# Patient Record
Sex: FEMALE | Race: WHITE | Employment: OTHER | URBAN - METROPOLITAN AREA
[De-identification: names, ages, dates, MRNs, and addresses within clinical notes are randomized per-mention and may not be internally consistent; named-entity substitution may affect disease eponyms.]

---

## 2017-05-09 ENCOUNTER — PATIENT OUTREACH (OUTPATIENT)
Dept: CASE MANAGEMENT | Age: 72
End: 2017-05-09

## 2017-05-09 NOTE — PROGRESS NOTES
Initial phone assessment completed with patient, separate call to daughter in law  Direct referral from PCP in conjunction with LPN for AWV    Patient lives alone - support through son and daughter in law  Patient with COPD - oxygen dependent   Continues to smoke - in the face of many please from family   Patient has been refusing GAMALIEL because she thinks she will not be able to smoke  Patient takes long list of medications - has trouble affording meds and also living   Refer now to SW to discuss medication assistance and possible GAMALIEL  Provided patient and daughter in law names for help in the home,  Patient says she could use help with light housekeeping, meal prep, driving to appointments, companionship    Plan - refer to SW   Follow up with SW

## 2017-05-15 ENCOUNTER — PATIENT OUTREACH (OUTPATIENT)
Dept: CASE MANAGEMENT | Age: 72
End: 2017-05-15

## 2017-05-15 NOTE — PROGRESS NOTES
(Late Entry) BYRON CM received referral from RN CM on 5/9 - attempted contact with patient's Rain VALDEZ by telephone, no answer and message left requesting call back on voice mail - will attempt second contact by 5/17 if no response has been received.

## 2017-05-16 ENCOUNTER — PATIENT OUTREACH (OUTPATIENT)
Dept: CASE MANAGEMENT | Age: 72
End: 2017-05-16

## 2017-05-17 ENCOUNTER — PATIENT OUTREACH (OUTPATIENT)
Dept: CASE MANAGEMENT | Age: 72
End: 2017-05-17

## 2017-05-17 NOTE — PROGRESS NOTES
Visit made today by this SW CM with RN CM to patient's home - Zhangdeyanira Maverickmiguel angel, also in attendance - discussed with patient importance of having a plan B in place if she was no longer able to live alone in her own apartment - patient states emphatically, at this point in time, she \"wants to remain in own home as long as possible\" - discussed assisted living as an option and patient's questions were answered and misconceptions about ALFs corrected - patient is open to talking about assisted living options and possibly touring some facilities so she will be more informed about this living option for the future - relayed that referral had been made to [address] [facility] with [facility] [employer] to come and speak with patient/DIL and son (if available) about ALFs, how fees are structured and various options availalble - SW will also provide DIL with transportation resource information available through [employer] - patient currently has a Medicare Advantage plan through [employer] - (provides all A & B coverage in addition to RX and dental coverage)  - SW to also provide DIL with contact link for [place] [facility] to speak to them and determine if a plan with more comprehensive RX coverage for what medications she is now on exists - additionally, SW will also look into drug  programs for assistance with/coverage of Entocort EC that patient cannot currently afford (will also compare them with coupon programs that are also available) - SW will continue to follow case and assist as needed.

## 2017-05-17 NOTE — PROGRESS NOTES
Home visit with patient and daughter-in-law (DIL), Aleksandr 240 also present - see her Progress Note  Patient lives in one level duplex - 1 step to enter the home  Patient lives independently, family lives nearby. Driving - could use intermittent assistance with transportation -  will provide information on this    Patient alert, well-engaged in conversation, mild [place]  As discussed with PCP prior to visit - [facility] to [facility] in [facility]    Discussion -  1-Patient unable to afford Entocort EC. She found that this worked for her when she was taking it. 2-Advanced directives:  Provided education on the importance of this  3-Nutrition - patient reports that she does not eat well  4-Falls:  Patient has had two falls that she made us aware of; has been able to call family when they occurred  5-Assisted Living - patient resistant to the idea; discussed this as a proactive \"Plan B,\"  Something best planned for when it isn't need. \"Plan for the worst, hope for the best.\"    Plan -   Provided team contact information, explained our role  Provided information re: Entocort EC discount coupons  Provided the following information to COURTNEY via email:  Sanjay Vitale is diagnosed with lymphocytic colitis. This link is from [address] [facility] [address] will tell you more than you want to know. [email or web address]    It is notable that tobacco irritates the intestinal lining causing diarrhea, cramps, bloating. Nicotine, as well can also cause nausea and stomach cramps. The other aspect of this is that smoking decreases the efficiency of food digestion, causing altered bowel movements.   If this information motivates Nirmala to stop smoking (doubtful from what you tell me), we will want to use a method of withdrawal (such as a patch) because direct withdrawal of nicotine can cause both constipation and diarrhea.    http://www.Thounds.com/. org/using-diet-changes. html  This link mentions that there is still research to be done on this type of colitis and how diet may affect it. It does, however, makes the suggestion for a gluten free diet. There are many gluten free options now in grocery stores. Whole foods are always preferable over processed foods. Easy to digest foods:  Applesauce, bananas (especially if having diarrhea), melons, rice (also will help with diarrhea). High fiber foods:  Well-cooked vegetables, beans        Probably raw greens will aggravate this condition for Nirmala  As suggested, Nirmala may want to avoid the ice cream related to the lactose  Drink plenty of water, avoid caffeine and sodas (because of the sugar)    In researching this problem, it seems like Nirmala should lean towards a bland diet. This is not as bad as it sounds. See this link:  Deidra.HireVue/the-bland-diet.html    (complete with a chart you can print out)  A lot of this is trial and error  patient will determine what works and what [facility].

## 2017-05-27 ENCOUNTER — PATIENT OUTREACH (OUTPATIENT)
Dept: CASE MANAGEMENT | Age: 72
End: 2017-05-27

## 2017-05-27 NOTE — PROGRESS NOTES
BYRON MARSHALL communicated, as requested by RN CM, with patient's daughter [address] [facility] re: names of three private pay agencies to provide assistance in patient's home, contact information for [employer] program through [employer] on aging, contact information to apply for caregiver's respite vouchers available through [employer] as well as access information for [employer] (Drug 's programs online) for assistance in obtaining Entocort EC/Budesonide - patient cannot afford RX at present time due to high co-pay - BYRON MARSHALL also received call back from [employer] on 5/26 - he stated that he had made contact with Norm Thompson (patient's daughter) and provided GAMALIEL overview and discussed possible options in ALFs availalble to patient, however,due to patient's very limited monthly income and no additional resources, is not a candidate for an GAMALIEL at this time - BYRON will contact daughter on 5/31 to discuss possible consideration of applying for Medicaid and CLTC services - current case status communicated to RN CM.

## 2017-05-29 ENCOUNTER — PATIENT OUTREACH (OUTPATIENT)
Dept: CASE MANAGEMENT | Age: 72
End: 2017-05-29

## 2017-05-29 NOTE — PROGRESS NOTES
Email message sent to patient's DIL reminding her to please let me know when patient gets an appointment with the [facility]

## 2017-06-05 ENCOUNTER — PATIENT OUTREACH (OUTPATIENT)
Dept: CASE MANAGEMENT | Age: 72
End: 2017-06-05

## 2017-06-05 NOTE — PROGRESS NOTES
Patient's DIL reports some improvement with patient  Still waiting on referral for [facility]  (i.e. Call from the [address])

## 2017-06-16 ENCOUNTER — PATIENT OUTREACH (OUTPATIENT)
Dept: CASE MANAGEMENT | Age: 72
End: 2017-06-16

## 2017-06-16 NOTE — PROGRESS NOTES
Follow up call with patient's DIL  Requested call back - trying to determine if they have heard from the [facility]

## 2017-06-19 ENCOUNTER — PATIENT OUTREACH (OUTPATIENT)
Dept: CASE MANAGEMENT | Age: 72
End: 2017-06-19

## 2017-06-19 NOTE — PROGRESS NOTES
Spoke briefly with patient  She is not aware of any call from the [facility] and doesn't feel that she needs the resources they provide. Patient is also adamant about postponing any efforts towards beginning to look at ALFs at present time. Will let patient's DIL know of conversation. Follow up call scheduled for 2 weeks.

## 2017-06-21 ENCOUNTER — PATIENT OUTREACH (OUTPATIENT)
Dept: CASE MANAGEMENT | Age: 72
End: 2017-06-21

## 2017-06-21 NOTE — PROGRESS NOTES
BYRON MARSHALL made telephone contact with patient's DIL, Denton Orestes, today - we discussed patient's currnt status (she states that patient's overall affect is \"happier\" and patient now has a better understanding of her Colitis, how she was feeling when she wasn't eating properly or paying attention to the flares that she would have - we also discussed long term possibilities (Patient's income appears to be too low but SW will confirm any possibilities for consideration with [employer] ([employer]) as DIL feels these still need to be considered as a \"Plan B\" option for the future - also interested in possibly applying for Medicaid/CLTC services in the future - BYRON will have to verify that patient is not over income and will contact DIL in one week for f/u - have apprised RN CM of call content.

## 2017-07-06 ENCOUNTER — PATIENT OUTREACH (OUTPATIENT)
Dept: CASE MANAGEMENT | Age: 72
End: 2017-07-06

## 2017-07-06 NOTE — PROGRESS NOTES
BYRON CM placed call to patient's DIL, Alisha De León (T# [phone]) to discuss patient's status and  intermediate-long term patient care needs in home and how they will be met - no answer - left message requesting c/b to this BYRON - RN ROLANDO on case informed of status.

## 2017-11-05 ENCOUNTER — PATIENT OUTREACH (OUTPATIENT)
Dept: CASE MANAGEMENT | Age: 72
End: 2017-11-05

## 2017-11-06 NOTE — PROGRESS NOTES
BYRON MARSHALL received email from COURTNEY, Patria Millan, requesting contact be made with patient re: changing insurance coverage - BYRON contacted COURTNEY and spoke with her concerning what changes she needed made - from information provided by COURTNEY sounded like sky was interested in changing her Part D plan - this SW provided contact information to COURTNEY for [employer]) through the [employer] and told her to contact now during the open enrollment period which ends on December 7th - information was also emailed to COURTNEY as well - COURTNEY also mentioned that patient has been asked by each of her two sisters to move in with them - she is presently resistant to to this idea - COURTNEY cocerned that patient will find her self in a position with few options available (she has limited monthly income and cannot afford GAMALIEL) - she speaks with son fairly regularly during the week but, per COURTNEY, their relationship is strained - this SW has offered to meet with patient and possibly other family members (DIL, Son) to pursue discussion of long term planning options -COURTNEY thanked this SW and will advise if needed.

## 2017-11-13 ENCOUNTER — PATIENT OUTREACH (OUTPATIENT)
Dept: CASE MANAGEMENT | Age: 72
End: 2017-11-13

## 2017-11-13 NOTE — PROGRESS NOTES
Clinical goals met as far as patient willing to engage  Patient with good family support  Family engaged with LISW

## 2018-01-31 PROBLEM — F32.5 DEPRESSION, MAJOR, IN REMISSION (HCC): Status: ACTIVE | Noted: 2018-01-31

## 2018-08-09 ENCOUNTER — HOSPITAL ENCOUNTER (OUTPATIENT)
Dept: MAMMOGRAPHY | Age: 73
Discharge: HOME | End: 2018-08-09
Payer: MEDICARE

## 2018-08-09 ENCOUNTER — HOME HEALTH ADMISSION (OUTPATIENT)
Dept: HOME HEALTH SERVICES | Facility: HOME HEALTH | Age: 73
End: 2018-08-09
Payer: MEDICARE

## 2018-08-09 DIAGNOSIS — Z12.31 ENCOUNTER FOR SCREENING MAMMOGRAM FOR MALIGNANT NEOPLASM OF BREAST: ICD-10-CM

## 2018-08-09 PROCEDURE — 77067 SCR MAMMO BI INCL CAD: CPT

## 2018-08-10 ENCOUNTER — PATIENT OUTREACH (OUTPATIENT)
Dept: CASE MANAGEMENT | Age: 73
End: 2018-08-10

## 2018-08-10 ENCOUNTER — HOME CARE VISIT (OUTPATIENT)
Dept: SCHEDULING | Facility: HOME HEALTH | Age: 73
End: 2018-08-10
Payer: MEDICARE

## 2018-08-10 PROCEDURE — G0151 HHCP-SERV OF PT,EA 15 MIN: HCPCS

## 2018-08-10 PROCEDURE — 400013 HH SOC

## 2018-08-10 NOTE — PROGRESS NOTES
Please let patient know her MMG showed : IMPRESSION: Increased focal asymmetry in the medial left breast.  Compression views are recommended. Darry Goodpasture will get in touch with her to schedule it.     Thanks,  Dr. Richard Gomez

## 2018-08-10 NOTE — PROGRESS NOTES
BYRON MARSHALL called to talk with pt about community resources that can help her better with transportation and around her home. Pt was having a very hard time hearing BYRON MARSHALL on the phone. BYRON MARSHALL asked pt if she would prefer a home visit over telephone interview and pt said she would much prefer a home visit. Pt asked if BYRON MARSHALL could come out Tuesday next week. BYRON MARSHALL let to know that would work well. PLAN:  BYRON MARSHALL will make a home visit Tues. Aug. 14th to help assess pt's needs and provide resources. This note will not be viewable in [address]

## 2018-08-12 VITALS
DIASTOLIC BLOOD PRESSURE: 80 MMHG | OXYGEN SATURATION: 94 % | RESPIRATION RATE: 17 BRPM | HEART RATE: 79 BPM | SYSTOLIC BLOOD PRESSURE: 124 MMHG

## 2018-08-14 ENCOUNTER — PATIENT OUTREACH (OUTPATIENT)
Dept: CASE MANAGEMENT | Age: 73
End: 2018-08-14

## 2018-08-14 ENCOUNTER — HOME CARE VISIT (OUTPATIENT)
Dept: SCHEDULING | Facility: HOME HEALTH | Age: 73
End: 2018-08-14
Payer: MEDICARE

## 2018-08-14 VITALS
RESPIRATION RATE: 20 BRPM | OXYGEN SATURATION: 97 % | HEART RATE: 76 BPM | TEMPERATURE: 99.5 F | SYSTOLIC BLOOD PRESSURE: 128 MMHG | DIASTOLIC BLOOD PRESSURE: 68 MMHG

## 2018-08-14 PROCEDURE — G0157 HHC PT ASSISTANT EA 15: HCPCS

## 2018-08-15 ENCOUNTER — HOME CARE VISIT (OUTPATIENT)
Dept: SCHEDULING | Facility: HOME HEALTH | Age: 73
End: 2018-08-15
Payer: MEDICARE

## 2018-08-15 PROCEDURE — G0155 HHCP-SVS OF CSW,EA 15 MIN: HCPCS

## 2018-08-16 ENCOUNTER — HOME CARE VISIT (OUTPATIENT)
Dept: SCHEDULING | Facility: HOME HEALTH | Age: 73
End: 2018-08-16
Payer: MEDICARE

## 2018-08-16 ENCOUNTER — PATIENT OUTREACH (OUTPATIENT)
Dept: CASE MANAGEMENT | Age: 73
End: 2018-08-16

## 2018-08-16 VITALS
OXYGEN SATURATION: 96 % | SYSTOLIC BLOOD PRESSURE: 138 MMHG | RESPIRATION RATE: 20 BRPM | TEMPERATURE: 97.8 F | DIASTOLIC BLOOD PRESSURE: 72 MMHG | HEART RATE: 76 BPM

## 2018-08-16 PROCEDURE — G0157 HHC PT ASSISTANT EA 15: HCPCS

## 2018-08-20 ENCOUNTER — PATIENT OUTREACH (OUTPATIENT)
Dept: CASE MANAGEMENT | Age: 73
End: 2018-08-20

## 2018-08-21 ENCOUNTER — HOME CARE VISIT (OUTPATIENT)
Dept: SCHEDULING | Facility: HOME HEALTH | Age: 73
End: 2018-08-21
Payer: MEDICARE

## 2018-08-21 ENCOUNTER — PATIENT OUTREACH (OUTPATIENT)
Dept: CASE MANAGEMENT | Age: 73
End: 2018-08-21

## 2018-08-21 VITALS
TEMPERATURE: 98.1 F | RESPIRATION RATE: 18 BRPM | SYSTOLIC BLOOD PRESSURE: 148 MMHG | HEART RATE: 72 BPM | OXYGEN SATURATION: 97 % | DIASTOLIC BLOOD PRESSURE: 66 MMHG

## 2018-08-21 PROCEDURE — G0157 HHC PT ASSISTANT EA 15: HCPCS

## 2018-08-21 NOTE — Clinical Note
Just an FYI,  I wasn't able to connect with this patient to do a home visit, but she's really connected with Nava  13.  and feels comfortable with her. I'm going to step out and just let the SW through home health assist the patient at this time. Please let me know if any new needs arise in the future.   [facility]

## 2018-08-21 NOTE — PROGRESS NOTES
Pt called BYRON MARSHALL back. Pt stated that she likes the SW who's coming out through [facility] right now and doesn't want to have SW CM coming out too. BYRON MARSHALL let pt know that was fine and that she could just work with the SW through [facility]. PLAN:  BYRON MARSHALL will remove herself from pt's care at this time. Pt is connected with SW through [facility]. This note will not be viewable in [address]

## 2018-08-22 ENCOUNTER — PATIENT OUTREACH (OUTPATIENT)
Dept: CASE MANAGEMENT | Age: 73
End: 2018-08-22

## 2018-08-22 ENCOUNTER — HOME CARE VISIT (OUTPATIENT)
Dept: SCHEDULING | Facility: HOME HEALTH | Age: 73
End: 2018-08-22
Payer: MEDICARE

## 2018-08-22 PROCEDURE — G0155 HHCP-SVS OF CSW,EA 15 MIN: HCPCS

## 2018-08-22 NOTE — PROGRESS NOTES
Medication Adherence Outreach    Date/Time of Call:  8/22/2018  9:40 am    Name of medication and dosage:   * Atorvastatin 80 mg 1 every day    Does the patient know the purpose and dosage of medication? * Yes    Are you getting a #30 day or #90 day supply of your medication? * 90 day supply    Are you still taking this medication? If not, why? * Yes    Transportation issues or any problems paying for the medication or other reason? * No    What pharmacy are you using to fill your medication and do you know the last time that you got your medication filled? * [facility]   Patient is currently transferring medication to [facility].   * Last refill 8/8/2018   Are you having any side effects from taking your medication? * No          Any other questions or concerns expressed by the patient. * No    Comments:     * Discussed medication adherence with Mrs. Bueno and she states she has no current barriers to prevent her from obtaining or taking her medication.        Call Completed By:  [address]

## 2018-08-23 ENCOUNTER — HOME CARE VISIT (OUTPATIENT)
Dept: HOME HEALTH SERVICES | Facility: HOME HEALTH | Age: 73
End: 2018-08-23
Payer: MEDICARE

## 2018-08-23 ENCOUNTER — HOME CARE VISIT (OUTPATIENT)
Dept: SCHEDULING | Facility: HOME HEALTH | Age: 73
End: 2018-08-23
Payer: MEDICARE

## 2018-08-23 PROCEDURE — G0157 HHC PT ASSISTANT EA 15: HCPCS

## 2018-08-24 VITALS
TEMPERATURE: 97.4 F | RESPIRATION RATE: 18 BRPM | SYSTOLIC BLOOD PRESSURE: 138 MMHG | OXYGEN SATURATION: 95 % | HEART RATE: 72 BPM | DIASTOLIC BLOOD PRESSURE: 72 MMHG

## 2018-08-28 ENCOUNTER — HOME CARE VISIT (OUTPATIENT)
Dept: SCHEDULING | Facility: HOME HEALTH | Age: 73
End: 2018-08-28
Payer: MEDICARE

## 2018-08-28 PROCEDURE — G0157 HHC PT ASSISTANT EA 15: HCPCS

## 2018-08-29 VITALS
TEMPERATURE: 97.8 F | OXYGEN SATURATION: 97 % | RESPIRATION RATE: 16 BRPM | SYSTOLIC BLOOD PRESSURE: 138 MMHG | DIASTOLIC BLOOD PRESSURE: 68 MMHG | HEART RATE: 78 BPM

## 2018-08-30 ENCOUNTER — HOME CARE VISIT (OUTPATIENT)
Dept: SCHEDULING | Facility: HOME HEALTH | Age: 73
End: 2018-08-30
Payer: MEDICARE

## 2018-08-30 VITALS
OXYGEN SATURATION: 97 % | DIASTOLIC BLOOD PRESSURE: 86 MMHG | TEMPERATURE: 98.1 F | HEART RATE: 66 BPM | SYSTOLIC BLOOD PRESSURE: 128 MMHG | RESPIRATION RATE: 18 BRPM

## 2018-08-30 PROCEDURE — G0157 HHC PT ASSISTANT EA 15: HCPCS

## 2018-09-03 ENCOUNTER — HOME CARE VISIT (OUTPATIENT)
Dept: SCHEDULING | Facility: HOME HEALTH | Age: 73
End: 2018-09-03
Payer: MEDICARE

## 2018-09-03 VITALS
SYSTOLIC BLOOD PRESSURE: 123 MMHG | DIASTOLIC BLOOD PRESSURE: 65 MMHG | RESPIRATION RATE: 17 BRPM | OXYGEN SATURATION: 98 % | HEART RATE: 78 BPM | TEMPERATURE: 98.1 F

## 2018-09-03 PROCEDURE — G0151 HHCP-SERV OF PT,EA 15 MIN: HCPCS

## 2018-09-13 ENCOUNTER — HOSPITAL ENCOUNTER (OUTPATIENT)
Dept: MAMMOGRAPHY | Age: 73
Discharge: HOME | End: 2018-09-13
Attending: FAMILY MEDICINE
Payer: MEDICARE

## 2018-09-13 DIAGNOSIS — R92.8 ABNORMAL SCREENING MAMMOGRAM: ICD-10-CM

## 2018-09-13 DIAGNOSIS — M89.9 BONE DISORDER: ICD-10-CM

## 2018-09-13 PROCEDURE — 77065 DX MAMMO INCL CAD UNI: CPT

## 2018-09-13 PROCEDURE — 77080 DXA BONE DENSITY AXIAL: CPT

## 2018-09-14 NOTE — PROGRESS NOTES
Please inform patient her Mammogram is abnormal .    Radiologist is recommending additional imaging evaluation. [facility] will get in touch with her to schedule it.     Thanks,  Dr. Sapp

## 2018-09-17 NOTE — PROGRESS NOTES
Please let patient know her Dexa showed : Impression:  Using the World Health Organization criteria, the bone mineral density is  borderline osteoporotic, slightly improved since prior. Continue Fosamax & Vit D as directed.     Thanks,  Dr. Dipak Mendoza

## 2019-04-23 ENCOUNTER — HOSPITAL ENCOUNTER (OUTPATIENT)
Dept: CT IMAGING | Age: 74
Discharge: HOME | End: 2019-04-23
Attending: INTERNAL MEDICINE
Payer: MEDICARE

## 2019-04-23 DIAGNOSIS — R63.4 WEIGHT LOSS: ICD-10-CM

## 2019-04-23 DIAGNOSIS — R10.9 ABDOMINAL PAIN: ICD-10-CM

## 2019-04-23 PROCEDURE — 74177 CT ABD & PELVIS W/CONTRAST: CPT

## 2019-04-23 PROCEDURE — 74011000258 HC RX REV CODE- 258: Performed by: INTERNAL MEDICINE

## 2019-04-23 PROCEDURE — 74011636320 HC RX REV CODE- 636/320: Performed by: INTERNAL MEDICINE

## 2019-04-23 RX ORDER — SODIUM CHLORIDE 0.9 % (FLUSH) 0.9 %
10 SYRINGE (ML) INJECTION
Status: COMPLETED | OUTPATIENT
Start: 2019-04-23 | End: 2019-04-23

## 2019-04-23 RX ADMIN — DIATRIZOATE MEGLUMINE AND DIATRIZOATE SODIUM 15 ML: 660; 100 LIQUID ORAL; RECTAL at 10:52

## 2019-04-23 RX ADMIN — SODIUM CHLORIDE 100 ML: 900 INJECTION, SOLUTION INTRAVENOUS at 10:52

## 2019-04-23 RX ADMIN — IOPAMIDOL 100 ML: 755 INJECTION, SOLUTION INTRAVENOUS at 10:52

## 2019-04-23 RX ADMIN — Medication 10 ML: at 10:52

## 2019-06-12 ENCOUNTER — HOSPITAL ENCOUNTER (OUTPATIENT)
Dept: LAB | Age: 74
Discharge: HOME | End: 2019-06-12
Payer: MEDICARE

## 2019-06-12 DIAGNOSIS — D72.820 LYMPHOCYTOSIS: ICD-10-CM

## 2019-06-12 DIAGNOSIS — R16.1 SPLENOMEGALY: ICD-10-CM

## 2019-06-12 LAB
ALBUMIN SERPL-MCNC: 3.7 G/DL (ref 3.2–4.6)
ALBUMIN/GLOB SERPL: 1.4 {RATIO} (ref 1.2–3.5)
ALP SERPL-CCNC: 71 U/L (ref 50–136)
ALT SERPL-CCNC: 20 U/L (ref 12–65)
ANION GAP SERPL CALC-SCNC: 9 MMOL/L (ref 7–16)
AST SERPL-CCNC: 16 U/L (ref 15–37)
BASOPHILS # BLD: 0.1 K/UL (ref 0–0.2)
BASOPHILS NFR BLD: 1 % (ref 0–2)
BILIRUB SERPL-MCNC: 0.4 MG/DL (ref 0.2–1.1)
BUN SERPL-MCNC: 12 MG/DL (ref 8–23)
CALCIUM SERPL-MCNC: 9 MG/DL (ref 8.3–10.4)
CHLORIDE SERPL-SCNC: 107 MMOL/L (ref 98–107)
CO2 SERPL-SCNC: 23 MMOL/L (ref 21–32)
CREAT SERPL-MCNC: 1.09 MG/DL (ref 0.6–1)
CRP SERPL-MCNC: 0.9 MG/DL (ref 0–0.9)
DIFFERENTIAL METHOD BLD: ABNORMAL
EOSINOPHIL # BLD: 0 K/UL (ref 0–0.8)
EOSINOPHIL NFR BLD: 1 % (ref 0.5–7.8)
ERYTHROCYTE [DISTWIDTH] IN BLOOD BY AUTOMATED COUNT: 13.4 % (ref 11.9–14.6)
ERYTHROCYTE [SEDIMENTATION RATE] IN BLOOD: 16 MM/HR (ref 0–30)
FOLATE SERPL-MCNC: 9.9 NG/ML (ref 3.1–17.5)
GLOBULIN SER CALC-MCNC: 2.7 G/DL (ref 2.3–3.5)
GLUCOSE SERPL-MCNC: 95 MG/DL (ref 65–100)
HAPTOGLOB SERPL-MCNC: 198 MG/DL (ref 30–200)
HCT VFR BLD AUTO: 38.7 % (ref 35.8–46.3)
HGB BLD-MCNC: 12.8 G/DL (ref 11.7–15.4)
IMM GRANULOCYTES # BLD AUTO: 0 K/UL (ref 0–0.5)
IMM GRANULOCYTES NFR BLD AUTO: 1 % (ref 0–5)
LDH SERPL L TO P-CCNC: 150 U/L (ref 110–210)
LYMPHOCYTES # BLD: 1 K/UL (ref 0.5–4.6)
LYMPHOCYTES NFR BLD: 12 % (ref 13–44)
MCH RBC QN AUTO: 31.1 PG (ref 26.1–32.9)
MCHC RBC AUTO-ENTMCNC: 33.1 G/DL (ref 31.4–35)
MCV RBC AUTO: 94.2 FL (ref 79.6–97.8)
MONOCYTES # BLD: 0.4 K/UL (ref 0.1–1.3)
MONOCYTES NFR BLD: 5 % (ref 4–12)
NEUTS SEG # BLD: 6.7 K/UL (ref 1.7–8.2)
NEUTS SEG NFR BLD: 82 % (ref 43–78)
NRBC # BLD: 0 K/UL (ref 0–0.2)
PLATELET # BLD AUTO: 218 K/UL (ref 150–450)
PMV BLD AUTO: 10.9 FL (ref 9.4–12.3)
POTASSIUM SERPL-SCNC: 4.1 MMOL/L (ref 3.5–5.1)
PROT SERPL-MCNC: 6.4 G/DL (ref 6.3–8.2)
RBC # BLD AUTO: 4.11 M/UL (ref 4.05–5.25)
RHEUMATOID FACT SER QL LA: NEGATIVE
SODIUM SERPL-SCNC: 139 MMOL/L (ref 136–145)
VIT B12 SERPL-MCNC: 739 PG/ML (ref 193–986)
WBC # BLD AUTO: 8.2 K/UL (ref 4.3–11.1)

## 2019-06-12 PROCEDURE — 83615 LACTATE (LD) (LDH) ENZYME: CPT

## 2019-06-12 PROCEDURE — 85025 COMPLETE CBC W/AUTO DIFF WBC: CPT

## 2019-06-12 PROCEDURE — 82607 VITAMIN B-12: CPT

## 2019-06-12 PROCEDURE — 86038 ANTINUCLEAR ANTIBODIES: CPT

## 2019-06-12 PROCEDURE — 86803 HEPATITIS C AB TEST: CPT

## 2019-06-12 PROCEDURE — 86140 C-REACTIVE PROTEIN: CPT

## 2019-06-12 PROCEDURE — 86430 RHEUMATOID FACTOR TEST QUAL: CPT

## 2019-06-12 PROCEDURE — 85652 RBC SED RATE AUTOMATED: CPT

## 2019-06-12 PROCEDURE — 87389 HIV-1 AG W/HIV-1&-2 AB AG IA: CPT

## 2019-06-12 PROCEDURE — 88184 FLOWCYTOMETRY/ TC 1 MARKER: CPT

## 2019-06-12 PROCEDURE — 88185 FLOWCYTOMETRY/TC ADD-ON: CPT

## 2019-06-12 PROCEDURE — 82746 ASSAY OF FOLIC ACID SERUM: CPT

## 2019-06-12 PROCEDURE — 83010 ASSAY OF HAPTOGLOBIN QUANT: CPT

## 2019-06-12 PROCEDURE — 80053 COMPREHEN METABOLIC PANEL: CPT

## 2019-06-12 PROCEDURE — 36415 COLL VENOUS BLD VENIPUNCTURE: CPT

## 2019-06-13 LAB
HCV AB S/CO SERPL IA: <0.1 S/CO RATIO (ref 0–0.9)
HIV 1+2 AB+HIV1 P24 AG SERPL QL IA: NON REACTIVE

## 2019-06-14 PROBLEM — D72.820 LYMPHOCYTOSIS: Status: ACTIVE | Noted: 2019-06-14

## 2019-06-14 PROBLEM — R16.1 SPLENOMEGALY: Status: ACTIVE | Noted: 2019-06-14

## 2019-06-14 PROBLEM — D72.829 LEUKOCYTOSIS: Status: ACTIVE | Noted: 2019-06-14

## 2019-06-14 LAB
ANA SER QL: NEGATIVE
FLOW CYTOMETRY, FBTC1: NORMAL
PATH REV BLD -IMP: NORMAL
SPECIMEN SOURCE: NORMAL
TEST ORDERED:: NORMAL

## 2019-08-19 PROBLEM — K52.9 CHRONIC DIARRHEA: Status: ACTIVE | Noted: 2019-08-19

## 2019-10-30 ENCOUNTER — HOSPITAL ENCOUNTER (OUTPATIENT)
Dept: LAB | Age: 74
Discharge: HOME | End: 2019-10-30
Payer: MEDICARE

## 2019-10-30 DIAGNOSIS — R16.1 SPLENOMEGALY: ICD-10-CM

## 2019-10-30 DIAGNOSIS — R79.89 CREATININE ELEVATION: ICD-10-CM

## 2019-10-30 LAB
ALBUMIN SERPL-MCNC: 3.6 G/DL (ref 3.2–4.6)
ALBUMIN/GLOB SERPL: 1.2 {RATIO} (ref 1.2–3.5)
ALP SERPL-CCNC: 66 U/L (ref 50–136)
ALT SERPL-CCNC: 25 U/L (ref 12–65)
ANION GAP SERPL CALC-SCNC: 6 MMOL/L (ref 7–16)
APPEARANCE UR: CLEAR
AST SERPL-CCNC: 15 U/L (ref 15–37)
BASOPHILS # BLD: 0 K/UL (ref 0–0.2)
BASOPHILS NFR BLD: 1 % (ref 0–2)
BILIRUB SERPL-MCNC: 0.3 MG/DL (ref 0.2–1.1)
BILIRUB UR QL: NEGATIVE
BUN SERPL-MCNC: 19 MG/DL (ref 8–23)
CALCIUM SERPL-MCNC: 8.1 MG/DL (ref 8.3–10.4)
CHLORIDE SERPL-SCNC: 112 MMOL/L (ref 98–107)
CO2 SERPL-SCNC: 23 MMOL/L (ref 21–32)
COLOR UR: YELLOW
CREAT SERPL-MCNC: 1.43 MG/DL (ref 0.6–1)
DIFFERENTIAL METHOD BLD: ABNORMAL
EOSINOPHIL # BLD: 0 K/UL (ref 0–0.8)
EOSINOPHIL NFR BLD: 1 % (ref 0.5–7.8)
ERYTHROCYTE [DISTWIDTH] IN BLOOD BY AUTOMATED COUNT: 13.9 % (ref 11.9–14.6)
GLOBULIN SER CALC-MCNC: 2.9 G/DL (ref 2.3–3.5)
GLUCOSE SERPL-MCNC: 99 MG/DL (ref 65–100)
GLUCOSE UR STRIP.AUTO-MCNC: NEGATIVE MG/DL
HCT VFR BLD AUTO: 38.9 % (ref 35.8–46.3)
HGB BLD-MCNC: 12.3 G/DL (ref 11.7–15.4)
HGB UR QL STRIP: NEGATIVE
IMM GRANULOCYTES # BLD AUTO: 0.1 K/UL (ref 0–0.5)
IMM GRANULOCYTES NFR BLD AUTO: 1 % (ref 0–5)
KETONES UR QL STRIP.AUTO: NEGATIVE MG/DL
LDH SERPL L TO P-CCNC: 151 U/L (ref 110–210)
LEUKOCYTE ESTERASE UR QL STRIP.AUTO: NEGATIVE
LYMPHOCYTES # BLD: 0.9 K/UL (ref 0.5–4.6)
LYMPHOCYTES NFR BLD: 10 % (ref 13–44)
MCH RBC QN AUTO: 30.4 PG (ref 26.1–32.9)
MCHC RBC AUTO-ENTMCNC: 31.6 G/DL (ref 31.4–35)
MCV RBC AUTO: 96.3 FL (ref 79.6–97.8)
MONOCYTES # BLD: 0.5 K/UL (ref 0.1–1.3)
MONOCYTES NFR BLD: 6 % (ref 4–12)
NEUTS SEG # BLD: 7.2 K/UL (ref 1.7–8.2)
NEUTS SEG NFR BLD: 81 % (ref 43–78)
NITRITE UR QL STRIP.AUTO: NEGATIVE
NRBC # BLD: 0 K/UL (ref 0–0.2)
PH UR STRIP: 7 [PH] (ref 5–9)
PLATELET # BLD AUTO: 182 K/UL (ref 150–450)
PMV BLD AUTO: 11.2 FL (ref 9.4–12.3)
POTASSIUM SERPL-SCNC: 4.5 MMOL/L (ref 3.5–5.1)
PROT SERPL-MCNC: 6.5 G/DL (ref 6.3–8.2)
PROT UR STRIP-MCNC: NEGATIVE MG/DL
RBC # BLD AUTO: 4.04 M/UL (ref 4.05–5.25)
SODIUM SERPL-SCNC: 141 MMOL/L (ref 136–145)
SP GR UR REFRACTOMETRY: 1.01 (ref 1–1.02)
UROBILINOGEN UR QL STRIP.AUTO: 0.2 EU/DL (ref 0.2–1)
WBC # BLD AUTO: 8.7 K/UL (ref 4.3–11.1)

## 2019-10-30 PROCEDURE — 81003 URINALYSIS AUTO W/O SCOPE: CPT

## 2019-10-30 PROCEDURE — 85025 COMPLETE CBC W/AUTO DIFF WBC: CPT

## 2019-10-30 PROCEDURE — 80053 COMPREHEN METABOLIC PANEL: CPT

## 2019-10-30 PROCEDURE — 83615 LACTATE (LD) (LDH) ENZYME: CPT

## 2019-10-30 PROCEDURE — 36415 COLL VENOUS BLD VENIPUNCTURE: CPT

## 2019-11-10 PROBLEM — R79.89 CREATININE ELEVATION: Status: ACTIVE | Noted: 2019-11-10

## 2019-11-10 PROBLEM — L98.9 SKIN LESION: Status: ACTIVE | Noted: 2019-11-10

## 2020-01-10 ENCOUNTER — HOSPITAL ENCOUNTER (OUTPATIENT)
Dept: MAMMOGRAPHY | Age: 75
Discharge: HOME | End: 2020-01-10
Attending: FAMILY MEDICINE
Payer: MEDICARE

## 2020-01-10 DIAGNOSIS — Z12.31 VISIT FOR SCREENING MAMMOGRAM: ICD-10-CM

## 2020-01-10 PROCEDURE — 77063 BREAST TOMOSYNTHESIS BI: CPT

## 2020-01-13 NOTE — PROGRESS NOTES
Dear Mrs. Bueno     Your recent mammogram showed :No mammographic evidence of malignancy.      Recommend annual mammogram in one year.  A reminder letter will be scheduled.     Thanks,  Dr. Dipak Mendoza

## 2020-02-10 ENCOUNTER — HOSPITAL ENCOUNTER (OUTPATIENT)
Dept: LAB | Age: 75
Discharge: HOME | End: 2020-02-10
Payer: MEDICARE

## 2020-02-10 DIAGNOSIS — R16.1 SPLENOMEGALY: ICD-10-CM

## 2020-02-10 LAB
ALBUMIN SERPL-MCNC: 3.2 G/DL (ref 3.2–4.6)
ALBUMIN/GLOB SERPL: 1.1 {RATIO} (ref 1.2–3.5)
ALP SERPL-CCNC: 77 U/L (ref 50–136)
ALT SERPL-CCNC: 16 U/L (ref 12–65)
ANION GAP SERPL CALC-SCNC: 4 MMOL/L (ref 7–16)
AST SERPL-CCNC: 12 U/L (ref 15–37)
BASOPHILS # BLD: 0.1 K/UL (ref 0–0.2)
BASOPHILS NFR BLD: 1 % (ref 0–2)
BILIRUB SERPL-MCNC: 0.3 MG/DL (ref 0.2–1.1)
BUN SERPL-MCNC: 14 MG/DL (ref 8–23)
CALCIUM SERPL-MCNC: 8.4 MG/DL (ref 8.3–10.4)
CHLORIDE SERPL-SCNC: 112 MMOL/L (ref 98–107)
CO2 SERPL-SCNC: 25 MMOL/L (ref 21–32)
CREAT SERPL-MCNC: 1.12 MG/DL (ref 0.6–1)
DIFFERENTIAL METHOD BLD: ABNORMAL
EOSINOPHIL # BLD: 0.1 K/UL (ref 0–0.8)
EOSINOPHIL NFR BLD: 1 % (ref 0.5–7.8)
ERYTHROCYTE [DISTWIDTH] IN BLOOD BY AUTOMATED COUNT: 13.8 % (ref 11.9–14.6)
GLOBULIN SER CALC-MCNC: 2.9 G/DL (ref 2.3–3.5)
GLUCOSE SERPL-MCNC: 88 MG/DL (ref 65–100)
HCT VFR BLD AUTO: 37.6 % (ref 35.8–46.3)
HGB BLD-MCNC: 12 G/DL (ref 11.7–15.4)
IMM GRANULOCYTES # BLD AUTO: 0.1 K/UL (ref 0–0.5)
IMM GRANULOCYTES NFR BLD AUTO: 1 % (ref 0–5)
LDH SERPL L TO P-CCNC: 168 U/L (ref 110–210)
LYMPHOCYTES # BLD: 1.8 K/UL (ref 0.5–4.6)
LYMPHOCYTES NFR BLD: 20 % (ref 13–44)
MCH RBC QN AUTO: 30.4 PG (ref 26.1–32.9)
MCHC RBC AUTO-ENTMCNC: 31.9 G/DL (ref 31.4–35)
MCV RBC AUTO: 95.2 FL (ref 79.6–97.8)
MONOCYTES # BLD: 0.6 K/UL (ref 0.1–1.3)
MONOCYTES NFR BLD: 7 % (ref 4–12)
NEUTS SEG # BLD: 6.3 K/UL (ref 1.7–8.2)
NEUTS SEG NFR BLD: 71 % (ref 43–78)
NRBC # BLD: 0 K/UL (ref 0–0.2)
PLATELET # BLD AUTO: 212 K/UL (ref 150–450)
PMV BLD AUTO: 11 FL (ref 9.4–12.3)
POTASSIUM SERPL-SCNC: 4.6 MMOL/L (ref 3.5–5.1)
PROT SERPL-MCNC: 6.1 G/DL (ref 6.3–8.2)
RBC # BLD AUTO: 3.95 M/UL (ref 4.05–5.25)
SODIUM SERPL-SCNC: 141 MMOL/L (ref 136–145)
WBC # BLD AUTO: 8.9 K/UL (ref 4.3–11.1)

## 2020-02-10 PROCEDURE — 83615 LACTATE (LD) (LDH) ENZYME: CPT

## 2020-02-10 PROCEDURE — 80053 COMPREHEN METABOLIC PANEL: CPT

## 2020-02-10 PROCEDURE — 36415 COLL VENOUS BLD VENIPUNCTURE: CPT

## 2020-02-10 PROCEDURE — 85025 COMPLETE CBC W/AUTO DIFF WBC: CPT

## 2020-02-13 ENCOUNTER — HOSPITAL ENCOUNTER (EMERGENCY)
Age: 75
Discharge: HOME | End: 2020-02-13
Attending: EMERGENCY MEDICINE
Payer: MEDICARE

## 2020-02-13 ENCOUNTER — HOSPITAL ENCOUNTER (OUTPATIENT)
Dept: GENERAL RADIOLOGY | Age: 75
Discharge: HOME | End: 2020-02-13
Attending: EMERGENCY MEDICINE
Payer: MEDICARE

## 2020-02-13 ENCOUNTER — HOSPITAL ENCOUNTER (OUTPATIENT)
Dept: CT IMAGING | Age: 75
Discharge: HOME | End: 2020-02-13
Attending: FAMILY MEDICINE
Payer: MEDICARE

## 2020-02-13 VITALS
RESPIRATION RATE: 18 BRPM | BODY MASS INDEX: 22.51 KG/M2 | TEMPERATURE: 98.4 F | SYSTOLIC BLOOD PRESSURE: 120 MMHG | WEIGHT: 152 LBS | DIASTOLIC BLOOD PRESSURE: 70 MMHG | OXYGEN SATURATION: 94 % | HEIGHT: 69 IN | HEART RATE: 70 BPM

## 2020-02-13 DIAGNOSIS — G44.221 CHRONIC TENSION-TYPE HEADACHE, INTRACTABLE: ICD-10-CM

## 2020-02-13 DIAGNOSIS — S20.219A STERNAL CONTUSION, INITIAL ENCOUNTER: Primary | ICD-10-CM

## 2020-02-13 DIAGNOSIS — S61.511A TEAR OF SKIN OF RIGHT WRIST, INITIAL ENCOUNTER: ICD-10-CM

## 2020-02-13 PROCEDURE — 74011250636 HC RX REV CODE- 250/636: Performed by: EMERGENCY MEDICINE

## 2020-02-13 PROCEDURE — 70450 CT HEAD/BRAIN W/O DYE: CPT

## 2020-02-13 PROCEDURE — 96372 THER/PROPH/DIAG INJ SC/IM: CPT

## 2020-02-13 PROCEDURE — 71046 X-RAY EXAM CHEST 2 VIEWS: CPT

## 2020-02-13 PROCEDURE — 74011250637 HC RX REV CODE- 250/637: Performed by: EMERGENCY MEDICINE

## 2020-02-13 PROCEDURE — 99283 EMERGENCY DEPT VISIT LOW MDM: CPT

## 2020-02-13 PROCEDURE — 90715 TDAP VACCINE 7 YRS/> IM: CPT | Performed by: EMERGENCY MEDICINE

## 2020-02-13 PROCEDURE — 90471 IMMUNIZATION ADMIN: CPT

## 2020-02-13 RX ORDER — KETOROLAC TROMETHAMINE 30 MG/ML
30 INJECTION, SOLUTION INTRAMUSCULAR; INTRAVENOUS
Status: COMPLETED | OUTPATIENT
Start: 2020-02-13 | End: 2020-02-13

## 2020-02-13 RX ORDER — ONDANSETRON 8 MG/1
8 TABLET, ORALLY DISINTEGRATING ORAL
Status: COMPLETED | OUTPATIENT
Start: 2020-02-13 | End: 2020-02-13

## 2020-02-13 RX ADMIN — KETOROLAC TROMETHAMINE 30 MG: 30 INJECTION, SOLUTION INTRAMUSCULAR at 15:31

## 2020-02-13 RX ADMIN — TETANUS TOXOID, REDUCED DIPHTHERIA TOXOID AND ACELLULAR PERTUSSIS VACCINE, ADSORBED 0.5 ML: 5; 2.5; 8; 8; 2.5 SUSPENSION INTRAMUSCULAR at 15:31

## 2020-02-13 RX ADMIN — ONDANSETRON 8 MG: 8 TABLET, ORALLY DISINTEGRATING ORAL at 15:31

## 2020-02-13 NOTE — ED PROVIDER NOTES
Patient fell while standing up off the toilet into a nightstand just in front of her. Hit chest first over her sternum. Also suffered a skin tear to her right wrist on the forearm. Has worse pain with breathing. Also with palpation. Did Not hit her head or lose consciousness. No neck pain. The history is provided by the patient. No  was used. Fall   The accident occurred 3 to 5 hours ago. The fall occurred while standing. She fell from a height of ground level. Impact surface: nightstand. The volume of blood lost was minimal. The point of impact was the right wrist (chest). The pain is present in the right wrist (chest). The pain is moderate. She was ambulatory at the scene. Associated symptoms include nausea and laceration (skin tear riight wrist). Pertinent negatives include no visual change, no fever, no numbness, no abdominal pain, no bowel incontinence, no vomiting, no hematuria, no headaches, no extremity weakness, no loss of consciousness and no tingling. The risk factors include being elderly. The symptoms are aggravated by pressure on injury. She has tried nothing for the symptoms. It is unknown when the patient last had a tetanus shot.         Past Medical History:   Diagnosis Date    Anxiety 3/6/2013    Arthritis     osteoarthritis    B12 deficiency 3/6/2013    Chronic back pain 3/6/2013    Chronic kidney disease (CKD) stage G3b/A1, moderately decreased glomerular filtration rate (GFR) between 30-44 mL/min/1.73 square meter and albuminuria creatinine ratio less than 30 mg/g ([facility]) 10/14/2016    Constipation 11/4/2015    COPD 2006    [facility]/ patient on 2.5 liters O2 nightly at home    Decreased GFR 5/26/2016    Depression 10/25/2012    Depression 10/25/2012    Depression, major, in remission ([facility] [address] 1/31/2018    diverticulitis     Encounter for long-term (current) use of other medications 3/6/2013    Environmental allergies 3/6/2013    Fecal incontinence 12/7/2016    Fibromyalgia 9/15/2015    GERD (gastroesophageal reflux disease) \"years\"    Headache(784.0) 2/12/2013    HTN (hypertension) 10/25/2012    HTN (hypertension) 10/25/2012    Hyperlipidemia 10/25/2012    Lymphocytic colitis 65/8/9348    Metabolic syndrome 6/2/7783    Microalbuminuria 11/4/2015    Osteoporosis 10/25/2012    polypectomy     PUD (peptic ulcer disease) \"years ago\"    Had peptic ulcers    thyroid mass     partial removal, benign    Unspecified adverse effect of anesthesia     PATIENT STATES SHE DOESN'T GET GENERAL ANESTHESIA DUE TO COPD    Unspecified sleep apnea     wears O2 at night.   no c-jeffrey    Vitamin D deficiency 3/6/2013       Past Surgical History:   Procedure Laterality Date    BREAST SURGERY PROCEDURE UNLISTED  1980s    cyst removed from right breast/benign    COLOSTOMY  1990s    placed, then reversed    HX APPENDECTOMY  1970s    HX BREAST BIOPSY Right     excision of cyst    HX CATARACT REMOVAL Right 2016    right eye    HX CHOLECYSTECTOMY  1970s    HX GYN  1970s    TIA BSO    HX HERNIA REPAIR  unknown    hiatal hernia repair    HX ORTHOPAEDIC  [id number]    right knee replaced and then left the next year    HX PARTIAL THYROIDECTOMY  1990s    [facility]         Family History:   Problem Relation Age of Onset    Kidney Disease Mother     Heart Disease Father     Heart Attack Brother        Social History     Socioeconomic History    Marital status:      Spouse name: Not on file    Number of children: Not on file    Years of education: Not on file    Highest education level: Not on file   Occupational History    Not on file   Social Needs    Financial resource strain: Not on file    Food insecurity:     Worry: Not on file     Inability: Not on file    Transportation needs:     Medical: Not on file     Non-medical: Not on file   Tobacco Use    Smoking status: Current Every Day Smoker     Packs/day: 0.50     Years: 45.00     Pack years: 22.50     Types: Cigarettes    Smokeless tobacco: Never Used   Substance and Sexual Activity    Alcohol use: Yes     Comment: states no drinking beer in 2 months    Drug use: Yes     Types: Marijuana     Comment: smoked it last night    Sexual activity: Never   Lifestyle    Physical activity:     Days per week: Not on file     Minutes per session: Not on file    Stress: Not on file   Relationships    Social connections:     Talks on phone: Not on file     Gets together: Not on file     Attends Denominational service: Not on file     Active member of club or organization: Not on file     Attends meetings of clubs or organizations: Not on file     Relationship status: Not on file    Intimate partner violence:     Fear of current or ex partner: Not on file     Emotionally abused: Not on file     Physically abused: Not on file     Forced sexual activity: Not on file   Other Topics Concern    Not on file   Social History Narrative    Not on file         ALLERGIES: Amlodipine; Lisinopril; and Niaspan [niacin]    Review of Systems   Constitutional: Negative for chills and fever. HENT: Negative for rhinorrhea and sore throat. Eyes: Negative for pain and redness. Respiratory: Negative for chest tightness, shortness of breath and wheezing. Cardiovascular: Positive for chest pain. Negative for leg swelling. Gastrointestinal: Positive for nausea. Negative for abdominal pain, bowel incontinence, diarrhea and vomiting. Genitourinary: Negative for dysuria and hematuria. Musculoskeletal: Negative for back pain, extremity weakness, gait problem, neck pain and neck stiffness. Skin: Positive for wound. Negative for color change and rash. Neurological: Negative for tingling, loss of consciousness, weakness, numbness and headaches.        Vitals:    02/13/20 1400   BP: 124/69   Pulse: 73   Resp: 20   Temp: 98.8 °F (37.1 °C)   SpO2: 92%   Weight: 68.9 kg (152 lb)   Height: 5' 9\" (1.753 m) Physical Exam  Constitutional:       Appearance: She is well-developed. HENT:      Head: Normocephalic and atraumatic. Neck:      Musculoskeletal: Normal range of motion and neck supple. Cardiovascular:      Rate and Rhythm: Normal rate and regular rhythm. Pulmonary:      Effort: Pulmonary effort is normal.      Breath sounds: Normal breath sounds. Chest:      Chest wall: Tenderness (over sternum) present. Abdominal:      General: Bowel sounds are normal.      Palpations: Abdomen is soft. Tenderness: There is no abdominal tenderness. Musculoskeletal: Normal range of motion. General: Signs of injury present. Comments: Skin tear to right wrist.   FROM. Radial pulse intact. Skin:     General: Skin is warm and dry. Findings: Laceration (skin tear riight wrist) present. Neurological:      General: No focal deficit present. Mental Status: She is alert. Mental status is at baseline. MDM  Number of Diagnoses or Management Options  Diagnosis management comments: Patient with healing sternum fracture from prior. No acute on x-ray today. Has tenderness over the mid sternum. Likely sternal contusion. Has Ashley at home. Will discharge. Amount and/or Complexity of Data Reviewed  Tests in the radiology section of CPT®: ordered and reviewed  Tests in the medicine section of CPT®: ordered and reviewed    Patient Progress  Patient progress: stable         Wound Repair  Date/Time: 2/13/2020 3:40 PM  Performed by: attendingPre-procedure re-eval: Immediately prior to the procedure, the patient was reevaluated and found suitable for the planned procedure and any planned medications. Location details: right wrist  Wound length:2.6 - 7.5 cm  Skin closure: Steri-Strips  Patient tolerance: Patient tolerated the procedure well with no immediate complications  My total time at bedside, performing this procedure was 1-15 minutes.                  XR CHEST PA LAT (Final result)   Result time 02/13/20 14:39:52   Final result by Leslie Horn MD (02/13/20 14:39:52)                Impression:    Impression:   No significant interval change.             Narrative:    PA and lateral chest radiographs    History:  fall with sternal pain, 74 years Female    Comparison: Chest radiograph October 16, 2013    Findings:  Normal cardiomediastinal silhouette.  Persistent hyperinflation  suggestive of COPD.  Moderate diffuse interstitial prominence appears probably  similar to prior, given differences in technique.  Minimal dependent  subsegmental atelectasis bilateral lung bases.  No evidence of pneumothorax,  pleural effusion, or air space opacity.  There appears to be a chronic healed  fracture deformity of the mid sternum also seen on prior.  Visualized soft  tissue and osseous structures otherwise unremarkable.

## 2020-02-13 NOTE — ED TRIAGE NOTES
PT fell this am and struck her chest on a night stand. She is c/o sternal pain and skin tear to right wrist. She denies loss of consciousness.

## 2020-02-13 NOTE — DISCHARGE INSTRUCTIONS
Patient Education        Chest Contusion: Care Instructions  Your Care Instructions  A chest contusion, or bruise, is caused by a fall or direct blow to the chest. Car crashes, falls, getting punched, and injury from bicycle handlebars are common causes of chest contusions. A very forceful blow to the chest can injure the heart or blood vessels in the chest, the lungs, the airway, the liver, or the spleen. Pain may be caused by an injury to muscles, cartilage, or ribs. Deep breathing, coughing, or sneezing can increase your pain. Lying on the injured area also can cause pain. Follow-up care is a key part of your treatment and safety. Be sure to make and go to all appointments, and call your doctor if you are having problems. It's also a good idea to know your test results and keep a list of the medicines you take. How can you care for yourself at home? · Rest and protect the injured or sore area. Stop, change, or take a break from any activity that may be causing your pain. · Put ice or a cold pack on the area for 10 to 20 minutes at a time. Put a thin cloth between the ice and your skin. · After 2 or 3 days, if your swelling is gone, apply a heating pad set on low or a warm cloth to your chest. Some doctors suggest that you go back and forth between hot and cold. Put a thin cloth between the heating pad and your skin. · Do not wrap or tape your ribs for support. This may cause you to take smaller breaths, which could increase your risk of pneumonia and lung collapse. · Ask your doctor if you can take an over-the-counter pain medicine, such as acetaminophen (Tylenol), ibuprofen (Advil, Motrin), or naproxen (Aleve). Be safe with medicines. Read and follow all instructions on the label. · Take your medicines exactly as prescribed. Call your doctor if you think you are having a problem with your medicine.   · Gentle stretching and massage may help you feel better after a few days of rest. Stretch slowly to the point just before discomfort begins, then hold the stretch for at least 15 to 30 seconds. Do this 3 or 4 times per day. · As your pain gets better, slowly return to your normal activities. Be patient, because chest bruises can take weeks or months to heal. Any increased pain may be a sign that you need to rest a while longer. When should you call for help? Call 911 anytime you think you may need emergency care. For example, call if:    · You have severe trouble breathing.     · You cough up blood.    Call your doctor now or seek immediate medical care if:    · You have belly pain.     · You are dizzy or lightheaded, or you feel like you may faint.     · You develop new symptoms with the chest pain.     · Your chest pain gets worse.     · You have a fever.     · You have some shortness of breath.     · You have a cough that brings up mucus from the lungs.    Watch closely for changes in your health, and be sure to contact your doctor if:    · Your chest pain is not improving after 1 week. Where can you learn more? Go to http://augusta-liat.info/. Enter I174 in the search box to learn more about \"Chest Contusion: Care Instructions. \"  Current as of: June 26, 2019  Content Version: 12.2  © 2596-8696 Healthwise, Incorporated. Care instructions adapted under license by "[employer] (which disclaims liability or warranty for this information). If you have questions about a medical condition or this instruction, always ask your healthcare professional. [place] [address] any warranty or liability for your use of this information. Patient Education     Skin Tears: Care Instructions  Your Care Instructions  As we get older, our skin gets drier and more fragile. Sometimes this can cause the outer layers of skin to split and tear open. Skin tears are treated in different ways.  In some cases, doctors use pieces of tape called Steri-Strips to pull the skin together and help it heal. Other times, it's best to leave the tear open and cover it with a special wound-care bandage. Skin tears are usually not serious. They usually heal in a few weeks. But how long you take to heal depends on your body and the type of tear you have. Sometimes the torn piece of skin is used to protect the wound while it heals. But that piece of skin does not heal. It may fall off on its own. Or the doctor may remove it. As your tear heals, it's important to keep it clean to help prevent infection. The doctor has checked you carefully, but problems can develop later. If you notice any problems or new symptoms, get medical treatment right away. Follow-up care is a key part of your treatment and safety. Be sure to make and go to all appointments, and call your doctor if you are having problems. It's also a good idea to know your test results and keep a list of the medicines you take. How can you care for yourself at home? · If you have pain, ask your doctor if you can take an over-the-counter pain medicine, such as acetaminophen (Tylenol), ibuprofen (Advil, Motrin), or naproxen (Aleve). Be safe with medicines. Read and follow all instructions on the label. · If you have a bandage, follow your doctor's instructions for changing it. · If you have Steri-Strips, leave them on until they fall off. · Follow your doctor's instructions about bathing. · Gently wash the skin tear with plain water 2 times a day. Do not rub the area. · Let the area air dry. Or you can pat it carefully with a soft towel. When should you call for help? Call your doctor now or seek immediate medical care if:  · You have signs of infection, such as:  ¨ Increased pain, swelling, warmth, or redness around the tear. ¨ Red streaks leading from the tear. ¨ Pus draining from the tear. ¨ A fever. · The tear starts to bleed a lot.  Small amounts of blood are normal.  Watch closely for changes in your health, and be sure to contact your doctor if:  · You do not get better as expected. Where can you learn more? Go to [employer]  Enter N511 in the search box to learn more about \"Skin Tears: Care Instructions. \"   © 9667-8784 Healthwise, Incorporated. Care instructions adapted under license by [facility] (which disclaims liability or warranty for this information). This care instruction is for use with your licensed healthcare professional. If you have questions about a medical condition or this instruction, always ask your healthcare professional. [address] any warranty or liability for your use of this information.   Content Version: 90.9.493145; Current as of: May 22, 2015

## 2020-02-13 NOTE — ED NOTES
I have reviewed discharge instructions with the patient and caregiver. The patient and caregiver verbalized understanding. Patient left ED via Discharge Method: wheelchair to Home with caregiver. Opportunity for questions and clarification provided. Patient given 0 scripts. To continue your aftercare when you leave the hospital, you may receive an automated call from our care team to check in on how you are doing. This is a free service and part of our promise to provide the best care and service to meet your aftercare needs.  If you have questions, or wish to unsubscribe from this service please call [phone]. Thank you for Choosing our [facility] Emergency Department.

## 2020-02-16 ENCOUNTER — APPOINTMENT (OUTPATIENT)
Dept: CT IMAGING | Age: 75
End: 2020-02-16
Payer: MEDICARE

## 2020-02-16 ENCOUNTER — HOSPITAL ENCOUNTER (EMERGENCY)
Age: 75
Discharge: HOME | End: 2020-02-16
Payer: MEDICARE

## 2020-02-16 VITALS
DIASTOLIC BLOOD PRESSURE: 66 MMHG | HEART RATE: 85 BPM | RESPIRATION RATE: 17 BRPM | OXYGEN SATURATION: 96 % | TEMPERATURE: 98.2 F | SYSTOLIC BLOOD PRESSURE: 148 MMHG

## 2020-02-16 DIAGNOSIS — S22.22XD CLOSED FRACTURE OF BODY OF STERNUM WITH ROUTINE HEALING, SUBSEQUENT ENCOUNTER: Primary | ICD-10-CM

## 2020-02-16 DIAGNOSIS — K21.9 GASTROESOPHAGEAL REFLUX DISEASE WITHOUT ESOPHAGITIS: Chronic | ICD-10-CM

## 2020-02-16 LAB
ALBUMIN SERPL-MCNC: 3.4 G/DL (ref 3.2–4.6)
ALBUMIN/GLOB SERPL: 1.1 {RATIO} (ref 1.2–3.5)
ALP SERPL-CCNC: 79 U/L (ref 50–136)
ALT SERPL-CCNC: 25 U/L (ref 12–65)
ANION GAP SERPL CALC-SCNC: 4 MMOL/L (ref 7–16)
ARTERIAL PATENCY WRIST A: YES
AST SERPL-CCNC: 44 U/L (ref 15–37)
BASE DEFICIT BLD-SCNC: 5 MMOL/L
BASOPHILS # BLD: 0.1 K/UL (ref 0–0.2)
BASOPHILS NFR BLD: 1 % (ref 0–2)
BDY SITE: ABNORMAL
BILIRUB SERPL-MCNC: 0.5 MG/DL (ref 0.2–1.1)
BUN SERPL-MCNC: 18 MG/DL (ref 8–23)
CALCIUM SERPL-MCNC: 8.7 MG/DL (ref 8.3–10.4)
CHLORIDE SERPL-SCNC: 111 MMOL/L (ref 98–107)
CK SERPL-CCNC: 931 U/L (ref 21–215)
CO2 BLD-SCNC: 22 MMOL/L
CO2 SERPL-SCNC: 26 MMOL/L (ref 21–32)
COLLECT TIME,HTIME: 559
CREAT SERPL-MCNC: 1.08 MG/DL (ref 0.6–1)
DIFFERENTIAL METHOD BLD: ABNORMAL
EOSINOPHIL # BLD: 0 K/UL (ref 0–0.8)
EOSINOPHIL NFR BLD: 0 % (ref 0.5–7.8)
ERYTHROCYTE [DISTWIDTH] IN BLOOD BY AUTOMATED COUNT: 14.1 % (ref 11.9–14.6)
FLOW RATE ISTAT,IFRATE: 2 L/MIN
GAS FLOW.O2 O2 DELIVERY SYS: ABNORMAL L/MIN
GLOBULIN SER CALC-MCNC: 3.1 G/DL (ref 2.3–3.5)
GLUCOSE SERPL-MCNC: 103 MG/DL (ref 65–100)
HCO3 BLD-SCNC: 21.1 MMOL/L (ref 22–26)
HCT VFR BLD AUTO: 40.2 % (ref 35.8–46.3)
HGB BLD-MCNC: 12.6 G/DL (ref 11.7–15.4)
IMM GRANULOCYTES # BLD AUTO: 0.1 K/UL (ref 0–0.5)
IMM GRANULOCYTES NFR BLD AUTO: 1 % (ref 0–5)
LYMPHOCYTES # BLD: 0.7 K/UL (ref 0.5–4.6)
LYMPHOCYTES NFR BLD: 6 % (ref 13–44)
MCH RBC QN AUTO: 30.9 PG (ref 26.1–32.9)
MCHC RBC AUTO-ENTMCNC: 31.3 G/DL (ref 31.4–35)
MCV RBC AUTO: 98.5 FL (ref 79.6–97.8)
MONOCYTES # BLD: 0.8 K/UL (ref 0.1–1.3)
MONOCYTES NFR BLD: 7 % (ref 4–12)
NEUTS SEG # BLD: 10.6 K/UL (ref 1.7–8.2)
NEUTS SEG NFR BLD: 86 % (ref 43–78)
NRBC # BLD: 0 K/UL (ref 0–0.2)
PCO2 BLD: 43.7 MMHG (ref 35–45)
PH BLD: 7.29 [PH] (ref 7.35–7.45)
PLATELET # BLD AUTO: 203 K/UL (ref 150–450)
PMV BLD AUTO: 11.7 FL (ref 9.4–12.3)
PO2 BLD: 65 MMHG (ref 75–100)
POTASSIUM SERPL-SCNC: 4.2 MMOL/L (ref 3.5–5.1)
PROT SERPL-MCNC: 6.5 G/DL (ref 6.3–8.2)
RBC # BLD AUTO: 4.08 M/UL (ref 4.05–5.2)
SAO2 % BLD: 90 % (ref 95–98)
SERVICE CMNT-IMP: ABNORMAL
SERVICE CMNT-IMP: ABNORMAL
SODIUM SERPL-SCNC: 141 MMOL/L (ref 136–145)
SPECIMEN TYPE: ABNORMAL
TROPONIN I SERPL-MCNC: 0.04 NG/ML (ref 0.02–0.05)
WBC # BLD AUTO: 12.3 K/UL (ref 4.3–11.1)

## 2020-02-16 PROCEDURE — 72125 CT NECK SPINE W/O DYE: CPT

## 2020-02-16 PROCEDURE — 81003 URINALYSIS AUTO W/O SCOPE: CPT

## 2020-02-16 PROCEDURE — 82550 ASSAY OF CK (CPK): CPT

## 2020-02-16 PROCEDURE — 94640 AIRWAY INHALATION TREATMENT: CPT

## 2020-02-16 PROCEDURE — 82803 BLOOD GASES ANY COMBINATION: CPT

## 2020-02-16 PROCEDURE — 71260 CT THORAX DX C+: CPT

## 2020-02-16 PROCEDURE — 74011000250 HC RX REV CODE- 250

## 2020-02-16 PROCEDURE — 96374 THER/PROPH/DIAG INJ IV PUSH: CPT

## 2020-02-16 PROCEDURE — 74011636320 HC RX REV CODE- 636/320

## 2020-02-16 PROCEDURE — 80053 COMPREHEN METABOLIC PANEL: CPT

## 2020-02-16 PROCEDURE — 84484 ASSAY OF TROPONIN QUANT: CPT

## 2020-02-16 PROCEDURE — 85025 COMPLETE CBC W/AUTO DIFF WBC: CPT

## 2020-02-16 PROCEDURE — 74011000258 HC RX REV CODE- 258

## 2020-02-16 PROCEDURE — 99285 EMERGENCY DEPT VISIT HI MDM: CPT

## 2020-02-16 PROCEDURE — 70450 CT HEAD/BRAIN W/O DYE: CPT

## 2020-02-16 PROCEDURE — 74011250636 HC RX REV CODE- 250/636

## 2020-02-16 PROCEDURE — 96375 TX/PRO/DX INJ NEW DRUG ADDON: CPT

## 2020-02-16 PROCEDURE — 36600 WITHDRAWAL OF ARTERIAL BLOOD: CPT

## 2020-02-16 RX ORDER — MORPHINE SULFATE 4 MG/ML
4 INJECTION INTRAVENOUS
Status: COMPLETED | OUTPATIENT
Start: 2020-02-16 | End: 2020-02-16

## 2020-02-16 RX ORDER — IPRATROPIUM BROMIDE AND ALBUTEROL SULFATE 2.5; .5 MG/3ML; MG/3ML
SOLUTION RESPIRATORY (INHALATION)
Status: COMPLETED
Start: 2020-02-16 | End: 2020-02-16

## 2020-02-16 RX ORDER — SODIUM CHLORIDE 0.9 % (FLUSH) 0.9 %
10 SYRINGE (ML) INJECTION
Status: COMPLETED | OUTPATIENT
Start: 2020-02-16 | End: 2020-02-16

## 2020-02-16 RX ORDER — TRAMADOL HYDROCHLORIDE 50 MG/1
50 TABLET ORAL
Qty: 14 TAB | Refills: 0 | Status: SHIPPED | OUTPATIENT
Start: 2020-02-16 | End: 2020-02-19

## 2020-02-16 RX ORDER — IPRATROPIUM BROMIDE AND ALBUTEROL SULFATE 2.5; .5 MG/3ML; MG/3ML
3 SOLUTION RESPIRATORY (INHALATION)
Status: COMPLETED | OUTPATIENT
Start: 2020-02-16 | End: 2020-02-16

## 2020-02-16 RX ORDER — ONDANSETRON 2 MG/ML
4 INJECTION INTRAMUSCULAR; INTRAVENOUS
Status: COMPLETED | OUTPATIENT
Start: 2020-02-16 | End: 2020-02-16

## 2020-02-16 RX ORDER — ALBUTEROL SULFATE 0.83 MG/ML
2.5 SOLUTION RESPIRATORY (INHALATION)
Qty: 24 NEBULE | Refills: 0 | Status: SHIPPED | OUTPATIENT
Start: 2020-02-16

## 2020-02-16 RX ADMIN — IPRATROPIUM BROMIDE AND ALBUTEROL SULFATE 3 ML: 2.5; .5 SOLUTION RESPIRATORY (INHALATION) at 06:05

## 2020-02-16 RX ADMIN — Medication 10 ML: at 05:07

## 2020-02-16 RX ADMIN — ONDANSETRON 4 MG: 2 INJECTION INTRAMUSCULAR; INTRAVENOUS at 06:33

## 2020-02-16 RX ADMIN — MORPHINE SULFATE 4 MG: 4 INJECTION INTRAVENOUS at 06:35

## 2020-02-16 RX ADMIN — SODIUM CHLORIDE 100 ML: 900 INJECTION, SOLUTION INTRAVENOUS at 05:07

## 2020-02-16 RX ADMIN — IPRATROPIUM BROMIDE AND ALBUTEROL SULFATE 3 ML: .5; 3 SOLUTION RESPIRATORY (INHALATION) at 06:05

## 2020-02-16 RX ADMIN — IOPAMIDOL 100 ML: 755 INJECTION, SOLUTION INTRAVENOUS at 05:07

## 2020-02-16 NOTE — DISCHARGE INSTRUCTIONS
Patient Education        Sternum Fracture: Care Instructions  Your Care Instructions    The sternum is a long, flat bone in the center of the chest. It is connected to the ribs with cartilage. Together with the ribs, it helps to protect important organs in the chest, such as the heart and lungs, from damage. The sternum is also called the breastbone. Most broken sternums are caused by car accidents. In most cases, a broken sternum will heal on its own. It can take 3 months or longer for the pain to go away. The doctor has checked you carefully, but problems can develop later. If you notice any problems or new symptoms, get medical treatment right away. You heal best when you take good care of yourself. Eat a variety of healthy foods, and don't smoke. Follow-up care is a key part of your treatment and safety. Be sure to make and go to all appointments, and call your doctor if you are having problems. It's also a good idea to know your test results and keep a list of the medicines you take. How can you care for yourself at home? · Be safe with medicines. Take pain medicines exactly as directed. ? If the doctor gave you a prescription medicine for pain, take it as prescribed. ? If you are not taking a prescription pain medicine, ask your doctor if you can take an over-the-counter medicine. · Put ice or a cold pack on your chest for 10 to 20 minutes at a time. Try to do this every 1 to 2 hours for the next 3 days (when you are awake) or until the swelling goes down. Put a thin cloth between the ice and your skin. · Even if it hurts, cough or take the deepest breath you can at least once every hour. This will get air deeply into your lungs and reduce your chance of getting pneumonia or a partial collapse of a lung. Hold a pillow against your chest to make this less painful. · Get plenty of rest.  · Avoid activities that may put pressure on your chest or cause a blow to the chest until the pain is gone.   When should you call for help? Call 911 anytime you think you may need emergency care. For example, call if:    · You passed out (lost consciousness).     · You have severe trouble breathing.     · You have a fast or irregular heartbeat.    Call your doctor now or seek immediate medical care if:    · You have new or worse trouble breathing.     · Your pain gets worse.     · You are dizzy or lightheaded, or you feel like you may faint.     · You have a fever.     · You have a new cough or your cough gets worse.    Watch closely for changes in your health, and be sure to contact your doctor if:    · Your pain does not get better as expected. Where can you learn more? Go to http://augusta-liat.info/. Enter C430 in the search box to learn more about \"Sternum Fracture: Care Instructions. \"  Current as of: June 26, 2019  Content Version: 12.2  © 8455-0105 [employer]. Care instructions adapted under license by [email or web address] (which disclaims liability or warranty for this information). If you have questions about a medical condition or this instruction, always ask your healthcare professional. [address] any warranty or liability for your use of this information.

## 2020-02-16 NOTE — ED TRIAGE NOTES
Patient arrives to ED from home complaining of a fall. Patient complains of pain to her chest from the impact of the fall. Patient was recently at [facility] for a fall and they diagnosed her with a bruised sternum. Patient O2 sat is 83% on room air. Patient denies feeling SOB. Patient states she was using her walker and tripped and when she fell she hit her sternum on her walker similar to what she did last time. Patient complains of pain to her neck, pain, and sternum. Denies dizziness.  Denies LOC

## 2020-02-16 NOTE — ED NOTES
I have reviewed discharge instructions with the patient. The patient verbalized understanding. Patient left ED via Discharge Method: stretcher to Home with [facility]. Opportunity for questions and clarification provided. Patient given 1 scripts. To continue your aftercare when you leave the hospital, you may receive an automated call from our care team to check in on how you are doing. This is a free service and part of our promise to provide the best care and service to meet your aftercare needs.  If you have questions, or wish to unsubscribe from this service please call [phone]. Thank you for Choosing our [facility] Emergency Department.

## 2020-02-16 NOTE — ED PROVIDER NOTES
66-year-old female who is fallen several times. She fell several days ago landing on her chest had chest and back pain was seen in outlying facility had some x-rays done which were reportedly negative she continues to have pain tonight she fell again landing on her chest she has neck back and chest pain. She denies striking her head. Patient has bruises on various parts of her body of varying ages of healing. Fall   The accident occurred 1 to 2 hours ago. The fall occurred while standing. She fell from a height of ground level. She landed on hard floor. The point of impact was the neck. The pain is present in the neck. The pain is at a severity of 5/10. The pain is moderate. Pertinent negatives include no loss of consciousness. The symptoms are aggravated by activity.         Past Medical History:   Diagnosis Date    Anxiety 3/6/2013    Arthritis     osteoarthritis    B12 deficiency 3/6/2013    Chronic back pain 3/6/2013    Chronic kidney disease (CKD) stage G3b/A1, moderately decreased glomerular filtration rate (GFR) between 30-44 mL/min/1.73 square meter and albuminuria creatinine ratio less than 30 mg/g ([facility]) 10/14/2016    Constipation 11/4/2015    COPD 2006    [facility]/ patient on 2.5 liters O2 nightly at home    Decreased GFR 5/26/2016    Depression 10/25/2012    Depression 10/25/2012    Depression, major, in remission ([facility] [address] 1/31/2018    diverticulitis     Encounter for long-term (current) use of other medications 3/6/2013    Environmental allergies 3/6/2013    Fecal incontinence 12/7/2016    Fibromyalgia 9/15/2015    GERD (gastroesophageal reflux disease) \"years\"    Headache(784.0) 2/12/2013    HTN (hypertension) 10/25/2012    HTN (hypertension) 10/25/2012    Hyperlipidemia 10/25/2012    Lymphocytic colitis 82/4/1811    Metabolic syndrome 4/1/3334    Microalbuminuria 11/4/2015    Osteoporosis 10/25/2012    polypectomy     PUD (peptic ulcer disease) \"years ago\" Had peptic ulcers    thyroid mass     partial removal, benign    Unspecified adverse effect of anesthesia     PATIENT STATES SHE DOESN'T GET GENERAL ANESTHESIA DUE TO COPD    Unspecified sleep apnea     wears O2 at night.   no c-jeffrey    Vitamin D deficiency 3/6/2013       Past Surgical History:   Procedure Laterality Date    BREAST SURGERY PROCEDURE UNLISTED  1980s    cyst removed from right breast/benign    COLOSTOMY  1990s    placed, then reversed    HX APPENDECTOMY  1970s    HX BREAST BIOPSY Right     excision of cyst    HX CATARACT REMOVAL Right 2016    right eye    HX CHOLECYSTECTOMY  1970s    HX GYN  1970s    TIA BSO    HX HERNIA REPAIR  unknown    hiatal hernia repair    HX ORTHOPAEDIC  [id number]    right knee replaced and then left the next year    HX PARTIAL THYROIDECTOMY  1990s    Angelina Carmona         Family History:   Problem Relation Age of Onset    Kidney Disease Mother     Heart Disease Father     Heart Attack Brother        Social History     Socioeconomic History    Marital status:      Spouse name: Not on file    Number of children: Not on file    Years of education: Not on file    Highest education level: Not on file   Occupational History    Not on file   Social Needs    Financial resource strain: Not on file    Food insecurity:     Worry: Not on file     Inability: Not on file    Transportation needs:     Medical: Not on file     Non-medical: Not on file   Tobacco Use    Smoking status: Current Every Day Smoker     Packs/day: 0.50     Years: 45.00     Pack years: 22.50     Types: Cigarettes    Smokeless tobacco: Never Used   Substance and Sexual Activity    Alcohol use: Yes     Comment: states no drinking beer in 2 months    Drug use: Yes     Types: Marijuana     Comment: smoked it last night    Sexual activity: Never   Lifestyle    Physical activity:     Days per week: Not on file     Minutes per session: Not on file    Stress: Not on file   Relationships    Social connections:     Talks on phone: Not on file     Gets together: Not on file     Attends Sabianism service: Not on file     Active member of club or organization: Not on file     Attends meetings of clubs or organizations: Not on file     Relationship status: Not on file    Intimate partner violence:     Fear of current or ex partner: Not on file     Emotionally abused: Not on file     Physically abused: Not on file     Forced sexual activity: Not on file   Other Topics Concern    Not on file   Social History Narrative    Not on file         ALLERGIES: Amlodipine; Lisinopril; and Niaspan [niacin]    Review of Systems   Constitutional: Negative. Negative for activity change. HENT: Negative. Eyes: Negative. Respiratory: Negative. Cardiovascular: Negative. Gastrointestinal: Negative. Genitourinary: Negative. Musculoskeletal: Negative. Skin: Negative. Neurological: Negative. Negative for loss of consciousness. Psychiatric/Behavioral: Negative. All other systems reviewed and are negative. Vitals:    02/16/20 0355   BP: 143/64   Pulse: 81   Resp: 16   Temp: 97.4 °F (36.3 °C)   SpO2: 92%            Physical Exam  Vitals signs and nursing note reviewed. Constitutional:       General: She is not in acute distress. Appearance: She is well-developed. She is not diaphoretic. HENT:      Head: Normocephalic and atraumatic. Right Ear: External ear normal.      Left Ear: External ear normal.      Nose: Nose normal.      Mouth/Throat:      Pharynx: No oropharyngeal exudate. Eyes:      General: No scleral icterus. Right eye: No discharge. Left eye: No discharge. Conjunctiva/sclera: Conjunctivae normal.      Pupils: Pupils are equal, round, and reactive to light. Neck:      Musculoskeletal: Normal range of motion. Neck rigidity, spinous process tenderness and muscular tenderness present. Vascular: No JVD. Trachea: No tracheal deviation. Cardiovascular:      Rate and Rhythm: Normal rate and regular rhythm. Pulmonary:      Effort: Pulmonary effort is normal. No respiratory distress. Breath sounds: Normal breath sounds. No stridor. No wheezing. Chest:      Chest wall: Tenderness present. Abdominal:      General: Bowel sounds are normal. There is no distension. Palpations: Abdomen is soft. There is no mass. Tenderness: There is no abdominal tenderness. Musculoskeletal: Normal range of motion. General: No tenderness. Skin:     General: Skin is warm and dry. Coloration: Skin is not pale. Findings: No erythema or rash. Neurological:      Mental Status: She is alert and oriented to person, place, and time. Cranial Nerves: No cranial nerve deficit. Psychiatric:         Behavior: Behavior normal.         Thought Content: Thought content normal.          MDM  Number of Diagnoses or Management Options  Diagnosis management comments: Patient is worse fall happened several days ago she is 3 to 4 days post fall she has a fractured sternum minimal displacement no large vessel injury mild atelectasis. Patient is able to maintain saturation levels greater than 90% on 2 L which he uses at home. Put her on some inhaled albuterol and close follow-up no signs of infection at this point.        Amount and/or Complexity of Data Reviewed  Clinical lab tests: ordered and reviewed  Tests in the radiology section of CPT®: ordered and reviewed  Tests in the medicine section of CPT®: ordered and reviewed    Risk of Complications, Morbidity, and/or Mortality  Presenting problems: high  Diagnostic procedures: high  Management options: high    Patient Progress  Patient progress: improved         Procedures

## 2020-02-17 NOTE — PROGRESS NOTES
Dear Ms. Bueno    Your recent CT head showed: Impression:  Normal unenhanced CT of the brain for age. No acute intracranial abnormality.     Thanks,  Dr. Christianne Vargas

## 2020-02-20 ENCOUNTER — HOME HEALTH ADMISSION (OUTPATIENT)
Dept: HOME HEALTH SERVICES | Facility: HOME HEALTH | Age: 75
End: 2020-02-20

## 2020-06-04 ENCOUNTER — HOSPITAL ENCOUNTER (OUTPATIENT)
Dept: GENERAL RADIOLOGY | Age: 75
Discharge: HOME | End: 2020-06-04
Attending: FAMILY MEDICINE
Payer: MEDICARE

## 2020-06-04 DIAGNOSIS — S22.22XD CLOSED FRACTURE OF BODY OF STERNUM WITH ROUTINE HEALING, SUBSEQUENT ENCOUNTER: ICD-10-CM

## 2020-06-04 PROCEDURE — 71120 X-RAY EXAM BREASTBONE 2/>VWS: CPT

## 2020-06-05 NOTE — PROGRESS NOTES
Dear Ms. Bueno,    Your recent XR of sternum showed:  IMPRESSION: Interval healing of the sternal fracture. Low bone density.     Thanks,  Dr. Floresita Diaz

## 2020-12-18 ENCOUNTER — TRANSCRIBE ORDER (OUTPATIENT)
Dept: SCHEDULING | Age: 75
End: 2020-12-18

## 2020-12-18 DIAGNOSIS — Z12.31 SCREENING MAMMOGRAM FOR HIGH-RISK PATIENT: Primary | ICD-10-CM

## 2021-01-15 ENCOUNTER — HOSPITAL ENCOUNTER (OUTPATIENT)
Dept: MAMMOGRAPHY | Age: 76
Discharge: HOME | End: 2021-01-15
Attending: FAMILY MEDICINE
Payer: MEDICARE

## 2021-01-15 ENCOUNTER — HOSPITAL ENCOUNTER (EMERGENCY)
Age: 76
Discharge: HOME | End: 2021-01-15
Attending: EMERGENCY MEDICINE
Payer: MEDICARE

## 2021-01-15 ENCOUNTER — HOSPITAL ENCOUNTER (EMERGENCY)
Dept: CT IMAGING | Age: 76
Discharge: HOME | End: 2021-01-15
Attending: PHYSICIAN ASSISTANT
Payer: MEDICARE

## 2021-01-15 VITALS
BODY MASS INDEX: 17.88 KG/M2 | HEIGHT: 68 IN | TEMPERATURE: 98.2 F | SYSTOLIC BLOOD PRESSURE: 117 MMHG | DIASTOLIC BLOOD PRESSURE: 67 MMHG | OXYGEN SATURATION: 96 % | WEIGHT: 118 LBS | RESPIRATION RATE: 16 BRPM | HEART RATE: 65 BPM

## 2021-01-15 DIAGNOSIS — Z12.31 SCREENING MAMMOGRAM FOR HIGH-RISK PATIENT: ICD-10-CM

## 2021-01-15 DIAGNOSIS — S00.03XA HEMATOMA OF SCALP, INITIAL ENCOUNTER: ICD-10-CM

## 2021-01-15 DIAGNOSIS — S09.90XA MINOR HEAD INJURY, INITIAL ENCOUNTER: Primary | ICD-10-CM

## 2021-01-15 PROCEDURE — 77067 SCR MAMMO BI INCL CAD: CPT

## 2021-01-15 PROCEDURE — 72125 CT NECK SPINE W/O DYE: CPT

## 2021-01-15 PROCEDURE — 70450 CT HEAD/BRAIN W/O DYE: CPT

## 2021-01-15 PROCEDURE — 99283 EMERGENCY DEPT VISIT LOW MDM: CPT

## 2021-01-15 PROCEDURE — 74011250637 HC RX REV CODE- 250/637: Performed by: PHYSICIAN ASSISTANT

## 2021-01-15 RX ORDER — ACETAMINOPHEN 325 MG/1
650 TABLET ORAL
Status: COMPLETED | OUTPATIENT
Start: 2021-01-15 | End: 2021-01-15

## 2021-01-15 RX ADMIN — ACETAMINOPHEN 650 MG: 325 TABLET, FILM COATED ORAL at 15:05

## 2021-01-15 NOTE — ED PROVIDER NOTES
Patient to ER status post fall in her parking deck. She was using her walker and somehow walker got caught on the pavement she fell backwards and hit her head, she denies any chest pain shortness of breath no dizziness or blurred vision she does have tenderness to the back of her head. She was able to move from lying to the wheelchair without difficulty standing no pain to the lower back, no nausea or vomiting    The history is provided by the patient. Fall  The accident occurred less than 1 hour ago. The fall occurred while walking. She fell from a height of ground level. She landed on concrete. There was no blood loss. The point of impact was the head. The pain is present in the head. The pain is at a severity of 7/10. She was ambulatory at the scene. There was no entrapment after the fall. There was no drug use involved in the accident. There was no alcohol use involved in the accident. Pertinent negatives include no visual change, no loss of consciousness and no laceration. The risk factors include being elderly. The symptoms are aggravated by pressure on injury. She has tried nothing for the symptoms. The treatment provided no relief. It is unknown when the patient last had a tetanus shot.         Past Medical History:   Diagnosis Date    Anxiety 3/6/2013    Arthritis     osteoarthritis    B12 deficiency 3/6/2013    Chronic back pain 3/6/2013    Chronic kidney disease (CKD) stage G3b/A1, moderately decreased glomerular filtration rate (GFR) between 30-44 mL/min/1.73 square meter and albuminuria creatinine ratio less than 30 mg/g 10/14/2016    Constipation 11/4/2015    COPD 2006    [facility]/ patient on 2.5 liters O2 nightly at home    Decreased GFR 5/26/2016    Depression 10/25/2012    Depression 10/25/2012    Depression, major, in remission ([address] 1/31/2018    diverticulitis     Encounter for long-term (current) use of other medications 3/6/2013    Environmental allergies 3/6/2013    Fecal incontinence 12/7/2016    Fibromyalgia 9/15/2015    GERD (gastroesophageal reflux disease) \"years\"    Headache(784.0) 2/12/2013    HTN (hypertension) 10/25/2012    HTN (hypertension) 10/25/2012    Hyperlipidemia 10/25/2012    Lymphocytic colitis 80/3/0804    Metabolic syndrome 9/9/6887    Microalbuminuria 11/4/2015    Osteoporosis 10/25/2012    polypectomy     PUD (peptic ulcer disease) \"years ago\"    Had peptic ulcers    thyroid mass     partial removal, benign    Unspecified adverse effect of anesthesia     PATIENT STATES SHE DOESN'T GET GENERAL ANESTHESIA DUE TO COPD    Unspecified sleep apnea     wears O2 at night.   no c-jeffrey    Vitamin D deficiency 3/6/2013       Past Surgical History:   Procedure Laterality Date    HX APPENDECTOMY  1970s    HX BREAST BIOPSY Right     excision of cyst    HX CATARACT REMOVAL Right 2016    right eye    HX CHOLECYSTECTOMY  1970s    HX GYN  1970s    TIA BSO    HX HERNIA REPAIR  unknown    hiatal hernia repair    HX ORTHOPAEDIC  [id number]    right knee replaced and then left the next year    HX PARTIAL THYROIDECTOMY  [address] UNLISTED  1980s    cyst removed from right breast/benign    AL COLOSTOMY  1990s    placed, then reversed         Family History:   Problem Relation Age of Onset    Kidney Disease Mother     Heart Disease Father     Heart Attack Brother        Social History     Socioeconomic History    Marital status:      Spouse name: Not on file    Number of children: Not on file    Years of education: Not on file    Highest education level: Not on file   Occupational History    Not on file   Social Needs    Financial resource strain: Not on file    Food insecurity     Worry: Not on file     Inability: Not on file    Transportation needs     Medical: Not on file     Non-medical: Not on file   Tobacco Use    Smoking status: Current Every Day Smoker     Packs/day: 0.50     Years: 45.00 Pack years: 22.50     Types: Cigarettes    Smokeless tobacco: Never Used   Substance and Sexual Activity    Alcohol use: Yes     Comment: states no drinking beer in 2 months    Drug use: Yes     Types: Marijuana     Comment: smoked it last night    Sexual activity: Never   Lifestyle    Physical activity     Days per week: Not on file     Minutes per session: Not on file    Stress: Not on file   Relationships    Social connections     Talks on phone: Not on file     Gets together: Not on file     Attends Sikh service: Not on file     Active member of club or organization: Not on file     Attends meetings of clubs or organizations: Not on file     Relationship status: Not on file    Intimate partner violence     Fear of current or ex partner: Not on file     Emotionally abused: Not on file     Physically abused: Not on file     Forced sexual activity: Not on file   Other Topics Concern    Not on file   Social History Narrative    Not on file         ALLERGIES: Amlodipine, Lisinopril, and Niaspan [niacin]    Review of Systems   Neurological: Negative for loss of consciousness. All other systems reviewed and are negative. Vitals:    01/15/21 1335   BP: 117/67   Pulse: 65   Resp: 16   Temp: 98.2 °F (36.8 °C)   SpO2: 96%   Weight: 53.5 kg (118 lb)   Height: 5' 8\" (1.727 m)            Physical Exam  Vitals signs and nursing note reviewed. Constitutional:       General: She is not in acute distress. Appearance: Normal appearance. She is well-developed. She is not diaphoretic. HENT:      Head: Normocephalic. Comments: Occipital scalp area with small hematoma no abrasion noted pain does radiate down into the cervical spine area  Eyes:      Pupils: Pupils are equal, round, and reactive to light. Neck:      Musculoskeletal: Normal range of motion and neck supple. Muscular tenderness present. Comments: Diffuse soreness along the C-spine area no point tenderness.   Patient has full flexion and extension of the C-spine no numbness or tingling into the arms  Cardiovascular:      Rate and Rhythm: Normal rate and regular rhythm. Pulmonary:      Effort: Pulmonary effort is normal.      Breath sounds: Normal breath sounds. Chest:      Chest wall: No tenderness. Abdominal:      General: Bowel sounds are normal.      Palpations: Abdomen is soft. Tenderness: There is no abdominal tenderness. Musculoskeletal: Normal range of motion. General: Swelling present. Comments: Patient with chronic edema to bilateral lower extremities but no pain to rotation of bilateral hips   Skin:     General: Skin is warm. Findings: No laceration. Neurological:      General: No focal deficit present. Mental Status: She is alert and oriented to person, place, and time.    Psychiatric:         Mood and Affect: Mood normal.         Behavior: Behavior normal.          MDM  Number of Diagnoses or Management Options  Diagnosis management comments: Head and cervical CT showed no acute process, will discharge home with scalp hematoma and minor head trauma patient to return to ER if vomiting severe headache not relieved with Tylenol or strokelike symptoms       Amount and/or Complexity of Data Reviewed  Tests in the radiology section of CPT®: ordered and reviewed    Risk of Complications, Morbidity, and/or Mortality  Presenting problems: moderate  Diagnostic procedures: moderate  Management options: low    Patient Progress  Patient progress: improved         Procedures

## 2021-01-15 NOTE — DISCHARGE INSTRUCTIONS
Ice to scalp hematoma twice a day, Tylenol for any pain.   Return to the ER if vomiting severe headache or strokelike symptoms, see primary md for recheck

## 2021-02-06 ENCOUNTER — APPOINTMENT (OUTPATIENT)
Dept: GENERAL RADIOLOGY | Age: 76
DRG: 308 | End: 2021-02-06
Attending: EMERGENCY MEDICINE
Payer: MEDICARE

## 2021-02-06 ENCOUNTER — HOSPITAL ENCOUNTER (INPATIENT)
Age: 76
LOS: 9 days | Discharge: NURSING, REHAB OR LONG-TERM CARE | DRG: 308 | End: 2021-02-15
Attending: EMERGENCY MEDICINE | Admitting: HOSPITALIST
Payer: MEDICARE

## 2021-02-06 ENCOUNTER — APPOINTMENT (OUTPATIENT)
Dept: CT IMAGING | Age: 76
DRG: 308 | End: 2021-02-06
Attending: EMERGENCY MEDICINE
Payer: MEDICARE

## 2021-02-06 DIAGNOSIS — S32.030A CLOSED COMPRESSION FRACTURE OF L3 LUMBAR VERTEBRA, INITIAL ENCOUNTER (HCC): ICD-10-CM

## 2021-02-06 DIAGNOSIS — J44.9 CHRONIC OBSTRUCTIVE PULMONARY DISEASE, UNSPECIFIED COPD TYPE (HCC): Chronic | ICD-10-CM

## 2021-02-06 DIAGNOSIS — N18.32 CHRONIC KIDNEY DISEASE (CKD) STAGE G3B/A1, MODERATELY DECREASED GLOMERULAR FILTRATION RATE (GFR) BETWEEN 30-44 ML/MIN/1.73 SQUARE METER AND ALBUMINURIA CREATININE RATIO LESS THAN 30 MG/G (HCC): ICD-10-CM

## 2021-02-06 DIAGNOSIS — F17.210 SMOKING 1/2 PACK A DAY OR LESS: ICD-10-CM

## 2021-02-06 DIAGNOSIS — M54.50 CHRONIC BILATERAL LOW BACK PAIN WITHOUT SCIATICA: ICD-10-CM

## 2021-02-06 DIAGNOSIS — E87.6 HYPOKALEMIA: ICD-10-CM

## 2021-02-06 DIAGNOSIS — I48.91 NEW ONSET ATRIAL FIBRILLATION (HCC): Primary | ICD-10-CM

## 2021-02-06 DIAGNOSIS — I35.1 SEVERE AORTIC REGURGITATION: ICD-10-CM
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
## 2021-02-06 DIAGNOSIS — I35.1 NONRHEUMATIC AORTIC VALVE INSUFFICIENCY: ICD-10-CM

## 2021-02-06 DIAGNOSIS — S32.000S CLOSED COMPRESSION FRACTURE OF LUMBAR VERTEBRA, SEQUELA: ICD-10-CM

## 2021-02-06 DIAGNOSIS — G89.29 CHRONIC BILATERAL LOW BACK PAIN WITHOUT SCIATICA: ICD-10-CM

## 2021-02-06 PROBLEM — R29.6 FALLS FREQUENTLY: Status: ACTIVE | Noted: 2021-02-06

## 2021-02-06 LAB
25(OH)D3 SERPL-MCNC: 57.4 NG/ML (ref 30–100)
ALBUMIN SERPL-MCNC: 3.3 G/DL (ref 3.2–4.6)
ALBUMIN/GLOB SERPL: 1.2 {RATIO} (ref 1.2–3.5)
ALP SERPL-CCNC: 93 U/L (ref 50–130)
ALT SERPL-CCNC: 26 U/L (ref 12–65)
ANION GAP SERPL CALC-SCNC: 7 MMOL/L (ref 7–16)
APPEARANCE UR: CLEAR
APTT PPP: 30 SEC (ref 24.1–35.1)
AST SERPL-CCNC: 27 U/L (ref 15–37)
ATRIAL RATE: 119 BPM
BASOPHILS # BLD: 0 K/UL (ref 0–0.2)
BASOPHILS # BLD: 0.1 K/UL (ref 0–0.2)
BASOPHILS NFR BLD: 1 % (ref 0–2)
BASOPHILS NFR BLD: 1 % (ref 0–2)
BILIRUB SERPL-MCNC: 0.4 MG/DL (ref 0.2–1.1)
BILIRUB UR QL: NEGATIVE
BNP SERPL-MCNC: 6999 PG/ML
BUN SERPL-MCNC: 23 MG/DL (ref 8–23)
CALCIUM SERPL-MCNC: 8.5 MG/DL (ref 8.3–10.4)
CALCULATED R AXIS, ECG10: -111 DEGREES
CALCULATED T AXIS, ECG11: 74 DEGREES
CHLORIDE SERPL-SCNC: 110 MMOL/L (ref 98–107)
CO2 SERPL-SCNC: 27 MMOL/L (ref 21–32)
COLOR UR: YELLOW
CREAT SERPL-MCNC: 0.95 MG/DL (ref 0.6–1)
DIAGNOSIS, 93000: NORMAL
DIFFERENTIAL METHOD BLD: ABNORMAL
DIFFERENTIAL METHOD BLD: ABNORMAL
EOSINOPHIL # BLD: 0 K/UL (ref 0–0.8)
EOSINOPHIL # BLD: 0.1 K/UL (ref 0–0.8)
EOSINOPHIL NFR BLD: 0 % (ref 0.5–7.8)
EOSINOPHIL NFR BLD: 1 % (ref 0.5–7.8)
ERYTHROCYTE [DISTWIDTH] IN BLOOD BY AUTOMATED COUNT: 14.5 % (ref 11.9–14.6)
ERYTHROCYTE [DISTWIDTH] IN BLOOD BY AUTOMATED COUNT: 14.6 % (ref 11.9–14.6)
GLOBULIN SER CALC-MCNC: 2.8 G/DL (ref 2.3–3.5)
GLUCOSE SERPL-MCNC: 158 MG/DL (ref 65–100)
GLUCOSE UR STRIP.AUTO-MCNC: NEGATIVE MG/DL
HCT VFR BLD AUTO: 31.7 % (ref 35.8–46.3)
HCT VFR BLD AUTO: 36.9 % (ref 35.8–46.3)
HGB BLD-MCNC: 10.4 G/DL (ref 11.7–15.4)
HGB BLD-MCNC: 12.6 G/DL (ref 11.7–15.4)
HGB UR QL STRIP: NEGATIVE
IMM GRANULOCYTES # BLD AUTO: 0 K/UL (ref 0–0.5)
IMM GRANULOCYTES # BLD AUTO: 0 K/UL (ref 0–0.5)
IMM GRANULOCYTES NFR BLD AUTO: 0 % (ref 0–5)
IMM GRANULOCYTES NFR BLD AUTO: 1 % (ref 0–5)
INR PPP: 1.1
KETONES UR QL STRIP.AUTO: NEGATIVE MG/DL
LACTATE SERPL-SCNC: 1.4 MMOL/L (ref 0.4–2)
LEUKOCYTE ESTERASE UR QL STRIP.AUTO: NEGATIVE
LYMPHOCYTES # BLD: 1.3 K/UL (ref 0.5–4.6)
LYMPHOCYTES # BLD: 1.7 K/UL (ref 0.5–4.6)
LYMPHOCYTES NFR BLD: 15 % (ref 13–44)
LYMPHOCYTES NFR BLD: 25 % (ref 13–44)
MAGNESIUM SERPL-MCNC: 1.7 MG/DL (ref 1.8–2.4)
MCH RBC QN AUTO: 32.6 PG (ref 26.1–32.9)
MCH RBC QN AUTO: 33 PG (ref 26.1–32.9)
MCHC RBC AUTO-ENTMCNC: 32.8 G/DL (ref 31.4–35)
MCHC RBC AUTO-ENTMCNC: 34.1 G/DL (ref 31.4–35)
MCV RBC AUTO: 96.6 FL (ref 79.6–97.8)
MCV RBC AUTO: 99.4 FL (ref 79.6–97.8)
MONOCYTES # BLD: 0.6 K/UL (ref 0.1–1.3)
MONOCYTES # BLD: 0.7 K/UL (ref 0.1–1.3)
MONOCYTES NFR BLD: 10 % (ref 4–12)
MONOCYTES NFR BLD: 7 % (ref 4–12)
NEUTS SEG # BLD: 4.3 K/UL (ref 1.7–8.2)
NEUTS SEG # BLD: 6.6 K/UL (ref 1.7–8.2)
NEUTS SEG NFR BLD: 63 % (ref 43–78)
NEUTS SEG NFR BLD: 77 % (ref 43–78)
NITRITE UR QL STRIP.AUTO: NEGATIVE
NRBC # BLD: 0 K/UL (ref 0–0.2)
NRBC # BLD: 0 K/UL (ref 0–0.2)
PH UR STRIP: 5.5 [PH] (ref 5–9)
PLATELET # BLD AUTO: 170 K/UL (ref 150–450)
PLATELET # BLD AUTO: 213 K/UL (ref 150–450)
PMV BLD AUTO: 11.2 FL (ref 9.4–12.3)
PMV BLD AUTO: 11.8 FL (ref 9.4–12.3)
POTASSIUM SERPL-SCNC: 2.9 MMOL/L (ref 3.5–5.1)
PROT SERPL-MCNC: 6.1 G/DL (ref 6.3–8.2)
PROT UR STRIP-MCNC: NEGATIVE MG/DL
PROTHROMBIN TIME: 14.6 SEC (ref 12.5–14.7)
Q-T INTERVAL, ECG07: 376 MS
QRS DURATION, ECG06: 108 MS
QTC CALCULATION (BEZET), ECG08: 546 MS
RBC # BLD AUTO: 3.19 M/UL (ref 4.05–5.2)
RBC # BLD AUTO: 3.82 M/UL (ref 4.05–5.2)
SODIUM SERPL-SCNC: 144 MMOL/L (ref 136–145)
SP GR UR REFRACTOMETRY: 1.01 (ref 1–1.02)
TROPONIN-HIGH SENSITIVITY: 15.1 PG/ML (ref 0–14)
TROPONIN-HIGH SENSITIVITY: 17 PG/ML (ref 0–14)
TSH SERPL DL<=0.005 MIU/L-ACNC: 4.18 UIU/ML
UROBILINOGEN UR QL STRIP.AUTO: 0.2 EU/DL (ref 0.2–1)
VENTRICULAR RATE, ECG03: 127 BPM
VIT B12 SERPL-MCNC: 472 PG/ML (ref 193–986)
WBC # BLD AUTO: 6.8 K/UL (ref 4.3–11.1)
WBC # BLD AUTO: 8.6 K/UL (ref 4.3–11.1)

## 2021-02-06 PROCEDURE — 82306 VITAMIN D 25 HYDROXY: CPT

## 2021-02-06 PROCEDURE — 96361 HYDRATE IV INFUSION ADD-ON: CPT

## 2021-02-06 PROCEDURE — 77010033678 HC OXYGEN DAILY

## 2021-02-06 PROCEDURE — 65610000001 HC ROOM ICU GENERAL

## 2021-02-06 PROCEDURE — 85730 THROMBOPLASTIN TIME PARTIAL: CPT

## 2021-02-06 PROCEDURE — 83735 ASSAY OF MAGNESIUM: CPT

## 2021-02-06 PROCEDURE — 84484 ASSAY OF TROPONIN QUANT: CPT

## 2021-02-06 PROCEDURE — 85025 COMPLETE CBC W/AUTO DIFF WBC: CPT

## 2021-02-06 PROCEDURE — 72100 X-RAY EXAM L-S SPINE 2/3 VWS: CPT

## 2021-02-06 PROCEDURE — 96374 THER/PROPH/DIAG INJ IV PUSH: CPT

## 2021-02-06 PROCEDURE — 73502 X-RAY EXAM HIP UNI 2-3 VIEWS: CPT

## 2021-02-06 PROCEDURE — 85610 PROTHROMBIN TIME: CPT

## 2021-02-06 PROCEDURE — 99283 EMERGENCY DEPT VISIT LOW MDM: CPT

## 2021-02-06 PROCEDURE — 83880 ASSAY OF NATRIURETIC PEPTIDE: CPT

## 2021-02-06 PROCEDURE — 83605 ASSAY OF LACTIC ACID: CPT

## 2021-02-06 PROCEDURE — 93005 ELECTROCARDIOGRAM TRACING: CPT | Performed by: EMERGENCY MEDICINE

## 2021-02-06 PROCEDURE — 84443 ASSAY THYROID STIM HORMONE: CPT

## 2021-02-06 PROCEDURE — 75810000293 HC SIMP/SUPERF WND  RPR

## 2021-02-06 PROCEDURE — 71045 X-RAY EXAM CHEST 1 VIEW: CPT

## 2021-02-06 PROCEDURE — 82607 VITAMIN B-12: CPT

## 2021-02-06 PROCEDURE — 74011250636 HC RX REV CODE- 250/636: Performed by: HOSPITALIST

## 2021-02-06 PROCEDURE — 74011250637 HC RX REV CODE- 250/637: Performed by: HOSPITALIST

## 2021-02-06 PROCEDURE — 80053 COMPREHEN METABOLIC PANEL: CPT

## 2021-02-06 PROCEDURE — 74011000250 HC RX REV CODE- 250: Performed by: EMERGENCY MEDICINE

## 2021-02-06 PROCEDURE — 74011250636 HC RX REV CODE- 250/636: Performed by: EMERGENCY MEDICINE

## 2021-02-06 PROCEDURE — 96375 TX/PRO/DX INJ NEW DRUG ADDON: CPT

## 2021-02-06 PROCEDURE — 2709999900 HC NON-CHARGEABLE SUPPLY

## 2021-02-06 PROCEDURE — 70450 CT HEAD/BRAIN W/O DYE: CPT

## 2021-02-06 PROCEDURE — 81003 URINALYSIS AUTO W/O SCOPE: CPT

## 2021-02-06 PROCEDURE — 94760 N-INVAS EAR/PLS OXIMETRY 1: CPT

## 2021-02-06 RX ORDER — NALOXONE HYDROCHLORIDE 0.4 MG/ML
0.4 INJECTION, SOLUTION INTRAMUSCULAR; INTRAVENOUS; SUBCUTANEOUS AS NEEDED
Status: DISCONTINUED | OUTPATIENT
Start: 2021-02-06 | End: 2021-02-09 | Stop reason: HOSPADM

## 2021-02-06 RX ORDER — ONDANSETRON 2 MG/ML
4 INJECTION INTRAMUSCULAR; INTRAVENOUS
Status: COMPLETED | OUTPATIENT
Start: 2021-02-06 | End: 2021-02-06

## 2021-02-06 RX ORDER — DILTIAZEM HYDROCHLORIDE 30 MG/1
30 TABLET, FILM COATED ORAL
Status: DISCONTINUED | OUTPATIENT
Start: 2021-02-06 | End: 2021-02-09 | Stop reason: HOSPADM

## 2021-02-06 RX ORDER — MORPHINE SULFATE 2 MG/ML
1 INJECTION, SOLUTION INTRAMUSCULAR; INTRAVENOUS
Status: DISCONTINUED | OUTPATIENT
Start: 2021-02-06 | End: 2021-02-07

## 2021-02-06 RX ORDER — POTASSIUM CHLORIDE 20 MEQ/1
40 TABLET, EXTENDED RELEASE ORAL
Status: COMPLETED | OUTPATIENT
Start: 2021-02-06 | End: 2021-02-06

## 2021-02-06 RX ORDER — MORPHINE SULFATE 4 MG/ML
4 INJECTION INTRAVENOUS
Status: COMPLETED | OUTPATIENT
Start: 2021-02-06 | End: 2021-02-06

## 2021-02-06 RX ORDER — AMOXICILLIN 250 MG
1 CAPSULE ORAL
Status: DISCONTINUED | OUTPATIENT
Start: 2021-02-06 | End: 2021-02-09 | Stop reason: HOSPADM

## 2021-02-06 RX ORDER — HEPARIN SODIUM 5000 [USP'U]/ML
60 INJECTION, SOLUTION INTRAVENOUS; SUBCUTANEOUS ONCE
Status: DISCONTINUED | OUTPATIENT
Start: 2021-02-06 | End: 2021-02-06

## 2021-02-06 RX ORDER — HYDROCODONE BITARTRATE AND ACETAMINOPHEN 5; 325 MG/1; MG/1
1 TABLET ORAL
Status: DISCONTINUED | OUTPATIENT
Start: 2021-02-06 | End: 2021-02-09 | Stop reason: HOSPADM

## 2021-02-06 RX ORDER — MAGNESIUM SULFATE HEPTAHYDRATE 40 MG/ML
2 INJECTION, SOLUTION INTRAVENOUS DAILY PRN
Status: DISCONTINUED | OUTPATIENT
Start: 2021-02-06 | End: 2021-02-09 | Stop reason: HOSPADM

## 2021-02-06 RX ORDER — ENOXAPARIN SODIUM 100 MG/ML
30 INJECTION SUBCUTANEOUS EVERY 24 HOURS
Status: DISCONTINUED | OUTPATIENT
Start: 2021-02-06 | End: 2021-02-07 | Stop reason: ALTCHOICE

## 2021-02-06 RX ORDER — DILTIAZEM HYDROCHLORIDE 5 MG/ML
20 INJECTION INTRAVENOUS
Status: COMPLETED | OUTPATIENT
Start: 2021-02-06 | End: 2021-02-06

## 2021-02-06 RX ORDER — SODIUM CHLORIDE 0.9 % (FLUSH) 0.9 %
5-40 SYRINGE (ML) INJECTION AS NEEDED
Status: DISCONTINUED | OUTPATIENT
Start: 2021-02-06 | End: 2021-02-09 | Stop reason: HOSPADM

## 2021-02-06 RX ORDER — POTASSIUM CHLORIDE 14.9 MG/ML
20 INJECTION INTRAVENOUS
Status: COMPLETED | OUTPATIENT
Start: 2021-02-06 | End: 2021-02-06

## 2021-02-06 RX ORDER — ACETAMINOPHEN 325 MG/1
650 TABLET ORAL
Status: DISCONTINUED | OUTPATIENT
Start: 2021-02-06 | End: 2021-02-09 | Stop reason: HOSPADM

## 2021-02-06 RX ORDER — SODIUM CHLORIDE 0.9 % (FLUSH) 0.9 %
5-40 SYRINGE (ML) INJECTION EVERY 8 HOURS
Status: DISCONTINUED | OUTPATIENT
Start: 2021-02-06 | End: 2021-02-09 | Stop reason: HOSPADM

## 2021-02-06 RX ORDER — HEPARIN SODIUM 5000 [USP'U]/100ML
12-25 INJECTION, SOLUTION INTRAVENOUS
Status: DISCONTINUED | OUTPATIENT
Start: 2021-02-06 | End: 2021-02-06

## 2021-02-06 RX ADMIN — POTASSIUM CHLORIDE 40 MEQ: 1500 TABLET, EXTENDED RELEASE ORAL at 17:30

## 2021-02-06 RX ADMIN — ENOXAPARIN SODIUM 30 MG: 30 INJECTION SUBCUTANEOUS at 18:29

## 2021-02-06 RX ADMIN — Medication 10 ML: at 18:29

## 2021-02-06 RX ADMIN — MORPHINE SULFATE 4 MG: 4 INJECTION INTRAVENOUS at 13:44

## 2021-02-06 RX ADMIN — MORPHINE SULFATE 1 MG: 2 INJECTION, SOLUTION INTRAMUSCULAR; INTRAVENOUS at 22:08

## 2021-02-06 RX ADMIN — SODIUM CHLORIDE 1000 ML: 900 INJECTION, SOLUTION INTRAVENOUS at 13:48

## 2021-02-06 RX ADMIN — Medication 10 ML: at 22:09

## 2021-02-06 RX ADMIN — MORPHINE SULFATE 1 MG: 2 INJECTION, SOLUTION INTRAMUSCULAR; INTRAVENOUS at 17:22

## 2021-02-06 RX ADMIN — POTASSIUM CHLORIDE 20 MEQ: 14.9 INJECTION, SOLUTION INTRAVENOUS at 15:34

## 2021-02-06 RX ADMIN — DILTIAZEM HYDROCHLORIDE 30 MG: 30 TABLET, FILM COATED ORAL at 17:30

## 2021-02-06 RX ADMIN — ONDANSETRON 4 MG: 2 INJECTION INTRAMUSCULAR; INTRAVENOUS at 13:44

## 2021-02-06 RX ADMIN — DILTIAZEM HYDROCHLORIDE 20 MG: 5 INJECTION INTRAVENOUS at 14:30

## 2021-02-06 RX ADMIN — HYDROCODONE BITARTRATE AND ACETAMINOPHEN 1 TABLET: 5; 325 TABLET ORAL at 20:09

## 2021-02-06 NOTE — PROGRESS NOTES
Patient arrived from ER with monitoring for A fib She is catechetic with sagging skin and multiple bruising and skin tears. Lower leg dressings are intact and please see note prior as it is this nurse who did those dressings. Her feet have poorly cared for nails and they are thickened and twisted. There is a stage one sore on the sacral promontory with an Allevyn on that site. Her body is covered with scars and pigment changes. She has bruising on the right shoulder, an older bruise. There is a glued laceration over the left brow and is bright blue bruised and there is a painful lump on the left scalp. IV on the right arm is patent. Right arm is covered with skin tears   Xeroform guaze applied to opened areas with Kerlix wrap  She has scrapped knuckles are scraped. Teeth are in poor repair. Left arm has ecchymosis. Bathed per ICU policy with warmed CHG wipes.   [facility]ernadez care done

## 2021-02-06 NOTE — ED NOTES
TRANSFER - OUT REPORT:    Verbal report given to Lupe Alford RN on Jose Raul Gupta  being transferred to [facility] [address] [facility] for routine progression of care       Report consisted of patients Situation, Background, Assessment and   Recommendations(SBAR). Information from the following report(s) SBAR was reviewed with the receiving nurse. Lines:   Peripheral IV 02/06/21 Left Forearm (Active)        Opportunity for questions and clarification was provided.       Patient transported with:   Registered Nurse

## 2021-02-06 NOTE — ED NOTES
Bandages on both lower legs falling off legs. Removed the rest of the bandages to reveal skin tears from below both knees all the way down the shins. Also ecchymosis on all 4 extremities. Xeroform guaze on the lower legs wrapped with Kerlix.

## 2021-02-06 NOTE — H&P
Hospitalist Note     Admit Date:  2021  1:20 PM   Name:  Leroy Sequeira   Age:  66 y.o.  :  1945   MRN:  [id number]   PCP:  Meg Herman MD  Treatment Team: Attending Provider: Krista Echevarria MD; Primary Nurse: Amaya Lucas RN    HPI/Subjective:     Chief complaint fall    Patient is a 33-year-old female with past medical history significant for depression, GERD, hypertension, peptic ulcer disease, COPD on 4 L oxygen at nighttime at baseline, was brought to the ER by EMS due to falling at home. Patient is alert awake oriented x3 at the time of my evaluation. Patient denies any loss of consciousness. She states that she fell down about 3 days ago and started having worsening lower back pain. Back Pain- lower back, 10/10 in severity, aggravated with movement, no radiation, no relieving factors. She also fell today and hit her left forehead. She states she feels very weak and tired. She denies any loss of consciousness. She denies any chest pain or palpitations. No nausea no vomiting no diarrhea no constipation. No tingling numbness. She states she feels weak in both her legs. 10 systems reviewed and negative except as noted in HPI. ER course  Afebrile, tachycardic with heart rate of 129/min irregularly irregular,, blood pressure 129/66, oxygen saturation 95% room air. EKG personally reviewed by me showed A. fib with RVR with heart rate of 130/min. Patient was given a dose of IV Cardizem and heart rate improved. CT head negative for acute intracranial abnormality. CBC fairly unremarkable. CMP remarkable for potassium of 2.9. Lactic acid 1.4. High-sensitivity troponin 17.  proBNP [id number]. Chest x-ray shows no acute cardiopulmonary abnormality. X-ray of the low back shows age-indeterminate moderate compression fracture L3. X-ray of the right hip shows no fracture. Osteoarthritic changes.     Patient is admitted for further evaluation management of new onset atrial fibrillation with rapid ventricular rate, frequent falls. Past Medical History:   Diagnosis Date    Anxiety 3/6/2013    Arthritis     osteoarthritis    B12 deficiency 3/6/2013    Chronic back pain 3/6/2013    Chronic kidney disease (CKD) stage G3b/A1, moderately decreased glomerular filtration rate (GFR) between 30-44 mL/min/1.73 square meter and albuminuria creatinine ratio less than 30 mg/g 10/14/2016    Constipation 11/4/2015    COPD 2006    [facility]/ patient on 2.5 liters O2 nightly at home    Decreased GFR 5/26/2016    Depression 10/25/2012    Depression 10/25/2012    Depression, major, in remission ([facility] [address] 1/31/2018    diverticulitis     Encounter for long-term (current) use of other medications 3/6/2013    Environmental allergies 3/6/2013    Fecal incontinence 12/7/2016    Fibromyalgia 9/15/2015    GERD (gastroesophageal reflux disease) \"years\"    Headache(784.0) 2/12/2013    HTN (hypertension) 10/25/2012    HTN (hypertension) 10/25/2012    Hyperlipidemia 10/25/2012    Lymphocytic colitis 90/7/2178    Metabolic syndrome 8/7/3092    Microalbuminuria 11/4/2015    Osteoporosis 10/25/2012    polypectomy     PUD (peptic ulcer disease) \"years ago\"    Had peptic ulcers    thyroid mass     partial removal, benign    Unspecified adverse effect of anesthesia     PATIENT STATES SHE DOESN'T GET GENERAL ANESTHESIA DUE TO COPD    Unspecified sleep apnea     wears O2 at night.   no c-jeffrey    Vitamin D deficiency 3/6/2013      Past Surgical History:   Procedure Laterality Date    HX APPENDECTOMY  1970s    HX BREAST BIOPSY Right     excision of cyst    HX CATARACT REMOVAL Right 2016    right eye    HX CHOLECYSTECTOMY  1970s    HX GYN  1970s    TIA BSO    HX HERNIA REPAIR  unknown    hiatal hernia repair    HX ORTHOPAEDIC  [id number]    right knee replaced and then left the next year    HX PARTIAL THYROIDECTOMY  Sheeba cyst removed from right breast/benign    GA COLOSTOMY  1990s    placed, then reversed      Allergies   Allergen Reactions    Amlodipine Hives    Lisinopril Diarrhea    Niaspan [Niacin] Rash     Patient was not taking it with  mg 30 min before. She wants to give it another try. Pt takes this med and it does not cause a rash anymore        Social History     Tobacco Use    Smoking status: Current Every Day Smoker     Packs/day: 0.50     Years: 45.00     Pack years: 22.50     Types: Cigarettes    Smokeless tobacco: Never Used   Substance Use Topics    Alcohol use: Yes     Comment: states no drinking beer in 2 months      Family History   Problem Relation Age of Onset    Kidney Disease Mother     Heart Disease Father     Heart Attack Brother       Immunization History   Administered Date(s) Administered    Influenza High Dose Vaccine PF 11/04/2015, 11/03/2017, 10/10/2018    Influenza Vaccine 10/17/2013, 10/13/2014    Influenza Vaccine (Quad) PF (>6 Mo Flulaval, Fluarix, and >3 Yrs Afluria, Fluzone 27735) 10/12/2016    Influenza Vaccine (Tri) Adjuvanted (>65 Yrs FLUAD TRI 42433) 10/10/2019    Pneumococcal Conjugate (PCV-13) 10/12/2016    Pneumococcal Polysaccharide (PPSV-23) 10/13/2014    Tdap 04/25/2017, 02/13/2020     PTA Medications:  Prior to Admission Medications   Prescriptions Last Dose Informant Patient Reported? Taking? LORazepam (ATIVAN) 2 mg tablet   No No   Sig: Take 1 Tab by mouth three (3) times daily. 1 TAB PO QHS OR TID PRN  Indications: anxious   OTHER   Yes No   Oxygen   Yes No   albuterol (PROVENTIL HFA, VENTOLIN HFA, PROAIR HFA) 90 mcg/actuation inhaler 2/5/2021  No Yes   Sig: Take 2 Puffs by inhalation every four (4) hours as needed for Wheezing or Shortness of Breath. albuterol (PROVENTIL VENTOLIN) 2.5 mg /3 mL (0.083 %) nebu 2/5/2021  No No   Sig: 3 mL by Nebulization route every four (4) hours as needed for Wheezing or Shortness of Breath.    alendronate (FOSAMAX) 70 mg tablet   No No   Sig: Take 1 Tab by mouth every seven (7) days. artificial tears, dextran-hypromellose-glycerin, (TEARS NATURALE FORTE) 0.1-0.3-0.2 % ophthalmic solution   No Yes   Sig: Administer 1 Drop to both eyes every six (6) hours. Indications: dry eye   atorvastatin (LIPITOR) 80 mg tablet   No No   Sig: Take 1 Tab by mouth daily. Indications: primary prevention of heart attack   budesonide (ENTOCORT EC) 3 mg capsule   Yes No   Sig: Take 6 mg by mouth every morning. budesonide-formoteroL (SYMBICORT) 160-4.5 mcg/actuation HFAA   No No   Sig: Take 2 Puffs by inhalation two (2) times a day. busPIRone (BUSPAR) 15 mg tablet   No Yes   Sig: Take 1 Tab by mouth three (3) times daily. Indications: repeated episodes of anxiety   colestipol (COLESTID) 1 gram tablet   Yes No   Sig: Take 1 g by mouth two (2) times a day. dicyclomine (BENTYL) 10 mg capsule   No No   Sig: Take 1 Cap by mouth three (3) times daily as needed for Abdominal Cramps. Indications: irritable colon   ergocalciferol (ERGOCALCIFEROL) 1,250 mcg (50,000 unit) capsule   No No   Sig: Take 1 Cap by mouth every seven (7) days. furosemide (LASIX) 20 mg tablet   No No   Sig: Take 1 Tab by mouth daily as needed (edema). linaCLOtide (Linzess) 72 mcg cap capsule   No No   Sig: Take 1 Cap by mouth Daily (before breakfast). Indications: irritable bowel syndrome with constipation   metoclopramide HCl (REGLAN) 10 mg tablet   No No   Sig: Take 1 Tab by mouth Before breakfast, lunch, dinner and at bedtime. Indications: stomach muscle paralysis and decreased function from diabetes   ondansetron (ZOFRAN ODT) 4 mg disintegrating tablet   No No   Sig: Take 1 Tab by mouth every eight (8) hours as needed for Nausea. ondansetron hcl (ZOFRAN) 4 mg tablet   Yes No   Sig: Take 4 mg by mouth every eight (8) hours as needed for Nausea or Vomiting. pantoprazole (PROTONIX) 40 mg tablet   No No   Sig: Take 1 Tab by mouth two (2) times a day.  Indications: gastroesophageal reflux disease   predniSONE (DELTASONE) 10 mg tablet   Yes No   Sig: TK 1 T PO D   predniSONE (DELTASONE) 10 mg tablet   No No   Sig: Take 10 mg by mouth two (2) times a day. primidone (MYSOLINE) 50 mg tablet   No No   Sig: Take 1 Tab by mouth three (3) times daily. Indications: essential tremor   sertraline (ZOLOFT) 100 mg tablet   No No   Sig: Take 2 tablets daily ( Dose increased from previously 100 mg daily )  Indications: anxiousness associated with depression   sucralfate (CARAFATE) 1 gram tablet   Yes No   Sig: Take 1 g by mouth four (4) times daily. tiotropium (SPIRIVA) 18 mcg inhalation capsule   No No   Sig: Take 1 Cap by inhalation daily. Indications: bronchospasm prevention with COPD   topiramate (TOPAMAX) 50 mg tablet   No No   Sig: Take 1 tablet with breakfast, 1 tablet with lunch, and 2 with dinner. Indications: migraine prevention   traZODone (DESYREL) 50 mg tablet   No No   Sig: Take 1 Tab by mouth nightly. Indications: insomnia associated with depression      Facility-Administered Medications: None       Objective:     Patient Vitals for the past 24 hrs:   Temp Pulse Resp BP SpO2   02/06/21 1719 97.8 °F (36.6 °C) 99  128/69 94 %   02/06/21 1430  (!) 113  132/70    02/06/21 1326 98.2 °F (36.8 °C) (!) 129 18 129/66 95 %     Oxygen Therapy  O2 Sat (%): 94 % (02/06/21 1719)  O2 Device: Room air (02/06/21 1719)    No intake or output data in the 24 hours ending 02/06/21 1735    *Note that automatically entered I/Os may not be accurate; dependent on patient compliance with collection and accurate  by assistants. Physical Exam:    General:    Elderly female, chronically ill-appearing,  Eyes:   Normal sclerae. Extraocular movements intact. HENT:  Normocephalic, atraumatic. Moist mucous membranes  CV:   Tachycardic, irregularly irregular, no murmurs rubs or gallops  Lungs:  CTAB. No wheezing, rhonchi, or rales. Abdomen: Soft, nontender, nondistended.   Active bowel sounds, no organomegaly  Extremities: Warm and dry. No cyanosis or edema.,  Bilateral lower extremity bruises, wraps in place,  Neurologic: CN II-XII grossly intact. Sensation intact. Skin:     No rashes or jaundice. Normal coloration  Psych:  Normal mood and affect. I reviewed the labs, imaging, EKGs, telemetry, and other studies done this admission. Data Review:   Recent Results (from the past 24 hour(s))   EKG, 12 LEAD, INITIAL    Collection Time: 02/06/21  1:24 PM   Result Value Ref Range    Ventricular Rate 127 BPM    Atrial Rate 119 BPM    QRS Duration 108 ms    Q-T Interval 376 ms    QTC Calculation (Bezet) 546 ms    Calculated R Axis -111 degrees    Calculated T Axis 74 degrees    Diagnosis       Atrial fibrillation with rapid ventricular response  Pulmonary disease pattern  Right bundle branch block  Abnormal ECG  When compared with ECG of 07-DEC-2009 12:08,  Atrial fibrillation has replaced Sinus rhythm  Vent. rate has increased BY  63 BPM  Right bundle branch block is now Present     TROPONIN-HIGH SENSITIVITY    Collection Time: 02/06/21  1:43 PM   Result Value Ref Range    Troponin-High Sensitivity 17.0 (H) 0 - 14 pg/mL   CBC WITH AUTOMATED DIFF    Collection Time: 02/06/21  1:43 PM   Result Value Ref Range    WBC 8.6 4.3 - 11.1 K/uL    RBC 3.82 (L) 4.05 - 5.2 M/uL    HGB 12.6 11.7 - 15.4 g/dL    HCT 36.9 35.8 - 46.3 %    MCV 96.6 79.6 - 97.8 FL    MCH 33.0 (H) 26.1 - 32.9 PG    MCHC 34.1 31.4 - 35.0 g/dL    RDW 14.5 11.9 - 14.6 %    PLATELET 149 833 - 064 K/uL    MPV 11.8 9.4 - 12.3 FL    ABSOLUTE NRBC 0.00 0.0 - 0.2 K/uL    DF AUTOMATED      NEUTROPHILS 77 43 - 78 %    LYMPHOCYTES 15 13 - 44 %    MONOCYTES 7 4.0 - 12.0 %    EOSINOPHILS 0 (L) 0.5 - 7.8 %    BASOPHILS 1 0.0 - 2.0 %    IMMATURE GRANULOCYTES 1 0.0 - 5.0 %    ABS. NEUTROPHILS 6.6 1.7 - 8.2 K/UL    ABS. LYMPHOCYTES 1.3 0.5 - 4.6 K/UL    ABS. MONOCYTES 0.6 0.1 - 1.3 K/UL    ABS. EOSINOPHILS 0.0 0.0 - 0.8 K/UL    ABS.  BASOPHILS 0.0 0.0 - 0.2 K/UL    ABS. IMM. GRANS. 0.0 0.0 - 0.5 K/UL   METABOLIC PANEL, COMPREHENSIVE    Collection Time: 02/06/21  1:43 PM   Result Value Ref Range    Sodium 144 136 - 145 mmol/L    Potassium 2.9 (L) 3.5 - 5.1 mmol/L    Chloride 110 (H) 98 - 107 mmol/L    CO2 27 21 - 32 mmol/L    Anion gap 7 7 - 16 mmol/L    Glucose 158 (H) 65 - 100 mg/dL    BUN 23 8 - 23 MG/DL    Creatinine 0.95 0.6 - 1.0 MG/DL    GFR est AA >60 >60 ml/min/1.73m2    GFR est non-AA >60 >60 ml/min/1.73m2    Calcium 8.5 8.3 - 10.4 MG/DL    Bilirubin, total 0.4 0.2 - 1.1 MG/DL    ALT (SGPT) 26 12 - 65 U/L    AST (SGOT) 27 15 - 37 U/L    Alk.  phosphatase 93 50 - 130 U/L    Protein, total 6.1 (L) 6.3 - 8.2 g/dL    Albumin 3.3 3.2 - 4.6 g/dL    Globulin 2.8 2.3 - 3.5 g/dL    A-G Ratio 1.2 1.2 - 3.5     PROTHROMBIN TIME + INR    Collection Time: 02/06/21  1:43 PM   Result Value Ref Range    Prothrombin time 14.6 12.5 - 14.7 sec    INR 1.1     LACTIC ACID    Collection Time: 02/06/21  1:43 PM   Result Value Ref Range    Lactic acid 1.4 0.4 - 2.0 MMOL/L   NT-PRO BNP    Collection Time: 02/06/21  1:43 PM   Result Value Ref Range    NT pro-BNP 6,999 (H) <450 PG/ML   PTT    Collection Time: 02/06/21  1:43 PM   Result Value Ref Range    aPTT 30.0 24.1 - 35.1 SEC   URINALYSIS W/ RFLX MICROSCOPIC    Collection Time: 02/06/21  3:10 PM   Result Value Ref Range    Color YELLOW      Appearance CLEAR      Specific gravity 1.008 1.001 - 1.023      pH (UA) 5.5 5.0 - 9.0      Protein Negative NEG mg/dL    Glucose Negative mg/dL    Ketone Negative NEG mg/dL    Bilirubin Negative NEG      Blood Negative NEG      Urobilinogen 0.2 0.2 - 1.0 EU/dL    Nitrites Negative NEG      Leukocyte Esterase Negative NEG     TROPONIN-HIGH SENSITIVITY    Collection Time: 02/06/21  4:13 PM   Result Value Ref Range    Troponin-High Sensitivity 15.1 (H) 0 - 14 pg/mL   TSH 3RD GENERATION    Collection Time: 02/06/21  4:13 PM   Result Value Ref Range    TSH 4.180 uIU/mL   MAGNESIUM Collection Time: 02/06/21  4:13 PM   Result Value Ref Range    Magnesium 1.7 (L) 1.8 - 2.4 mg/dL       All Micro Results     None          Current Facility-Administered Medications   Medication Dose Route Frequency    sodium chloride (NS) flush 5-40 mL  5-40 mL IntraVENous Q8H    sodium chloride (NS) flush 5-40 mL  5-40 mL IntraVENous PRN    dilTIAZem IR (CARDIZEM) tablet 30 mg  30 mg Oral TIDAC    acetaminophen (TYLENOL) tablet 650 mg  650 mg Oral Q4H PRN    HYDROcodone-acetaminophen (NORCO) 5-325 mg per tablet 1 Tab  1 Tab Oral Q4H PRN    morphine injection 1 mg  1 mg IntraVENous Q4H PRN    naloxone (NARCAN) injection 0.4 mg  0.4 mg IntraVENous PRN    senna-docusate (PERICOLACE) 8.6-50 mg per tablet 1 Tab  1 Tab Oral BID PRN    heparin (porcine) injection 3,200 Units  60 Units/kg IntraVENous ONCE    heparin 25,000 units in dextrose 500 mL infusion  12-25 Units/kg/hr IntraVENous TITRATE    magnesium sulfate 2 g/50 ml IVPB (premix or compounded)  2 g IntraVENous DAILY PRN       Other Studies:  Xr Spine Lumb 2 Or 3 V    Result Date: 2/6/2021  PELVIS AND RIGHT HIP, 3 views. HISTORY: Hip pain following fall. TECHNIQUE: AP view of the pelvis and coned down AP and frogleg lateral of the hip. FINDINGS: -Bony pelvic ring: Appears intact. -SI joints: Appear symmetric. -Pubic rami: Appear intact. -Femoral head:  Has a round smooth contour. -Joint space: Symmetrically. Acetabular osteophytes. -Femoral neck and proximal femoral shaft:  Appear intact. -Lower lumbar spine: Mild DJD. Negative for acute hip fracture. Osteoarthritis. LUMBAR SPINE, 3 views. HISTORY: Low back pain following fall. COMPARISON: None. CT scan July 2011 TECHNIQUE: AP, lateral and cone-down lumbosacral junction views. FINDINGS: Spinal alignment: Anatomic. Disk spaces: Maintained. Pedicles:  Appear intact. Vertebral bodies: Decreased height of L3 SI joints:  Appear symmetric. Facet joints:  Also unremarkable. Other:  Osteophytes.  Aortic calcifications. IMPRESSION: Age-indeterminate moderate compression fracture L3. Posterior intact. CHEST X-RAY, one view. HISTORY:  Chest pain  falls. TECHNIQUE:  AP supine view. COMPARISON: None. FINDINGS: Lungs: Hyperinflated and clear. Small calcified granulomas. No pneumothorax. Costophrenic angles: are sharp. Heart size: is normal. Pulmonary vasculature: is unremarkable. Aorta: Arch calcifications. . Included portion of the upper abdomen: is unremarkable. Bones: No gross bony lesions. Other: None. IMPRESSION:  Negative for acute change. Xr Hip Rt W Or Wo Pelv 2-3 Vws    Result Date: 2/6/2021  PELVIS AND RIGHT HIP, 3 views. HISTORY: Hip pain following fall. TECHNIQUE: AP view of the pelvis and coned down AP and frogleg lateral of the hip. FINDINGS: -Bony pelvic ring: Appears intact. -SI joints: Appear symmetric. -Pubic rami: Appear intact. -Femoral head:  Has a round smooth contour. -Joint space: Symmetrically. Acetabular osteophytes. -Femoral neck and proximal femoral shaft:  Appear intact. -Lower lumbar spine: Mild DJD. Negative for acute hip fracture. Osteoarthritis. LUMBAR SPINE, 3 views. HISTORY: Low back pain following fall. COMPARISON: None. CT scan July 2011 TECHNIQUE: AP, lateral and cone-down lumbosacral junction views. FINDINGS: Spinal alignment: Anatomic. Disk spaces: Maintained. Pedicles:  Appear intact. Vertebral bodies: Decreased height of L3 SI joints:  Appear symmetric. Facet joints:  Also unremarkable. Other:  Osteophytes. Aortic calcifications. IMPRESSION: Age-indeterminate moderate compression fracture L3. Posterior intact. CHEST X-RAY, one view. HISTORY:  Chest pain  falls. TECHNIQUE:  AP supine view. COMPARISON: None. FINDINGS: Lungs: Hyperinflated and clear. Small calcified granulomas. No pneumothorax. Costophrenic angles: are sharp. Heart size: is normal. Pulmonary vasculature: is unremarkable. Aorta: Arch calcifications. . Included portion of the upper abdomen: is unremarkable. Bones: No gross bony lesions. Other: None. IMPRESSION:  Negative for acute change. Ct Head Wo Cont    Result Date: 2/6/2021  CT HEAD WITHOUT CONTRAST. INDICATION: Head injury sustained in fall. COMPARISON: January 2021 and February 2020  TECHNIQUE:   5 mm axial scans from the skull base to the vertex. Our CT scanners use one or more of the following:  Automated exposure control, adjustment of the mA and or kV according to patient size, iterative reconstruction. FINDINGS:  No acute intraparenchymal hemorrhage or abnormal extra-axial fluid collection. The ventricles are normal size. No midline shift or mass effect. Symmetric volume loss. Included portion of the paranasal sinuses and orbits grossly unremarkable. Negative for acute intracranial abnormality. Chronic changes. Xr Chest Port    Result Date: 2/6/2021  PELVIS AND RIGHT HIP, 3 views. HISTORY: Hip pain following fall. TECHNIQUE: AP view of the pelvis and coned down AP and frogleg lateral of the hip. FINDINGS: -Bony pelvic ring: Appears intact. -SI joints: Appear symmetric. -Pubic rami: Appear intact. -Femoral head:  Has a round smooth contour. -Joint space: Symmetrically. Acetabular osteophytes. -Femoral neck and proximal femoral shaft:  Appear intact. -Lower lumbar spine: Mild DJD. Negative for acute hip fracture. Osteoarthritis. LUMBAR SPINE, 3 views. HISTORY: Low back pain following fall. COMPARISON: None. CT scan July 2011 TECHNIQUE: AP, lateral and cone-down lumbosacral junction views. FINDINGS: Spinal alignment: Anatomic. Disk spaces: Maintained. Pedicles:  Appear intact. Vertebral bodies: Decreased height of L3 SI joints:  Appear symmetric. Facet joints:  Also unremarkable. Other:  Osteophytes. Aortic calcifications. IMPRESSION: Age-indeterminate moderate compression fracture L3. Posterior intact. CHEST X-RAY, one view. HISTORY:  Chest pain  falls. TECHNIQUE:  AP supine view. COMPARISON: None.  FINDINGS: Lungs: Hyperinflated and clear. Small calcified granulomas. No pneumothorax. Costophrenic angles: are sharp. Heart size: is normal. Pulmonary vasculature: is unremarkable. Aorta: Arch calcifications. . Included portion of the upper abdomen: is unremarkable. Bones: No gross bony lesions. Other: None. IMPRESSION:  Negative for acute change. Assessment and Plan:     Hospital Problems as of 2/6/2021 Date Reviewed: 8/4/2020          Codes Class Noted - Resolved POA    * (Principal) New onset atrial fibrillation ([facility] [address] ICD-10-CM: I48.91  ICD-9-CM: 427.31  2/6/2021 - Present Unknown        Falls frequently ICD-10-CM: R29.6  ICD-9-CM: V15.88  2/6/2021 - Present Unknown        Hypokalemia ICD-10-CM: E87.6  ICD-9-CM: 276.8  2/6/2021 - Present Unknown        Peripheral neuropathy ICD-10-CM: G62.9  ICD-9-CM: 356.9  2/12/2013 - Present Yes        COPD (chronic obstructive pulmonary disease) (HCC) (Chronic) ICD-10-CM: J44.9  ICD-9-CM: 496  12/30/2009 - Present Yes        GERD (gastroesophageal reflux disease) (Chronic) ICD-10-CM: K21.9  ICD-9-CM: 530.81  12/30/2009 - Present Yes              Plan: This is a 70-year-old female with    New onset atrial fibrillation with rapid ventricular rate:  EKG shows new onset atrial fibrillation with RVR. Status post IV Cardizem and heart rate improved. Patient is started on p.o. Cardizem. Cardiology consulted. Appreciate recommendations. Echocardiogram ordered. [id number] score is 3, putting her at high risk for stroke. However unfortunately due to frequent falls and head injury today with laceration, and multiple bruises will hold off on starting oral anticoagulation. Will check TSH, magnesium level. Frequent falls -likely polypharmacy  Patient is on multiple medications which can increase risk of falls including Ativan, Neurontin. Will decrease the dose of Ativan and will need to be tapered off of it. Will not stop abruptly due to risk of seizures. Fall precautions.   Will check 25-hydroxy vitamin D level, TSH, B12    Hypokalemia  Potassium 2.9. She received IV potassium in the ER. Plan to give 1 dose of oral potassium. Recheck potassium in a.m. Anxiety/depression  Continue home medications buspirone    Chronic hypoxemic respiratory failure secondary to COPD at baseline 4 L oxygen at night  Continue home oxygen, inhalers. No signs of acute exacerbation. GERD  PPI    Discharge planning: Telemetry, inpatient. May need placement. PPD.     DVT ppx: Lovenox    Code status: DNR    DPOA patient's son Mr. Pedro Dunbar phone number [phone]    Estimated LOS:  Greater than 2 midnights    Risk:  high    Signed:  Estella Petersen MD

## 2021-02-06 NOTE — PROGRESS NOTES
Problem: Falls - Risk of  Goal: *Absence of Falls  Description: Document Jose Martin Lindo Fall Risk and appropriate interventions in the flowsheet. Outcome: Progressing Towards Goal  Note: Fall Risk Interventions:            Medication Interventions: Patient to call before getting OOB, Bed/chair exit alarm    Elimination Interventions: Bed/chair exit alarm    History of Falls Interventions: Bed/chair exit alarm, Room close to nurse's station, Investigate reason for fall         Problem: Patient Education: Go to Patient Education Activity  Goal: Patient/Family Education  Outcome: Progressing Towards Goal     Problem: Pressure Injury - Risk of  Goal: *Prevention of pressure injury  Description: Document Gen Scale and appropriate interventions in the flowsheet. Outcome: Progressing Towards Goal  Note: Pressure Injury Interventions:  Sensory Interventions: Assess changes in LOC, Pad between skin to skin, Minimize linen layers, Keep linens dry and wrinkle-free    Moisture Interventions: Check for incontinence Q2 hours and as needed, Limit adult briefs, Minimize layers    Activity Interventions: PT/OT evaluation    Mobility Interventions: Float heels, Turn and reposition approx.  every two hours(pillow and wedges)    Nutrition Interventions: Offer support with meals,snacks and hydration    Friction and Shear Interventions: Minimize layers                Problem: Patient Education: Go to Patient Education Activity  Goal: Patient/Family Education  Outcome: Progressing Towards Goal

## 2021-02-06 NOTE — ED PROVIDER NOTES
Patient has had multiple falls recently with multiple bruises and bumps on her legs and arms. Had a fall 3 days ago worsening her lower back pain. Has not felt better. Also fell today hitting her left forehead with a laceration above the left eyebrow. Patient states her legs are weak. Came in for evaluation. Lives at home with a caretaker. Caretaker is not there all the time. Patient ambulates with a walker. The history is provided by the patient and the EMS personnel. No  was used. Fall  The accident occurred more than 2 days ago. The fall occurred while walking. She fell from a height of ground level. She landed on hard floor. There was no blood loss. The point of impact was the head (buttocks). Pain location: lower back. The pain is moderate. She was ambulatory at the scene. Associated symptoms include extremity weakness (BLE) and laceration. Pertinent negatives include no visual change, no fever, no numbness, no abdominal pain, no nausea, no vomiting, no hematuria, no headaches, no loss of consciousness and no tingling. The risk factors include being elderly. The symptoms are aggravated by pressure on injury. She has tried nothing for the symptoms. It is unknown when the patient last had a tetanus shot.         Past Medical History:   Diagnosis Date    Anxiety 3/6/2013    Arthritis     osteoarthritis    B12 deficiency 3/6/2013    Chronic back pain 3/6/2013    Chronic kidney disease (CKD) stage G3b/A1, moderately decreased glomerular filtration rate (GFR) between 30-44 mL/min/1.73 square meter and albuminuria creatinine ratio less than 30 mg/g 10/14/2016    Constipation 11/4/2015    COPD 2006    [facility]/ patient on 2.5 liters O2 nightly at home    Decreased GFR 5/26/2016    Depression 10/25/2012    Depression 10/25/2012    Depression, major, in remission ([facility] [address] 1/31/2018    diverticulitis     Encounter for long-term (current) use of other medications 3/6/2013    Environmental allergies 3/6/2013    Fecal incontinence 12/7/2016    Fibromyalgia 9/15/2015    GERD (gastroesophageal reflux disease) \"years\"    Headache(784.0) 2/12/2013    HTN (hypertension) 10/25/2012    HTN (hypertension) 10/25/2012    Hyperlipidemia 10/25/2012    Lymphocytic colitis 11/5/9004    Metabolic syndrome 8/5/1659    Microalbuminuria 11/4/2015    Osteoporosis 10/25/2012    polypectomy     PUD (peptic ulcer disease) \"years ago\"    Had peptic ulcers    thyroid mass     partial removal, benign    Unspecified adverse effect of anesthesia     PATIENT STATES SHE DOESN'T GET GENERAL ANESTHESIA DUE TO COPD    Unspecified sleep apnea     wears O2 at night.   no c-jeffrey    Vitamin D deficiency 3/6/2013       Past Surgical History:   Procedure Laterality Date    HX APPENDECTOMY  1970s    HX BREAST BIOPSY Right     excision of cyst    HX CATARACT REMOVAL Right 2016    right eye    HX CHOLECYSTECTOMY  1970s    HX GYN  1970s    TIA BSO    HX HERNIA REPAIR  unknown    hiatal hernia repair    HX ORTHOPAEDIC  [id number]    right knee replaced and then left the next year    HX PARTIAL THYROIDECTOMY  [address] UNLISTED  1980s    cyst removed from right breast/benign    SC COLOSTOMY  1990s    placed, then reversed         Family History:   Problem Relation Age of Onset    Kidney Disease Mother     Heart Disease Father     Heart Attack Brother        Social History     Socioeconomic History    Marital status:      Spouse name: Not on file    Number of children: Not on file    Years of education: Not on file    Highest education level: Not on file   Occupational History    Not on file   Social Needs    Financial resource strain: Not on file    Food insecurity     Worry: Not on file     Inability: Not on file    Transportation needs     Medical: Not on file     Non-medical: Not on file   Tobacco Use    Smoking status: Current Every Day Smoker     Packs/day: 0.50     Years: 45.00     Pack years: 22.50     Types: Cigarettes    Smokeless tobacco: Never Used   Substance and Sexual Activity    Alcohol use: Yes     Comment: states no drinking beer in 2 months    Drug use: Yes     Types: Marijuana     Comment: smoked it last night    Sexual activity: Never   Lifestyle    Physical activity     Days per week: Not on file     Minutes per session: Not on file    Stress: Not on file   Relationships    Social connections     Talks on phone: Not on file     Gets together: Not on file     Attends Worship service: Not on file     Active member of club or organization: Not on file     Attends meetings of clubs or organizations: Not on file     Relationship status: Not on file    Intimate partner violence     Fear of current or ex partner: Not on file     Emotionally abused: Not on file     Physically abused: Not on file     Forced sexual activity: Not on file   Other Topics Concern    Not on file   Social History Narrative    Not on file         ALLERGIES: Amlodipine, Lisinopril, and Niaspan [niacin]    Review of Systems   Constitutional: Negative for chills and fever. HENT: Negative for rhinorrhea and sore throat. Eyes: Negative for pain, redness and visual disturbance. Respiratory: Negative for chest tightness, shortness of breath and wheezing. Cardiovascular: Negative for chest pain and leg swelling. Gastrointestinal: Negative for abdominal pain, diarrhea, nausea and vomiting. Genitourinary: Negative for dysuria and hematuria. Musculoskeletal: Positive for back pain, extremity weakness (BLE) and gait problem. Negative for neck pain and neck stiffness. Skin: Positive for color change and wound. Negative for rash. Neurological: Positive for weakness. Negative for tingling, loss of consciousness, numbness and headaches.        Vitals:    02/06/21 1326   BP: 129/66   Pulse: (!) 129   Resp: 18   Temp: 98.2 °F (36.8 °C)   SpO2: 95% Weight: 53.5 kg (118 lb)   Height: 5' 8\" (1.727 m)            Physical Exam  Constitutional:       Appearance: Normal appearance. She is well-developed. HENT:      Head: Normocephalic. Eyes:      Extraocular Movements: Extraocular movements intact. Pupils: Pupils are equal, round, and reactive to light. Neck:      Musculoskeletal: Normal range of motion and neck supple. No muscular tenderness. Cardiovascular:      Rate and Rhythm: Normal rate and regular rhythm. Pulmonary:      Effort: Pulmonary effort is normal.      Breath sounds: Normal breath sounds. Abdominal:      General: Bowel sounds are normal.      Palpations: Abdomen is soft. Tenderness: There is no abdominal tenderness. Musculoskeletal: Normal range of motion. General: Swelling (both feet) present. Skin:     General: Skin is warm and dry. Findings: Bruising (BLE and BUE) and laceration present. Neurological:      General: No focal deficit present. Mental Status: She is alert and oriented to person, place, and time. MDM  Number of Diagnoses or Management Options  Diagnosis management comments: New onset atrial fibrillation with leg weakness and falls. Also with hyperkalemia. Has L3 compression fracture age indeterminate. Worse back pain since fall 3 days ago. Will admit for further treatment. Amount and/or Complexity of Data Reviewed  Clinical lab tests: ordered and reviewed  Tests in the radiology section of CPT®: ordered and reviewed  Tests in the medicine section of CPT®: ordered and reviewed    Patient Progress  Patient progress: stable         Wound Closure by Adhesive    Date/Time: 2/6/2021 2:02 PM  Performed by: Dong Mohr MD  Authorized by: Dong Mohr MD     Consent:     Consent obtained:  Verbal    Consent given by:  Patient    Risks discussed:  Pain  Anesthesia (see MAR for exact dosages):      Anesthesia method:  None  Laceration details:     Location:  Face Face location:  L eyebrow    Length (cm):  2  Repair type:     Repair type:  Simple  Exploration:     Hemostasis achieved with:  Direct pressure    Wound exploration: wound explored through full range of motion and entire depth of wound probed and visualized    Treatment:     Area cleansed with:  Saline    Amount of cleaning:  Standard  Skin repair:     Repair method:  Tissue adhesive  Approximation:     Approximation:  Close  Post-procedure details:     Dressing:  Open (no dressing)    Patient tolerance of procedure: Tolerated well, no immediate complications            EKG: nonspecific ST and T waves changes, atrial fibrillation, rate 127. Results Include:    Recent Results (from the past 24 hour(s))   EKG, 12 LEAD, INITIAL    Collection Time: 02/06/21  1:24 PM   Result Value Ref Range    Ventricular Rate 127 BPM    Atrial Rate 119 BPM    QRS Duration 108 ms    Q-T Interval 376 ms    QTC Calculation (Bezet) 546 ms    Calculated R Axis -111 degrees    Calculated T Axis 74 degrees    Diagnosis       Atrial fibrillation with rapid ventricular response  Pulmonary disease pattern  Right bundle branch block  Abnormal ECG  When compared with ECG of 07-DEC-2009 12:08,  Atrial fibrillation has replaced Sinus rhythm  Vent.  rate has increased BY  63 BPM  Right bundle branch block is now Present     TROPONIN-HIGH SENSITIVITY    Collection Time: 02/06/21  1:43 PM   Result Value Ref Range    Troponin-High Sensitivity 17.0 (H) 0 - 14 pg/mL   CBC WITH AUTOMATED DIFF    Collection Time: 02/06/21  1:43 PM   Result Value Ref Range    WBC 8.6 4.3 - 11.1 K/uL    RBC 3.82 (L) 4.05 - 5.2 M/uL    HGB 12.6 11.7 - 15.4 g/dL    HCT 36.9 35.8 - 46.3 %    MCV 96.6 79.6 - 97.8 FL    MCH 33.0 (H) 26.1 - 32.9 PG    MCHC 34.1 31.4 - 35.0 g/dL    RDW 14.5 11.9 - 14.6 %    PLATELET 057 265 - 382 K/uL    MPV 11.8 9.4 - 12.3 FL    ABSOLUTE NRBC 0.00 0.0 - 0.2 K/uL    DF AUTOMATED      NEUTROPHILS 77 43 - 78 %    LYMPHOCYTES 15 13 - 44 % MONOCYTES 7 4.0 - 12.0 %    EOSINOPHILS 0 (L) 0.5 - 7.8 %    BASOPHILS 1 0.0 - 2.0 %    IMMATURE GRANULOCYTES 1 0.0 - 5.0 %    ABS. NEUTROPHILS 6.6 1.7 - 8.2 K/UL    ABS. LYMPHOCYTES 1.3 0.5 - 4.6 K/UL    ABS. MONOCYTES 0.6 0.1 - 1.3 K/UL    ABS. EOSINOPHILS 0.0 0.0 - 0.8 K/UL    ABS. BASOPHILS 0.0 0.0 - 0.2 K/UL    ABS. IMM. GRANS. 0.0 0.0 - 0.5 K/UL   METABOLIC PANEL, COMPREHENSIVE    Collection Time: 02/06/21  1:43 PM   Result Value Ref Range    Sodium 144 136 - 145 mmol/L    Potassium 2.9 (L) 3.5 - 5.1 mmol/L    Chloride 110 (H) 98 - 107 mmol/L    CO2 27 21 - 32 mmol/L    Anion gap 7 7 - 16 mmol/L    Glucose 158 (H) 65 - 100 mg/dL    BUN 23 8 - 23 MG/DL    Creatinine 0.95 0.6 - 1.0 MG/DL    GFR est AA >60 >60 ml/min/1.73m2    GFR est non-AA >60 >60 ml/min/1.73m2    Calcium 8.5 8.3 - 10.4 MG/DL    Bilirubin, total 0.4 0.2 - 1.1 MG/DL    ALT (SGPT) 26 12 - 65 U/L    AST (SGOT) 27 15 - 37 U/L    Alk.  phosphatase 93 50 - 130 U/L    Protein, total 6.1 (L) 6.3 - 8.2 g/dL    Albumin 3.3 3.2 - 4.6 g/dL    Globulin 2.8 2.3 - 3.5 g/dL    A-G Ratio 1.2 1.2 - 3.5     PROTHROMBIN TIME + INR    Collection Time: 02/06/21  1:43 PM   Result Value Ref Range    Prothrombin time 14.6 12.5 - 14.7 sec    INR 1.1     LACTIC ACID    Collection Time: 02/06/21  1:43 PM   Result Value Ref Range    Lactic acid 1.4 0.4 - 2.0 MMOL/L   NT-PRO BNP    Collection Time: 02/06/21  1:43 PM   Result Value Ref Range    NT pro-BNP 6,999 (H) <450 PG/ML   URINALYSIS W/ RFLX MICROSCOPIC    Collection Time: 02/06/21  3:10 PM   Result Value Ref Range    Color YELLOW      Appearance CLEAR      Specific gravity 1.008 1.001 - 1.023      pH (UA) 5.5 5.0 - 9.0      Protein Negative NEG mg/dL    Glucose Negative mg/dL    Ketone Negative NEG mg/dL    Bilirubin Negative NEG      Blood Negative NEG      Urobilinogen 0.2 0.2 - 1.0 EU/dL    Nitrites Negative NEG      Leukocyte Esterase Negative NEG           CT HEAD WO CONT (Final result)  Result time 02/06/21 14:45:14  Final result by Ash Carranza MD (02/06/21 14:45:14)                Impression:    Negative for acute intracranial abnormality.  Chronic changes. Narrative:    CT HEAD WITHOUT CONTRAST. INDICATION: Head injury sustained in fall. COMPARISON: January 2021 and February 2020       TECHNIQUE:   5 mm axial scans from the skull base to the vertex.  Our CT   scanners use one or more of the following:  Automated exposure control,   adjustment of the mA and or kV according to patient size, iterative   reconstruction. FINDINGS:     No acute intraparenchymal hemorrhage or abnormal extra-axial fluid collection.     The ventricles are normal size.     No midline shift or mass effect. Symmetric volume loss. Included portion of the paranasal sinuses and orbits grossly unremarkable.                     XR CHEST PORT (Final result)  Result time 02/06/21 14:59:06  Final result by Ash Carranza MD (02/06/21 14:59:06)                Impression:    Negative for acute hip fracture. Osteoarthritis. LUMBAR SPINE, 3 views. HISTORY: Low back pain following fall. COMPARISON: None. CT scan July 2011     TECHNIQUE: AP, lateral and cone-down lumbosacral junction views. FINDINGS:   Spinal alignment: Anatomic.     Disk spaces: Maintained. Pedicles:  Appear intact. Vertebral bodies: Decreased height of L3   SI joints:  Appear symmetric. Facet joints:  Also unremarkable. Other:  Osteophytes. Aortic calcifications. IMPRESSION: Age-indeterminate moderate compression fracture L3. Posterior   intact. CHEST X-RAY, one view. HISTORY:  Chest pain  falls. TECHNIQUE:  AP supine view. COMPARISON: None. FINDINGS:   Lungs: Hyperinflated and clear. Small calcified granulomas. No pneumothorax. Costophrenic angles: are sharp. Heart size: is normal.   Pulmonary vasculature: is unremarkable. Aorta: Arch calcifications. .   Included portion of the upper abdomen: is unremarkable. Bones: No gross bony lesions. Other: None. IMPRESSION:  Negative for acute change.                    Narrative:    PELVIS AND RIGHT HIP, 3 views. HISTORY: Hip pain following fall. TECHNIQUE: AP view of the pelvis and coned down AP and frogleg lateral of the   hip. FINDINGS:   -Bony pelvic ring: Appears intact. -SI joints: Appear symmetric.   -Pubic rami: Appear intact. -Femoral head:  Has a round smooth contour.   -Joint space: Symmetrically. Acetabular osteophytes. -Femoral neck and proximal femoral shaft:  Appear intact.   -Lower lumbar spine: Mild DJD.                     XR SPINE LUMB 2 OR 3 V (Final result)  Result time 02/06/21 14:59:06  Final result by Paulo Cooney MD (02/06/21 14:59:06)                Impression:    Negative for acute hip fracture. Osteoarthritis. LUMBAR SPINE, 3 views. HISTORY: Low back pain following fall. COMPARISON: None. CT scan July 2011     TECHNIQUE: AP, lateral and cone-down lumbosacral junction views. FINDINGS:   Spinal alignment: Anatomic.     Disk spaces: Maintained. Pedicles:  Appear intact. Vertebral bodies: Decreased height of L3   SI joints:  Appear symmetric. Facet joints:  Also unremarkable. Other:  Osteophytes. Aortic calcifications. IMPRESSION: Age-indeterminate moderate compression fracture L3. Posterior   intact. CHEST X-RAY, one view. HISTORY:  Chest pain  falls. TECHNIQUE:  AP supine view. COMPARISON: None. FINDINGS:   Lungs: Hyperinflated and clear. Small calcified granulomas. No pneumothorax. Costophrenic angles: are sharp. Heart size: is normal.   Pulmonary vasculature: is unremarkable. Aorta: Arch calcifications. .   Included portion of the upper abdomen: is unremarkable. Bones: No gross bony lesions. Other: None. IMPRESSION:  Negative for acute change.                    Narrative:    PELVIS AND RIGHT HIP, 3 views.      HISTORY: Hip pain following fall. TECHNIQUE: AP view of the pelvis and coned down AP and frogleg lateral of the   hip. FINDINGS:   -Bony pelvic ring: Appears intact. -SI joints: Appear symmetric.   -Pubic rami: Appear intact. -Femoral head:  Has a round smooth contour.   -Joint space: Symmetrically. Acetabular osteophytes. -Femoral neck and proximal femoral shaft:  Appear intact.   -Lower lumbar spine: Mild DJD.                     XR HIP RT W OR WO PELV 2-3 VWS (Final result)  Result time 02/06/21 14:59:06  Final result by Silvia Reeves MD (02/06/21 14:59:06)                Impression:    Negative for acute hip fracture. Osteoarthritis. LUMBAR SPINE, 3 views. HISTORY: Low back pain following fall. COMPARISON: None. CT scan July 2011     TECHNIQUE: AP, lateral and cone-down lumbosacral junction views. FINDINGS:   Spinal alignment: Anatomic.     Disk spaces: Maintained. Pedicles:  Appear intact. Vertebral bodies: Decreased height of L3   SI joints:  Appear symmetric. Facet joints:  Also unremarkable. Other:  Osteophytes. Aortic calcifications. IMPRESSION: Age-indeterminate moderate compression fracture L3. Posterior   intact. CHEST X-RAY, one view. HISTORY:  Chest pain  falls. TECHNIQUE:  AP supine view. COMPARISON: None. FINDINGS:   Lungs: Hyperinflated and clear. Small calcified granulomas. No pneumothorax. Costophrenic angles: are sharp. Heart size: is normal.   Pulmonary vasculature: is unremarkable. Aorta: Arch calcifications. .   Included portion of the upper abdomen: is unremarkable. Bones: No gross bony lesions. Other: None. IMPRESSION:  Negative for acute change.                    Narrative:    PELVIS AND RIGHT HIP, 3 views. HISTORY: Hip pain following fall. TECHNIQUE: AP view of the pelvis and coned down AP and frogleg lateral of the   hip. FINDINGS:   -Bony pelvic ring: Appears intact. -SI joints: Appear symmetric. -Pubic rami: Appear intact. -Femoral head:  Has a round smooth contour.   -Joint space: Symmetrically. Acetabular osteophytes.    -Femoral neck and proximal femoral shaft:  Appear intact.   -Lower lumbar spine: Mild DJD.

## 2021-02-07 LAB
ALBUMIN SERPL-MCNC: 2.7 G/DL (ref 3.2–4.6)
ALBUMIN/GLOB SERPL: 1.1 {RATIO} (ref 1.2–3.5)
ALP SERPL-CCNC: 108 U/L (ref 50–136)
ALT SERPL-CCNC: 27 U/L (ref 12–65)
ANION GAP SERPL CALC-SCNC: 3 MMOL/L (ref 7–16)
AST SERPL-CCNC: 29 U/L (ref 15–37)
BILIRUB SERPL-MCNC: 0.4 MG/DL (ref 0.2–1.1)
BUN SERPL-MCNC: 16 MG/DL (ref 8–23)
CALCIUM SERPL-MCNC: 7.6 MG/DL (ref 8.3–10.4)
CHLORIDE SERPL-SCNC: 113 MMOL/L (ref 98–107)
CO2 SERPL-SCNC: 27 MMOL/L (ref 21–32)
CREAT SERPL-MCNC: 0.7 MG/DL (ref 0.6–1)
GLOBULIN SER CALC-MCNC: 2.5 G/DL (ref 2.3–3.5)
GLUCOSE SERPL-MCNC: 87 MG/DL (ref 65–100)
POTASSIUM SERPL-SCNC: 3.4 MMOL/L (ref 3.5–5.1)
PROT SERPL-MCNC: 5.2 G/DL (ref 6.3–8.2)
SODIUM SERPL-SCNC: 143 MMOL/L (ref 136–145)

## 2021-02-07 PROCEDURE — 86580 TB INTRADERMAL TEST: CPT | Performed by: HOSPITALIST

## 2021-02-07 PROCEDURE — 94760 N-INVAS EAR/PLS OXIMETRY 1: CPT

## 2021-02-07 PROCEDURE — 74011250636 HC RX REV CODE- 250/636: Performed by: FAMILY MEDICINE

## 2021-02-07 PROCEDURE — 99223 1ST HOSP IP/OBS HIGH 75: CPT | Performed by: INTERNAL MEDICINE

## 2021-02-07 PROCEDURE — 74011000250 HC RX REV CODE- 250: Performed by: HOSPITALIST

## 2021-02-07 PROCEDURE — 74011000302 HC RX REV CODE- 302: Performed by: HOSPITALIST

## 2021-02-07 PROCEDURE — 74011250637 HC RX REV CODE- 250/637: Performed by: HOSPITALIST

## 2021-02-07 PROCEDURE — 94640 AIRWAY INHALATION TREATMENT: CPT

## 2021-02-07 PROCEDURE — 77030012341 HC CHMB SPCR OPTC MDI VYRM -A

## 2021-02-07 PROCEDURE — 94664 DEMO&/EVAL PT USE INHALER: CPT

## 2021-02-07 PROCEDURE — 36415 COLL VENOUS BLD VENIPUNCTURE: CPT

## 2021-02-07 PROCEDURE — 65610000001 HC ROOM ICU GENERAL

## 2021-02-07 PROCEDURE — 74011250636 HC RX REV CODE- 250/636: Performed by: HOSPITALIST

## 2021-02-07 PROCEDURE — 80053 COMPREHEN METABOLIC PANEL: CPT

## 2021-02-07 PROCEDURE — 77010033678 HC OXYGEN DAILY

## 2021-02-07 RX ORDER — IPRATROPIUM BROMIDE AND ALBUTEROL SULFATE 2.5; .5 MG/3ML; MG/3ML
3 SOLUTION RESPIRATORY (INHALATION)
Status: DISCONTINUED | OUTPATIENT
Start: 2021-02-07 | End: 2021-02-09 | Stop reason: HOSPADM

## 2021-02-07 RX ORDER — LORAZEPAM 1 MG/1
1 TABLET ORAL
Status: DISCONTINUED | OUTPATIENT
Start: 2021-02-07 | End: 2021-02-09 | Stop reason: HOSPADM

## 2021-02-07 RX ORDER — BUDESONIDE AND FORMOTEROL FUMARATE DIHYDRATE 160; 4.5 UG/1; UG/1
2 AEROSOL RESPIRATORY (INHALATION)
Status: DISCONTINUED | OUTPATIENT
Start: 2021-02-07 | End: 2021-02-09 | Stop reason: HOSPADM

## 2021-02-07 RX ORDER — ONDANSETRON 2 MG/ML
4 INJECTION INTRAMUSCULAR; INTRAVENOUS ONCE
Status: COMPLETED | OUTPATIENT
Start: 2021-02-07 | End: 2021-02-07

## 2021-02-07 RX ORDER — LIDOCAINE 4 G/100G
1 PATCH TOPICAL EVERY 24 HOURS
Status: DISCONTINUED | OUTPATIENT
Start: 2021-02-07 | End: 2021-02-09 | Stop reason: HOSPADM

## 2021-02-07 RX ORDER — IBUPROFEN 200 MG
1 TABLET ORAL EVERY 24 HOURS
Status: DISCONTINUED | OUTPATIENT
Start: 2021-02-07 | End: 2021-02-09 | Stop reason: HOSPADM

## 2021-02-07 RX ORDER — ACETYLCYSTEINE 200 MG/ML
400 SOLUTION ORAL; RESPIRATORY (INHALATION)
Status: DISCONTINUED | OUTPATIENT
Start: 2021-02-07 | End: 2021-02-08

## 2021-02-07 RX ORDER — HYDROMORPHONE HYDROCHLORIDE 2 MG/1
2 TABLET ORAL
Status: DISCONTINUED | OUTPATIENT
Start: 2021-02-07 | End: 2021-02-09 | Stop reason: HOSPADM

## 2021-02-07 RX ORDER — ALBUTEROL SULFATE 2.5 MG/.5ML
2.5 SOLUTION RESPIRATORY (INHALATION)
Status: COMPLETED | OUTPATIENT
Start: 2021-02-07 | End: 2021-02-07

## 2021-02-07 RX ORDER — ACETYLCYSTEINE 200 MG/ML
4 SOLUTION ORAL; RESPIRATORY (INHALATION)
Status: DISCONTINUED | OUTPATIENT
Start: 2021-02-07 | End: 2021-02-07

## 2021-02-07 RX ORDER — MORPHINE SULFATE 4 MG/ML
4 INJECTION INTRAVENOUS
Status: DISCONTINUED | OUTPATIENT
Start: 2021-02-07 | End: 2021-02-07

## 2021-02-07 RX ORDER — HYDROCODONE BITARTRATE AND ACETAMINOPHEN 7.5; 325 MG/1; MG/1
1 TABLET ORAL EVERY 6 HOURS
Status: DISCONTINUED | OUTPATIENT
Start: 2021-02-07 | End: 2021-02-09 | Stop reason: HOSPADM

## 2021-02-07 RX ORDER — PREGABALIN 25 MG/1
25 CAPSULE ORAL 2 TIMES DAILY
Status: DISCONTINUED | OUTPATIENT
Start: 2021-02-07 | End: 2021-02-09 | Stop reason: HOSPADM

## 2021-02-07 RX ADMIN — HYDROCODONE BITARTRATE AND ACETAMINOPHEN 1 TABLET: 7.5; 325 TABLET ORAL at 11:30

## 2021-02-07 RX ADMIN — HYDROCODONE BITARTRATE AND ACETAMINOPHEN 1 TABLET: 7.5; 325 TABLET ORAL at 17:47

## 2021-02-07 RX ADMIN — PREGABALIN 25 MG: 25 CAPSULE ORAL at 09:32

## 2021-02-07 RX ADMIN — DILTIAZEM HYDROCHLORIDE 30 MG: 30 TABLET, FILM COATED ORAL at 16:55

## 2021-02-07 RX ADMIN — DILTIAZEM HYDROCHLORIDE 30 MG: 30 TABLET, FILM COATED ORAL at 11:30

## 2021-02-07 RX ADMIN — DILTIAZEM HYDROCHLORIDE 30 MG: 30 TABLET, FILM COATED ORAL at 07:12

## 2021-02-07 RX ADMIN — BUDESONIDE AND FORMOTEROL FUMARATE DIHYDRATE 2 PUFF: 160; 4.5 AEROSOL RESPIRATORY (INHALATION) at 20:10

## 2021-02-07 RX ADMIN — MORPHINE SULFATE 4 MG: 4 INJECTION INTRAVENOUS at 01:30

## 2021-02-07 RX ADMIN — HYDROMORPHONE HYDROCHLORIDE 2 MG: 2 TABLET ORAL at 15:09

## 2021-02-07 RX ADMIN — MORPHINE SULFATE 4 MG: 4 INJECTION INTRAVENOUS at 05:48

## 2021-02-07 RX ADMIN — LORAZEPAM 1 MG: 1 TABLET ORAL at 11:30

## 2021-02-07 RX ADMIN — LORAZEPAM 1 MG: 1 TABLET ORAL at 20:32

## 2021-02-07 RX ADMIN — HYDROMORPHONE HYDROCHLORIDE 2 MG: 2 TABLET ORAL at 20:32

## 2021-02-07 RX ADMIN — AZITHROMYCIN MONOHYDRATE 250 MG: 500 INJECTION, POWDER, LYOPHILIZED, FOR SOLUTION INTRAVENOUS at 17:45

## 2021-02-07 RX ADMIN — HYDROCODONE BITARTRATE AND ACETAMINOPHEN 1 TABLET: 7.5; 325 TABLET ORAL at 23:27

## 2021-02-07 RX ADMIN — ONDANSETRON 4 MG: 2 INJECTION INTRAMUSCULAR; INTRAVENOUS at 13:15

## 2021-02-07 RX ADMIN — Medication 10 ML: at 20:35

## 2021-02-07 RX ADMIN — Medication 10 ML: at 05:49

## 2021-02-07 RX ADMIN — Medication 10 ML: at 13:16

## 2021-02-07 RX ADMIN — ALBUTEROL SULFATE 2.5 MG: 2.5 SOLUTION RESPIRATORY (INHALATION) at 16:41

## 2021-02-07 RX ADMIN — TUBERCULIN PURIFIED PROTEIN DERIVATIVE 5 UNITS: 5 INJECTION, SOLUTION INTRADERMAL at 09:43

## 2021-02-07 RX ADMIN — HYDROMORPHONE HYDROCHLORIDE 2 MG: 2 TABLET ORAL at 09:32

## 2021-02-07 RX ADMIN — PREGABALIN 25 MG: 25 CAPSULE ORAL at 20:32

## 2021-02-07 RX ADMIN — ACETYLCYSTEINE 400 MG: 200 SOLUTION ORAL; RESPIRATORY (INHALATION) at 20:10

## 2021-02-07 RX ADMIN — ENOXAPARIN SODIUM 30 MG: 30 INJECTION SUBCUTANEOUS at 17:49

## 2021-02-07 RX ADMIN — HYDROCODONE BITARTRATE AND ACETAMINOPHEN 1 TABLET: 5; 325 TABLET ORAL at 07:12

## 2021-02-07 RX ADMIN — IPRATROPIUM BROMIDE AND ALBUTEROL SULFATE 3 ML: .5; 3 SOLUTION RESPIRATORY (INHALATION) at 20:10

## 2021-02-07 NOTE — PROGRESS NOTES
Problem: Falls - Risk of  Goal: *Absence of Falls  Description: Document Clydene Bone Fall Risk and appropriate interventions in the flowsheet. Outcome: Progressing Towards Goal  Note: Fall Risk Interventions:            Medication Interventions: Bed/chair exit alarm, Evaluate medications/consider consulting pharmacy, Patient to call before getting OOB    Elimination Interventions: Bed/chair exit alarm, Toileting schedule/hourly rounds    History of Falls Interventions: Bed/chair exit alarm, Evaluate medications/consider consulting pharmacy         Problem: Patient Education: Go to Patient Education Activity  Goal: Patient/Family Education  Outcome: Progressing Towards Goal     Problem: Pressure Injury - Risk of  Goal: *Prevention of pressure injury  Description: Document Gen Scale and appropriate interventions in the flowsheet. Outcome: Progressing Towards Goal  Note: Pressure Injury Interventions:  Sensory Interventions: Assess changes in LOC, Assess need for specialty bed, Chair cushion, Check visual cues for pain, Float heels, Turn and reposition approx. every two hours (pillows and wedges if needed), Use 30-degree side-lying position    Moisture Interventions: Absorbent underpads, Apply protective barrier, creams and emollients, Assess need for specialty bed, Check for incontinence Q2 hours and as needed, Internal/External urinary devices    Activity Interventions: Assess need for specialty bed, Pressure redistribution bed/mattress(bed type)    Mobility Interventions: Assess need for specialty bed, Float heels, HOB 30 degrees or less, Pressure redistribution bed/mattress (bed type), Turn and reposition approx.  every two hours(pillow and wedges)    Nutrition Interventions: Offer support with meals,snacks and hydration, Document food/fluid/supplement intake    Friction and Shear Interventions: Apply protective barrier, creams and emollients, Feet elevated on foot rest, Foam dressings/transparent film/skin sealants, HOB 30 degrees or less, Lift sheet, Lift team/patient mobility team                Problem: Patient Education: Go to Patient Education Activity  Goal: Patient/Family Education  Outcome: Progressing Towards Goal

## 2021-02-07 NOTE — PROGRESS NOTES
SW reviewed patient's chart and conducted a baseline assessment. Discharge plan at this time is as follows:     Care Management Interventions  PCP Verified by CM: Yes  Mode of Transport at Discharge: BLS  Transition of Care Consult (CM Consult): Discharge Planning, SNF  Physical Therapy Consult: Yes  Occupational Therapy Consult: Yes  Current Support Network: Own Home, Has Personal Caregivers  Confirm Follow Up Transport: Other (see comment)  The Plan for Transition of Care is Related to the Following Treatment Goals : STR potentially LTC  The Patient and/or Patient Representative was Provided with a Choice of Provider and Agrees with the Discharge Plan?: Yes  Name of the Patient Representative Who was Provided with a Choice of Provider and Agrees with the Discharge Plan: Grace Liu  Freedom of Choice List was Provided with Basic Dialogue that Supports the Patient's Individualized Plan of Care/Goals, Treatment Preferences and Shares the Quality Data Associated with the Providers?: Yes  Discharge Location  Discharge Placement: Skilled nursing facility      *Please note that discharge plans can change throughout an inpatient admission.  Ensure that you are referring to the most recent social work/nurse case management note for current discharge plan*     MASSIEL Sommers    [facility]    * [email or web address]

## 2021-02-07 NOTE — PROGRESS NOTES
Verbal consult to BYRON regarding concerns that the patient has multiple bruises at various stages of healing all over her body and is under weight. SW met with patient who states that she lives alone and does not have family support. Patient has Medicaid secondary and a [facility] worker seven days a week for four hours a day. She uses a walker to ambulate but has a wheelchair. She admits to multiple falls and requires ADL assistance. Patient seen by Dr. Emigdio Pineda and is current. SW talked to the patient about STR. Patient reluctant to consider. BYRON guided the patient through the decision making process and she agreed that STR would be the best option for discharge as she admits she is not doing well at home independently. Patient denies abuse or neglect. Patient to be seen by PT/OT. SW requested a PPD be placed. Patient would benefit from a STR referral at a facility that has the capability of accepting a LTC patient should she be unable to live independently. Patient prefers to return home, but would benefit from the option to transition into LTC if she's ultimately agreeable.      MASSIEL Simpson    [facility]    * [email or web address]

## 2021-02-07 NOTE — PROGRESS NOTES
[facility] Progress Note      Assessment / Plan:    Severe back pain due to recent fallwith opiate tolerance and dependency, chronic pain, high de-escalate pain medications to oral Dilaudid, avoid IV medications whenever possible due to tolerance    New onset atrial fibrillation with rapid ventricular responsestatus post IV diltiazem's, heart rate improved, cardiology consulted, chest Vascor 3, with high bleeding risk due to frequent and recent falls, monitor without anticoagulation    Polypharmacyhistory of frequent high-dose opiates and Ativan useslowly wean patient's if tolerated,    Hypokalemia,-replace, continue to monitor while inpatient    COPD exacerbation with increased sputum lungs, chronic respiratory failure secondary to advanced COPD on 4 L at baseline -we will order nebulizer treatments, Mucomyst, Symbicort,  encourage pulmonary toilet, low-dose azithromycin    Severe protein malnutritionlikely secondary to above, BMI 16, add Ensure 3 times daily, consider dietary consultation       Chief Complaint : Fall      Subjective:  Patient complains of severe back pain, increase mucus  Cardiovascular, respiratory, GI review of system negative except mentioned above  Objective:  Visit Vitals  /69   Pulse 93   Temp 98.4 °F (36.9 °C)   Resp 15   Ht 5' 8\" (1.727 m)   Wt 49 kg (108 lb)   SpO2 99%   BMI 16.42 kg/m²                 Physical Exam:  General: No acute distress, speaking in full sentences, no use of accessory muscles, diffuse muscle loss  HEENT: Pupils equal and reactive to light and accommodation, oropharynx is clear   Neck: Supple, no lymphadenopathy, no JVD   Lungs:   mild decreased breath sound   cardiovascular:IR Regular rate and rhythm with normal S1 and S2   Abdomen: Soft, nontender, nondistended, normoactive bowel sounds   Extremities: No cyanosis  or edema   Neuro: Nonfocal, A&O x3   Psych: Normal affect     Intake and Output:  Date 02/06/21 0700 - 02/07/21 6112 02/07/21 0700 - 02/08/21 0659   Shift 1725-3340 6905-4460 24 Hour Total 9035-5456 2736-4144 24 Hour Total   INTAKE   Shift Total(mL/kg)         OUTPUT   Urine(mL/kg/hr) 100(0.2) 750(1.3) 850(0.7)        Urine Output (mL) (Urinary Catheter 02/06/21 2- way) 100 750 850      Shift Total(mL/kg) 100(2) 750(15.3) 850(17.4)      NET -100 -750 -850      Weight (kg) 49 49 49 49 49 49       LAB:  Admission on 02/06/2021   Component Date Value    Ventricular Rate 02/06/2021 127     Atrial Rate 02/06/2021 119     QRS Duration 02/06/2021 108     Q-T Interval 02/06/2021 376     QTC Calculation (Bezet) 02/06/2021 546     Calculated R Axis 02/06/2021 -111     Calculated T Axis 02/06/2021 74     Diagnosis 02/06/2021                      Value:Atrial fibrillation with rapid ventricular response  Pulmonary disease pattern  Right bundle branch block  Abnormal ECG  When compared with ECG of 07-DEC-2009 12:08,  Atrial fibrillation has replaced Sinus rhythm  Vent. rate has increased BY  63 BPM  Right bundle branch block is now Present  Confirmed by Marylen Juba MD (), BASIM KERN ([id number]) on 2/6/2021 5:51:13 PM      Troponin-High Sensitivity 02/06/2021 17.0*    Troponin-High Sensitivity 02/06/2021 15.1*    WBC 02/06/2021 8.6     RBC 02/06/2021 3.82*    HGB 02/06/2021 12.6     HCT 02/06/2021 36.9     MCV 02/06/2021 96.6     MCH 02/06/2021 33.0*    MCHC 02/06/2021 34.1     RDW 02/06/2021 14.5     PLATELET 00/38/0304 419     MPV 02/06/2021 11.8     ABSOLUTE NRBC 02/06/2021 0.00     DF 02/06/2021 AUTOMATED     NEUTROPHILS 02/06/2021 77     LYMPHOCYTES 02/06/2021 15     MONOCYTES 02/06/2021 7     EOSINOPHILS 02/06/2021 0*    BASOPHILS 02/06/2021 1     IMMATURE GRANULOCYTES 02/06/2021 1     ABS. NEUTROPHILS 02/06/2021 6.6     ABS. LYMPHOCYTES 02/06/2021 1.3     ABS. MONOCYTES 02/06/2021 0.6     ABS. EOSINOPHILS 02/06/2021 0.0     ABS. BASOPHILS 02/06/2021 0.0     ABS. IMM.  GRANS. 02/06/2021 0.0     Sodium 02/06/2021 144     Potassium 02/06/2021 2.9*    Chloride 02/06/2021 110*    CO2 02/06/2021 27     Anion gap 02/06/2021 7     Glucose 02/06/2021 158*    BUN 02/06/2021 23     Creatinine 02/06/2021 0.95     GFR est AA 02/06/2021 >60     GFR est non-AA 02/06/2021 >60     Calcium 02/06/2021 8.5     Bilirubin, total 02/06/2021 0.4     ALT (SGPT) 02/06/2021 26     AST (SGOT) 02/06/2021 27     Alk. phosphatase 02/06/2021 93     Protein, total 02/06/2021 6.1*    Albumin 02/06/2021 3.3     Globulin 02/06/2021 2.8     A-G Ratio 02/06/2021 1.2     Prothrombin time 02/06/2021 14.6     INR 02/06/2021 1.1     Color 02/06/2021 YELLOW     Appearance 02/06/2021 CLEAR     Specific gravity 02/06/2021 1.008     pH (UA) 02/06/2021 5.5     Protein 02/06/2021 Negative     Glucose 02/06/2021 Negative     Ketone 02/06/2021 Negative     Bilirubin 02/06/2021 Negative     Blood 02/06/2021 Negative     Urobilinogen 02/06/2021 0.2     Nitrites 02/06/2021 Negative     Leukocyte Esterase 02/06/2021 Negative     Lactic acid 02/06/2021 1.4     NT pro-BNP 02/06/2021 6,999*    aPTT 02/06/2021 30.0     TSH 02/06/2021 4.180     Magnesium 02/06/2021 1.7*    Vitamin B12 02/06/2021 472     Vitamin D 25-Hydroxy 02/06/2021 57.4     WBC 02/06/2021 6.8     RBC 02/06/2021 3.19*    HGB 02/06/2021 10.4*    HCT 02/06/2021 31.7*    MCV 02/06/2021 99.4*    MCH 02/06/2021 32.6     MCHC 02/06/2021 32.8     RDW 02/06/2021 14.6     PLATELET 41/49/1506 536     MPV 02/06/2021 11.2     ABSOLUTE NRBC 02/06/2021 0.00     DF 02/06/2021 AUTOMATED     NEUTROPHILS 02/06/2021 63     LYMPHOCYTES 02/06/2021 25     MONOCYTES 02/06/2021 10     EOSINOPHILS 02/06/2021 1     BASOPHILS 02/06/2021 1     IMMATURE GRANULOCYTES 02/06/2021 0     ABS. NEUTROPHILS 02/06/2021 4.3     ABS. LYMPHOCYTES 02/06/2021 1.7     ABS. MONOCYTES 02/06/2021 0.7     ABS. EOSINOPHILS 02/06/2021 0.1     ABS. BASOPHILS 02/06/2021 0.1     ABS. IMM. GRANS. 02/06/2021 0.0     Sodium 02/07/2021 143     Potassium 02/07/2021 3.4*    Chloride 02/07/2021 113*    CO2 02/07/2021 27     Anion gap 02/07/2021 3*    Glucose 02/07/2021 87     BUN 02/07/2021 16     Creatinine 02/07/2021 0.70     GFR est AA 02/07/2021 >60     GFR est non-AA 02/07/2021 >60     Calcium 02/07/2021 7.6*    Bilirubin, total 02/07/2021 0.4     ALT (SGPT) 02/07/2021 27     AST (SGOT) 02/07/2021 29     Alk. phosphatase 02/07/2021 108     Protein, total 02/07/2021 5.2*    Albumin 02/07/2021 2.7*    Globulin 02/07/2021 2.5     A-G Ratio 02/07/2021 1.1*       IMAGING:  Xr Spine Lumb 2 Or 3 V    Result Date: 2/6/2021  Negative for acute hip fracture. Osteoarthritis. LUMBAR SPINE, 3 views. HISTORY: Low back pain following fall. COMPARISON: None. CT scan July 2011 TECHNIQUE: AP, lateral and cone-down lumbosacral junction views. FINDINGS: Spinal alignment: Anatomic. Disk spaces: Maintained. Pedicles:  Appear intact. Vertebral bodies: Decreased height of L3 SI joints:  Appear symmetric. Facet joints:  Also unremarkable. Other:  Osteophytes. Aortic calcifications. IMPRESSION: Age-indeterminate moderate compression fracture L3. Posterior intact. CHEST X-RAY, one view. HISTORY:  Chest pain  falls. TECHNIQUE:  AP supine view. COMPARISON: None. FINDINGS: Lungs: Hyperinflated and clear. Small calcified granulomas. No pneumothorax. Costophrenic angles: are sharp. Heart size: is normal. Pulmonary vasculature: is unremarkable. Aorta: Arch calcifications. . Included portion of the upper abdomen: is unremarkable. Bones: No gross bony lesions. Other: None. IMPRESSION:  Negative for acute change. Xr Hip Rt W Or Wo Pelv 2-3 Vws    Result Date: 2/6/2021  Negative for acute hip fracture. Osteoarthritis. LUMBAR SPINE, 3 views. HISTORY: Low back pain following fall. COMPARISON: None. CT scan July 2011 TECHNIQUE: AP, lateral and cone-down lumbosacral junction views. FINDINGS: Spinal alignment: Anatomic. Disk spaces: Maintained. Pedicles:  Appear intact. Vertebral bodies: Decreased height of L3 SI joints:  Appear symmetric. Facet joints:  Also unremarkable. Other:  Osteophytes. Aortic calcifications. IMPRESSION: Age-indeterminate moderate compression fracture L3. Posterior intact. CHEST X-RAY, one view. HISTORY:  Chest pain  falls. TECHNIQUE:  AP supine view. COMPARISON: None. FINDINGS: Lungs: Hyperinflated and clear. Small calcified granulomas. No pneumothorax. Costophrenic angles: are sharp. Heart size: is normal. Pulmonary vasculature: is unremarkable. Aorta: Arch calcifications. . Included portion of the upper abdomen: is unremarkable. Bones: No gross bony lesions. Other: None. IMPRESSION:  Negative for acute change. Ct Head Wo Cont    Result Date: 2/6/2021  Negative for acute intracranial abnormality. Chronic changes. Xr Chest Port    Result Date: 2/6/2021  Negative for acute hip fracture. Osteoarthritis. LUMBAR SPINE, 3 views. HISTORY: Low back pain following fall. COMPARISON: None. CT scan July 2011 TECHNIQUE: AP, lateral and cone-down lumbosacral junction views. FINDINGS: Spinal alignment: Anatomic. Disk spaces: Maintained. Pedicles:  Appear intact. Vertebral bodies: Decreased height of L3 SI joints:  Appear symmetric. Facet joints:  Also unremarkable. Other:  Osteophytes. Aortic calcifications. IMPRESSION: Age-indeterminate moderate compression fracture L3. Posterior intact. CHEST X-RAY, one view. HISTORY:  Chest pain  falls. TECHNIQUE:  AP supine view. COMPARISON: None. FINDINGS: Lungs: Hyperinflated and clear. Small calcified granulomas. No pneumothorax. Costophrenic angles: are sharp. Heart size: is normal. Pulmonary vasculature: is unremarkable. Aorta: Arch calcifications. . Included portion of the upper abdomen: is unremarkable. Bones: No gross bony lesions. Other: None. IMPRESSION:  Negative for acute change. EKG:  No results found for this or any previous visit.           Tk Riojas, DO  2/7/2021 4:29 PM

## 2021-02-07 NOTE — PROGRESS NOTES
PT/OT note: order received, chart reviewed at length and spoke with RN, Mike Blanchard, who asked therapy to hold today as he is working on getting her pain controlled and getting some food in her. PT/OT will see her tomorrow.   Stef Vaz, OT

## 2021-02-08 ENCOUNTER — APPOINTMENT (OUTPATIENT)
Dept: MRI IMAGING | Age: 76
DRG: 308 | End: 2021-02-08
Attending: PHYSICIAN ASSISTANT
Payer: MEDICARE

## 2021-02-08 LAB
ANION GAP SERPL CALC-SCNC: 1 MMOL/L (ref 7–16)
BUN SERPL-MCNC: 22 MG/DL (ref 8–23)
CALCIUM SERPL-MCNC: 8.3 MG/DL (ref 8.3–10.4)
CHLORIDE SERPL-SCNC: 111 MMOL/L (ref 98–107)
CO2 SERPL-SCNC: 31 MMOL/L (ref 21–32)
CREAT SERPL-MCNC: 0.72 MG/DL (ref 0.6–1)
GLUCOSE SERPL-MCNC: 155 MG/DL (ref 65–100)
POTASSIUM SERPL-SCNC: 4 MMOL/L (ref 3.5–5.1)
SODIUM SERPL-SCNC: 143 MMOL/L (ref 136–145)

## 2021-02-08 PROCEDURE — 36415 COLL VENOUS BLD VENIPUNCTURE: CPT

## 2021-02-08 PROCEDURE — 97161 PT EVAL LOW COMPLEX 20 MIN: CPT

## 2021-02-08 PROCEDURE — 87040 BLOOD CULTURE FOR BACTERIA: CPT

## 2021-02-08 PROCEDURE — 77010033678 HC OXYGEN DAILY

## 2021-02-08 PROCEDURE — 93306 TTE W/DOPPLER COMPLETE: CPT

## 2021-02-08 PROCEDURE — 74011250637 HC RX REV CODE- 250/637: Performed by: HOSPITALIST

## 2021-02-08 PROCEDURE — 94640 AIRWAY INHALATION TREATMENT: CPT

## 2021-02-08 PROCEDURE — 72148 MRI LUMBAR SPINE W/O DYE: CPT

## 2021-02-08 PROCEDURE — 80048 BASIC METABOLIC PNL TOTAL CA: CPT

## 2021-02-08 PROCEDURE — 97166 OT EVAL MOD COMPLEX 45 MIN: CPT

## 2021-02-08 PROCEDURE — 97530 THERAPEUTIC ACTIVITIES: CPT

## 2021-02-08 PROCEDURE — 94760 N-INVAS EAR/PLS OXIMETRY 1: CPT

## 2021-02-08 PROCEDURE — 65610000001 HC ROOM ICU GENERAL

## 2021-02-08 PROCEDURE — 74011250637 HC RX REV CODE- 250/637: Performed by: INTERNAL MEDICINE

## 2021-02-08 PROCEDURE — 99233 SBSQ HOSP IP/OBS HIGH 50: CPT | Performed by: INTERNAL MEDICINE

## 2021-02-08 PROCEDURE — 2709999900 HC NON-CHARGEABLE SUPPLY

## 2021-02-08 PROCEDURE — 97535 SELF CARE MNGMENT TRAINING: CPT

## 2021-02-08 PROCEDURE — 74011000250 HC RX REV CODE- 250: Performed by: HOSPITALIST

## 2021-02-08 RX ORDER — TRAZODONE HYDROCHLORIDE 50 MG/1
50 TABLET ORAL
Status: DISCONTINUED | OUTPATIENT
Start: 2021-02-08 | End: 2021-02-09 | Stop reason: HOSPADM

## 2021-02-08 RX ORDER — AZITHROMYCIN 250 MG/1
250 TABLET, FILM COATED ORAL DAILY
Status: DISCONTINUED | OUTPATIENT
Start: 2021-02-08 | End: 2021-02-09 | Stop reason: HOSPADM

## 2021-02-08 RX ORDER — PANTOPRAZOLE SODIUM 40 MG/1
40 TABLET, DELAYED RELEASE ORAL 2 TIMES DAILY
Status: DISCONTINUED | OUTPATIENT
Start: 2021-02-08 | End: 2021-02-09 | Stop reason: HOSPADM

## 2021-02-08 RX ORDER — TOPIRAMATE 50 MG/1
50 TABLET, FILM COATED ORAL
Status: DISCONTINUED | OUTPATIENT
Start: 2021-02-08 | End: 2021-02-09 | Stop reason: HOSPADM

## 2021-02-08 RX ORDER — METOCLOPRAMIDE 10 MG/1
10 TABLET ORAL
Status: DISCONTINUED | OUTPATIENT
Start: 2021-02-08 | End: 2021-02-09 | Stop reason: HOSPADM

## 2021-02-08 RX ORDER — BUDESONIDE AND FORMOTEROL FUMARATE DIHYDRATE 160; 4.5 UG/1; UG/1
2 AEROSOL RESPIRATORY (INHALATION) 2 TIMES DAILY
Status: DISCONTINUED | OUTPATIENT
Start: 2021-02-08 | End: 2021-02-08 | Stop reason: SDUPTHER

## 2021-02-08 RX ORDER — BUSPIRONE HYDROCHLORIDE 5 MG/1
15 TABLET ORAL 3 TIMES DAILY
Status: DISCONTINUED | OUTPATIENT
Start: 2021-02-08 | End: 2021-02-09 | Stop reason: HOSPADM

## 2021-02-08 RX ORDER — PRIMIDONE 50 MG/1
50 TABLET ORAL 3 TIMES DAILY
Status: DISCONTINUED | OUTPATIENT
Start: 2021-02-08 | End: 2021-02-09 | Stop reason: HOSPADM

## 2021-02-08 RX ORDER — LEVALBUTEROL INHALATION SOLUTION 1.25 MG/3ML
1.25 SOLUTION RESPIRATORY (INHALATION)
Status: DISCONTINUED | OUTPATIENT
Start: 2021-02-08 | End: 2021-02-09 | Stop reason: HOSPADM

## 2021-02-08 RX ORDER — LORAZEPAM 1 MG/1
1 TABLET ORAL
Status: DISCONTINUED | OUTPATIENT
Start: 2021-02-08 | End: 2021-02-09 | Stop reason: HOSPADM

## 2021-02-08 RX ORDER — ATORVASTATIN CALCIUM 40 MG/1
80 TABLET, FILM COATED ORAL DAILY
Status: DISCONTINUED | OUTPATIENT
Start: 2021-02-08 | End: 2021-02-09 | Stop reason: HOSPADM

## 2021-02-08 RX ADMIN — IPRATROPIUM BROMIDE AND ALBUTEROL SULFATE 3 ML: .5; 3 SOLUTION RESPIRATORY (INHALATION) at 15:19

## 2021-02-08 RX ADMIN — TOPIRAMATE 50 MG: 50 TABLET, FILM COATED ORAL at 08:25

## 2021-02-08 RX ADMIN — HYDROMORPHONE HYDROCHLORIDE 2 MG: 2 TABLET ORAL at 01:31

## 2021-02-08 RX ADMIN — BUSPIRONE HYDROCHLORIDE 15 MG: 5 TABLET ORAL at 17:50

## 2021-02-08 RX ADMIN — METOCLOPRAMIDE 10 MG: 10 TABLET ORAL at 08:26

## 2021-02-08 RX ADMIN — ACETYLCYSTEINE 400 MG: 200 SOLUTION ORAL; RESPIRATORY (INHALATION) at 07:31

## 2021-02-08 RX ADMIN — BUSPIRONE HYDROCHLORIDE 15 MG: 5 TABLET ORAL at 21:51

## 2021-02-08 RX ADMIN — PANTOPRAZOLE SODIUM 40 MG: 40 TABLET, DELAYED RELEASE ORAL at 17:50

## 2021-02-08 RX ADMIN — DILTIAZEM HYDROCHLORIDE 30 MG: 30 TABLET, FILM COATED ORAL at 08:26

## 2021-02-08 RX ADMIN — HYDROCODONE BITARTRATE AND ACETAMINOPHEN 1 TABLET: 7.5; 325 TABLET ORAL at 23:03

## 2021-02-08 RX ADMIN — PRIMIDONE 50 MG: 50 TABLET ORAL at 21:51

## 2021-02-08 RX ADMIN — PREGABALIN 25 MG: 25 CAPSULE ORAL at 08:26

## 2021-02-08 RX ADMIN — PRIMIDONE 50 MG: 50 TABLET ORAL at 08:26

## 2021-02-08 RX ADMIN — DILTIAZEM HYDROCHLORIDE 30 MG: 30 TABLET, FILM COATED ORAL at 17:50

## 2021-02-08 RX ADMIN — METOCLOPRAMIDE 10 MG: 10 TABLET ORAL at 21:51

## 2021-02-08 RX ADMIN — HYDROMORPHONE HYDROCHLORIDE 2 MG: 2 TABLET ORAL at 19:44

## 2021-02-08 RX ADMIN — DILTIAZEM HYDROCHLORIDE 30 MG: 30 TABLET, FILM COATED ORAL at 10:50

## 2021-02-08 RX ADMIN — BUDESONIDE AND FORMOTEROL FUMARATE DIHYDRATE 2 PUFF: 160; 4.5 AEROSOL RESPIRATORY (INHALATION) at 20:03

## 2021-02-08 RX ADMIN — AZITHROMYCIN MONOHYDRATE 250 MG: 250 TABLET ORAL at 12:57

## 2021-02-08 RX ADMIN — APIXABAN 2.5 MG: 2.5 TABLET, FILM COATED ORAL at 17:50

## 2021-02-08 RX ADMIN — LORAZEPAM 1 MG: 1 TABLET ORAL at 21:51

## 2021-02-08 RX ADMIN — Medication 10 ML: at 06:08

## 2021-02-08 RX ADMIN — TIOTROPIUM BROMIDE INHALATION SPRAY 2 PUFF: 3.12 SPRAY, METERED RESPIRATORY (INHALATION) at 07:32

## 2021-02-08 RX ADMIN — TOPIRAMATE 50 MG: 50 TABLET, FILM COATED ORAL at 10:50

## 2021-02-08 RX ADMIN — TOPIRAMATE 50 MG: 50 TABLET, FILM COATED ORAL at 17:50

## 2021-02-08 RX ADMIN — PRIMIDONE 50 MG: 50 TABLET ORAL at 17:51

## 2021-02-08 RX ADMIN — ATORVASTATIN CALCIUM 80 MG: 40 TABLET, FILM COATED ORAL at 08:26

## 2021-02-08 RX ADMIN — HYDROCODONE BITARTRATE AND ACETAMINOPHEN 1 TABLET: 7.5; 325 TABLET ORAL at 12:44

## 2021-02-08 RX ADMIN — BUSPIRONE HYDROCHLORIDE 15 MG: 5 TABLET ORAL at 08:26

## 2021-02-08 RX ADMIN — APIXABAN 2.5 MG: 2.5 TABLET, FILM COATED ORAL at 06:09

## 2021-02-08 RX ADMIN — IPRATROPIUM BROMIDE AND ALBUTEROL SULFATE 3 ML: .5; 3 SOLUTION RESPIRATORY (INHALATION) at 07:31

## 2021-02-08 RX ADMIN — HYDROCODONE BITARTRATE AND ACETAMINOPHEN 1 TABLET: 7.5; 325 TABLET ORAL at 17:49

## 2021-02-08 RX ADMIN — LORAZEPAM 1 MG: 1 TABLET ORAL at 06:09

## 2021-02-08 RX ADMIN — HYDROCODONE BITARTRATE AND ACETAMINOPHEN 1 TABLET: 7.5; 325 TABLET ORAL at 06:09

## 2021-02-08 RX ADMIN — Medication 10 ML: at 21:52

## 2021-02-08 RX ADMIN — LORAZEPAM 1 MG: 1 TABLET ORAL at 17:56

## 2021-02-08 RX ADMIN — HYDROCODONE BITARTRATE AND ACETAMINOPHEN 1 TABLET: 5; 325 TABLET ORAL at 10:50

## 2021-02-08 RX ADMIN — TRAZODONE HYDROCHLORIDE 50 MG: 50 TABLET ORAL at 21:51

## 2021-02-08 RX ADMIN — METOCLOPRAMIDE 10 MG: 10 TABLET ORAL at 10:50

## 2021-02-08 RX ADMIN — PANTOPRAZOLE SODIUM 40 MG: 40 TABLET, DELAYED RELEASE ORAL at 08:26

## 2021-02-08 RX ADMIN — PREGABALIN 25 MG: 25 CAPSULE ORAL at 21:51

## 2021-02-08 RX ADMIN — METOCLOPRAMIDE 10 MG: 10 TABLET ORAL at 17:50

## 2021-02-08 RX ADMIN — IPRATROPIUM BROMIDE AND ALBUTEROL SULFATE 3 ML: .5; 3 SOLUTION RESPIRATORY (INHALATION) at 20:02

## 2021-02-08 RX ADMIN — BUDESONIDE AND FORMOTEROL FUMARATE DIHYDRATE 2 PUFF: 160; 4.5 AEROSOL RESPIRATORY (INHALATION) at 07:31

## 2021-02-08 NOTE — PROGRESS NOTES
Problem: Self Care Deficits Care Plan (Adult)  Goal: *Acute Goals and Plan of Care (Insert Text)  Description: Patient will complete toileting with contact guard assist to increase self care independence. Patient will complete bathing with contact guard assist to increase self care independence. Patient will improve static standing at sink for 3 minutes to improve independence with transfers and self cares. Patient will tolerate 30 minutes of OT treatment with self incorporated rest breaks to increase activity tolerance to enhance participation in hobbies. Patient will complete all functional transfers with contact guard assist using adaptive equipment as needed. Patient will complete UE exercises with contact guard assist to increase overall activity tolerance and strength. Timeframe: 7 visits       OCCUPATIONAL THERAPY: Initial Assessment and Daily Note 2/8/2021  INPATIENT: OT Visit Days: 1  Payor: Jose Alberto Valladares / Plan: [email or web address] / Product Type: Managed Care Medicare /      NAME/AGE/GENDER: Jeremias Mendosa is a 66 y.o. female   PRIMARY DIAGNOSIS:  New onset atrial fibrillation ([address] [I48.91]  Falls frequently [R29.6] New onset atrial fibrillation ([address] New onset atrial fibrillation ([address]        ICD-10: Treatment Diagnosis:    Generalized Muscle Weakness (M62.81)  Other lack of cordination (R27.8)  Difficulty in walking, Not elsewhere classified (R26.2)   Precautions/Allergies:     Amlodipine, Lisinopril, and Niaspan [niacin]      ASSESSMENT:     Ms. Sarah Contreras presents in ICU with COPD and new A-Fib. She lives alone but has a paid caretaker 4 hours every day. She uses a walker and has been falling more. She will need STR and possible LT placement as living alone is becoming more unsafe. She works hard during evaluation and treatment but fatigues quickly. She is below baseline for ADL's and mobility. Initiate OT.      This section established at most recent assessment   PROBLEM LIST (Impairments causing functional limitations):  Decreased Strength  Decreased ADL/Functional Activities  Decreased Transfer Abilities   INTERVENTIONS PLANNED: (Benefits and precautions of occupational therapy have been discussed with the patient.)  Activities of daily living training  Adaptive equipment training  Neuromuscular re-eduation  Therapeutic activity  Therapeutic exercise     TREATMENT PLAN: Frequency/Duration: Follow patient 3x a week to address above goals. Rehabilitation Potential For Stated Goals: Good     REHAB RECOMMENDATIONS (at time of discharge pending progress):    Placement: It is my opinion, based on this patient's performance to date, that Ms. Mehran Boone may benefit from intensive therapy at a [address] [place] [address] after discharge due to the functional deficits listed above that are likely to improve with skilled rehabilitation and concerns that he/she may be unsafe to be unsupervised at home due to deficits above .   Equipment:   None at this time              OCCUPATIONAL PROFILE AND HISTORY:   History of Present Injury/Illness (Reason for Referral):  Good, sitting up in recliner  Past Medical History/Comorbidities:   Ms. Mehran Boone  has a past medical history of Anxiety (3/6/2013), Arthritis, B12 deficiency (3/6/2013), Chronic back pain (3/6/2013), Chronic kidney disease (CKD) stage G3b/A1, moderately decreased glomerular filtration rate (GFR) between 30-44 mL/min/1.73 square meter and albuminuria creatinine ratio less than 30 mg/g (10/14/2016), Constipation (11/4/2015), COPD (2006), Decreased GFR (5/26/2016), Depression (10/25/2012), Depression (10/25/2012), Depression, major, in remission ([facility] (1/31/2018), diverticulitis, Encounter for long-term (current) use of other medications (3/6/2013), Environmental allergies (3/6/2013), Fecal incontinence (12/7/2016), Fibromyalgia (9/15/2015), GERD (gastroesophageal reflux disease) (\"years\"), Headache(784.0) (2/12/2013), HTN (hypertension) (10/25/2012), HTN (hypertension) (10/25/2012), Hyperlipidemia (10/25/2012), Lymphocytic colitis (14/5/7701), Metabolic syndrome (1/5/6763), Microalbuminuria (11/4/2015), Osteoporosis (10/25/2012), polypectomy, PUD (peptic ulcer disease) (\"years ago\"), thyroid mass, Unspecified adverse effect of anesthesia, Unspecified sleep apnea, and Vitamin D deficiency (3/6/2013). Ms. Juliette Ramos  has a past surgical history that includes hx partial thyroidectomy (1990s); pr colostomy (1990s); hx appendectomy (1970s); hx cholecystectomy (1970s); hx gyn (1970s); pr breast surgery procedure unlisted (1980s); hx hernia repair (unknown); hx orthopaedic (0738,7591); hx cataract removal (Right, 2016); and hx breast biopsy (Right). Social History/Living Environment:      Prior Level of Function/Work/Activity:  Caretaker 4 hours every day. Uses a walker. Number of Personal Factors/Comorbidities that affect the Plan of Care: Expanded review of therapy/medical records (1-2):  MODERATE COMPLEXITY   ASSESSMENT OF OCCUPATIONAL PERFORMANCE[de-identified]   Activities of Daily Living:   Basic ADLs (From Assessment) Complex ADLs (From Assessment)   Feeding: Setup, Stand-by assistance  Oral Facial Hygiene/Grooming: Setup  Bathing: Moderate assistance  Lower Body Dressing: Moderate assistance  Toileting:  Moderate assistance     Grooming/Bathing/Dressing Activities of Daily Living                       Functional Transfers  Toilet Transfer : Minimum assistance;Assist x2     Bed/Mat Mobility  Supine to Sit: Minimum assistance  Sit to Supine: Minimum assistance  Sit to Stand: Minimum assistance;Assist x2  Stand to Sit: Minimum assistance;Assist x2  Scooting: Minimum assistance;Contact guard assistance     Most Recent Physical Functioning:   Gross Assessment:                  Posture:     Balance:  Sitting: Intact  Standing: With support Bed Mobility:  Supine to Sit: Minimum assistance  Sit to Supine: Minimum assistance  Scooting: Minimum assistance;Contact guard assistance  Wheelchair Mobility:     Transfers:  Sit to Stand: Minimum assistance;Assist x2  Stand to Sit: Minimum assistance;Assist x2            Patient Vitals for the past 6 hrs:   BP SpO2 O2 Flow Rate (L/min) Pulse   21 0530 120/72 100 %  80   21 0600 116/63 100 %  79   21 0630 139/70 92 %  90   21 0700 116/64 99 %  71   21 0702 116/64 100 %  81   21 0730 119/62 99 %  79   21 0732  99 % 3 l/min    21 0800 (!) 109/56 96 %  92   21 0830 119/70 97 %  (!) 118   21 0900 113/70 99 %  96   21 0930 (!) 140/66 98 %  99       Mental Status  Neurologic State: Alert  Orientation Level: Oriented X4  Cognition: Appropriate decision making  Perception: Appears intact                          Physical Skills Involved:  Range of Motion  Balance  Strength  Activity Tolerance Cognitive Skills Affected (resulting in the inability to perform in a timely and safe manner):  WFL Psychosocial Skills Affected:  WFL   Number of elements that affect the Plan of Care: 1-3:  LOW COMPLEXITY   CLINICAL DECISION MAKIN[address] [place] [id number] AM-PAC 6 Clicks   Daily Activity Inpatient Short Form  How much help from another person does the patient currently need. .. Total A Lot A Little None   1. Putting on and taking off regular lower body clothing? [x] 1   [] 2   [] 3   [] 4   2. Bathing (including washing, rinsing, drying)? [] 1   [x] 2   [] 3   [] 4   3. Toileting, which includes using toilet, bedpan or urinal?   [x] 1   [] 2   [] 3   [] 4   4. Putting on and taking off regular upper body clothing? [] 1   [] 2   [x] 3   [] 4   5. Taking care of personal grooming such as brushing teeth? [] 1   [] 2   [x] 3   [] 4   6. Eating meals? [] 1   [] 2   [x] 3   [] 4   © , Trustees of [address] [place] [address] [address] [id number], under license to [employer].  All rights reserved      Score:  Initial: 13 Most Recent: X (Date: -- )    Interpretation of Tool: Represents activities that are increasingly more difficult (i.e. Bed mobility, Transfers, Gait). Medical Necessity:     Skilled intervention continues to be required due to deficits noted above. Reason for Services/Other Comments:  Patient continues to require skilled intervention due to   New Dx  . Use of outcome tool(s) and clinical judgement create a POC that gives a: MODERATE COMPLEXITY         TREATMENT:   (In addition to Assessment/Re-Assessment sessions the following treatments were rendered)     Pre-treatment Symptoms/Complaints:    Pain: Initial:   Pain Location 1: Back  Pain Orientation 1: Mid  Post Session: No change in back pain from start to end. Self Care: (10): Procedure(s) (per grid) utilized to improve and/or restore self-care/home management as related to bathing, toileting, and grooming. Required minimal verbal and tactile cueing to facilitate activities of daily living skills. Initial evaluation 10 minutes    Braces/Orthotics/Lines/Etc:   O2 Device: Nasal cannula  Treatment/Session Assessment:    Response to Treatment:  Good, back supine in bed. Interdisciplinary Collaboration:   Physical Therapist  Occupational Therapist  Registered Nurse  After treatment position/precautions:   Supine in bed  Call light within reach  RN notified   Compliance with Program/Exercises: Compliant all of the time, Will assess as treatment progresses. Recommendations/Intent for next treatment session: \"Next visit will focus on advancements to more challenging activities and reduction in assistance provided\".   Total Treatment Duration:  OT Patient Time In/Time Out  Time In: 0840  Time Out: 0900     Jonathan Sosa OT

## 2021-02-08 NOTE — PROGRESS NOTES
[facility] Progress Note      Assessment / Plan:    Severe back pain due to recent fall, newly diagnosed acute [id number] fracture and sacral s3 sacral insufficiency fracture with opiate tolerance and dependency, chronic pain, high de-escalate pain medications to oral Dilaudid, avoid IV medications whenever possible due to tolerance  February 8better pain control, still uncomfortable, continue treatments, appreciate IR consult, MRI done, await further recommendation    New onset atrial fibrillation with rapid ventricular responsestatus post IV diltiazem's, heart rate improved, cardiology consulted, chest Vascor 3, with high bleeding risk due to frequent and recent falls, monitor without anticoagulation    Polypharmacyhistory of frequent high-dose opiates and Ativan useslowly wean patient's if tolerated,    Hypokalemia,-replace, continue to monitor while inpatient    COPD exacerbation with increased sputum lungs, chronic respiratory failure secondary to advanced COPD on 4 L at baseline -we will order nebulizer treatments, Mucomyst, Symbicort,  encourage pulmonary toilet, low-dose azithromycin  February 8exacerbation seemingly resolved, continue treatment above    Severe protein malnutritionlikely secondary to above, BMI 16, add Ensure 3 times daily, consider dietary consultation  February 8evidently weight loss is chronic, progressive, and unintentional, history of splenomegaly, outpatient work-up has been done, will contact family members obtain medical record, consider inpatient work-up if clinically indicated       Chief Complaint : Fall      Subjective:  Patient complains of severe back pain,    Cardiovascular, respiratory, GI review of system negative except mentioned above  Objective:  Visit Vitals  /62   Pulse 86   Temp 98.7 °F (37.1 °C)   Resp 17   Ht 5' 8\" (1.727 m)   Wt 52.7 kg (116 lb 3.2 oz)   SpO2 98%   BMI 17.67 kg/m²                 Physical Exam:  General: No acute distress, speaking in full sentences, no use of accessory muscles, diffuse muscle loss  HEENT: Pupils equal and reactive to light and accommodation, oropharynx is clear   Neck: Supple, no lymphadenopathy, no JVD   Lungs:   mild decreased breath sound   cardiovascular:IR Regular rate and rhythm with normal S1 and S2   Abdomen: Soft, nontender, nondistended, normoactive bowel sounds   Extremities: No cyanosis  or edema   Neuro: Nonfocal, A&O x3   Psych: Normal affect     Intake and Output:  Date 02/07/21 0700 - 02/08/21 0659 02/08/21 0700 - 02/09/21 0659   Shift [id number] 1900-0659 24 Hour Total 6955-3899 3943-3943 24 Hour Total   INTAKE   P.O.  350 350        P. O.  350 350      Shift Total(mL/kg)  350(7.1) 350(7.1)      OUTPUT   Urine(mL/kg/hr)  850(1.4) 850(0.7)        Urine Output (mL) (Urinary Catheter 02/06/21 2- way)  850 850      Shift Total(mL/kg)  850(17.4) 850(17.4)      NET  -500 -500      Weight (kg) 49 49 49 52.7 52.7 52.7       LAB:  Admission on 02/06/2021   Component Date Value    Ventricular Rate 02/06/2021 127     Atrial Rate 02/06/2021 119     QRS Duration 02/06/2021 108     Q-T Interval 02/06/2021 376     QTC Calculation (Bezet) 02/06/2021 546     Calculated R Axis 02/06/2021 -111     Calculated T Axis 02/06/2021 74     Diagnosis 02/06/2021                      Value:Atrial fibrillation with rapid ventricular response  Pulmonary disease pattern  Right bundle branch block  Abnormal ECG  When compared with ECG of 07-DEC-2009 12:08,  Atrial fibrillation has replaced Sinus rhythm  Vent.  rate has increased BY  63 BPM  Right bundle branch block is now Present  Confirmed by Morena Quesada MD (), BASIM KERN ([id number]) on 2/6/2021 5:51:13 PM      Troponin-High Sensitivity 02/06/2021 17.0*    Troponin-High Sensitivity 02/06/2021 15.1*    WBC 02/06/2021 8.6     RBC 02/06/2021 3.82*    HGB 02/06/2021 12.6     HCT 02/06/2021 36.9     MCV 02/06/2021 96.6     MCH 02/06/2021 33.0*    MCHC 02/06/2021 34.1  RDW 02/06/2021 14.5     PLATELET 63/60/1868 042     MPV 02/06/2021 11.8     ABSOLUTE NRBC 02/06/2021 0.00     DF 02/06/2021 AUTOMATED     NEUTROPHILS 02/06/2021 77     LYMPHOCYTES 02/06/2021 15     MONOCYTES 02/06/2021 7     EOSINOPHILS 02/06/2021 0*    BASOPHILS 02/06/2021 1     IMMATURE GRANULOCYTES 02/06/2021 1     ABS. NEUTROPHILS 02/06/2021 6.6     ABS. LYMPHOCYTES 02/06/2021 1.3     ABS. MONOCYTES 02/06/2021 0.6     ABS. EOSINOPHILS 02/06/2021 0.0     ABS. BASOPHILS 02/06/2021 0.0     ABS. IMM. GRANS. 02/06/2021 0.0     Sodium 02/06/2021 144     Potassium 02/06/2021 2.9*    Chloride 02/06/2021 110*    CO2 02/06/2021 27     Anion gap 02/06/2021 7     Glucose 02/06/2021 158*    BUN 02/06/2021 23     Creatinine 02/06/2021 0.95     GFR est AA 02/06/2021 >60     GFR est non-AA 02/06/2021 >60     Calcium 02/06/2021 8.5     Bilirubin, total 02/06/2021 0.4     ALT (SGPT) 02/06/2021 26     AST (SGOT) 02/06/2021 27     Alk.  phosphatase 02/06/2021 93     Protein, total 02/06/2021 6.1*    Albumin 02/06/2021 3.3     Globulin 02/06/2021 2.8     A-G Ratio 02/06/2021 1.2     Prothrombin time 02/06/2021 14.6     INR 02/06/2021 1.1     Color 02/06/2021 YELLOW     Appearance 02/06/2021 CLEAR     Specific gravity 02/06/2021 1.008     pH (UA) 02/06/2021 5.5     Protein 02/06/2021 Negative     Glucose 02/06/2021 Negative     Ketone 02/06/2021 Negative     Bilirubin 02/06/2021 Negative     Blood 02/06/2021 Negative     Urobilinogen 02/06/2021 0.2     Nitrites 02/06/2021 Negative     Leukocyte Esterase 02/06/2021 Negative     Lactic acid 02/06/2021 1.4     NT pro-BNP 02/06/2021 6,999*    aPTT 02/06/2021 30.0     TSH 02/06/2021 4.180     Magnesium 02/06/2021 1.7*    Vitamin B12 02/06/2021 472     Vitamin D 25-Hydroxy 02/06/2021 57.4     WBC 02/06/2021 6.8     RBC 02/06/2021 3.19*    HGB 02/06/2021 10.4*    HCT 02/06/2021 31.7*    MCV 02/06/2021 99.4*    [facility] 02/06/2021 32.6     MCHC 02/06/2021 32.8     RDW 02/06/2021 14.6     PLATELET 82/35/5175 420     MPV 02/06/2021 11.2     ABSOLUTE NRBC 02/06/2021 0.00     DF 02/06/2021 AUTOMATED     NEUTROPHILS 02/06/2021 63     LYMPHOCYTES 02/06/2021 25     MONOCYTES 02/06/2021 10     EOSINOPHILS 02/06/2021 1     BASOPHILS 02/06/2021 1     IMMATURE GRANULOCYTES 02/06/2021 0     ABS. NEUTROPHILS 02/06/2021 4.3     ABS. LYMPHOCYTES 02/06/2021 1.7     ABS. MONOCYTES 02/06/2021 0.7     ABS. EOSINOPHILS 02/06/2021 0.1     ABS. BASOPHILS 02/06/2021 0.1     ABS. IMM. GRANS. 02/06/2021 0.0     Sodium 02/07/2021 143     Potassium 02/07/2021 3.4*    Chloride 02/07/2021 113*    CO2 02/07/2021 27     Anion gap 02/07/2021 3*    Glucose 02/07/2021 87     BUN 02/07/2021 16     Creatinine 02/07/2021 0.70     GFR est AA 02/07/2021 >60     GFR est non-AA 02/07/2021 >60     Calcium 02/07/2021 7.6*    Bilirubin, total 02/07/2021 0.4     ALT (SGPT) 02/07/2021 27     AST (SGOT) 02/07/2021 29     Alk. phosphatase 02/07/2021 108     Protein, total 02/07/2021 5.2*    Albumin 02/07/2021 2.7*    Globulin 02/07/2021 2.5     A-G Ratio 02/07/2021 1.1*    PPD 02/08/2021 Negative     mm Induration 02/08/2021 0     Sodium 02/08/2021 143     Potassium 02/08/2021 4.0     Chloride 02/08/2021 111*    CO2 02/08/2021 31     Anion gap 02/08/2021 1*    Glucose 02/08/2021 155*    BUN 02/08/2021 22     Creatinine 02/08/2021 0.72     GFR est AA 02/08/2021 >60     GFR est non-AA 02/08/2021 >60     Calcium 02/08/2021 8.3        IMAGING:  Xr Spine Lumb 2 Or 3 V    Result Date: 2/6/2021  Negative for acute hip fracture. Osteoarthritis. LUMBAR SPINE, 3 views. HISTORY: Low back pain following fall. COMPARISON: None. CT scan July 2011 TECHNIQUE: AP, lateral and cone-down lumbosacral junction views. FINDINGS: Spinal alignment: Anatomic. Disk spaces: Maintained. Pedicles:  Appear intact.  Vertebral bodies: Decreased height of L3 SI joints: Appear symmetric. Facet joints:  Also unremarkable. Other:  Osteophytes. Aortic calcifications. IMPRESSION: Age-indeterminate moderate compression fracture L3. Posterior intact. CHEST X-RAY, one view. HISTORY:  Chest pain  falls. TECHNIQUE:  AP supine view. COMPARISON: None. FINDINGS: Lungs: Hyperinflated and clear. Small calcified granulomas. No pneumothorax. Costophrenic angles: are sharp. Heart size: is normal. Pulmonary vasculature: is unremarkable. Aorta: Arch calcifications. . Included portion of the upper abdomen: is unremarkable. Bones: No gross bony lesions. Other: None. IMPRESSION:  Negative for acute change. Xr Hip Rt W Or Wo Pelv 2-3 Vws    Result Date: 2/6/2021  Negative for acute hip fracture. Osteoarthritis. LUMBAR SPINE, 3 views. HISTORY: Low back pain following fall. COMPARISON: None. CT scan July 2011 TECHNIQUE: AP, lateral and cone-down lumbosacral junction views. FINDINGS: Spinal alignment: Anatomic. Disk spaces: Maintained. Pedicles:  Appear intact. Vertebral bodies: Decreased height of L3 SI joints:  Appear symmetric. Facet joints:  Also unremarkable. Other:  Osteophytes. Aortic calcifications. IMPRESSION: Age-indeterminate moderate compression fracture L3. Posterior intact. CHEST X-RAY, one view. HISTORY:  Chest pain  falls. TECHNIQUE:  AP supine view. COMPARISON: None. FINDINGS: Lungs: Hyperinflated and clear. Small calcified granulomas. No pneumothorax. Costophrenic angles: are sharp. Heart size: is normal. Pulmonary vasculature: is unremarkable. Aorta: Arch calcifications. . Included portion of the upper abdomen: is unremarkable. Bones: No gross bony lesions. Other: None. IMPRESSION:  Negative for acute change. Mri Lumb Spine Wo Cont    Result Date: 2/8/2021  1. Acute compression fracture causing mild vertebral body height loss at L3. There is an additional relatively acute injury at S3 which may be part of a larger sacral insufficiency fracture.  2. Mild foraminal stenosis at L2-L3. 3. Mild spinal stenosis with mild foraminal stenosis at L4-L5. 4. Incompletely imaged left hepatic lobe cyst. Consider routine follow-up abdominal sonography for further assessment. Ct Head Wo Cont    Result Date: 2/6/2021  Negative for acute intracranial abnormality. Chronic changes. Xr Chest Port    Result Date: 2/6/2021  Negative for acute hip fracture. Osteoarthritis. LUMBAR SPINE, 3 views. HISTORY: Low back pain following fall. COMPARISON: None. CT scan July 2011 TECHNIQUE: AP, lateral and cone-down lumbosacral junction views. FINDINGS: Spinal alignment: Anatomic. Disk spaces: Maintained. Pedicles:  Appear intact. Vertebral bodies: Decreased height of L3 SI joints:  Appear symmetric. Facet joints:  Also unremarkable. Other:  Osteophytes. Aortic calcifications. IMPRESSION: Age-indeterminate moderate compression fracture L3. Posterior intact. CHEST X-RAY, one view. HISTORY:  Chest pain  falls. TECHNIQUE:  AP supine view. COMPARISON: None. FINDINGS: Lungs: Hyperinflated and clear. Small calcified granulomas. No pneumothorax. Costophrenic angles: are sharp. Heart size: is normal. Pulmonary vasculature: is unremarkable. Aorta: Arch calcifications. . Included portion of the upper abdomen: is unremarkable. Bones: No gross bony lesions. Other: None. IMPRESSION:  Negative for acute change. EKG:  No results found for this or any previous visit.           Laurita Gibson DO  2/8/2021 4:29 PM

## 2021-02-08 NOTE — CONSULTS
Department of Interventional Radiology  [phone]        Virtual Consult Note     Patient: Danya Shaw MRN: [id number]  SSN: [id number]    YOB: 1945  Age: 66 y.o. Sex: female      Referring Physician: Dr. Hugo Arboleda Date: 2/8/2021     Subjective:     Chief Complaint: Low Back Pain    History of Present Illness: Danya Shaw is a 66 y.o. female currently admitted to [facility] after a fall resulting in LBP. Conventional radiographs show an L3, wedge-shaped, Vertebral Compression Fracture as well as diffuse osteopenia. The VCF is new since at least 4/23/19 CT scan (the most recent lumbar imaging available for comparison). Newly diagnosed Atrial Fibrillation treated w rate control and low dose Eliquis (started yesterday). Severe COPD requiring 4L O2 NC at baseline. Unexplained weight loss. Chronic tobacco use. Will order a MRI of the lumbar region to evaluate for chronicity of the L3 fracture, and to look for any other fractures.      Ozzy Nj MD      Past Medical History:   Diagnosis Date    Anxiety 3/6/2013    Arthritis     osteoarthritis    B12 deficiency 3/6/2013    Chronic back pain 3/6/2013    Chronic kidney disease (CKD) stage G3b/A1, moderately decreased glomerular filtration rate (GFR) between 30-44 mL/min/1.73 square meter and albuminuria creatinine ratio less than 30 mg/g 10/14/2016    Constipation 11/4/2015    COPD 2006    [facility]/ patient on 2.5 liters O2 nightly at home    Decreased GFR 5/26/2016    Depression 10/25/2012    Depression 10/25/2012    Depression, major, in remission ([facility] [address] 1/31/2018    diverticulitis     Encounter for long-term (current) use of other medications 3/6/2013    Environmental allergies 3/6/2013    Fecal incontinence 12/7/2016    Fibromyalgia 9/15/2015    GERD (gastroesophageal reflux disease) \"years\"    Headache(784.0) 2/12/2013    HTN (hypertension) 10/25/2012    HTN (hypertension) 10/25/2012    Hyperlipidemia 10/25/2012    Lymphocytic colitis 72/4/3939    Metabolic syndrome 6/7/6843    Microalbuminuria 11/4/2015    Osteoporosis 10/25/2012    polypectomy     PUD (peptic ulcer disease) \"years ago\"    Had peptic ulcers    thyroid mass     partial removal, benign    Unspecified adverse effect of anesthesia     PATIENT STATES SHE DOESN'T GET GENERAL ANESTHESIA DUE TO COPD    Unspecified sleep apnea     wears O2 at night. no c-jeffrey    Vitamin D deficiency 3/6/2013     Past Surgical History:   Procedure Laterality Date    HX APPENDECTOMY  1970s    HX BREAST BIOPSY Right     excision of cyst    HX CATARACT REMOVAL Right 2016    right eye    HX CHOLECYSTECTOMY  1970s    HX GYN  1970s    TIA BSO    HX HERNIA REPAIR  unknown    hiatal hernia repair    HX ORTHOPAEDIC  [id number]    right knee replaced and then left the next year    HX PARTIAL THYROIDECTOMY  [address] UNLISTED  1980s    cyst removed from right breast/benign    [place] COLOSTOMY  1990s    placed, then reversed      Family History   Problem Relation Age of Onset    Kidney Disease Mother     Heart Disease Father     Heart Attack Brother      Social History     Tobacco Use    Smoking status: Current Every Day Smoker     Packs/day: 0.50     Years: 45.00     Pack years: 22.50     Types: Cigarettes    Smokeless tobacco: Never Used   Substance Use Topics    Alcohol use: Yes     Comment: states no drinking beer in 2 months      Allergies   Allergen Reactions    Amlodipine Hives    Lisinopril Diarrhea    Niaspan [Niacin] Rash     Patient was not taking it with  mg 30 min before. She wants to give it another try.   Pt takes this med and it does not cause a rash anymore       Current Facility-Administered Medications   Medication Dose Route Frequency Provider Last Rate Last Admin    levalbuterol (XOPENEX) nebulizer soln 1.25 mg/3 mL  1.25 mg Nebulization Q4H PRN Genevieve Bray MD  atorvastatin (LIPITOR) tablet 80 mg  80 mg Oral DAILY Carlos Alberto Schulz MD        metoclopramide HCl (REGLAN) tablet 10 mg  10 mg Oral AC&HS Carlos Alberto Schulz MD        pantoprazole (PROTONIX) tablet 40 mg  40 mg Oral BID Carlos Alberto Schulz MD        primidone (MYSOLINE) tablet 50 mg  50 mg Oral TID Carlos Alberto Schulz MD        tiotropium bromide (SPIRIVA RESPIMAT) 2.5 mcg /actuation  2 Puff Inhalation DAILY Carlos Alberto Schulz MD   2 Puff at 02/08/21 0732    topiramate (TOPAMAX) tablet 50 mg  50 mg Oral TIDAC Carlos Alberto Schulz MD        traZODone (DESYREL) tablet 50 mg  50 mg Oral QHS Carlos Alberto Schulz MD   Stopped at 02/08/21 0400    LORazepam (ATIVAN) tablet 1 mg  1 mg Oral TID PRN Carlos Alberto Schulz MD        busPIRone (BUSPAR) tablet 15 mg  15 mg Oral TID Makeda Thomas DO        HYDROcodone-acetaminophen (NORCO) 7.5-325 mg per tablet 1 Tab  1 Tab Oral Q6H Makeda Thomas DO   1 Tab at 02/08/21 8892    HYDROmorphone (DILAUDID) tablet 2 mg  2 mg Oral Q4H PRN Makeda Thomas DO   2 mg at 02/08/21 0131    pregabalin (LYRICA) capsule 25 mg  25 mg Oral BID Makeda Thomas DO   25 mg at 02/07/21 2032    lidocaine 4 % patch 1 Patch  1 Patch TransDERmal Q24H Makeda Thomas DO   1 Patch at 02/07/21 0932    tuberculin injection 5 Units  5 Units IntraDERMal Mack Halter, DO   5 Units at 02/07/21 0943    nicotine (NICODERM CQ) 14 mg/24 hr patch 1 Patch  1 Patch TransDERmal Q24H Makeda Thomas DO   1 Patch at 02/07/21 1130    LORazepam (ATIVAN) tablet 1 mg  1 mg Oral TID PRN Makeda Thomas DO   1 mg at 02/08/21 9628    budesonide-formoteroL (SYMBICORT) 160-4.5 mcg/actuation HFA inhaler 2 Puff  2 Puff Inhalation BID RT Makeda Thomas DO   2 Puff at 02/08/21 0731    albuterol-ipratropium (DUO-NEB) 2.5 MG-0.5 MG/3 ML  3 mL Nebulization QID RT Makeda Thomas, DO   3 mL at 02/08/21 0731    azithromycin (ZITHROMAX) 250 mg in 0.9% sodium chloride 250 mL IVPB  250 mg IntraVENous Q24H Makeda Thomas,  mL/hr at 02/07/21 1745 250 mg at 02/07/21 1745    acetylcysteine (MUCOMYST) 200 mg/mL (20 %) solution 400 mg  400 mg Nebulization QID RT Dedra Harry DO   400 mg at 02/08/21 7495    apixaban (ELIQUIS) tablet 2.5 mg  2.5 mg Oral BID Aislinn AKERS MD   2.5 mg at 02/08/21 4645    influenza vaccine 2020-21 (6 mos+)(PF) (FLUARIX/FLULAVAL/FLUZONE QUAD) injection 0.5 mL  0.5 mL IntraMUSCular PRIOR TO DISCHARGE Dedra Harry DO        sodium chloride (NS) flush 5-40 mL  5-40 mL IntraVENous Q8H Derrick Schulz MD   10 mL at 02/08/21 0608    sodium chloride (NS) flush 5-40 mL  5-40 mL IntraVENous PRN Derrick Schulz MD        dilTIAZem IR (CARDIZEM) tablet 30 mg  30 mg Oral TIDAC Derrick Schulz MD   30 mg at 02/07/21 1655    acetaminophen (TYLENOL) tablet 650 mg  650 mg Oral Q4H PRN Derrick Schulz MD        HYDROcodone-acetaminophen (NORCO) 5-325 mg per tablet 1 Tab  1 Tab Oral Q4H PRN Derrick Schulz MD   1 Tab at 02/07/21 0712    naloxone [facility]) injection 0.4 mg  0.4 mg IntraVENous PRN Derrick Schulz MD        senna-docusate (PERICOLACE) 8.6-50 mg per tablet 1 Tab  1 Tab Oral BID PRN Derrick Schulz MD        magnesium sulfate 2 g/50 ml IVPB (premix or compounded)  2 g IntraVENous DAILY PRN Derrick Schulz MD           Medications Prior to Admission   Medication Sig    traZODone (DESYREL) 50 mg tablet Take 1 Tab by mouth nightly. Indications: insomnia associated with depression    LORazepam (ATIVAN) 2 mg tablet Take 1 Tab by mouth three (3) times daily. 1 TAB PO QHS OR TID PRN  Indications: anxious    topiramate (TOPAMAX) 50 mg tablet Take 1 tablet with breakfast, 1 tablet with lunch, and 2 with dinner. Indications: migraine prevention    pantoprazole (PROTONIX) 40 mg tablet Take 1 Tab by mouth two (2) times a day. Indications: gastroesophageal reflux disease    ondansetron (ZOFRAN ODT) 4 mg disintegrating tablet Take 1 Tab by mouth every eight (8) hours as needed for Nausea.  alendronate (FOSAMAX) 70 mg tablet Take 1 Tab by mouth every seven (7) days.     primidone (MYSOLINE) 50 mg tablet Take 1 Tab by mouth three (3) times daily. Indications: essential tremor    tiotropium (SPIRIVA) 18 mcg inhalation capsule Take 1 Cap by inhalation daily. Indications: bronchospasm prevention with COPD    atorvastatin (LIPITOR) 80 mg tablet Take 1 Tab by mouth daily. Indications: primary prevention of heart attack    budesonide-formoteroL (SYMBICORT) 160-4.5 mcg/actuation HFAA Take 2 Puffs by inhalation two (2) times a day.  albuterol (PROVENTIL VENTOLIN) 2.5 mg /3 mL (0.083 %) nebu 3 mL by Nebulization route every four (4) hours as needed for Wheezing or Shortness of Breath.  budesonide (ENTOCORT EC) 3 mg capsule Take 6 mg by mouth every morning.  ondansetron hcl (ZOFRAN) 4 mg tablet Take 4 mg by mouth every eight (8) hours as needed for Nausea or Vomiting.  busPIRone (BUSPAR) 15 mg tablet Take 1 Tab by mouth three (3) times daily. Indications: repeated episodes of anxiety    artificial tears, dextran-hypromellose-glycerin, (TEARS NATURALE FORTE) 0.1-0.3-0.2 % ophthalmic solution Administer 1 Drop to both eyes every six (6) hours. Indications: dry eye    albuterol (PROVENTIL HFA, VENTOLIN HFA, PROAIR HFA) 90 mcg/actuation inhaler Take 2 Puffs by inhalation every four (4) hours as needed for Wheezing or Shortness of Breath.  Oxygen     metoclopramide HCl (REGLAN) 10 mg tablet Take 1 Tab by mouth Before breakfast, lunch, dinner and at bedtime. Indications: stomach muscle paralysis and decreased function from diabetes    predniSONE (DELTASONE) 10 mg tablet Take 10 mg by mouth two (2) times a day.  dicyclomine (BENTYL) 10 mg capsule Take 1 Cap by mouth three (3) times daily as needed for Abdominal Cramps. Indications: irritable colon    furosemide (LASIX) 20 mg tablet Take 1 Tab by mouth daily as needed (edema).  linaCLOtide (Linzess) 72 mcg cap capsule Take 1 Cap by mouth Daily (before breakfast).  Indications: irritable bowel syndrome with constipation    ergocalciferol (ERGOCALCIFEROL) 1,250 mcg (50,000 unit) capsule Take 1 Cap by mouth every seven (7) days.  sertraline (ZOLOFT) 100 mg tablet Take 2 tablets daily ( Dose increased from previously 100 mg daily )  Indications: anxiousness associated with depression    predniSONE (DELTASONE) 10 mg tablet TK 1 T PO D    sucralfate (CARAFATE) 1 gram tablet Take 1 g by mouth four (4) times daily.  colestipol (COLESTID) 1 gram tablet Take 1 g by mouth two (2) times a day.  OTHER         Allergies   Allergen Reactions    Amlodipine Hives    Lisinopril Diarrhea    Niaspan [Niacin] Rash     Patient was not taking it with  mg 30 min before. She wants to give it another try.   Pt takes this med and it does not cause a rash anymore       Objective:     Physical Exam:  Vitals:    02/08/21 0600 02/08/21 0630 02/08/21 0702 02/08/21 0732   BP: 116/63 139/70 116/64    Pulse: 79 90 81    Resp: 12 13 10    Temp:   97.4 °F (36.3 °C)    SpO2: 100% 92% 100% 99%   Weight:   52.7 kg (116 lb 3.2 oz)    Height:          Pain Assessment  Pain Intensity 1: 0 (02/08/21 0000)  Pain Location 1: Back  Pain Intervention(s) 1: Medication (see MAR)  Patient Stated Pain Goal: 0    Lab/Data Review:  BMP: No results found for: NA, K, CL, CO2, AGAP, GLU, BUN, CREA, GFRAA, GFRNA  CMP: No results found for: NA, K, CL, CO2, AGAP, GLU, BUN, CREA, GFRAA, GFRNA, CA, MG, PHOS, ALB, TBIL, TP, ALB, GLOB, AGRAT, ALT  CBC: No results found for: WBC, HGB, HGBEXT, HCT, HCTEXT, PLT, PLTEXT, HGBEXT, HCTEXT, PLTEXT  COAGS: No results found for: APTT, PTP, INR, INREXT, INREXT      Assessment:     Hospital Problems  Date Reviewed: 8/4/2020          Codes Class Noted POA    * (Principal) [facility] ICD-10-CM: I48.91  ICD-9-CM: 427.31  2/6/2021 Unknown        Falls frequently ICD-10-CM: R29.6  ICD-9-CM: V15.88  2/6/2021 Unknown        Hypokalemia ICD-10-CM: E87.6  ICD-9-CM: 276.8  2/6/2021 Unknown        Peripheral neuropathy ICD-10-CM: G62.9  ICD-9-CM: 356.9  2/12/2013 Yes        COPD (chronic obstructive pulmonary disease) (HCC) (Chronic) ICD-10-CM: J44.9  ICD-9-CM: 129  12/30/2009 Yes        GERD (gastroesophageal reflux disease) (Chronic) ICD-10-CM: K21.9  ICD-9-CM: 530.81  12/30/2009 Yes

## 2021-02-08 NOTE — WOUND CARE
Patient seen for multiple skin tears from previous falls. Tibial shallow pink ulcerations,  Continue xeroform gauze dressings. Coccyx ulcer is shallow friction vs pressure,  1cm pink and moist. Silicone foam in use and to be continued. Also noted mid spine shallow stage 3 pink ulcer, present on admission. Silicone foam in use and to be continued. Bilateral arms have small skin tears, also has xeroform in use and to be continued. She is very thin and bony,continues to lose weight. Is high risk from further breakdown. Keep turned frequently. Keep heels floated off bed. Updated patient and primary nurse. Will follow as needed. Please call. Answered all patient questions.

## 2021-02-08 NOTE — PROGRESS NOTES
Problem: Mobility Impaired (Adult and Pediatric)  Goal: *Acute Goals and Plan of Care (Insert Text)  Outcome: Progressing Towards Goal  Note:     LTG:  (1.)Ms. Akash Perez will move from supine to sit and sit to supine  in bed with CONTACT GUARD ASSIST within 7 treatment day(s). (2.)Ms. Akash Perez will transfer from bed to chair and chair to bed with CONTACT GUARD ASSIST using the least restrictive device within 7 treatment day(s). (3.)Ms. Bueno will ambulate with CONTACT GUARD ASSIST for 50 feet with the least restrictive device within 7 treatment day(s). ________________________________________________________________________________________________      PHYSICAL THERAPY: Initial Assessment and AM 2/8/2021  INPATIENT: PT Visit Days : 1  Payor: [employer] / Plan: [employer] / Product Type: Managed Care Medicare /       NAME/AGE/GENDER: Romel Morgan is a 66 y.o. female   PRIMARY DIAGNOSIS: New onset atrial fibrillation ([address] [I48.91]  Falls frequently [R29.6] New onset atrial fibrillation ([address] New onset atrial fibrillation ([address]        ICD-10: Treatment Diagnosis:    Generalized Muscle Weakness (M62.81)  Other abnormalities of gait and mobility (R26.89)   Precaution/Allergies:  Amlodipine, Lisinopril, and Niaspan [niacin]      ASSESSMENT:     Ms. Akash Perez presents with weakness and limited functional mobility, decreased from her baseline. She will benefit from PT to increase her functional independence with bed mobility, transfers and gait and will need STR at discharge. She has a lot of back pain due to an L3 compression fracture but mobility did not exacerbate it. She transferred to sit on EOB and then stood to side step to [facility] then returned supine. She is very fearful of falling.      This section established at most recent assessment   PROBLEM LIST (Impairments causing functional limitations):  Decreased Strength  Decreased Transfer Abilities  Decreased Ambulation Ability/Technique  Decreased Balance   INTERVENTIONS PLANNED: (Benefits and precautions of physical therapy have been discussed with the patient.)  Bed Mobility  Gait Training  Therapeutic Exercise/Strengthening  Transfer Training     TREATMENT PLAN: Frequency/Duration: daily for duration of hospital stay  Rehabilitation Potential For Stated Goals: Good     REHAB RECOMMENDATIONS (at time of discharge pending progress):    Placement: It is my opinion, based on this patient's performance to date, that Ms. Max Hernandez may benefit from intensive therapy at a [address] after discharge due to the functional deficits listed above that are likely to improve with skilled rehabilitation and concerns that he/she may be unsafe to be unsupervised at home due to chronic falls and increased weakness . Equipment:   None at this time              HISTORY:   History of Present Injury/Illness (Reason for Referral):  Kush Chou is a 66 y.o. female currently admitted to [facility] after a fall resulting in LBP. Conventional radiographs show an L3, wedge-shaped, Vertebral Compression Fracture as well as diffuse osteopenia. The VCF is new since at least 4/23/19 CT scan (the most recent lumbar imaging available for comparison). Newly diagnosed Atrial Fibrillation treated w rate control and low dose Eliquis (started yesterday). Severe COPD requiring 4L O2 NC at baseline. Unexplained weight loss. Chronic tobacco use.       Will order a MRI of the lumbar region to evaluate for chronicity of the L3 fracture, and to look for any other fractures  Past Medical History/Comorbidities:   Ms. Max Hernandez  has a past medical history of Anxiety (3/6/2013), Arthritis, B12 deficiency (3/6/2013), Chronic back pain (3/6/2013), Chronic kidney disease (CKD) stage G3b/A1, moderately decreased glomerular filtration rate (GFR) between 30-44 mL/min/1.73 square meter and albuminuria creatinine ratio less than 30 mg/g (10/14/2016), Constipation (11/4/2015), COPD (2006), Decreased GFR (5/26/2016), Depression (10/25/2012), Depression (10/25/2012), Depression, major, in remission ([facility] (1/31/2018), diverticulitis, Encounter for long-term (current) use of other medications (3/6/2013), Environmental allergies (3/6/2013), Fecal incontinence (12/7/2016), Fibromyalgia (9/15/2015), GERD (gastroesophageal reflux disease) (\"years\"), Headache(784.0) (2/12/2013), HTN (hypertension) (10/25/2012), HTN (hypertension) (10/25/2012), Hyperlipidemia (10/25/2012), Lymphocytic colitis (35/3/4865), Metabolic syndrome (5/4/2544), Microalbuminuria (11/4/2015), Osteoporosis (10/25/2012), polypectomy, PUD (peptic ulcer disease) (\"years ago\"), thyroid mass, Unspecified adverse effect of anesthesia, Unspecified sleep apnea, and Vitamin D deficiency (3/6/2013). Ms. Contreras  has a past surgical history that includes hx partial thyroidectomy (1990s); pr colostomy (1990s); hx appendectomy (1970s); hx cholecystectomy (1970s); hx gyn (1970s); pr breast surgery procedure unlisted (1980s); hx hernia repair (unknown); hx orthopaedic (9260,9957); hx cataract removal (Right, 2016); and hx breast biopsy (Right). Social History/Living Environment: lives alone and has [address] aides come 4 hours a day 7 days a week. Prior Level of Function/Work/Activity:  Chronic falling. Uses a rolling walker or wheelchair in the home. Requires assist with ADL's. Number of Personal Factors/Comorbidities that affect the Plan of Care: 1-2: MODERATE COMPLEXITY   EXAMINATION:   Most Recent Physical Functioning:   Gross Assessment:  AROM: Generally decreased, functional  Strength: Generally decreased, functional               Posture:     Balance:  Sitting: Intact  Standing: Pull to stand; With support Bed Mobility:  Supine to Sit: Minimum assistance  Sit to Supine: Minimum assistance  Scooting: Contact guard assistance;Minimum assistance  Wheelchair Mobility:     Transfers:  Sit to Stand: Minimum assistance;Assist x2  Stand to Sit: Minimum assistance;Assist x2  Bed to Chair: Minimum assistance;Assist x2  Gait:     Base of Support: Narrowed  Speed/Reva: Slow  Gait Abnormalities: Decreased step clearance;Shuffling gait;Trunk sway increased  Distance (ft): (side step at bedside to [facility])  Assistive Device: (HHA x 2 but needs to use RW)  Ambulation - Level of Assistance: Minimal assistance;Assist x2  Interventions: Verbal cues; Safety awareness training      Body Structures Involved:  Muscles Body Functions Affected: Movement Related Activities and Participation Affected: Mobility   Number of elements that affect the Plan of Care: 3: MODERATE COMPLEXITY   CLINICAL PRESENTATION:   Presentation: Stable and uncomplicated: LOW COMPLEXITY   CLINICAL DECISION MAKIN[address] [place] [id number] AM-PAC 6 Clicks   Basic Mobility Inpatient Short Form  How much difficulty does the patient currently have. .. Unable A Lot A Little None   1. Turning over in bed (including adjusting bedclothes, sheets and blankets)? [] 1   [x] 2   [] 3   [] 4   2. Sitting down on and standing up from a chair with arms ( e.g., wheelchair, bedside commode, etc.)   [] 1   [x] 2   [] 3   [] 4   3. Moving from lying on back to sitting on the side of the bed? [] 1   [x] 2   [] 3   [] 4   How much help from another person does the patient currently need. .. Total A Lot A Little None   4. Moving to and from a bed to a chair (including a wheelchair)? [] 1   [x] 2   [] 3   [] 4   5. Need to walk in hospital room? [] 1   [x] 2   [] 3   [] 4   6. Climbing 3-5 steps with a railing? [x] 1   [] 2   [] 3   [] 4   © 2007, Trustees of [address] [place] [address] [address] [address], under license to [employer]. All rights reserved      Score:  Initial: 11 Most Recent: X (Date: -- )    Interpretation of Tool:  Represents activities that are increasingly more difficult (i.e. Bed mobility, Transfers, Gait).     Medical Necessity:     Patient is expected to demonstrate progress in strength, balance, and functional technique   to   increase independence with bed mobility, transfers and gait  . Reason for Services/Other Comments:  Patient continues to require skilled intervention due to   Limited functional independence  . Use of outcome tool(s) and clinical judgement create a POC that gives a: Clear prediction of patient's progress: LOW COMPLEXITY            TREATMENT:   (In addition to Assessment/Re-Assessment sessions the following treatments were rendered)   Pre-treatment Symptoms/Complaints:  back pain  Pain: Initial:      Post Session:  did not rate     Therapeutic Activity: (    10): Therapeutic activities including Bed transfers, Ambulation on level ground,  to improve mobility, strength, and balance. Required minimal Verbal cues; Safety awareness training to promote dynamic balance in standing. Braces/Orthotics/Lines/Etc:   IV  O2 Device: Nasal cannula  Treatment/Session Assessment:    Response to Treatment:  tolerated therapy well  Interdisciplinary Collaboration:   Physical Therapist  Occupational Therapist  Registered Nurse  After treatment position/precautions:   Supine in bed  Bed/Chair-wheels locked  Bed in low position  Call light within reach  RN notified   Compliance with Program/Exercises: Compliant all of the time  Recommendations/Intent for next treatment session: \"Next visit will focus on advancements to more challenging activities and reduction in assistance provided\".   Total Treatment Duration:  PT Patient Time In/Time Out  Time In: 0900  Time Out: [address], PT

## 2021-02-09 ENCOUNTER — HOSPITAL ENCOUNTER (OUTPATIENT)
Dept: CARDIAC CATH/INVASIVE PROCEDURES | Age: 76
Discharge: STILL ADMITTED OR ADMINISTRATIVE | End: 2021-02-09
Attending: INTERNAL MEDICINE | Admitting: INTERNAL MEDICINE
Payer: MEDICARE

## 2021-02-09 VITALS — HEART RATE: 85 BPM | SYSTOLIC BLOOD PRESSURE: 101 MMHG | DIASTOLIC BLOOD PRESSURE: 61 MMHG | OXYGEN SATURATION: 95 %

## 2021-02-09 LAB
ANION GAP SERPL CALC-SCNC: 3 MMOL/L (ref 7–16)
ATRIAL RATE: 375 BPM
BUN SERPL-MCNC: 24 MG/DL (ref 8–23)
CALCIUM SERPL-MCNC: 8.6 MG/DL (ref 8.3–10.4)
CALCULATED R AXIS, ECG10: 78 DEGREES
CALCULATED T AXIS, ECG11: 27 DEGREES
CHLORIDE SERPL-SCNC: 111 MMOL/L (ref 98–107)
CO2 SERPL-SCNC: 29 MMOL/L (ref 21–32)
CREAT SERPL-MCNC: 0.55 MG/DL (ref 0.6–1)
DIAGNOSIS, 93000: NORMAL
GLUCOSE SERPL-MCNC: 99 MG/DL (ref 65–100)
MAGNESIUM SERPL-MCNC: 2.2 MG/DL (ref 1.8–2.4)
POTASSIUM SERPL-SCNC: 3.9 MMOL/L (ref 3.5–5.1)
Q-T INTERVAL, ECG07: 400 MS
QRS DURATION, ECG06: 104 MS
QTC CALCULATION (BEZET), ECG08: 492 MS
SODIUM SERPL-SCNC: 143 MMOL/L (ref 136–145)
VENTRICULAR RATE, ECG03: 91 BPM

## 2021-02-09 PROCEDURE — 93325 DOPPLER ECHO COLOR FLOW MAPG: CPT | Performed by: INTERNAL MEDICINE

## 2021-02-09 PROCEDURE — 65660000000 HC RM CCU STEPDOWN

## 2021-02-09 PROCEDURE — 77010033678 HC OXYGEN DAILY

## 2021-02-09 PROCEDURE — 77030009397 HC ELECTRD ECG PACE ZOLL -B

## 2021-02-09 PROCEDURE — 80048 BASIC METABOLIC PNL TOTAL CA: CPT

## 2021-02-09 PROCEDURE — 93320 DOPPLER ECHO COMPLETE: CPT | Performed by: INTERNAL MEDICINE

## 2021-02-09 PROCEDURE — 74011000250 HC RX REV CODE- 250: Performed by: INTERNAL MEDICINE

## 2021-02-09 PROCEDURE — 36415 COLL VENOUS BLD VENIPUNCTURE: CPT

## 2021-02-09 PROCEDURE — 74011250637 HC RX REV CODE- 250/637: Performed by: HOSPITALIST

## 2021-02-09 PROCEDURE — 93312 ECHO TRANSESOPHAGEAL: CPT | Performed by: INTERNAL MEDICINE

## 2021-02-09 PROCEDURE — 93312 ECHO TRANSESOPHAGEAL: CPT

## 2021-02-09 PROCEDURE — 99232 SBSQ HOSP IP/OBS MODERATE 35: CPT | Performed by: INTERNAL MEDICINE

## 2021-02-09 PROCEDURE — 94640 AIRWAY INHALATION TREATMENT: CPT

## 2021-02-09 PROCEDURE — 74011250636 HC RX REV CODE- 250/636: Performed by: HOSPITALIST

## 2021-02-09 PROCEDURE — 99153 MOD SED SAME PHYS/QHP EA: CPT

## 2021-02-09 PROCEDURE — 74011000250 HC RX REV CODE- 250: Performed by: HOSPITALIST

## 2021-02-09 PROCEDURE — 74011250636 HC RX REV CODE- 250/636: Performed by: INTERNAL MEDICINE

## 2021-02-09 PROCEDURE — 83735 ASSAY OF MAGNESIUM: CPT

## 2021-02-09 PROCEDURE — 99152 MOD SED SAME PHYS/QHP 5/>YRS: CPT

## 2021-02-09 PROCEDURE — 93005 ELECTROCARDIOGRAM TRACING: CPT | Performed by: INTERNAL MEDICINE

## 2021-02-09 PROCEDURE — 92960 CARDIOVERSION ELECTRIC EXT: CPT

## 2021-02-09 PROCEDURE — 2709999900 HC NON-CHARGEABLE SUPPLY

## 2021-02-09 RX ORDER — ACETAMINOPHEN 325 MG/1
650 TABLET ORAL
Status: DISCONTINUED | OUTPATIENT
Start: 2021-02-09 | End: 2021-02-11 | Stop reason: HOSPADM

## 2021-02-09 RX ORDER — SERTRALINE HYDROCHLORIDE 50 MG/1
200 TABLET, FILM COATED ORAL DAILY
Status: DISCONTINUED | OUTPATIENT
Start: 2021-02-10 | End: 2021-02-09

## 2021-02-09 RX ORDER — ATORVASTATIN CALCIUM 40 MG/1
80 TABLET, FILM COATED ORAL
Status: DISCONTINUED | OUTPATIENT
Start: 2021-02-09 | End: 2021-02-09

## 2021-02-09 RX ORDER — AMOXICILLIN 250 MG
1 CAPSULE ORAL DAILY
Status: DISCONTINUED | OUTPATIENT
Start: 2021-02-10 | End: 2021-02-09

## 2021-02-09 RX ORDER — BUDESONIDE AND FORMOTEROL FUMARATE DIHYDRATE 160; 4.5 UG/1; UG/1
2 AEROSOL RESPIRATORY (INHALATION) 2 TIMES DAILY
Status: DISCONTINUED | OUTPATIENT
Start: 2021-02-09 | End: 2021-02-11 | Stop reason: HOSPADM

## 2021-02-09 RX ORDER — PANTOPRAZOLE SODIUM 40 MG/1
40 TABLET, DELAYED RELEASE ORAL 2 TIMES DAILY
Status: DISCONTINUED | OUTPATIENT
Start: 2021-02-09 | End: 2021-02-11 | Stop reason: HOSPADM

## 2021-02-09 RX ORDER — DILTIAZEM HYDROCHLORIDE 120 MG/1
120 CAPSULE, COATED, EXTENDED RELEASE ORAL DAILY
Status: DISCONTINUED | OUTPATIENT
Start: 2021-02-09 | End: 2021-02-09

## 2021-02-09 RX ORDER — BUDESONIDE 3 MG/1
6 CAPSULE, COATED PELLETS ORAL
Status: DISCONTINUED | OUTPATIENT
Start: 2021-02-10 | End: 2021-02-11 | Stop reason: HOSPADM

## 2021-02-09 RX ORDER — SERTRALINE HYDROCHLORIDE 100 MG/1
100 TABLET, FILM COATED ORAL DAILY
Status: DISCONTINUED | OUTPATIENT
Start: 2021-02-10 | End: 2021-02-11 | Stop reason: HOSPADM

## 2021-02-09 RX ORDER — POLYETHYLENE GLYCOL 3350 17 G/17G
17 POWDER, FOR SOLUTION ORAL DAILY
Status: DISCONTINUED | OUTPATIENT
Start: 2021-02-10 | End: 2021-02-11 | Stop reason: HOSPADM

## 2021-02-09 RX ORDER — MIDAZOLAM HYDROCHLORIDE 1 MG/ML
1-6 INJECTION, SOLUTION INTRAMUSCULAR; INTRAVENOUS
Status: DISCONTINUED | OUTPATIENT
Start: 2021-02-09 | End: 2021-02-09 | Stop reason: HOSPADM

## 2021-02-09 RX ORDER — DILTIAZEM HYDROCHLORIDE 120 MG/1
120 CAPSULE, COATED, EXTENDED RELEASE ORAL DAILY
Status: DISCONTINUED | OUTPATIENT
Start: 2021-02-09 | End: 2021-02-11 | Stop reason: HOSPADM

## 2021-02-09 RX ORDER — FENTANYL CITRATE 50 UG/ML
25-100 INJECTION, SOLUTION INTRAMUSCULAR; INTRAVENOUS
Status: DISCONTINUED | OUTPATIENT
Start: 2021-02-09 | End: 2021-02-09 | Stop reason: HOSPADM

## 2021-02-09 RX ORDER — PRIMIDONE 50 MG/1
50 TABLET ORAL 3 TIMES DAILY
Status: DISCONTINUED | OUTPATIENT
Start: 2021-02-09 | End: 2021-02-11 | Stop reason: HOSPADM

## 2021-02-09 RX ORDER — AZITHROMYCIN 250 MG/1
250 TABLET, FILM COATED ORAL EVERY 24 HOURS
Status: DISCONTINUED | OUTPATIENT
Start: 2021-02-09 | End: 2021-02-11 | Stop reason: HOSPADM

## 2021-02-09 RX ORDER — DIAZEPAM 2 MG/1
2 TABLET ORAL
Status: DISCONTINUED | OUTPATIENT
Start: 2021-02-09 | End: 2021-02-11 | Stop reason: HOSPADM

## 2021-02-09 RX ORDER — FACIAL-BODY WIPES
10 EACH TOPICAL DAILY PRN
Status: DISCONTINUED | OUTPATIENT
Start: 2021-02-09 | End: 2021-02-11 | Stop reason: HOSPADM

## 2021-02-09 RX ORDER — METOCLOPRAMIDE 10 MG/1
10 TABLET ORAL
Status: DISCONTINUED | OUTPATIENT
Start: 2021-02-09 | End: 2021-02-09

## 2021-02-09 RX ORDER — DIAZEPAM 10 MG/2ML
5 INJECTION INTRAMUSCULAR ONCE
Status: COMPLETED | OUTPATIENT
Start: 2021-02-09 | End: 2021-02-09

## 2021-02-09 RX ORDER — HYDROMORPHONE HYDROCHLORIDE 2 MG/1
2 TABLET ORAL
Status: DISCONTINUED | OUTPATIENT
Start: 2021-02-09 | End: 2021-02-11 | Stop reason: HOSPADM

## 2021-02-09 RX ORDER — METOCLOPRAMIDE 10 MG/1
5 TABLET ORAL
Status: DISCONTINUED | OUTPATIENT
Start: 2021-02-09 | End: 2021-02-11 | Stop reason: HOSPADM

## 2021-02-09 RX ORDER — TOPIRAMATE 50 MG/1
50 TABLET, FILM COATED ORAL
Status: DISCONTINUED | OUTPATIENT
Start: 2021-02-09 | End: 2021-02-11 | Stop reason: HOSPADM

## 2021-02-09 RX ORDER — AMOXICILLIN 250 MG
1 CAPSULE ORAL 2 TIMES DAILY
Status: DISCONTINUED | OUTPATIENT
Start: 2021-02-09 | End: 2021-02-11 | Stop reason: HOSPADM

## 2021-02-09 RX ORDER — LIDOCAINE HYDROCHLORIDE 20 MG/ML
15 SOLUTION OROPHARYNGEAL AS NEEDED
Status: DISCONTINUED | OUTPATIENT
Start: 2021-02-09 | End: 2021-02-09 | Stop reason: HOSPADM

## 2021-02-09 RX ORDER — BUSPIRONE HYDROCHLORIDE 5 MG/1
15 TABLET ORAL 3 TIMES DAILY
Status: DISCONTINUED | OUTPATIENT
Start: 2021-02-09 | End: 2021-02-11 | Stop reason: HOSPADM

## 2021-02-09 RX ORDER — NALOXONE HYDROCHLORIDE 0.4 MG/ML
0.4 INJECTION, SOLUTION INTRAMUSCULAR; INTRAVENOUS; SUBCUTANEOUS AS NEEDED
Status: DISCONTINUED | OUTPATIENT
Start: 2021-02-09 | End: 2021-02-11 | Stop reason: HOSPADM

## 2021-02-09 RX ORDER — SUCRALFATE 1 G/1
1 TABLET ORAL 4 TIMES DAILY
Status: DISCONTINUED | OUTPATIENT
Start: 2021-02-09 | End: 2021-02-11 | Stop reason: HOSPADM

## 2021-02-09 RX ORDER — LIDOCAINE 4 G/100G
1 PATCH TOPICAL EVERY 24 HOURS
Status: DISCONTINUED | OUTPATIENT
Start: 2021-02-09 | End: 2021-02-11 | Stop reason: HOSPADM

## 2021-02-09 RX ORDER — POLYETHYLENE GLYCOL 3350 17 G/17G
17 POWDER, FOR SOLUTION ORAL DAILY
Status: DISCONTINUED | OUTPATIENT
Start: 2021-02-10 | End: 2021-02-09 | Stop reason: SDUPTHER

## 2021-02-09 RX ORDER — ATORVASTATIN CALCIUM 40 MG/1
80 TABLET, FILM COATED ORAL
Status: DISCONTINUED | OUTPATIENT
Start: 2021-02-09 | End: 2021-02-09 | Stop reason: SDUPTHER

## 2021-02-09 RX ORDER — SODIUM CHLORIDE 9 MG/ML
75 INJECTION, SOLUTION INTRAVENOUS CONTINUOUS
Status: DISCONTINUED | OUTPATIENT
Start: 2021-02-09 | End: 2021-02-09 | Stop reason: HOSPADM

## 2021-02-09 RX ADMIN — BUDESONIDE AND FORMOTEROL FUMARATE DIHYDRATE 2 PUFF: 160; 4.5 AEROSOL RESPIRATORY (INHALATION) at 07:31

## 2021-02-09 RX ADMIN — IPRATROPIUM BROMIDE AND ALBUTEROL SULFATE 3 ML: .5; 3 SOLUTION RESPIRATORY (INHALATION) at 07:29

## 2021-02-09 RX ADMIN — TIOTROPIUM BROMIDE INHALATION SPRAY 2 PUFF: 3.12 SPRAY, METERED RESPIRATORY (INHALATION) at 07:32

## 2021-02-09 RX ADMIN — BUSPIRONE HYDROCHLORIDE 15 MG: 5 TABLET ORAL at 18:42

## 2021-02-09 RX ADMIN — METOCLOPRAMIDE 10 MG: 10 TABLET ORAL at 07:35

## 2021-02-09 RX ADMIN — PANTOPRAZOLE SODIUM 40 MG: 40 TABLET, DELAYED RELEASE ORAL at 18:42

## 2021-02-09 RX ADMIN — FENTANYL CITRATE 100 MCG: 50 INJECTION, SOLUTION INTRAMUSCULAR; INTRAVENOUS at 13:04

## 2021-02-09 RX ADMIN — HYDROMORPHONE HYDROCHLORIDE 2 MG: 2 TABLET ORAL at 19:57

## 2021-02-09 RX ADMIN — DILTIAZEM HYDROCHLORIDE 120 MG: 120 CAPSULE, COATED, EXTENDED RELEASE ORAL at 19:57

## 2021-02-09 RX ADMIN — SODIUM CHLORIDE 75 ML/HR: 900 INJECTION, SOLUTION INTRAVENOUS at 12:30

## 2021-02-09 RX ADMIN — SUCRALFATE 1 G: 1 TABLET ORAL at 18:41

## 2021-02-09 RX ADMIN — SUCRALFATE 1 G: 1 TABLET ORAL at 21:47

## 2021-02-09 RX ADMIN — HYDROMORPHONE HYDROCHLORIDE 2 MG: 2 TABLET ORAL at 16:23

## 2021-02-09 RX ADMIN — ACETAMINOPHEN 650 MG: 325 TABLET, FILM COATED ORAL at 22:56

## 2021-02-09 RX ADMIN — HYDROMORPHONE HYDROCHLORIDE 2 MG: 2 TABLET ORAL at 23:27

## 2021-02-09 RX ADMIN — BUDESONIDE AND FORMOTEROL FUMARATE DIHYDRATE 2 PUFF: 160; 4.5 AEROSOL RESPIRATORY (INHALATION) at 19:35

## 2021-02-09 RX ADMIN — LIDOCAINE HYDROCHLORIDE 15 ML: 20 SOLUTION ORAL; TOPICAL at 12:17

## 2021-02-09 RX ADMIN — TOPIRAMATE 50 MG: 50 TABLET, FILM COATED ORAL at 07:35

## 2021-02-09 RX ADMIN — MIDAZOLAM 5 MG: 1 INJECTION INTRAMUSCULAR; INTRAVENOUS at 13:04

## 2021-02-09 RX ADMIN — DIAZEPAM 5 MG: 5 INJECTION, SOLUTION INTRAMUSCULAR; INTRAVENOUS at 16:13

## 2021-02-09 RX ADMIN — HYDROCODONE BITARTRATE AND ACETAMINOPHEN 1 TABLET: 7.5; 325 TABLET ORAL at 07:36

## 2021-02-09 RX ADMIN — LORAZEPAM 1 MG: 1 TABLET ORAL at 04:02

## 2021-02-09 RX ADMIN — BUSPIRONE HYDROCHLORIDE 15 MG: 5 TABLET ORAL at 21:47

## 2021-02-09 RX ADMIN — AZITHROMYCIN MONOHYDRATE 250 MG: 250 TABLET ORAL at 19:57

## 2021-02-09 RX ADMIN — METOCLOPRAMIDE 5 MG: 10 TABLET ORAL at 21:47

## 2021-02-09 RX ADMIN — DIAZEPAM 2 MG: 2 TABLET ORAL at 19:57

## 2021-02-09 RX ADMIN — HYDROMORPHONE HYDROCHLORIDE 2 MG: 2 TABLET ORAL at 02:34

## 2021-02-09 RX ADMIN — LORAZEPAM 1 MG: 1 TABLET ORAL at 07:54

## 2021-02-09 RX ADMIN — DOCUSATE SODIUM 50MG AND SENNOSIDES 8.6MG 1 TABLET: 8.6; 5 TABLET, FILM COATED ORAL at 18:42

## 2021-02-09 RX ADMIN — DILTIAZEM HYDROCHLORIDE 30 MG: 30 TABLET, FILM COATED ORAL at 07:35

## 2021-02-09 RX ADMIN — PRIMIDONE 50 MG: 50 TABLET ORAL at 18:42

## 2021-02-09 RX ADMIN — TOPIRAMATE 50 MG: 50 TABLET, FILM COATED ORAL at 16:23

## 2021-02-09 RX ADMIN — METOCLOPRAMIDE 10 MG: 10 TABLET ORAL at 16:23

## 2021-02-09 NOTE — PROGRESS NOTES
Shahrzad Dalton called to notify of transport time delay, now 1000. Cath lab Elden Severs) notified of new  time.

## 2021-02-09 NOTE — PROGRESS NOTES
Patient received to [address] [place] [address] room # 8  Ambulatory from [facility]. Patient scheduled for JOSE/CVN today with Dr Ralf Mullen. Procedure reviewed & questions answered, voiced good understanding consent obtained & placed on chart. All medications and medical history reviewed. Will prep patient per orders. Patient & family updated on plan of care. The patient has a fraility score of 5-MILDLY FRAIL, based on transferred via EMS form [facility]. Alert and oriented.  Patient needs assist with adl's

## 2021-02-09 NOTE — PROGRESS NOTES
TRANSFER - OUT REPORT:    Verbal report given to Clover Natashajodi on Beverly Roy  being transferred to [facility] for routine progression of care       Report consisted of patients Situation, Background, Assessment and   Recommendations(SBAR). Information from the following report(s) SBAR, Procedure Summary, MAR and Recent Results was reviewed with the receiving nurse. Lines:   Peripheral IV 02/06/21 Left Forearm (Active)   Site Assessment Clean, dry, & intact 02/09/21 0700   Phlebitis Assessment 0 02/09/21 0700   Infiltration Assessment 0 02/09/21 0700   Dressing Status Clean, dry, & intact 02/09/21 0700   Dressing Type Transparent;Tape 02/09/21 0700   Hub Color/Line Status Flushed 02/09/21 0700        Opportunity for questions and clarification was provided. Patient transported with:   Monitor via [facility].

## 2021-02-09 NOTE — PROGRESS NOTES
Pt back from [facility]. Admitted back to room. VSS. Pt still in afib rate controlled. C/o pain and anxiety. dsgs remain CDI. No changes from prior assessment. Messaged Dr. Ventura Clubs to notify him of pt's arrival. He is entering orders.

## 2021-02-09 NOTE — PROGRESS NOTES
[facility] CARDIOLOGY PROGRESS NOTE           2/8/2021 7:08 PM    Admit Date: 2/6/2021      Subjective: In atrial fibrillation with controlled ventricular response. At baseline oxygen requirements of around 4 L. Continues to smoke about a pack a day prior presenting. Underlying COPD. Appears about 100 pound weight loss over the last year. Patient states has been worked up as an outpatient. Also reports having had a negative colonoscopy. No records. Prior records/strips reviewed with no evidence of atrial fibrillation    ROS:  Cardiovascular:  As noted above    Objective:      Vitals:    02/08/21 1500 02/08/21 1522 02/08/21 1600 02/08/21 1700   BP: 123/64  131/69 129/62   Pulse: 95  (!) 107 86   Resp: 15  28 17   Temp:       SpO2:  98%     Weight:       Height:           Physical Exam:  General-No Acute Distress  Neck- supple, no JVD  CV- irregular rate and rhythm; low grade diastolic murmur at LSB; low grade SUMI at SB  Lung- clear bilaterally  Abd- soft, nontender, nondistended  Ext- no edema bilaterally. Skin- warm and dry    Data Review:   Recent Labs     02/08/21  0845 02/07/21  0316 02/06/21 2028 02/06/21  1613 02/06/21  1343    143  --   --  144   K 4.0 3.4*  --   --  2.9*   MG  --   --   --  1.7*  --    BUN 22 16  --   --  23   CREA 0.72 0.70  --   --  0.95   * 87  --   --  158*   WBC  --   --  6.8  --  8.6   HGB  --   --  10.4*  --  12.6   HCT  --   --  31.7*  --  36.9   PLT  --   --  170  --  213   INR  --   --   --   --  1.1       Assessment/Plan:     Principal Problem:    New onset atrial fibrillation (HCC) (0/8/8733)  -Uncertain duration and present on initial presentation.  -Currently with adequate rate control.  -Preserved EF with moderate left atrial enlargement.  -Has been initiated on Eliquis. Defer consideration for rhythm control at this time. Aortic regurgitation  -Appears possibly severe. Eccentric jet making interpretation challenging. Check blood cultures. Plan JOSE in a.m. Risk/benefits/alternatives discussed with patient and family who are in agreement.  -With noted appearance of valve/significant weight loss, consideration for possible marantic endocarditis as well. -Reassess options once JOSE complete. Discussed with significant debility, unlikely to be a candidate for intervening options    Active Problems:    COPD (chronic obstructive pulmonary disease) ([facility] (12/30/2009)  -Severe oxygen dependent; persistent tobacco abuse. Stressed cessation      GERD (gastroesophageal reflux disease) (12/30/2009)      Peripheral neuropathy (2/12/2013)      Falls frequently (2/6/2021)      Hypokalemia (2/6/2021)  -Repleted.   Maintain K>4; Mg >2    Seamus Lau MD  2/8/2021 7:08 PM

## 2021-02-09 NOTE — PROGRESS NOTES
TRANSFER - IN REPORT:    Verbal report received from Orly(name) on Jagdish Boot  being received from [facility] cath lab(unit) for routine progression of care      Report consisted of patients Situation, Background, Assessment and   Recommendations(SBAR). Information from the following report(s) SBAR, Procedure Summary and MAR was reviewed with the receiving nurse. Opportunity for questions and clarification was provided. Assessment completed upon patients arrival to unit and care assumed.

## 2021-02-09 NOTE — PROGRESS NOTES
Received orders from Dr. Jennifer Posada for procedure on 12/11. Verified via verbal read back. Unable to schedule transport this far in advance but  notified to schedule for transport tomorrow for pt to be in IR at 1230 on 2/11. Message sent via perfect serve to notify Dr. Nathalie Joyce that IR would like Eliquis to be held starting today.

## 2021-02-09 NOTE — PROGRESS NOTES
Pt just left with Shahrzad Dao for transport to [facility] for JOSE. Attempted to notify cath lab but no answer.

## 2021-02-09 NOTE — PROGRESS NOTES
Care Management Interventions  PCP Verified by CM: Yes  Mode of Transport at Discharge: BLS  Transition of Care Consult (CM Consult): Discharge Planning, SNF  Physical Therapy Consult: Yes  Occupational Therapy Consult: Yes  Current Support Network: Own Home, Has Personal Caregivers  Confirm Follow Up Transport: Other (see comment)  The Plan for Transition of Care is Related to the Following Treatment Goals : STR potentially LTC  The Patient and/or Patient Representative was Provided with a Choice of Provider and Agrees with the Discharge Plan?: Yes  Name of the Patient Representative Who was Provided with a Choice of Provider and Agrees with the Discharge Plan: Gladis Pathak  Freedom of Choice List was Provided with Basic Dialogue that Supports the Patient's Individualized Plan of Care/Goals, Treatment Preferences and Shares the Quality Data Associated with the Providers?: Yes  Discharge Location  Discharge Placement: Skilled nursing facility  Rounds with Dr. Jeana Frankel for plan of care. Patient will need JOSE today for possible endocarditis. D/C plan pending JOSE results and work up. Patient has 2 bed offers for SN[facility] and will decide which one she wants. CM spoke with both facilities and they have beds and will take rapid COVID when she is ready and will need insurance approval. CM will f/u with patient for her choice of SNF bed.

## 2021-02-09 NOTE — PROGRESS NOTES
TRANSFER - OUT REPORT:    Verbal report given to [address] [place] [address], RN(name) on Minal Chase  being transferred to cath lab(unit) for ordered procedure       Report consisted of patients Situation, Background, Assessment and   Recommendations(SBAR). Information from the following report(s) SBAR was reviewed with the receiving nurse. Lines:   Peripheral IV 02/06/21 Left Forearm (Active)   Site Assessment Clean, dry, & intact 02/09/21 0700   Phlebitis Assessment 0 02/09/21 0700   Infiltration Assessment 0 02/09/21 0700   Dressing Status Clean, dry, & intact 02/09/21 0700   Dressing Type Transparent;Tape 02/09/21 0700   Hub Color/Line Status Flushed 02/09/21 0700        Opportunity for questions and clarification was provided.       Patient transported with:   O2 @ 2 liters

## 2021-02-09 NOTE — PROGRESS NOTES
[facility]ist Service Progress Note      Assessment / Plan:    Severe aortic regurg  2/9 - JOSE today for further evaluation, bcx pending (orderd by cardiology) for the possibility of endocarditis,       Severe back pain due to recent fall, newly diagnosed acute [id number] fracture and sacral s3 sacral insufficiency fracture with opiate tolerance and dependency, chronic pain, high de-escalate pain medications to oral Dilaudid, avoid IV medications whenever possible due to tolerance  February 8better pain control, still uncomfortable, continue treatments, appreciate IR consult, MRI done, await further recommendation      New onset atrial fibrillation with rapid ventricular responsestatus post IV diltiazem's, heart rate improved, cardiology consulted, chest Vascor 3, with high bleeding risk due to frequent and recent falls, monitor without anticoagulation        Polypharmacyhistory of frequent high-dose opiates and Ativan useslowly wean patient's if tolerated,    Hypokalemia,-replace, continue to monitor while inpatient    COPD exacerbation with increased sputum lungs, chronic respiratory failure secondary to advanced COPD on 4 L at baseline -we will order nebulizer treatments, Mucomyst, Symbicort,  encourage pulmonary toilet, low-dose azithromycin  February 8exacerbation seemingly resolved, continue treatment above    Severe protein malnutritionlikely secondary to above, BMI 16, add Ensure 3 times daily, consider dietary consultation  February 8evidently weight loss is chronic, progressive, and unintentional, history of splenomegaly, outpatient work-up has been done, will contact family members obtain medical record, consider inpatient work-up if clinically indicated       History of severe depressions, anxiety, and high-dose antidepressants and frequent Ativan  2/9 -Long discussion with patient in son at bedside regarding the risks of polypharmacy, patient is agreeable to decrease  Polypharmacy slowly   Chief Complaint : Fall      Subjective:  Patient's pain has slowly improving, Cardiovascular, respiratory, GI review of system negative except mentioned above  Objective:  Visit Vitals  /70   Pulse 86   Temp 98.2 °F (36.8 °C)   Resp 13   Ht 5' 8\" (1.727 m)   Wt 52.7 kg (116 lb 3.2 oz)   SpO2 97%   BMI 17.67 kg/m²                 Physical Exam:  General: No acute distress, speaking in full sentences, no use of accessory muscles, diffuse muscle loss  HEENT: Pupils equal and reactive to light and accommodation, oropharynx is clear   Neck: Supple, no lymphadenopathy, no JVD   Lungs:   mild decreased breath sound   cardiovascular:IR Regular rate and rhythm with normal S1 and S2   Abdomen: Soft, nontender, nondistended, normoactive bowel sounds   Extremities: No cyanosis  or edema   Neuro: Nonfocal, A&O x3   Psych: Normal affect     Intake and Output:  Date 02/08/21 0700 - 02/09/21 0659 02/09/21 0700 - 02/10/21 0659   Shift [id number] 1900-0659 24 Hour Total 1889-0490 4864-6967 24 Hour Total   INTAKE   Shift Total(mL/kg)         OUTPUT   Urine(mL/kg/hr)  500(0.8) 500(0.4) 1300  1300     Urine Output (mL) (Urinary Catheter 02/06/21 2- way)    1300   Shift Total(mL/kg)  500(9.5) 500(9.5) 1300(24.7)  1300(24.7)   NET  -500 -500 -1300  -1300   Weight (kg) 52.7 52.7 52.7 52.7 52.7 52.7       LAB:  Admission on 02/06/2021   Component Date Value    Ventricular Rate 02/06/2021 127     Atrial Rate 02/06/2021 119     QRS Duration 02/06/2021 108     Q-T Interval 02/06/2021 376     QTC Calculation (Bezet) 02/06/2021 546     Calculated R Axis 02/06/2021 -111     Calculated T Axis 02/06/2021 74     Diagnosis 02/06/2021                      Value:Atrial fibrillation with rapid ventricular response  Pulmonary disease pattern  Right bundle branch block  Abnormal ECG  When compared with ECG of 07-DEC-2009 12:08,  Atrial fibrillation has replaced Sinus rhythm  Vent.  rate has increased BY  63 BPM  Right bundle branch block is now Present  Confirmed by Adali Hilario MD (), BASIM CONCHA ([id number]) on 2/6/2021 5:51:13 PM      Troponin-High Sensitivity 02/06/2021 17.0*    Troponin-High Sensitivity 02/06/2021 15.1*    WBC 02/06/2021 8.6     RBC 02/06/2021 3.82*    HGB 02/06/2021 12.6     HCT 02/06/2021 36.9     MCV 02/06/2021 96.6     MCH 02/06/2021 33.0*    MCHC 02/06/2021 34.1     RDW 02/06/2021 14.5     PLATELET 88/95/6990 323     MPV 02/06/2021 11.8     ABSOLUTE NRBC 02/06/2021 0.00     DF 02/06/2021 AUTOMATED     NEUTROPHILS 02/06/2021 77     LYMPHOCYTES 02/06/2021 15     MONOCYTES 02/06/2021 7     EOSINOPHILS 02/06/2021 0*    BASOPHILS 02/06/2021 1     IMMATURE GRANULOCYTES 02/06/2021 1     ABS. NEUTROPHILS 02/06/2021 6.6     ABS. LYMPHOCYTES 02/06/2021 1.3     ABS. MONOCYTES 02/06/2021 0.6     ABS. EOSINOPHILS 02/06/2021 0.0     ABS. BASOPHILS 02/06/2021 0.0     ABS. IMM. GRANS. 02/06/2021 0.0     Sodium 02/06/2021 144     Potassium 02/06/2021 2.9*    Chloride 02/06/2021 110*    CO2 02/06/2021 27     Anion gap 02/06/2021 7     Glucose 02/06/2021 158*    BUN 02/06/2021 23     Creatinine 02/06/2021 0.95     GFR est AA 02/06/2021 >60     GFR est non-AA 02/06/2021 >60     Calcium 02/06/2021 8.5     Bilirubin, total 02/06/2021 0.4     ALT (SGPT) 02/06/2021 26     AST (SGOT) 02/06/2021 27     Alk.  phosphatase 02/06/2021 93     Protein, total 02/06/2021 6.1*    Albumin 02/06/2021 3.3     Globulin 02/06/2021 2.8     A-G Ratio 02/06/2021 1.2     Prothrombin time 02/06/2021 14.6     INR 02/06/2021 1.1     Color 02/06/2021 YELLOW     Appearance 02/06/2021 CLEAR     Specific gravity 02/06/2021 1.008     pH (UA) 02/06/2021 5.5     Protein 02/06/2021 Negative     Glucose 02/06/2021 Negative     Ketone 02/06/2021 Negative     Bilirubin 02/06/2021 Negative     Blood 02/06/2021 Negative     Urobilinogen 02/06/2021 0.2     Nitrites 02/06/2021 Negative     Leukocyte Esterase 02/06/2021 Negative     Lactic acid 02/06/2021 1.4     NT pro-BNP 02/06/2021 6,999*    aPTT 02/06/2021 30.0     TSH 02/06/2021 4.180     Magnesium 02/06/2021 1.7*    Vitamin B12 02/06/2021 472     Vitamin D 25-Hydroxy 02/06/2021 57.4     WBC 02/06/2021 6.8     RBC 02/06/2021 3.19*    HGB 02/06/2021 10.4*    HCT 02/06/2021 31.7*    MCV 02/06/2021 99.4*    MCH 02/06/2021 32.6     MCHC 02/06/2021 32.8     RDW 02/06/2021 14.6     PLATELET 49/51/8685 108     MPV 02/06/2021 11.2     ABSOLUTE NRBC 02/06/2021 0.00     DF 02/06/2021 AUTOMATED     NEUTROPHILS 02/06/2021 63     LYMPHOCYTES 02/06/2021 25     MONOCYTES 02/06/2021 10     EOSINOPHILS 02/06/2021 1     BASOPHILS 02/06/2021 1     IMMATURE GRANULOCYTES 02/06/2021 0     ABS. NEUTROPHILS 02/06/2021 4.3     ABS. LYMPHOCYTES 02/06/2021 1.7     ABS. MONOCYTES 02/06/2021 0.7     ABS. EOSINOPHILS 02/06/2021 0.1     ABS. BASOPHILS 02/06/2021 0.1     ABS. IMM. GRANS. 02/06/2021 0.0     Sodium 02/07/2021 143     Potassium 02/07/2021 3.4*    Chloride 02/07/2021 113*    CO2 02/07/2021 27     Anion gap 02/07/2021 3*    Glucose 02/07/2021 87     BUN 02/07/2021 16     Creatinine 02/07/2021 0.70     GFR est AA 02/07/2021 >60     GFR est non-AA 02/07/2021 >60     Calcium 02/07/2021 7.6*    Bilirubin, total 02/07/2021 0.4     ALT (SGPT) 02/07/2021 27     AST (SGOT) 02/07/2021 29     Alk.  phosphatase 02/07/2021 108     Protein, total 02/07/2021 5.2*    Albumin 02/07/2021 2.7*    Globulin 02/07/2021 2.5     A-G Ratio 02/07/2021 1.1*    PPD 02/08/2021 Negative     mm Induration 02/08/2021 0     Sodium 02/08/2021 143     Potassium 02/08/2021 4.0     Chloride 02/08/2021 111*    CO2 02/08/2021 31     Anion gap 02/08/2021 1*    Glucose 02/08/2021 155*    BUN 02/08/2021 22     Creatinine 02/08/2021 0.72     GFR est AA 02/08/2021 >60     GFR est non-AA 02/08/2021 >60     Calcium 02/08/2021 8.3     Special Requests: 02/08/2021 Value: LEFT  Antecubital      Culture result: 02/08/2021 NO GROWTH AFTER 10 HOURS     Special Requests: 02/08/2021                      Value:RIGHT  Antecubital      Culture result: 02/08/2021 NO GROWTH AFTER 10 HOURS     Sodium 02/09/2021 143     Potassium 02/09/2021 3.9     Chloride 02/09/2021 111*    CO2 02/09/2021 29     Anion gap 02/09/2021 3*    Glucose 02/09/2021 99     BUN 02/09/2021 24*    Creatinine 02/09/2021 0.55*    GFR est AA 02/09/2021 >60     GFR est non-AA 02/09/2021 >60     Calcium 02/09/2021 8.6     Magnesium 02/09/2021 2.2    Admission on 02/09/2021, Discharged on 02/09/2021   Component Date Value    Ventricular Rate 02/09/2021 91     Atrial Rate 02/09/2021 375     QRS Duration 02/09/2021 104     Q-T Interval 02/09/2021 400     QTC Calculation (Bezet) 02/09/2021 492     Calculated R Axis 02/09/2021 78     Calculated T Axis 02/09/2021 27     Diagnosis 02/09/2021                      Value:Atrial fibrillation  Incomplete right bundle branch block  Possible Right ventricular hypertrophy  Nonspecific ST and T wave abnormality  Prolonged QT  Abnormal ECG  When compared with ECG of 06-FEB-2021 13:24,  Questionable change in QRS axis  ST no longer depressed in Anterior leads  Nonspecific T wave abnormality has replaced inverted T waves in Anterior   leads  Confirmed by BREANNA ROSS (), Ashley Polk ([id number]) on 2/9/2021 1:07:41 PM         IMAGING:  Xr Spine Lumb 2 Or 3 V    Result Date: 2/6/2021  Negative for acute hip fracture. Osteoarthritis. LUMBAR SPINE, 3 views. HISTORY: Low back pain following fall. COMPARISON: None. CT scan July 2011 TECHNIQUE: AP, lateral and cone-down lumbosacral junction views. FINDINGS: Spinal alignment: Anatomic. Disk spaces: Maintained. Pedicles:  Appear intact. Vertebral bodies: Decreased height of L3 SI joints:  Appear symmetric. Facet joints:  Also unremarkable. Other:  Osteophytes. Aortic calcifications.  IMPRESSION: Age-indeterminate moderate compression fracture L3. Posterior intact. CHEST X-RAY, one view. HISTORY:  Chest pain  falls. TECHNIQUE:  AP supine view. COMPARISON: None. FINDINGS: Lungs: Hyperinflated and clear. Small calcified granulomas. No pneumothorax. Costophrenic angles: are sharp. Heart size: is normal. Pulmonary vasculature: is unremarkable. Aorta: Arch calcifications. . Included portion of the upper abdomen: is unremarkable. Bones: No gross bony lesions. Other: None. IMPRESSION:  Negative for acute change. Xr Hip Rt W Or Wo Pelv 2-3 Vws    Result Date: 2/6/2021  Negative for acute hip fracture. Osteoarthritis. LUMBAR SPINE, 3 views. HISTORY: Low back pain following fall. COMPARISON: None. CT scan July 2011 TECHNIQUE: AP, lateral and cone-down lumbosacral junction views. FINDINGS: Spinal alignment: Anatomic. Disk spaces: Maintained. Pedicles:  Appear intact. Vertebral bodies: Decreased height of L3 SI joints:  Appear symmetric. Facet joints:  Also unremarkable. Other:  Osteophytes. Aortic calcifications. IMPRESSION: Age-indeterminate moderate compression fracture L3. Posterior intact. CHEST X-RAY, one view. HISTORY:  Chest pain  falls. TECHNIQUE:  AP supine view. COMPARISON: None. FINDINGS: Lungs: Hyperinflated and clear. Small calcified granulomas. No pneumothorax. Costophrenic angles: are sharp. Heart size: is normal. Pulmonary vasculature: is unremarkable. Aorta: Arch calcifications. . Included portion of the upper abdomen: is unremarkable. Bones: No gross bony lesions. Other: None. IMPRESSION:  Negative for acute change. Mri Lumb Spine Wo Cont    Result Date: 2/8/2021  1. Acute compression fracture causing mild vertebral body height loss at L3. There is an additional relatively acute injury at S3 which may be part of a larger sacral insufficiency fracture. 2. Mild foraminal stenosis at L2-L3.  3. Mild spinal stenosis with mild foraminal stenosis at L4-L5. 4. Incompletely imaged left hepatic lobe cyst. Consider routine follow-up abdominal sonography for further assessment. Ct Head Wo Cont    Result Date: 2/6/2021  Negative for acute intracranial abnormality. Chronic changes. Xr Chest Port    Result Date: 2/6/2021  Negative for acute hip fracture. Osteoarthritis. LUMBAR SPINE, 3 views. HISTORY: Low back pain following fall. COMPARISON: None. CT scan July 2011 TECHNIQUE: AP, lateral and cone-down lumbosacral junction views. FINDINGS: Spinal alignment: Anatomic. Disk spaces: Maintained. Pedicles:  Appear intact. Vertebral bodies: Decreased height of L3 SI joints:  Appear symmetric. Facet joints:  Also unremarkable. Other:  Osteophytes. Aortic calcifications. IMPRESSION: Age-indeterminate moderate compression fracture L3. Posterior intact. CHEST X-RAY, one view. HISTORY:  Chest pain  falls. TECHNIQUE:  AP supine view. COMPARISON: None. FINDINGS: Lungs: Hyperinflated and clear. Small calcified granulomas. No pneumothorax. Costophrenic angles: are sharp. Heart size: is normal. Pulmonary vasculature: is unremarkable. Aorta: Arch calcifications. . Included portion of the upper abdomen: is unremarkable. Bones: No gross bony lesions. Other: None. IMPRESSION:  Negative for acute change. EKG:  No results found for this or any previous visit.           Jessa Chandler DO  2/9/2021 4:29 PM

## 2021-02-09 NOTE — PROGRESS NOTES
JOSE completed. Patient tolerated well. See MAR for med details. No PO till after 1417 today, 2/9/2021. Patient to recover and transfer back to [facility] via EMS.

## 2021-02-09 NOTE — PROGRESS NOTES
Patient has bed offer at [facility] and will f/u in am to see if she will accept bed and then will need to obtain authorization and COVID test prior to d/c. CM following.

## 2021-02-10 ENCOUNTER — ANESTHESIA EVENT (OUTPATIENT)
Dept: SURGERY | Age: 76
End: 2021-02-10
Payer: MEDICARE

## 2021-02-10 LAB
ALBUMIN SERPL-MCNC: 2.5 G/DL (ref 3.2–4.6)
ALBUMIN/GLOB SERPL: 1.1 {RATIO} (ref 1.2–3.5)
ALP SERPL-CCNC: 93 U/L (ref 50–136)
ALT SERPL-CCNC: 27 U/L (ref 12–65)
ANION GAP SERPL CALC-SCNC: 4 MMOL/L (ref 7–16)
AST SERPL-CCNC: 20 U/L (ref 15–37)
BASOPHILS # BLD: 0 K/UL (ref 0–0.2)
BASOPHILS NFR BLD: 1 % (ref 0–2)
BILIRUB SERPL-MCNC: 0.2 MG/DL (ref 0.2–1.1)
BUN SERPL-MCNC: 26 MG/DL (ref 8–23)
CALCIUM SERPL-MCNC: 8.3 MG/DL (ref 8.3–10.4)
CHLORIDE SERPL-SCNC: 112 MMOL/L (ref 98–107)
CO2 SERPL-SCNC: 29 MMOL/L (ref 21–32)
CREAT SERPL-MCNC: 0.68 MG/DL (ref 0.6–1)
DIFFERENTIAL METHOD BLD: ABNORMAL
EOSINOPHIL # BLD: 0.1 K/UL (ref 0–0.8)
EOSINOPHIL NFR BLD: 2 % (ref 0.5–7.8)
ERYTHROCYTE [DISTWIDTH] IN BLOOD BY AUTOMATED COUNT: 14.6 % (ref 11.9–14.6)
GLOBULIN SER CALC-MCNC: 2.2 G/DL (ref 2.3–3.5)
GLUCOSE SERPL-MCNC: 94 MG/DL (ref 65–100)
HCT VFR BLD AUTO: 27.8 % (ref 35.8–46.3)
HGB BLD-MCNC: 8.9 G/DL (ref 11.7–15.4)
IMM GRANULOCYTES # BLD AUTO: 0 K/UL (ref 0–0.5)
IMM GRANULOCYTES NFR BLD AUTO: 0 % (ref 0–5)
LYMPHOCYTES # BLD: 1.7 K/UL (ref 0.5–4.6)
LYMPHOCYTES NFR BLD: 28 % (ref 13–44)
MCH RBC QN AUTO: 32.4 PG (ref 26.1–32.9)
MCHC RBC AUTO-ENTMCNC: 32 G/DL (ref 31.4–35)
MCV RBC AUTO: 101.1 FL (ref 79.6–97.8)
MM INDURATION POC: 0 MM (ref 0–5)
MONOCYTES # BLD: 0.5 K/UL (ref 0.1–1.3)
MONOCYTES NFR BLD: 8 % (ref 4–12)
NEUTS SEG # BLD: 3.7 K/UL (ref 1.7–8.2)
NEUTS SEG NFR BLD: 61 % (ref 43–78)
NRBC # BLD: 0 K/UL (ref 0–0.2)
PLATELET # BLD AUTO: 182 K/UL (ref 150–450)
PMV BLD AUTO: 11.4 FL (ref 9.4–12.3)
POTASSIUM SERPL-SCNC: 4.2 MMOL/L (ref 3.5–5.1)
PPD POC: NEGATIVE NEGATIVE
PROT SERPL-MCNC: 4.7 G/DL (ref 6.3–8.2)
RBC # BLD AUTO: 2.75 M/UL (ref 4.05–5.2)
SODIUM SERPL-SCNC: 145 MMOL/L (ref 136–145)
WBC # BLD AUTO: 5.9 K/UL (ref 4.3–11.1)

## 2021-02-10 PROCEDURE — 74011250637 HC RX REV CODE- 250/637: Performed by: HOSPITALIST

## 2021-02-10 PROCEDURE — 87635 SARS-COV-2 COVID-19 AMP PRB: CPT

## 2021-02-10 PROCEDURE — 65660000000 HC RM CCU STEPDOWN

## 2021-02-10 PROCEDURE — 94760 N-INVAS EAR/PLS OXIMETRY 1: CPT

## 2021-02-10 PROCEDURE — 85025 COMPLETE CBC W/AUTO DIFF WBC: CPT

## 2021-02-10 PROCEDURE — 94640 AIRWAY INHALATION TREATMENT: CPT

## 2021-02-10 PROCEDURE — 36415 COLL VENOUS BLD VENIPUNCTURE: CPT

## 2021-02-10 PROCEDURE — 74011000250 HC RX REV CODE- 250: Performed by: HOSPITALIST

## 2021-02-10 PROCEDURE — 2709999900 HC NON-CHARGEABLE SUPPLY

## 2021-02-10 PROCEDURE — 80053 COMPREHEN METABOLIC PANEL: CPT

## 2021-02-10 PROCEDURE — 77010033678 HC OXYGEN DAILY

## 2021-02-10 RX ADMIN — PRIMIDONE 50 MG: 50 TABLET ORAL at 15:45

## 2021-02-10 RX ADMIN — HYDROMORPHONE HYDROCHLORIDE 2 MG: 2 TABLET ORAL at 18:27

## 2021-02-10 RX ADMIN — PANTOPRAZOLE SODIUM 40 MG: 40 TABLET, DELAYED RELEASE ORAL at 17:12

## 2021-02-10 RX ADMIN — BUDESONIDE AND FORMOTEROL FUMARATE DIHYDRATE 2 PUFF: 160; 4.5 AEROSOL RESPIRATORY (INHALATION) at 08:17

## 2021-02-10 RX ADMIN — PRIMIDONE 50 MG: 50 TABLET ORAL at 09:00

## 2021-02-10 RX ADMIN — SUCRALFATE 1 G: 1 TABLET ORAL at 17:12

## 2021-02-10 RX ADMIN — HYDROMORPHONE HYDROCHLORIDE 2 MG: 2 TABLET ORAL at 09:01

## 2021-02-10 RX ADMIN — TOPIRAMATE 50 MG: 50 TABLET, FILM COATED ORAL at 08:59

## 2021-02-10 RX ADMIN — SUCRALFATE 1 G: 1 TABLET ORAL at 09:00

## 2021-02-10 RX ADMIN — BUSPIRONE HYDROCHLORIDE 15 MG: 5 TABLET ORAL at 20:56

## 2021-02-10 RX ADMIN — TIOTROPIUM BROMIDE INHALATION SPRAY 2 PUFF: 3.12 SPRAY, METERED RESPIRATORY (INHALATION) at 08:17

## 2021-02-10 RX ADMIN — HYDROMORPHONE HYDROCHLORIDE 2 MG: 2 TABLET ORAL at 03:46

## 2021-02-10 RX ADMIN — DOCUSATE SODIUM 50MG AND SENNOSIDES 8.6MG 1 TABLET: 8.6; 5 TABLET, FILM COATED ORAL at 17:12

## 2021-02-10 RX ADMIN — AZITHROMYCIN MONOHYDRATE 250 MG: 250 TABLET ORAL at 20:56

## 2021-02-10 RX ADMIN — METOCLOPRAMIDE 5 MG: 10 TABLET ORAL at 20:56

## 2021-02-10 RX ADMIN — PANTOPRAZOLE SODIUM 40 MG: 40 TABLET, DELAYED RELEASE ORAL at 08:59

## 2021-02-10 RX ADMIN — HYDROMORPHONE HYDROCHLORIDE 2 MG: 2 TABLET ORAL at 14:06

## 2021-02-10 RX ADMIN — TOPIRAMATE 50 MG: 50 TABLET, FILM COATED ORAL at 15:44

## 2021-02-10 RX ADMIN — ACETAMINOPHEN 650 MG: 325 TABLET, FILM COATED ORAL at 15:44

## 2021-02-10 RX ADMIN — BUDESONIDE AND FORMOTEROL FUMARATE DIHYDRATE 2 PUFF: 160; 4.5 AEROSOL RESPIRATORY (INHALATION) at 20:11

## 2021-02-10 RX ADMIN — SUCRALFATE 1 G: 1 TABLET ORAL at 14:10

## 2021-02-10 RX ADMIN — BUDESONIDE 6 MG: 3 CAPSULE, GELATIN COATED ORAL at 09:00

## 2021-02-10 RX ADMIN — METOCLOPRAMIDE 5 MG: 10 TABLET ORAL at 09:00

## 2021-02-10 RX ADMIN — TOPIRAMATE 50 MG: 50 TABLET, FILM COATED ORAL at 10:56

## 2021-02-10 RX ADMIN — BUSPIRONE HYDROCHLORIDE 15 MG: 5 TABLET ORAL at 15:45

## 2021-02-10 RX ADMIN — METOCLOPRAMIDE 5 MG: 10 TABLET ORAL at 15:45

## 2021-02-10 RX ADMIN — HYDROMORPHONE HYDROCHLORIDE 2 MG: 2 TABLET ORAL at 22:27

## 2021-02-10 RX ADMIN — BUSPIRONE HYDROCHLORIDE 15 MG: 5 TABLET ORAL at 09:00

## 2021-02-10 RX ADMIN — PRIMIDONE 50 MG: 50 TABLET ORAL at 20:56

## 2021-02-10 RX ADMIN — DIAZEPAM 2 MG: 2 TABLET ORAL at 04:56

## 2021-02-10 RX ADMIN — DILTIAZEM HYDROCHLORIDE 120 MG: 120 CAPSULE, COATED, EXTENDED RELEASE ORAL at 09:00

## 2021-02-10 RX ADMIN — POLYETHYLENE GLYCOL 3350 17 G: 17 POWDER, FOR SOLUTION ORAL at 09:00

## 2021-02-10 RX ADMIN — DOCUSATE SODIUM 50MG AND SENNOSIDES 8.6MG 1 TABLET: 8.6; 5 TABLET, FILM COATED ORAL at 09:00

## 2021-02-10 RX ADMIN — DIAZEPAM 2 MG: 2 TABLET ORAL at 17:12

## 2021-02-10 RX ADMIN — SUCRALFATE 1 G: 1 TABLET ORAL at 20:56

## 2021-02-10 RX ADMIN — DIAZEPAM 2 MG: 2 TABLET ORAL at 10:55

## 2021-02-10 RX ADMIN — METOCLOPRAMIDE 5 MG: 10 TABLET ORAL at 10:55

## 2021-02-10 RX ADMIN — SERTRALINE 100 MG: 100 TABLET, FILM COATED ORAL at 09:00

## 2021-02-10 NOTE — PROGRESS NOTES
[facility] CARDIOLOGY PROGRESS NOTE           2/9/2021 7:08 PM    Admit Date: 2/6/2021      Subjective:     Appears back in SR on tele currently with ectopy. JOSE earlier today for AI likely mild to mod. Prior was In atrial fibrillation with controlled ventricular response. At baseline oxygen requirements of around 4 L. Continues to smoke about a pack a day prior presenting. Underlying COPD. Appears about 100 pound weight loss over the last year. Patient states has been worked up as an outpatient. Prior records/strips reviewed with no evidence of atrial fibrillation    ROS:  Cardiovascular:  As noted above    Objective:      Vitals:    02/09/21 1800 02/09/21 1830 02/09/21 1937 02/09/21 1958   BP:  (!) 113/48     Pulse: (!) 124 100  77   Resp: 18 20     Temp:       SpO2: 95% 98% 96%    Weight:       Height:           Physical Exam:  General-No Acute Distress  Neck- supple, no JVD  CV- regular rate and rhythm; low grade diastolic murmur at LSB; low grade SUMI at SB  Lung- clear bilaterally  Abd- soft, nontender, nondistended  Ext- no edema bilaterally. Skin- warm and dry    Data Review:   Recent Labs     02/09/21  0411 02/08/21  0845 02/06/21 2028 02/06/21 2028    143   < >  --    K 3.9 4.0   < >  --    MG 2.2  --   --   --    BUN 24* 22   < >  --    CREA 0.55* 0.72   < >  --    GLU 99 155*   < >  --    WBC  --   --   --  6.8   HGB  --   --   --  10.4*   HCT  --   --   --  31.7*   PLT  --   --   --  170    < > = values in this interval not displayed. Assessment/Plan:     Principal Problem:    New onset atrial fibrillation ([address] (0/8/1135)  -Uncertain duration and present on initial presentation; back in SR. Continue cardizem  -Deemed to be a poor AC candidate  -Preserved EF with moderate left atrial enlargement.       Aortic regurgitation  -Appears likely mild to moderate; very eccentric jet which complicates interpretation on surface echo    Active Problems:    COPD (chronic obstructive pulmonary disease) ([facility] [address] (12/30/2009)  -Severe oxygen dependent; persistent tobacco abuse. Stressed cessation      GERD (gastroesophageal reflux disease) (12/30/2009)      Peripheral neuropathy (2/12/2013)      Falls frequently (2/6/2021)      Hypokalemia (2/6/2021)  -Repleted. Maintain K>4; Mg >2    No further cardiac recs at this time. Will sign off.  Please call if questions    Kristy Whitley MD  2/9/2021 7:08 PM

## 2021-02-10 NOTE — PROGRESS NOTES
[facility] Progress Note      Assessment / Plan:    Severe aortic regurg  2/9 - JOSE today for further evaluation, bcx pending (orderd by cardiology) for the possibility of endocarditis,       Severe back pain due to recent fall, newly diagnosed acute [id number] fracture and sacral s3 sacral insufficiency fracture with opiate tolerance and dependency, chronic pain, high de-escalate pain medications to oral Dilaudid, avoid IV medications whenever possible due to tolerance  February 8better pain control, still uncomfortable, continue treatments, appreciate IR consult, MRI done, await further recommendation  February 10scheduled for kyphoplasty tomorrow, n.p.o. after midnight,    New onset atrial fibrillation with rapid ventricular responsestatus post IV diltiazem's, heart rate improved, cardiology consulted, chest Vascor 3, with high bleeding risk due to frequent and recent falls, monitor without anticoagulation        Polypharmacyhistory of frequent high-dose opiates and Ativan useslowly wean patient's if tolerated,    Hypokalemia,-replace, continue to monitor while inpatient    COPD exacerbation with increased sputum lungs, chronic respiratory failure secondary to advanced COPD on 4 L at baseline -we will order nebulizer treatments, Mucomyst, Symbicort,  encourage pulmonary toilet, low-dose azithromycin  February 8exacerbation seemingly resolved, continue treatment above    Severe protein malnutritionlikely secondary to above, BMI 16, add Ensure 3 times daily, consider dietary consultation  February 8evidently weight loss is chronic, progressive, and unintentional, history of splenomegaly, outpatient work-up has been done, will contact family members obtain medical record, consider inpatient work-up if clinically indicated       History of severe depressions, anxiety, and high-dose antidepressants and frequent Ativan  2/9 -Long discussion with patient in son at bedside regarding the risks of polypharmacy, patient is agreeable to decrease  Polypharmacy slowly   Chief Complaint : Fall      Subjective:  Patient's pain has slowly improving, Cardiovascular, respiratory, GI review of system negative except mentioned above  Objective:  Visit Vitals  /62 (BP 1 Location: Right upper arm)   Pulse 83   Temp 99 °F (37.2 °C)   Resp 18   Ht 5' 8\" (1.727 m)   Wt 52.7 kg (116 lb 3.2 oz)   SpO2 95%   BMI 17.67 kg/m²                 Physical Exam:  General: No acute distress, speaking in full sentences, no use of accessory muscles, diffuse muscle loss  HEENT: Pupils equal and reactive to light and accommodation, oropharynx is clear   Neck: Supple, no lymphadenopathy, no JVD   Lungs:   mild decreased breath sound   cardiovascular:IR Regular rate and rhythm with normal S1 and S2   Abdomen: Soft, nontender, nondistended, normoactive bowel sounds   Extremities: No cyanosis  or edema   Neuro: Nonfocal, A&O x3   Psych: Normal affect     Intake and Output:  Date 02/09/21 0700 - 02/10/21 0659 02/10/21 0700 - 02/11/21 0659   Shift 7611-2312 6344-7588 24 Hour Total 2885-3626 5909-0061 24 Hour Total   INTAKE   P.O.  600 600 120  120     P. O.  600 600 120  120   Shift Total(mL/kg)  600(11.4) 600(11.4) 120(2.3)  120(2.3)   OUTPUT   Urine(mL/kg/hr) 1300(2.1) 800(1.3) 2100(1.7) 450  450     Urine Output (mL) (Urinary Catheter 02/06/21 2- way) 0923 990 1705 450  450   Shift Total(mL/kg) 1300(24.7) 800(15.2) 2100(39.8) 450(8.5)  450(8.5)   NET -1300 -200 -1500 -330  -330   Weight (kg) 52.7 52.7 52.7 52.7 52.7 52.7       LAB:  Admission on 02/06/2021   Component Date Value    Ventricular Rate 02/06/2021 127     Atrial Rate 02/06/2021 119     QRS Duration 02/06/2021 108     Q-T Interval 02/06/2021 376     QTC Calculation (Bezet) 02/06/2021 546     Calculated R Axis 02/06/2021 -111     Calculated T Axis 02/06/2021 74     Diagnosis 02/06/2021                      Value:Atrial fibrillation with rapid ventricular response  Pulmonary disease pattern  Right bundle branch block  Abnormal ECG  When compared with ECG of 07-DEC-2009 12:08,  Atrial fibrillation has replaced Sinus rhythm  Vent. rate has increased BY  63 BPM  Right bundle branch block is now Present  Confirmed by Dawood Hazel MD (), BASIM KERN ([id number]) on 2/6/2021 5:51:13 PM      Troponin-High Sensitivity 02/06/2021 17.0*    Troponin-High Sensitivity 02/06/2021 15.1*    WBC 02/06/2021 8.6     RBC 02/06/2021 3.82*    HGB 02/06/2021 12.6     HCT 02/06/2021 36.9     MCV 02/06/2021 96.6     MCH 02/06/2021 33.0*    MCHC 02/06/2021 34.1     RDW 02/06/2021 14.5     PLATELET 28/71/4996 503     MPV 02/06/2021 11.8     ABSOLUTE NRBC 02/06/2021 0.00     DF 02/06/2021 AUTOMATED     NEUTROPHILS 02/06/2021 77     LYMPHOCYTES 02/06/2021 15     MONOCYTES 02/06/2021 7     EOSINOPHILS 02/06/2021 0*    BASOPHILS 02/06/2021 1     IMMATURE GRANULOCYTES 02/06/2021 1     ABS. NEUTROPHILS 02/06/2021 6.6     ABS. LYMPHOCYTES 02/06/2021 1.3     ABS. MONOCYTES 02/06/2021 0.6     ABS. EOSINOPHILS 02/06/2021 0.0     ABS. BASOPHILS 02/06/2021 0.0     ABS. IMM. GRANS. 02/06/2021 0.0     Sodium 02/06/2021 144     Potassium 02/06/2021 2.9*    Chloride 02/06/2021 110*    CO2 02/06/2021 27     Anion gap 02/06/2021 7     Glucose 02/06/2021 158*    BUN 02/06/2021 23     Creatinine 02/06/2021 0.95     GFR est AA 02/06/2021 >60     GFR est non-AA 02/06/2021 >60     Calcium 02/06/2021 8.5     Bilirubin, total 02/06/2021 0.4     ALT (SGPT) 02/06/2021 26     AST (SGOT) 02/06/2021 27     Alk.  phosphatase 02/06/2021 93     Protein, total 02/06/2021 6.1*    Albumin 02/06/2021 3.3     Globulin 02/06/2021 2.8     A-G Ratio 02/06/2021 1.2     Prothrombin time 02/06/2021 14.6     INR 02/06/2021 1.1     Color 02/06/2021 YELLOW     Appearance 02/06/2021 CLEAR     Specific gravity 02/06/2021 1.008     pH (UA) 02/06/2021 5.5     Protein 02/06/2021 Negative     Glucose 02/06/2021 Negative     Ketone 02/06/2021 Negative     Bilirubin 02/06/2021 Negative     Blood 02/06/2021 Negative     Urobilinogen 02/06/2021 0.2     Nitrites 02/06/2021 Negative     Leukocyte Esterase 02/06/2021 Negative     Lactic acid 02/06/2021 1.4     NT pro-BNP 02/06/2021 6,999*    aPTT 02/06/2021 30.0     TSH 02/06/2021 4.180     Magnesium 02/06/2021 1.7*    Vitamin B12 02/06/2021 472     Vitamin D 25-Hydroxy 02/06/2021 57.4     WBC 02/06/2021 6.8     RBC 02/06/2021 3.19*    HGB 02/06/2021 10.4*    HCT 02/06/2021 31.7*    MCV 02/06/2021 99.4*    MCH 02/06/2021 32.6     MCHC 02/06/2021 32.8     RDW 02/06/2021 14.6     PLATELET 59/69/4430 951     MPV 02/06/2021 11.2     ABSOLUTE NRBC 02/06/2021 0.00     DF 02/06/2021 AUTOMATED     NEUTROPHILS 02/06/2021 63     LYMPHOCYTES 02/06/2021 25     MONOCYTES 02/06/2021 10     EOSINOPHILS 02/06/2021 1     BASOPHILS 02/06/2021 1     IMMATURE GRANULOCYTES 02/06/2021 0     ABS. NEUTROPHILS 02/06/2021 4.3     ABS. LYMPHOCYTES 02/06/2021 1.7     ABS. MONOCYTES 02/06/2021 0.7     ABS. EOSINOPHILS 02/06/2021 0.1     ABS. BASOPHILS 02/06/2021 0.1     ABS. IMM. GRANS. 02/06/2021 0.0     Sodium 02/07/2021 143     Potassium 02/07/2021 3.4*    Chloride 02/07/2021 113*    CO2 02/07/2021 27     Anion gap 02/07/2021 3*    Glucose 02/07/2021 87     BUN 02/07/2021 16     Creatinine 02/07/2021 0.70     GFR est AA 02/07/2021 >60     GFR est non-AA 02/07/2021 >60     Calcium 02/07/2021 7.6*    Bilirubin, total 02/07/2021 0.4     ALT (SGPT) 02/07/2021 27     AST (SGOT) 02/07/2021 29     Alk.  phosphatase 02/07/2021 108     Protein, total 02/07/2021 5.2*    Albumin 02/07/2021 2.7*    Globulin 02/07/2021 2.5     A-G Ratio 02/07/2021 1.1*    PPD 02/08/2021 Negative     mm Induration 02/08/2021 0     Sodium 02/08/2021 143     Potassium 02/08/2021 4.0     Chloride 02/08/2021 111*    CO2 02/08/2021 31     Anion gap 02/08/2021 1*    Glucose 02/08/2021 155*    BUN 02/08/2021 22     Creatinine 02/08/2021 0.72     GFR est AA 02/08/2021 >60     GFR est non-AA 02/08/2021 >60     Calcium 02/08/2021 8.3     Special Requests: 02/08/2021                      Value:LEFT  Antecubital      Culture result: 02/08/2021 NO GROWTH AFTER 10 HOURS     Special Requests: 02/08/2021                      Value:RIGHT  Antecubital      Culture result: 02/08/2021 NO GROWTH AFTER 10 HOURS     Sodium 02/09/2021 143     Potassium 02/09/2021 3.9     Chloride 02/09/2021 111*    CO2 02/09/2021 29     Anion gap 02/09/2021 3*    Glucose 02/09/2021 99     BUN 02/09/2021 24*    Creatinine 02/09/2021 0.55*    GFR est AA 02/09/2021 >60     GFR est non-AA 02/09/2021 >60     Calcium 02/09/2021 8.6     Magnesium 02/09/2021 2.2     WBC 02/10/2021 5.9     RBC 02/10/2021 2.75*    HGB 02/10/2021 8.9*    HCT 02/10/2021 27.8*    MCV 02/10/2021 101.1*    MCH 02/10/2021 32.4     MCHC 02/10/2021 32.0     RDW 02/10/2021 14.6     PLATELET 91/52/0167 220     MPV 02/10/2021 11.4     ABSOLUTE NRBC 02/10/2021 0.00     DF 02/10/2021 AUTOMATED     NEUTROPHILS 02/10/2021 61     LYMPHOCYTES 02/10/2021 28     MONOCYTES 02/10/2021 8     EOSINOPHILS 02/10/2021 2     BASOPHILS 02/10/2021 1     IMMATURE GRANULOCYTES 02/10/2021 0     ABS. NEUTROPHILS 02/10/2021 3.7     ABS. LYMPHOCYTES 02/10/2021 1.7     ABS. MONOCYTES 02/10/2021 0.5     ABS. EOSINOPHILS 02/10/2021 0.1     ABS. BASOPHILS 02/10/2021 0.0     ABS. IMM. GRANS. 02/10/2021 0.0     Sodium 02/10/2021 145     Potassium 02/10/2021 4.2     Chloride 02/10/2021 112*    CO2 02/10/2021 29     Anion gap 02/10/2021 4*    Glucose 02/10/2021 94     BUN 02/10/2021 26*    Creatinine 02/10/2021 0.68     GFR est AA 02/10/2021 >60     GFR est non-AA 02/10/2021 >60     Calcium 02/10/2021 8.3     Bilirubin, total 02/10/2021 0.2     ALT (SGPT) 02/10/2021 27     AST (SGOT) 02/10/2021 20     Alk.  phosphatase 02/10/2021 93     Protein, total 02/10/2021 4.7*    Albumin 02/10/2021 2.5*    Globulin 02/10/2021 2.2*    A-G Ratio 02/10/2021 1.1*    SARS-CoV-2 02/10/2021 Please find results under separate order     Specimen source 02/10/2021 Nasopharyngeal     COVID-19 rapid test 02/10/2021 Not detected        IMAGING:  Xr Spine Lumb 2 Or 3 V    Result Date: 2/6/2021  Negative for acute hip fracture. Osteoarthritis. LUMBAR SPINE, 3 views. HISTORY: Low back pain following fall. COMPARISON: None. CT scan July 2011 TECHNIQUE: AP, lateral and cone-down lumbosacral junction views. FINDINGS: Spinal alignment: Anatomic. Disk spaces: Maintained. Pedicles:  Appear intact. Vertebral bodies: Decreased height of L3 SI joints:  Appear symmetric. Facet joints:  Also unremarkable. Other:  Osteophytes. Aortic calcifications. IMPRESSION: Age-indeterminate moderate compression fracture L3. Posterior intact. CHEST X-RAY, one view. HISTORY:  Chest pain  falls. TECHNIQUE:  AP supine view. COMPARISON: None. FINDINGS: Lungs: Hyperinflated and clear. Small calcified granulomas. No pneumothorax. Costophrenic angles: are sharp. Heart size: is normal. Pulmonary vasculature: is unremarkable. Aorta: Arch calcifications. . Included portion of the upper abdomen: is unremarkable. Bones: No gross bony lesions. Other: None. IMPRESSION:  Negative for acute change. Xr Hip Rt W Or Wo Pelv 2-3 Vws    Result Date: 2/6/2021  Negative for acute hip fracture. Osteoarthritis. LUMBAR SPINE, 3 views. HISTORY: Low back pain following fall. COMPARISON: None. CT scan July 2011 TECHNIQUE: AP, lateral and cone-down lumbosacral junction views. FINDINGS: Spinal alignment: Anatomic. Disk spaces: Maintained. Pedicles:  Appear intact. Vertebral bodies: Decreased height of L3 SI joints:  Appear symmetric. Facet joints:  Also unremarkable. Other:  Osteophytes. Aortic calcifications. IMPRESSION: Age-indeterminate moderate compression fracture L3. Posterior intact.  CHEST X-RAY, one view. HISTORY:  Chest pain  falls. TECHNIQUE:  AP supine view. COMPARISON: None. FINDINGS: Lungs: Hyperinflated and clear. Small calcified granulomas. No pneumothorax. Costophrenic angles: are sharp. Heart size: is normal. Pulmonary vasculature: is unremarkable. Aorta: Arch calcifications. . Included portion of the upper abdomen: is unremarkable. Bones: No gross bony lesions. Other: None. IMPRESSION:  Negative for acute change. Mri Lumb Spine Wo Cont    Result Date: 2/8/2021  1. Acute compression fracture causing mild vertebral body height loss at L3. There is an additional relatively acute injury at S3 which may be part of a larger sacral insufficiency fracture. 2. Mild foraminal stenosis at L2-L3. 3. Mild spinal stenosis with mild foraminal stenosis at L4-L5. 4. Incompletely imaged left hepatic lobe cyst. Consider routine follow-up abdominal sonography for further assessment. Ct Head Wo Cont    Result Date: 2/6/2021  Negative for acute intracranial abnormality. Chronic changes. Xr Chest Port    Result Date: 2/6/2021  Negative for acute hip fracture. Osteoarthritis. LUMBAR SPINE, 3 views. HISTORY: Low back pain following fall. COMPARISON: None. CT scan July 2011 TECHNIQUE: AP, lateral and cone-down lumbosacral junction views. FINDINGS: Spinal alignment: Anatomic. Disk spaces: Maintained. Pedicles:  Appear intact. Vertebral bodies: Decreased height of L3 SI joints:  Appear symmetric. Facet joints:  Also unremarkable. Other:  Osteophytes. Aortic calcifications. IMPRESSION: Age-indeterminate moderate compression fracture L3. Posterior intact. CHEST X-RAY, one view. HISTORY:  Chest pain  falls. TECHNIQUE:  AP supine view. COMPARISON: None. FINDINGS: Lungs: Hyperinflated and clear. Small calcified granulomas. No pneumothorax. Costophrenic angles: are sharp. Heart size: is normal. Pulmonary vasculature: is unremarkable. Aorta: Arch calcifications. . Included portion of the upper abdomen: is unremarkable. Bones: No gross bony lesions. Other: None. IMPRESSION:  Negative for acute change. EKG:  No results found for this or any previous visit.           Tk Riojas DO  2/10/2021 4:29 PM

## 2021-02-10 NOTE — ROUTINE PROCESS
TRANSFER - IN REPORT:    [facility] [place]  [facility] ICU for routine progression of care      Report consisted of patients Situation, Background, Assessment and   Recommendations(SBAR). Information from the following report(s) SBAR, Kardex, Procedure Summary, Intake/Output and MAR was reviewed with the receiving nurse. Opportunity for questions and clarification was provided. Assessment completed upon patients arrival to unit and care assumed.

## 2021-02-10 NOTE — PROGRESS NOTES
OUTREACH NURSE PROGRESS REPORT    SUBJECTIVE: In to assess patient secondary to Kellen Gibson was seen and focused assessment complete. MEWS Score: 1 (02/09/21 2253)  Vitals:    02/09/21 2000 02/09/21 2114 02/09/21 2253 02/10/21 0315   BP: (!) 115/52 115/60 (!) 110/55 (!) 109/55   Pulse: 75 71 92 71   Resp: 20 18 20 18   Temp: 98.4 °F (36.9 °C) 97.5 °F (36.4 °C) 97.5 °F (36.4 °C) 98.1 °F (36.7 °C)   SpO2: 96% 97% 98% 100%   Weight:       Height:              OBJECTIVE: On arrival to room, I found patient to be Alert and oriented. ASSESSMENT  HR Afib controlled, repeat EKG performed per MD order, all wound covered, c/d/i, pt requesting \"egg crate mattress\" will pass along to receiving nurse. Pain Assessment  Pain Intensity 1: 3 (02/09/21 2124)  Pain Location 1: Back  Pain Intervention(s) 1: MD notified (comment)(orders DC'd at transferred and need new orders)  Patient Stated Pain Goal: 0      PLAN:  Pt assessed prior to transfer to floor, will continue to follow up per outreach protocol.

## 2021-02-10 NOTE — PROGRESS NOTES
CM spoke with patient at bedside about STR choice. Patient had her sister, Eden Baires, on the phone, and they now request referral to [facility] Referral placed in [address], PPD has been placed; COVID ordered. 1315: CM spoke with patient; notified her that [facility]- does not have beds available. Discussed whether she would like to accept offer from [facility] or [facility]. Patient requests to speak with her daughter in law before making decision; CM will continue to follow.

## 2021-02-10 NOTE — PROGRESS NOTES
Outreach Follow Up Note    Nirmala Villatoro was seen and assessed. MEWS Score: 1 (02/09/21 2253)  Vitals:    02/09/21 2000 02/09/21 2114 02/09/21 2253 02/10/21 0315   BP: (!) 115/52 115/60 (!) 110/55 (!) 109/55   Pulse: 75 71 92 71   Resp: 20 18 20 18   Temp: 98.4 °F (36.9 °C) 97.5 °F (36.4 °C) 97.5 °F (36.4 °C) 98.1 °F (36.7 °C)   SpO2: 96% 97% 98% 100%   Weight:       Height:             Pain Assessment  Pain Intensity 1: 3 (02/09/21 2124)  Pain Location 1: Back  Pain Intervention(s) 1: MD notified (comment)(orders DC'd at transferred and need new orders)  Patient Stated Pain Goal: 0      Patient reviewed and discussed with primary nurse. There have been no significant clinical changes since the completion of the last dated Outreach assessment. Will continue to follow up per outreach protocol.     Signed By:   Sada Jordan RN    February 10, 2021 4:49 AM

## 2021-02-11 ENCOUNTER — HOSPITAL ENCOUNTER (OUTPATIENT)
Age: 76
Setting detail: OUTPATIENT SURGERY
Discharge: HOME | End: 2021-02-11
Attending: RADIOLOGY | Admitting: RADIOLOGY
Payer: MEDICARE

## 2021-02-11 ENCOUNTER — ANESTHESIA (OUTPATIENT)
Dept: SURGERY | Age: 76
End: 2021-02-11
Payer: MEDICARE

## 2021-02-11 ENCOUNTER — APPOINTMENT (OUTPATIENT)
Dept: INTERVENTIONAL RADIOLOGY/VASCULAR | Age: 76
End: 2021-02-11
Attending: RADIOLOGY
Payer: MEDICARE

## 2021-02-11 VITALS
DIASTOLIC BLOOD PRESSURE: 77 MMHG | TEMPERATURE: 97.8 F | HEIGHT: 68 IN | RESPIRATION RATE: 16 BRPM | WEIGHT: 116 LBS | OXYGEN SATURATION: 97 % | HEART RATE: 69 BPM | SYSTOLIC BLOOD PRESSURE: 166 MMHG | BODY MASS INDEX: 17.58 KG/M2

## 2021-02-11 LAB
BASOPHILS # BLD: 0 K/UL (ref 0–0.2)
BASOPHILS NFR BLD: 1 % (ref 0–2)
DIFFERENTIAL METHOD BLD: ABNORMAL
EOSINOPHIL # BLD: 0.1 K/UL (ref 0–0.8)
EOSINOPHIL NFR BLD: 2 % (ref 0.5–7.8)
ERYTHROCYTE [DISTWIDTH] IN BLOOD BY AUTOMATED COUNT: 14.1 % (ref 11.9–14.6)
HCT VFR BLD AUTO: 29.9 % (ref 35.8–46.3)
HGB BLD-MCNC: 9.5 G/DL (ref 11.7–15.4)
IMM GRANULOCYTES # BLD AUTO: 0 K/UL (ref 0–0.5)
IMM GRANULOCYTES NFR BLD AUTO: 0 % (ref 0–5)
LYMPHOCYTES # BLD: 1.3 K/UL (ref 0.5–4.6)
LYMPHOCYTES NFR BLD: 25 % (ref 13–44)
MCH RBC QN AUTO: 32 PG (ref 26.1–32.9)
MCHC RBC AUTO-ENTMCNC: 31.8 G/DL (ref 31.4–35)
MCV RBC AUTO: 100.7 FL (ref 79.6–97.8)
MONOCYTES # BLD: 0.4 K/UL (ref 0.1–1.3)
MONOCYTES NFR BLD: 8 % (ref 4–12)
NEUTS SEG # BLD: 3.5 K/UL (ref 1.7–8.2)
NEUTS SEG NFR BLD: 65 % (ref 43–78)
NRBC # BLD: 0 K/UL (ref 0–0.2)
PLATELET # BLD AUTO: 197 K/UL (ref 150–450)
PMV BLD AUTO: 11.1 FL (ref 9.4–12.3)
RBC # BLD AUTO: 2.97 M/UL (ref 4.05–5.2)
WBC # BLD AUTO: 5.4 K/UL (ref 4.3–11.1)

## 2021-02-11 PROCEDURE — 94760 N-INVAS EAR/PLS OXIMETRY 1: CPT

## 2021-02-11 PROCEDURE — 65660000000 HC RM CCU STEPDOWN

## 2021-02-11 PROCEDURE — 77030003666 HC NDL SPINAL BD -A

## 2021-02-11 PROCEDURE — 74011250636 HC RX REV CODE- 250/636: Performed by: RADIOLOGY

## 2021-02-11 PROCEDURE — 74011250636 HC RX REV CODE- 250/636: Performed by: NURSE ANESTHETIST, CERTIFIED REGISTERED

## 2021-02-11 PROCEDURE — 22514 PERQ VERTEBRAL AUGMENTATION: CPT

## 2021-02-11 PROCEDURE — 94640 AIRWAY INHALATION TREATMENT: CPT

## 2021-02-11 PROCEDURE — 74011250637 HC RX REV CODE- 250/637: Performed by: HOSPITALIST

## 2021-02-11 PROCEDURE — 74011000250 HC RX REV CODE- 250: Performed by: RADIOLOGY

## 2021-02-11 PROCEDURE — 77030010507 HC ADH SKN DERMBND J&J -B

## 2021-02-11 PROCEDURE — 88311 DECALCIFY TISSUE: CPT

## 2021-02-11 PROCEDURE — 77030034842 HC TAMP SPN BN INFL EXP II KYPH -I

## 2021-02-11 PROCEDURE — 74011250637 HC RX REV CODE- 250/637: Performed by: RADIOLOGY

## 2021-02-11 PROCEDURE — 88305 TISSUE EXAM BY PATHOLOGIST: CPT

## 2021-02-11 PROCEDURE — 85025 COMPLETE CBC W/AUTO DIFF WBC: CPT

## 2021-02-11 PROCEDURE — 88342 IMHCHEM/IMCYTCHM 1ST ANTB: CPT

## 2021-02-11 PROCEDURE — 74011000250 HC RX REV CODE- 250: Performed by: NURSE ANESTHETIST, CERTIFIED REGISTERED

## 2021-02-11 PROCEDURE — 77010033678 HC OXYGEN DAILY

## 2021-02-11 PROCEDURE — 74011000250 HC RX REV CODE- 250: Performed by: HOSPITALIST

## 2021-02-11 PROCEDURE — 76060000032 HC ANESTHESIA 0.5 TO 1 HR: Performed by: RADIOLOGY

## 2021-02-11 PROCEDURE — 77030011218 HC DEV BIOP BN KYPH -C

## 2021-02-11 PROCEDURE — 2709999900 HC NON-CHARGEABLE SUPPLY

## 2021-02-11 PROCEDURE — 36415 COLL VENOUS BLD VENIPUNCTURE: CPT

## 2021-02-11 PROCEDURE — C1713 ANCHOR/SCREW BN/BN,TIS/BN: HCPCS

## 2021-02-11 PROCEDURE — 88341 IMHCHEM/IMCYTCHM EA ADD ANTB: CPT

## 2021-02-11 RX ORDER — BUDESONIDE 3 MG/1
6 CAPSULE, COATED PELLETS ORAL
Status: DISCONTINUED | OUTPATIENT
Start: 2021-02-12 | End: 2021-02-15 | Stop reason: HOSPADM

## 2021-02-11 RX ORDER — NALOXONE HYDROCHLORIDE 0.4 MG/ML
0.4 INJECTION, SOLUTION INTRAMUSCULAR; INTRAVENOUS; SUBCUTANEOUS AS NEEDED
Status: DISCONTINUED | OUTPATIENT
Start: 2021-02-11 | End: 2021-02-15 | Stop reason: HOSPADM

## 2021-02-11 RX ORDER — AMOXICILLIN 250 MG
1 CAPSULE ORAL 2 TIMES DAILY
Status: DISCONTINUED | OUTPATIENT
Start: 2021-02-11 | End: 2021-02-15 | Stop reason: HOSPADM

## 2021-02-11 RX ORDER — DILTIAZEM HYDROCHLORIDE 120 MG/1
120 CAPSULE, COATED, EXTENDED RELEASE ORAL DAILY
Status: DISCONTINUED | OUTPATIENT
Start: 2021-02-12 | End: 2021-02-15 | Stop reason: HOSPADM

## 2021-02-11 RX ORDER — SODIUM CHLORIDE, SODIUM LACTATE, POTASSIUM CHLORIDE, CALCIUM CHLORIDE 600; 310; 30; 20 MG/100ML; MG/100ML; MG/100ML; MG/100ML
INJECTION, SOLUTION INTRAVENOUS
Status: DISCONTINUED | OUTPATIENT
Start: 2021-02-11 | End: 2021-02-11 | Stop reason: HOSPADM

## 2021-02-11 RX ORDER — SODIUM CHLORIDE 0.9 % (FLUSH) 0.9 %
5-40 SYRINGE (ML) INJECTION AS NEEDED
Status: CANCELLED | OUTPATIENT
Start: 2021-02-11

## 2021-02-11 RX ORDER — TOPIRAMATE 50 MG/1
50 TABLET, FILM COATED ORAL
Status: DISCONTINUED | OUTPATIENT
Start: 2021-02-11 | End: 2021-02-15 | Stop reason: HOSPADM

## 2021-02-11 RX ORDER — BUSPIRONE HYDROCHLORIDE 5 MG/1
15 TABLET ORAL 3 TIMES DAILY
Status: DISCONTINUED | OUTPATIENT
Start: 2021-02-11 | End: 2021-02-15 | Stop reason: HOSPADM

## 2021-02-11 RX ORDER — FACIAL-BODY WIPES
10 EACH TOPICAL DAILY PRN
Status: DISCONTINUED | OUTPATIENT
Start: 2021-02-11 | End: 2021-02-15 | Stop reason: HOSPADM

## 2021-02-11 RX ORDER — HYDROMORPHONE HYDROCHLORIDE 2 MG/ML
0.5 INJECTION, SOLUTION INTRAMUSCULAR; INTRAVENOUS; SUBCUTANEOUS
Status: CANCELLED | OUTPATIENT
Start: 2021-02-11

## 2021-02-11 RX ORDER — LIDOCAINE HYDROCHLORIDE 20 MG/ML
INJECTION, SOLUTION EPIDURAL; INFILTRATION; INTRACAUDAL; PERINEURAL AS NEEDED
Status: DISCONTINUED | OUTPATIENT
Start: 2021-02-11 | End: 2021-02-11 | Stop reason: HOSPADM

## 2021-02-11 RX ORDER — HYDROCODONE BITARTRATE AND ACETAMINOPHEN 5; 325 MG/1; MG/1
1 TABLET ORAL
Status: CANCELLED | OUTPATIENT
Start: 2021-02-11

## 2021-02-11 RX ORDER — LIDOCAINE 4 G/100G
1 PATCH TOPICAL EVERY 24 HOURS
Status: DISCONTINUED | OUTPATIENT
Start: 2021-02-11 | End: 2021-02-15 | Stop reason: HOSPADM

## 2021-02-11 RX ORDER — PROPOFOL 10 MG/ML
INJECTION, EMULSION INTRAVENOUS
Status: DISCONTINUED | OUTPATIENT
Start: 2021-02-11 | End: 2021-02-11 | Stop reason: HOSPADM

## 2021-02-11 RX ORDER — FENTANYL CITRATE 50 UG/ML
INJECTION, SOLUTION INTRAMUSCULAR; INTRAVENOUS AS NEEDED
Status: DISCONTINUED | OUTPATIENT
Start: 2021-02-11 | End: 2021-02-11 | Stop reason: HOSPADM

## 2021-02-11 RX ORDER — OXYCODONE HYDROCHLORIDE 5 MG/1
10 TABLET ORAL
Status: COMPLETED | OUTPATIENT
Start: 2021-02-11 | End: 2021-02-11

## 2021-02-11 RX ORDER — PRIMIDONE 50 MG/1
50 TABLET ORAL 3 TIMES DAILY
Status: DISCONTINUED | OUTPATIENT
Start: 2021-02-11 | End: 2021-02-15 | Stop reason: HOSPADM

## 2021-02-11 RX ORDER — OXYCODONE HYDROCHLORIDE 5 MG/1
10 TABLET ORAL
Status: CANCELLED | OUTPATIENT
Start: 2021-02-11 | End: 2021-02-12

## 2021-02-11 RX ORDER — SERTRALINE HYDROCHLORIDE 100 MG/1
100 TABLET, FILM COATED ORAL DAILY
Status: DISCONTINUED | OUTPATIENT
Start: 2021-02-12 | End: 2021-02-15 | Stop reason: HOSPADM

## 2021-02-11 RX ORDER — METOCLOPRAMIDE 10 MG/1
5 TABLET ORAL
Status: DISCONTINUED | OUTPATIENT
Start: 2021-02-11 | End: 2021-02-15 | Stop reason: HOSPADM

## 2021-02-11 RX ORDER — AZITHROMYCIN 250 MG/1
250 TABLET, FILM COATED ORAL EVERY 24 HOURS
Status: DISCONTINUED | OUTPATIENT
Start: 2021-02-11 | End: 2021-02-15 | Stop reason: HOSPADM

## 2021-02-11 RX ORDER — SODIUM CHLORIDE 0.9 % (FLUSH) 0.9 %
5-40 SYRINGE (ML) INJECTION EVERY 8 HOURS
Status: CANCELLED | OUTPATIENT
Start: 2021-02-11

## 2021-02-11 RX ORDER — PANTOPRAZOLE SODIUM 40 MG/1
40 TABLET, DELAYED RELEASE ORAL 2 TIMES DAILY
Status: DISCONTINUED | OUTPATIENT
Start: 2021-02-11 | End: 2021-02-15 | Stop reason: HOSPADM

## 2021-02-11 RX ORDER — LIDOCAINE HYDROCHLORIDE 20 MG/ML
20-300 INJECTION, SOLUTION INFILTRATION; PERINEURAL
Status: DISCONTINUED | OUTPATIENT
Start: 2021-02-11 | End: 2021-02-11 | Stop reason: HOSPADM

## 2021-02-11 RX ORDER — BUPIVACAINE HYDROCHLORIDE 5 MG/ML
5-10 INJECTION, SOLUTION EPIDURAL; INTRACAUDAL
Status: DISCONTINUED | OUTPATIENT
Start: 2021-02-11 | End: 2021-02-11 | Stop reason: HOSPADM

## 2021-02-11 RX ORDER — SODIUM CHLORIDE, SODIUM LACTATE, POTASSIUM CHLORIDE, CALCIUM CHLORIDE 600; 310; 30; 20 MG/100ML; MG/100ML; MG/100ML; MG/100ML
100 INJECTION, SOLUTION INTRAVENOUS CONTINUOUS
Status: CANCELLED | OUTPATIENT
Start: 2021-02-11 | End: 2021-02-12

## 2021-02-11 RX ORDER — MIDAZOLAM HYDROCHLORIDE 1 MG/ML
2 INJECTION, SOLUTION INTRAMUSCULAR; INTRAVENOUS
Status: CANCELLED | OUTPATIENT
Start: 2021-02-11 | End: 2021-02-12

## 2021-02-11 RX ORDER — CEFAZOLIN SODIUM/WATER 2 G/20 ML
2 SYRINGE (ML) INTRAVENOUS ONCE
Status: COMPLETED | OUTPATIENT
Start: 2021-02-11 | End: 2021-02-11

## 2021-02-11 RX ORDER — ACETAMINOPHEN 325 MG/1
650 TABLET ORAL
Status: DISCONTINUED | OUTPATIENT
Start: 2021-02-11 | End: 2021-02-15 | Stop reason: HOSPADM

## 2021-02-11 RX ORDER — POLYETHYLENE GLYCOL 3350 17 G/17G
17 POWDER, FOR SOLUTION ORAL DAILY
Status: DISCONTINUED | OUTPATIENT
Start: 2021-02-12 | End: 2021-02-15 | Stop reason: HOSPADM

## 2021-02-11 RX ORDER — HYDROMORPHONE HYDROCHLORIDE 2 MG/1
2 TABLET ORAL
Status: DISCONTINUED | OUTPATIENT
Start: 2021-02-11 | End: 2021-02-15 | Stop reason: HOSPADM

## 2021-02-11 RX ORDER — BUDESONIDE AND FORMOTEROL FUMARATE DIHYDRATE 160; 4.5 UG/1; UG/1
2 AEROSOL RESPIRATORY (INHALATION) 2 TIMES DAILY
Status: DISCONTINUED | OUTPATIENT
Start: 2021-02-11 | End: 2021-02-15 | Stop reason: HOSPADM

## 2021-02-11 RX ORDER — PROPOFOL 10 MG/ML
INJECTION, EMULSION INTRAVENOUS AS NEEDED
Status: DISCONTINUED | OUTPATIENT
Start: 2021-02-11 | End: 2021-02-11 | Stop reason: HOSPADM

## 2021-02-11 RX ORDER — SUCRALFATE 1 G/1
1 TABLET ORAL 4 TIMES DAILY
Status: DISCONTINUED | OUTPATIENT
Start: 2021-02-11 | End: 2021-02-15 | Stop reason: HOSPADM

## 2021-02-11 RX ORDER — LIDOCAINE HYDROCHLORIDE 10 MG/ML
0.3 INJECTION INFILTRATION; PERINEURAL ONCE
Status: CANCELLED | OUTPATIENT
Start: 2021-02-11 | End: 2021-02-11

## 2021-02-11 RX ORDER — SODIUM CHLORIDE, SODIUM LACTATE, POTASSIUM CHLORIDE, CALCIUM CHLORIDE 600; 310; 30; 20 MG/100ML; MG/100ML; MG/100ML; MG/100ML
100 INJECTION, SOLUTION INTRAVENOUS CONTINUOUS
Status: CANCELLED | OUTPATIENT
Start: 2021-02-11 | End: 2021-02-11

## 2021-02-11 RX ORDER — DIAZEPAM 2 MG/1
2 TABLET ORAL
Status: DISCONTINUED | OUTPATIENT
Start: 2021-02-11 | End: 2021-02-15 | Stop reason: HOSPADM

## 2021-02-11 RX ADMIN — SUCRALFATE 1 G: 1 TABLET ORAL at 19:55

## 2021-02-11 RX ADMIN — BUSPIRONE HYDROCHLORIDE 15 MG: 5 TABLET ORAL at 15:57

## 2021-02-11 RX ADMIN — PRIMIDONE 50 MG: 50 TABLET ORAL at 15:59

## 2021-02-11 RX ADMIN — LIDOCAINE HYDROCHLORIDE 200 MG: 20 INJECTION, SOLUTION INFILTRATION; PERINEURAL at 10:45

## 2021-02-11 RX ADMIN — METOCLOPRAMIDE 5 MG: 10 TABLET ORAL at 19:55

## 2021-02-11 RX ADMIN — METOCLOPRAMIDE 5 MG: 10 TABLET ORAL at 15:57

## 2021-02-11 RX ADMIN — HYDROMORPHONE HYDROCHLORIDE 2 MG: 2 TABLET ORAL at 07:25

## 2021-02-11 RX ADMIN — BUDESONIDE AND FORMOTEROL FUMARATE DIHYDRATE 2 PUFF: 160; 4.5 AEROSOL RESPIRATORY (INHALATION) at 20:32

## 2021-02-11 RX ADMIN — TOPIRAMATE 50 MG: 50 TABLET, FILM COATED ORAL at 15:57

## 2021-02-11 RX ADMIN — Medication 2 G: at 10:37

## 2021-02-11 RX ADMIN — DIAZEPAM 2 MG: 2 TABLET ORAL at 21:21

## 2021-02-11 RX ADMIN — FENTANYL CITRATE 25 MCG: 50 INJECTION INTRAMUSCULAR; INTRAVENOUS at 10:33

## 2021-02-11 RX ADMIN — DIAZEPAM 2 MG: 2 TABLET ORAL at 01:07

## 2021-02-11 RX ADMIN — LIDOCAINE HYDROCHLORIDE 60 MG: 20 INJECTION, SOLUTION EPIDURAL; INFILTRATION; INTRACAUDAL; PERINEURAL at 10:34

## 2021-02-11 RX ADMIN — DIAZEPAM 2 MG: 2 TABLET ORAL at 15:57

## 2021-02-11 RX ADMIN — AZITHROMYCIN MONOHYDRATE 250 MG: 250 TABLET ORAL at 19:54

## 2021-02-11 RX ADMIN — DOCUSATE SODIUM 50MG AND SENNOSIDES 8.6MG 1 TABLET: 8.6; 5 TABLET, FILM COATED ORAL at 15:58

## 2021-02-11 RX ADMIN — PRIMIDONE 50 MG: 50 TABLET ORAL at 19:54

## 2021-02-11 RX ADMIN — OXYCODONE 10 MG: 5 TABLET ORAL at 13:58

## 2021-02-11 RX ADMIN — SODIUM CHLORIDE, SODIUM LACTATE, POTASSIUM CHLORIDE, AND CALCIUM CHLORIDE: 600; 310; 30; 20 INJECTION, SOLUTION INTRAVENOUS at 10:26

## 2021-02-11 RX ADMIN — BUSPIRONE HYDROCHLORIDE 15 MG: 5 TABLET ORAL at 19:55

## 2021-02-11 RX ADMIN — PROPOFOL 140 MCG/KG/MIN: 10 INJECTION, EMULSION INTRAVENOUS at 10:34

## 2021-02-11 RX ADMIN — SUCRALFATE 1 G: 1 TABLET ORAL at 16:00

## 2021-02-11 RX ADMIN — HYDROMORPHONE HYDROCHLORIDE 2 MG: 2 TABLET ORAL at 19:55

## 2021-02-11 RX ADMIN — HYDROMORPHONE HYDROCHLORIDE 2 MG: 2 TABLET ORAL at 03:15

## 2021-02-11 RX ADMIN — BUPIVACAINE HYDROCHLORIDE 50 MG: 5 INJECTION, SOLUTION EPIDURAL; INTRACAUDAL at 10:53

## 2021-02-11 RX ADMIN — PROPOFOL 30 MG: 10 INJECTION, EMULSION INTRAVENOUS at 10:34

## 2021-02-11 RX ADMIN — PANTOPRAZOLE SODIUM 40 MG: 40 TABLET, DELAYED RELEASE ORAL at 15:59

## 2021-02-11 RX ADMIN — FENTANYL CITRATE 25 MCG: 50 INJECTION INTRAMUSCULAR; INTRAVENOUS at 10:29

## 2021-02-11 RX ADMIN — HYDROMORPHONE HYDROCHLORIDE 2 MG: 2 TABLET ORAL at 16:04

## 2021-02-11 NOTE — PROGRESS NOTES
END OF SHIFT:     VSS. PRN dilaudid given q4. Valium given 1x. Pt NPO for kyphoplasty in IR today.  Pt resting with no needs voiced at this time

## 2021-02-11 NOTE — PROGRESS NOTES
Pt resting in bed. Right upper extremity redressed with xeroform gauze and wrapped with kerlix in 2 places. 1) the right FA and to the right knuckles by Lora Panchal RN. Knuckles with a blistery area to the right index, middle and ring knuckles, and to BLE. Pt tolerated without difficulty.

## 2021-02-11 NOTE — PROGRESS NOTES
Problem: Falls - Risk of  Goal: *Absence of Falls  Description: Document Saul Ginny Fall Risk and appropriate interventions in the flowsheet.   Outcome: Progressing Towards Goal  Note: Fall Risk Interventions:  Mobility Interventions: Bed/chair exit alarm         Medication Interventions: Evaluate medications/consider consulting pharmacy    Elimination Interventions: Bed/chair exit alarm    History of Falls Interventions: Bed/chair exit alarm

## 2021-02-11 NOTE — PROGRESS NOTES
Problem: Falls - Risk of  Goal: *Absence of Falls  Description: Document Rachid Michel Fall Risk and appropriate interventions in the flowsheet.   Outcome: Progressing Towards Goal  Note: Fall Risk Interventions:  Mobility Interventions: Bed/chair exit alarm, Patient to call before getting OOB         Medication Interventions: Evaluate medications/consider consulting pharmacy, Patient to call before getting OOB, Bed/chair exit alarm    Elimination Interventions: Bed/chair exit alarm, Call light in reach, Patient to call for help with toileting needs    History of Falls Interventions: Bed/chair exit alarm         Problem: Patient Education: Go to Patient Education Activity  Goal: Patient/Family Education  Outcome: Progressing Towards Goal

## 2021-02-11 NOTE — PROGRESS NOTES
Patient in IR [address] for procedure. Anesthesia has assumed care of patient for the duration of procedure. Please see anesthesia record for documentation.   IR Nurse Pre-Procedure Checklist Part 2          Consent signed: Yes    H&P complete:  Yes    Antibiotics: Yes    Airway/Mallampati Done: Yes    Shaved: Not applicable    Pregnancy Form:Not applicable    Patient Position: Yes    MD Side: Yes     Biopsy Worksheet: Yes    Specimen Medium: Yes

## 2021-02-11 NOTE — PROGRESS NOTES
Billie met with pt and her daughter in law Gayathri Mendez. Long discussion about rehab, options, and future planning. Pt informed she fell after rec a mammogram. D in law informed she has fallen several times. Pt is alone most of the day as her [facility] Aide is only there 3-4 hours a day and is off on Sundays. D in law stated pt usually just sits and watches tv and has been able to be alone. Unsure why she is falling so much. Possible meds with decreased eating/drinking per family. Pt agreeable to rehab ref to [facility]. D in law works a mile away and states she could go by if pt needs anything. Billie called [facility] to start precert tomorrow. Unsure when pt will be medically stable for d/c but should obtain the precert quickly and pt could transfer over weekend if [facility]. Will follow up with [facility] in am. Pt stated she has many medical problems and did not want to be rushed out of here. Bed avail at [facility] and precert likely rec tomorrow.  Azalea Merlin

## 2021-02-11 NOTE — ANESTHESIA PREPROCEDURE EVALUATION
Relevant Problems   No relevant active problems       Anesthetic History               Review of Systems / Medical History  Patient summary reviewed and pertinent labs reviewed    Pulmonary    COPD (2-4 L O2): severe    Sleep apnea: No treatment  Smoker         Neuro/Psych         Psychiatric history     Cardiovascular    Hypertension        Dysrhythmias (Presented in Afib RVR treated with Dilt gtt now transitioned to PO. K and Mag repleted) : atrial fibrillation        Comments: Echo: Preserved EF, Mod AI   GI/Hepatic/Renal     GERD           Endo/Other        Arthritis and anemia (Hgb 8.7)     Other Findings   Comments: Back Pain s/p Fall    Fibromyalgia           Physical Exam    Airway  Mallampati: II  TM Distance: 4 - 6 cm  Neck ROM: normal range of motion   Mouth opening: Normal     Cardiovascular    Rhythm: regular  Rate: normal         Dental    Dentition: Edentulous     Pulmonary    Rhonchi:bilateral    Wheezes:bilateral    Prolonged expiration     Abdominal  GI exam deferred       Other Findings            Anesthetic Plan    ASA: 4  Anesthesia type: total IV anesthesia          Induction: Intravenous  Anesthetic plan and risks discussed with: Patient      Very poor pulmonary status which appears to be optimized.  Will need to be reassess and likely need breathing treatment prior to following case

## 2021-02-11 NOTE — PROGRESS NOTES
Resting in bed. Reports pain relief with dilaudid and lidocaine patch. Pt to be picked up in the am between 7 and 7:15 to go to IR for kyphoplasty. Report given to oncoming RN.

## 2021-02-11 NOTE — PROGRESS NOTES
Pt returned to room 347 via EMS by stretcher with Oxygen accompanied by transport personnel. Pt settled in bed. Report called to myself at 65, by Jake Brown at [id number]. Pt has 2 puncture sites one on each side of lumbar spine intact with glue.

## 2021-02-11 NOTE — WOUND CARE
Asked by nursing to assist with dressings on arms and lower legs, xeroform, ABD and jessie used on all skin tears. Will monitor.

## 2021-02-11 NOTE — PROGRESS NOTES
TRANSFER - OUT REPORT:    Verbal report given to Arabella Lazo RN on Abdiaziz Corpus  being transferred to 3rd floor  for routine post - op       Report consisted of patients Situation, Background, Assessment and   Recommendations(SBAR). Information from the following report(s) SBAR, Procedure Summary, MAR and Cardiac Rhythm Sinus Bradycardia was reviewed with the receiving nurse. Opportunity for questions and clarification was provided. Conscious Sedation:   See Anesthesia record for medicine administered    Pt tolerated procedure well.      Visit Vitals  BP (!) 142/67   Pulse (!) 57   Temp 97.8 °F (36.6 °C)   Resp 14   Ht 5' 8\" (1.727 m)   Wt 52.6 kg (116 lb)   SpO2 96%   BMI 17.64 kg/m²     Past Medical History:   Diagnosis Date    Anxiety 3/6/2013    Arthritis     osteoarthritis    B12 deficiency 3/6/2013    Chronic back pain 3/6/2013    Chronic kidney disease (CKD) stage G3b/A1, moderately decreased glomerular filtration rate (GFR) between 30-44 mL/min/1.73 square meter and albuminuria creatinine ratio less than 30 mg/g 10/14/2016    Constipation 11/4/2015    COPD [address] [place] [address]/ patient on 2.5 liters O2 nightly at home    Decreased GFR 5/26/2016    Depression 10/25/2012    Depression 10/25/2012    Depression, major, in remission ([facility] [address] 1/31/2018    diverticulitis     Encounter for long-term (current) use of other medications 3/6/2013    Environmental allergies 3/6/2013    Fecal incontinence 12/7/2016    Fibromyalgia 9/15/2015    GERD (gastroesophageal reflux disease) \"years\"    Headache(784.0) 2/12/2013    HTN (hypertension) 10/25/2012    HTN (hypertension) 10/25/2012    Hyperlipidemia 10/25/2012    Lymphocytic colitis 42/2/7640    Metabolic syndrome 5/5/1314    Microalbuminuria 11/4/2015    Osteoporosis 10/25/2012    polypectomy     PUD (peptic ulcer disease) \"years ago\"    Had peptic ulcers    thyroid mass     partial removal, benign    Unspecified adverse effect of anesthesia     PATIENT STATES SHE DOESN'T GET GENERAL ANESTHESIA DUE TO COPD    Unspecified sleep apnea     wears O2 at night.   no c-jeffrey    Vitamin D deficiency 3/6/2013

## 2021-02-11 NOTE — PROGRESS NOTES
[facility] Hospitalist Service Progress Note      Assessment / Plan:      Severe back pain due to recent fall, newly diagnosed acute [id number] fracture and sacral s3 sacral insufficiency fracture with opiate tolerance and dependency, chronic pain, high de-escalate pain medications to oral Dilaudid, avoid IV medications whenever possible due to tolerance  February 8better pain control, still uncomfortable, continue treatments, appreciate IR consult, MRI done, await further recommendation  February 10scheduled for kyphoplasty tomorrow, n.p.o. after midnight,  February 11status post kyphoplasty, with documented complications, continue inpatient pain control, physical therapy to evaluate tomorrow     aortic regurg  2/9 - JOSE today for further evaluation, bcx pending (orderd by cardiology) for the possibility of endocarditis,   February 10JOSE no evidence of vegetations,  \"There was likely mild to moderate regurgitation with a very  eccentric jet which appears to fan out (no diastolic aortic flow reversal;  narrower appearing vena contracta). \" culture no growth,    New onset atrial fibrillation with rapid ventricular responsestatus post IV diltiazem's, heart rate improved, cardiology consulted, chest Vascor 3, with high bleeding risk due to frequent and recent falls, monitor without anticoagulation        Polypharmacyhistory of frequent high-dose opiates and Ativan useslowly wean patient's if tolerated,    Hypokalemia,-replace, continue to monitor while inpatient    COPD exacerbation with increased sputum lungs, chronic respiratory failure secondary to advanced COPD on 4 L at baseline -we will order nebulizer treatments, Mucomyst, Symbicort,  encourage pulmonary toilet, low-dose azithromycin  February 8exacerbation seemingly resolved, continue treatment above    Severe protein malnutritionlikely secondary to above, BMI 16, add Ensure 3 times daily, consider dietary consultation  February 8evidently weight loss is chronic, progressive, and unintentional, history of splenomegaly, outpatient work-up has been done, will contact family members obtain medical record, consider inpatient work-up if clinically indicated       History of severe depressions, anxiety, and high-dose antidepressants and frequent Ativan  2/9 -Long discussion with patient in son at bedside regarding the risks of polypharmacy, patient is agreeable to decrease  Polypharmacy slowly   Chief Complaint : Fall      Subjective:  Patient's pain has slowly improving, now status post kyphoplasty, cardiovascular, respiratory, GI review of system negative except mentioned above  Objective:  Visit Vitals  BP (!) 136/59 (BP 1 Location: Right upper arm, BP Patient Position: At rest)   Pulse 63   Temp 97.9 °F (36.6 °C)   Resp 16   Ht 5' 8\" (1.727 m)   Wt 52.7 kg (116 lb 3.2 oz)   SpO2 96%   BMI 17.67 kg/m²                 Physical Exam:  General: No acute distress, speaking in full sentences, no use of accessory muscles, diffuse muscle loss  HEENT: Pupils equal and reactive to light and accommodation, oropharynx is clear   Neck: Supple, no lymphadenopathy, no JVD   Lungs:   mild decreased breath sound   cardiovascular:IR Regular rate and rhythm with normal S1 and S2   Abdomen: Soft, nontender, nondistended, normoactive bowel sounds   Extremities: No cyanosis  or edema   Neuro: Nonfocal, A&O x3   Psych: Normal affect     Intake and Output:  Date 02/10/21 0700 - 02/11/21 0659 02/11/21 0700 - 02/12/21 0659   Shift [id number] 1900-0659 24 Hour Total 5974-2027 0899-5923 24 Hour Total   INTAKE   P.O. 120 60 180        P. O. 120 60 180      I. V.(mL/kg/hr)    450  450     Volume (lactated Ringers infusion)    450  450   Shift Total(mL/kg) 120(2.3) 60(1.1) 180(3.4) 450(8.5)  450(8.5)   OUTPUT   Urine(mL/kg/hr) 450(0.7) 900(1.4) 1350(1.1) 0  0     Urine Output    0  0     Urine Output (mL) ([REMOVED] Urinary Catheter 02/06/21 2- way)       Blood    5  5     Estimated Blood Loss    5  5   Shift Total(mL/kg) 450(8.5) 900(17.1) 1350(25.6) 5(0.1)  5(0.1)   NET - 445  445   Weight (kg) 52.7 52.7 52.7 52.7 52.7 52.7       LAB:  No results displayed because visit has over 200 results. IMAGING:  Xr Spine Lumb 2 Or 3 V    Result Date: 2/6/2021  Negative for acute hip fracture. Osteoarthritis. LUMBAR SPINE, 3 views. HISTORY: Low back pain following fall. COMPARISON: None. CT scan July 2011 TECHNIQUE: AP, lateral and cone-down lumbosacral junction views. FINDINGS: Spinal alignment: Anatomic. Disk spaces: Maintained. Pedicles:  Appear intact. Vertebral bodies: Decreased height of L3 SI joints:  Appear symmetric. Facet joints:  Also unremarkable. Other:  Osteophytes. Aortic calcifications. IMPRESSION: Age-indeterminate moderate compression fracture L3. Posterior intact. CHEST X-RAY, one view. HISTORY:  Chest pain  falls. TECHNIQUE:  AP supine view. COMPARISON: None. FINDINGS: Lungs: Hyperinflated and clear. Small calcified granulomas. No pneumothorax. Costophrenic angles: are sharp. Heart size: is normal. Pulmonary vasculature: is unremarkable. Aorta: Arch calcifications. . Included portion of the upper abdomen: is unremarkable. Bones: No gross bony lesions. Other: None. IMPRESSION:  Negative for acute change. Xr Hip Rt W Or Wo Pelv 2-3 Vws    Result Date: 2/6/2021  Negative for acute hip fracture. Osteoarthritis. LUMBAR SPINE, 3 views. HISTORY: Low back pain following fall. COMPARISON: None. CT scan July 2011 TECHNIQUE: AP, lateral and cone-down lumbosacral junction views. FINDINGS: Spinal alignment: Anatomic. Disk spaces: Maintained. Pedicles:  Appear intact. Vertebral bodies: Decreased height of L3 SI joints:  Appear symmetric. Facet joints:  Also unremarkable. Other:  Osteophytes. Aortic calcifications. IMPRESSION: Age-indeterminate moderate compression fracture L3. Posterior intact. CHEST X-RAY, one view. HISTORY:  Chest pain  falls.  TECHNIQUE:  AP supine view. COMPARISON: None. FINDINGS: Lungs: Hyperinflated and clear. Small calcified granulomas. No pneumothorax. Costophrenic angles: are sharp. Heart size: is normal. Pulmonary vasculature: is unremarkable. Aorta: Arch calcifications. . Included portion of the upper abdomen: is unremarkable. Bones: No gross bony lesions. Other: None. IMPRESSION:  Negative for acute change. Ir Kyphoplasty Lumbar    Result Date: 2/11/2021  Successful Vertebral Augmentation with Cavity Creation (Kyphoplasty) of L3. A bone biopsy was performed. PLAN:  1 hours bedrest.  Virtual Office follow-up in 1/2 weeks. The patient has been transported back to the [facility] in stable condition. ________________________________________________________________________________ _______ PROCEDURE SUMMARY: - Fluoroscopic guided vertebral augmentation with cavity creation (Kyphoplasty). -Fluoroscopic Guided Bone Biopsy:  was performed. PROCEDURE DETAILS: Pre-procedure Consent:  Informed written and oral consent for the procedure was obtained after explanation of risks (including, but not limitted to:  hemorrhage, bone fracture, infection, cement migration) benefits and alternatives. The patient's questions were answered to their satisfaction. They stated understanding and requested that we proceed. Final verification:  A time-out identifying the patient and planned procedure was performed prior to the procedure. Preparation:  Maximal sterile barrier technique (including:  cap, mask, sterile gown, sterile gloves, sterile sheet, hand hygiene, and cutaneous antisepsis) was used. The patient was placed prone on the fluoroscopy table. All contact points were appropriately padded. Anesthesia/sedation Level of anesthesia/sedation:  Total intravenous sedation and analgesia Anesthesia/sedation administered by: Anesthesiology Service Total intra-service sedation time (minutes):  Not applicable Medications: Ancef 2 g IV.  Vertebral/Sacral Body Biopsy Target level: L3 Pedicle access: Unipedicular Number of specimen:1 Vertebral Augmentation with Cavity Creation (Kyphoplasty) Target fracture level(s):  L3.  Local anesthesia was achieved with lidocaine and bupivacaine. Using fluoroscopic guidance, transpedicular approach was used at both the right and left pedicles. Pedicle access:  Bipedicular. Access trocar(s) gauge:  10 gauge Kyphon Express II. Cavity creation device utilized:  15 mm balloon. Cement type:  Polymethylmethacrylate (PMMA). :  [employer]. Clinically significant cement leak occurrence:  No. Closure The devices were removed and hemostasis was achieved with manual compression. Sterile glue was placed on the skin. Radiation Dose Reference Air Kerma (Ka,r) :  108  mGy Dose Area Product/Kerma Area Product (DAP/JANNIE/PKA) :  304.79 cGy-cm2 Fluoroscopy Exposure Time :  3 minutes 24 seconds  Fluorographic Images :  30 Additional Details Additional description of procedure:  None Equipment details:  None Specimens removed:  None Estimated blood loss (mL):  Less than 10 Standardized report: [email or web address] Attestation Signer name: Anastasia Salazar M.D. I attest that I performed the entire procedure. I reviewed the stored images and agree with the report as written. Mri Lumb Spine Wo Cont    Result Date: 2/8/2021  1. Acute compression fracture causing mild vertebral body height loss at L3. There is an additional relatively acute injury at S3 which may be part of a larger sacral insufficiency fracture. 2. Mild foraminal stenosis at L2-L3. 3. Mild spinal stenosis with mild foraminal stenosis at L4-L5. 4. Incompletely imaged left hepatic lobe cyst. Consider routine follow-up abdominal sonography for further assessment. Ct Head Wo Cont    Result Date: 2/6/2021  Negative for acute intracranial abnormality. Chronic changes. Xr Chest Port    Result Date: 2/6/2021  Negative for acute hip fracture. Osteoarthritis. LUMBAR SPINE, 3 views. HISTORY: Low back pain following fall. COMPARISON: None. CT scan July 2011 TECHNIQUE: AP, lateral and cone-down lumbosacral junction views. FINDINGS: Spinal alignment: Anatomic. Disk spaces: Maintained. Pedicles:  Appear intact. Vertebral bodies: Decreased height of L3 SI joints:  Appear symmetric. Facet joints:  Also unremarkable. Other:  Osteophytes. Aortic calcifications. IMPRESSION: Age-indeterminate moderate compression fracture L3. Posterior intact. CHEST X-RAY, one view. HISTORY:  Chest pain  falls. TECHNIQUE:  AP supine view. COMPARISON: None. FINDINGS: Lungs: Hyperinflated and clear. Small calcified granulomas. No pneumothorax. Costophrenic angles: are sharp. Heart size: is normal. Pulmonary vasculature: is unremarkable. Aorta: Arch calcifications. . Included portion of the upper abdomen: is unremarkable. Bones: No gross bony lesions. Other: None. IMPRESSION:  Negative for acute change. EKG:  No results found for this or any previous visit.           Cori Leggett DO  2/11/2021 4:29 PM

## 2021-02-11 NOTE — PROGRESS NOTES
Pt transported to IR via [address] [place] [address], stretcher & oxygen. Pt given dilaudid for the the ride. Report called to Nog[place]om at 1236.

## 2021-02-11 NOTE — PROCEDURES
Department of Interventional Radiology  [phone]        Interventional Radiology Brief Procedure Note    Patient: Angel Garcia MRN: [id number]  SSN: [id number]    YOB: 1945  Age: 66 y.o. Sex: female      Date of Procedure: 2/11/2021    Pre-Procedure Diagnosis: Painful, acute. L3 VCF. Post-Procedure Diagnosis: SAME    Procedure(s): Kyphoplasty and biopsy    Brief Description of Procedure: as above    Performed By: Ty De Leon MD     Assistants: None    Anesthesia:TIVS/MAC    Estimated Blood Loss: Less than 10ml    Specimens:  Pathology    Implants:  None    Findings: Good height restoration. Complications: None    Recommendations: 1 hour bedrest.       Follow Up: Virtual office visit in 2-3 weeks.       Signed By: Ty De Leon MD     February 11, 2021

## 2021-02-11 NOTE — PROGRESS NOTES
Department of Interventional Radiology  [phone]        Consult Note     Patient: Harriet Becerra MRN: [id number]  SSN: [id number]    YOB: 1945  Age: 66 y.o. Sex: female      Consult Date: 2/11/2021     Subjective:     I explained the Vertebral Compression Fracture and treatment options, focusing on Kyphoplasty and Biopsy. Benefits and risks explained. Questions answered. She wishes to proceed.      Bret Jimenez MD

## 2021-02-12 ENCOUNTER — APPOINTMENT (OUTPATIENT)
Dept: CT IMAGING | Age: 76
DRG: 308 | End: 2021-02-12
Attending: HOSPITALIST
Payer: MEDICARE

## 2021-02-12 LAB
AFP-TM SERPL-MCNC: 2.4 NG/ML
ALBUMIN SERPL-MCNC: 3.1 G/DL (ref 3.2–4.6)
ALBUMIN/GLOB SERPL: 1.1 {RATIO} (ref 1.2–3.5)
ALP SERPL-CCNC: 127 U/L (ref 50–136)
ALT SERPL-CCNC: 23 U/L (ref 12–65)
ANION GAP SERPL CALC-SCNC: 5 MMOL/L (ref 7–16)
AST SERPL-CCNC: 15 U/L (ref 15–37)
BILIRUB SERPL-MCNC: 0.3 MG/DL (ref 0.2–1.1)
BUN SERPL-MCNC: 23 MG/DL (ref 8–23)
CALCIUM SERPL-MCNC: 8.9 MG/DL (ref 8.3–10.4)
CANCER AG19-9 SERPL-ACNC: 12.2 U/ML (ref 2–37)
CEA SERPL-MCNC: 4.4 NG/ML (ref 0–3)
CHLORIDE SERPL-SCNC: 110 MMOL/L (ref 98–107)
CO2 SERPL-SCNC: 28 MMOL/L (ref 21–32)
COVID-19 RAPID TEST, COVR: NOT DETECTED
CREAT SERPL-MCNC: 0.87 MG/DL (ref 0.6–1)
GLOBULIN SER CALC-MCNC: 2.7 G/DL (ref 2.3–3.5)
GLUCOSE SERPL-MCNC: 156 MG/DL (ref 65–100)
POTASSIUM SERPL-SCNC: 3.6 MMOL/L (ref 3.5–5.1)
PROT SERPL-MCNC: 5.8 G/DL (ref 6.3–8.2)
SARS COV-2, XPGCVT: NEGATIVE
SARS-COV-2, COV2: NORMAL
SODIUM SERPL-SCNC: 143 MMOL/L (ref 136–145)
SOURCE, COVRS: NORMAL

## 2021-02-12 PROCEDURE — 36415 COLL VENOUS BLD VENIPUNCTURE: CPT

## 2021-02-12 PROCEDURE — 86301 IMMUNOASSAY TUMOR CA 19-9: CPT

## 2021-02-12 PROCEDURE — 74011000258 HC RX REV CODE- 258: Performed by: HOSPITALIST

## 2021-02-12 PROCEDURE — 74011250636 HC RX REV CODE- 250/636: Performed by: HOSPITALIST

## 2021-02-12 PROCEDURE — 2709999900 HC NON-CHARGEABLE SUPPLY

## 2021-02-12 PROCEDURE — 74177 CT ABD & PELVIS W/CONTRAST: CPT

## 2021-02-12 PROCEDURE — 65660000000 HC RM CCU STEPDOWN

## 2021-02-12 PROCEDURE — 74011000636 HC RX REV CODE- 636: Performed by: HOSPITALIST

## 2021-02-12 PROCEDURE — 94640 AIRWAY INHALATION TREATMENT: CPT

## 2021-02-12 PROCEDURE — 80053 COMPREHEN METABOLIC PANEL: CPT

## 2021-02-12 PROCEDURE — 94760 N-INVAS EAR/PLS OXIMETRY 1: CPT

## 2021-02-12 PROCEDURE — 74011000250 HC RX REV CODE- 250: Performed by: HOSPITALIST

## 2021-02-12 PROCEDURE — 82378 CARCINOEMBRYONIC ANTIGEN: CPT

## 2021-02-12 PROCEDURE — 97110 THERAPEUTIC EXERCISES: CPT

## 2021-02-12 PROCEDURE — 74011250637 HC RX REV CODE- 250/637: Performed by: HOSPITALIST

## 2021-02-12 PROCEDURE — 82105 ALPHA-FETOPROTEIN SERUM: CPT

## 2021-02-12 RX ORDER — SODIUM CHLORIDE, SODIUM LACTATE, POTASSIUM CHLORIDE, CALCIUM CHLORIDE 600; 310; 30; 20 MG/100ML; MG/100ML; MG/100ML; MG/100ML
50 INJECTION, SOLUTION INTRAVENOUS CONTINUOUS
Status: DISPENSED | OUTPATIENT
Start: 2021-02-12 | End: 2021-02-13

## 2021-02-12 RX ORDER — SODIUM CHLORIDE 0.9 % (FLUSH) 0.9 %
10 SYRINGE (ML) INJECTION
Status: COMPLETED | OUTPATIENT
Start: 2021-02-12 | End: 2021-02-12

## 2021-02-12 RX ADMIN — DIAZEPAM 2 MG: 2 TABLET ORAL at 14:40

## 2021-02-12 RX ADMIN — HYDROMORPHONE HYDROCHLORIDE 2 MG: 2 TABLET ORAL at 20:55

## 2021-02-12 RX ADMIN — PRIMIDONE 50 MG: 50 TABLET ORAL at 08:13

## 2021-02-12 RX ADMIN — IOPAMIDOL 100 ML: 755 INJECTION, SOLUTION INTRAVENOUS at 10:58

## 2021-02-12 RX ADMIN — SUCRALFATE 1 G: 1 TABLET ORAL at 20:58

## 2021-02-12 RX ADMIN — Medication 10 ML: at 10:58

## 2021-02-12 RX ADMIN — SUCRALFATE 1 G: 1 TABLET ORAL at 14:40

## 2021-02-12 RX ADMIN — PRIMIDONE 50 MG: 50 TABLET ORAL at 20:59

## 2021-02-12 RX ADMIN — BUSPIRONE HYDROCHLORIDE 15 MG: 5 TABLET ORAL at 20:57

## 2021-02-12 RX ADMIN — BUDESONIDE 6 MG: 3 CAPSULE, GELATIN COATED ORAL at 08:13

## 2021-02-12 RX ADMIN — AZITHROMYCIN MONOHYDRATE 250 MG: 250 TABLET ORAL at 20:55

## 2021-02-12 RX ADMIN — DIATRIZOATE MEGLUMINE AND DIATRIZOATE SODIUM 15 ML: 660; 100 LIQUID ORAL; RECTAL at 09:04

## 2021-02-12 RX ADMIN — DOCUSATE SODIUM 50MG AND SENNOSIDES 8.6MG 1 TABLET: 8.6; 5 TABLET, FILM COATED ORAL at 08:13

## 2021-02-12 RX ADMIN — TOPIRAMATE 50 MG: 50 TABLET, FILM COATED ORAL at 17:30

## 2021-02-12 RX ADMIN — BUSPIRONE HYDROCHLORIDE 15 MG: 5 TABLET ORAL at 08:13

## 2021-02-12 RX ADMIN — HYDROMORPHONE HYDROCHLORIDE 2 MG: 2 TABLET ORAL at 11:56

## 2021-02-12 RX ADMIN — HYDROMORPHONE HYDROCHLORIDE 2 MG: 2 TABLET ORAL at 17:31

## 2021-02-12 RX ADMIN — BUDESONIDE AND FORMOTEROL FUMARATE DIHYDRATE 2 PUFF: 160; 4.5 AEROSOL RESPIRATORY (INHALATION) at 20:16

## 2021-02-12 RX ADMIN — DIAZEPAM 2 MG: 2 TABLET ORAL at 09:03

## 2021-02-12 RX ADMIN — PRIMIDONE 50 MG: 50 TABLET ORAL at 17:30

## 2021-02-12 RX ADMIN — SUCRALFATE 1 G: 1 TABLET ORAL at 17:30

## 2021-02-12 RX ADMIN — TOPIRAMATE 50 MG: 50 TABLET, FILM COATED ORAL at 08:13

## 2021-02-12 RX ADMIN — BUDESONIDE AND FORMOTEROL FUMARATE DIHYDRATE 2 PUFF: 160; 4.5 AEROSOL RESPIRATORY (INHALATION) at 08:10

## 2021-02-12 RX ADMIN — PANTOPRAZOLE SODIUM 40 MG: 40 TABLET, DELAYED RELEASE ORAL at 08:13

## 2021-02-12 RX ADMIN — METOCLOPRAMIDE 5 MG: 10 TABLET ORAL at 17:30

## 2021-02-12 RX ADMIN — METOCLOPRAMIDE 5 MG: 10 TABLET ORAL at 11:56

## 2021-02-12 RX ADMIN — PANTOPRAZOLE SODIUM 40 MG: 40 TABLET, DELAYED RELEASE ORAL at 17:30

## 2021-02-12 RX ADMIN — POLYETHYLENE GLYCOL 3350 17 G: 17 POWDER, FOR SOLUTION ORAL at 08:13

## 2021-02-12 RX ADMIN — SODIUM CHLORIDE, SODIUM LACTATE, POTASSIUM CHLORIDE, AND CALCIUM CHLORIDE 50 ML/HR: 600; 310; 30; 20 INJECTION, SOLUTION INTRAVENOUS at 08:16

## 2021-02-12 RX ADMIN — BUSPIRONE HYDROCHLORIDE 15 MG: 5 TABLET ORAL at 17:30

## 2021-02-12 RX ADMIN — TOPIRAMATE 50 MG: 50 TABLET, FILM COATED ORAL at 11:56

## 2021-02-12 RX ADMIN — TIOTROPIUM BROMIDE INHALATION SPRAY 2 PUFF: 3.12 SPRAY, METERED RESPIRATORY (INHALATION) at 08:10

## 2021-02-12 RX ADMIN — HYDROMORPHONE HYDROCHLORIDE 2 MG: 2 TABLET ORAL at 00:31

## 2021-02-12 RX ADMIN — DIAZEPAM 2 MG: 2 TABLET ORAL at 03:13

## 2021-02-12 RX ADMIN — SODIUM CHLORIDE 100 ML: 900 INJECTION, SOLUTION INTRAVENOUS at 10:58

## 2021-02-12 RX ADMIN — DILTIAZEM HYDROCHLORIDE 120 MG: 120 CAPSULE, COATED, EXTENDED RELEASE ORAL at 08:12

## 2021-02-12 RX ADMIN — SUCRALFATE 1 G: 1 TABLET ORAL at 08:13

## 2021-02-12 RX ADMIN — DIAZEPAM 2 MG: 2 TABLET ORAL at 20:57

## 2021-02-12 RX ADMIN — METOCLOPRAMIDE 5 MG: 10 TABLET ORAL at 08:12

## 2021-02-12 RX ADMIN — SERTRALINE 100 MG: 100 TABLET, FILM COATED ORAL at 08:13

## 2021-02-12 RX ADMIN — DOCUSATE SODIUM 50MG AND SENNOSIDES 8.6MG 1 TABLET: 8.6; 5 TABLET, FILM COATED ORAL at 17:30

## 2021-02-12 RX ADMIN — METOCLOPRAMIDE 5 MG: 10 TABLET ORAL at 20:56

## 2021-02-12 RX ADMIN — HYDROMORPHONE HYDROCHLORIDE 2 MG: 2 TABLET ORAL at 08:16

## 2021-02-12 NOTE — PROGRESS NOTES
Problem: Falls - Risk of  Goal: *Absence of Falls  Description: Document Geller Reason Fall Risk and appropriate interventions in the flowsheet.   Outcome: Progressing Towards Goal  Note: Fall Risk Interventions:  Mobility Interventions: Communicate number of staff needed for ambulation/transfer, Patient to call before getting OOB         Medication Interventions: Evaluate medications/consider consulting pharmacy, Patient to call before getting OOB, Teach patient to arise slowly    Elimination Interventions: Bed/chair exit alarm, Call light in reach, Patient to call for help with toileting needs    History of Falls Interventions: Bed/chair exit alarm, Consult care management for discharge planning, Door open when patient unattended, Evaluate medications/consider consulting pharmacy, Investigate reason for fall, Room close to nurse's station

## 2021-02-12 NOTE — PROGRESS NOTES
[facility] Hospitalist Service Progress Note      Assessment / Plan:      Severe back pain due to recent fall, newly diagnosed acute [id number] fracture and sacral s3 sacral insufficiency fracture with opiate tolerance and dependency, chronic pain, high de-escalate pain medications to oral Dilaudid, avoid IV medications whenever possible due to tolerance  February 8better pain control, still uncomfortable, continue treatments, appreciate IR consult, MRI done, await further recommendation  February 10scheduled for kyphoplasty tomorrow, n.p.o. after midnight,  February 11status post kyphoplasty, with documented complications, continue inpatient pain control, physical therapy to evaluate tomorrow  Feb 12 - doing well , seen by pt, will benefit from snf with rehab     Severe protein malnutritionlikely secondary to above, BMI 16, add Ensure 3 times daily, consider dietary consultation  February 8evidently weight loss is chronic, progressive, and unintentional, history of splenomegaly, outpatient work-up has been done, will contact family members obtain medical record, consider inpatient work-up if clinically indicated  Feb 12 -  ct abd/pelvis today      aortic regurg  2/9 - JOSE today for further evaluation, bcx pending (orderd by cardiology) for the possibility of endocarditis,   February 10JOSE no evidence of vegetations,  \"There was likely mild to moderate regurgitation with a very  eccentric jet which appears to fan out (no diastolic aortic flow reversal;  narrower appearing vena contracta). \" culture no growth,    New onset atrial fibrillation with rapid ventricular responsestatus post IV diltiazem's, heart rate improved, cardiology consulted, chest Vascor 3, with high bleeding risk due to frequent and recent falls, monitor without anticoagulation        Polypharmacyhistory of frequent high-dose opiates and Ativan useslowly wean patient's if tolerated,    Hypokalemia,-replace, continue to monitor while inpatient    COPD exacerbation with increased sputum lungs, chronic respiratory failure secondary to advanced COPD on 4 L at baseline -we will order nebulizer treatments, Mucomyst, Symbicort,  encourage pulmonary toilet, low-dose azithromycin  February 8exacerbation seemingly resolved, continue treatment above     History of severe depressions, anxiety, and high-dose antidepressants and frequent Ativan  2/9 -Long discussion with patient in son at bedside regarding the risks of polypharmacy, patient is agreeable to decrease  Polypharmacy slowly   Chief Complaint : Fall      Subjective:  Patient's pain has slowly improving, now status post kyphoplasty, cardiovascular, respiratory, GI review of system negative except mentioned above  Objective:  Visit Vitals  /64 (BP 1 Location: Left upper arm)   Pulse 72   Temp 98.2 °F (36.8 °C)   Resp 18   Ht 5' 8\" (1.727 m)   Wt 52.4 kg (115 lb 9.6 oz)   SpO2 96%   BMI 17.58 kg/m²                 Physical Exam:  General: No acute distress, speaking in full sentences, no use of accessory muscles, diffuse muscle loss  HEENT: Pupils equal and reactive to light and accommodation, oropharynx is clear   Neck: Supple, no lymphadenopathy, no JVD   Lungs:   mild decreased breath sound   cardiovascular:IR Regular rate and rhythm with normal S1 and S2   Abdomen: Soft, nontender, nondistended, normoactive bowel sounds   Extremities: No cyanosis  or edema   Neuro: Nonfocal, A&O x3   Psych: Normal affect     Intake and Output:  Date 02/11/21 0700 - 02/12/21 0659 02/12/21 0700 - 02/13/21 0659   Shift 0700-1859 1900-0659 24 Hour Total 3608-4426 4039-3309 24 Hour Total   INTAKE   P.O.  600 600 240  240     P. O.  600 600 240  240   I. V.(mL/kg/hr) 450(0.7)  450(0.4)        Volume (lactated Ringers infusion) 450  450      Shift Total(mL/kg) 450(8.5) 600(11.4) 1050(20) 240(4.6)  240(4.6)   OUTPUT   Urine(mL/kg/hr) 400(0.6) 1800(2.9) 2200(1.7) 300  300     Urine Output 0  0        Urine Output (mL) (Urinary Catheter 02/06/21 2- way) 400 1800 2200 300  300   Stool           Stool Occurrence(s)    1 x  1 x   Blood 5  5        Estimated Blood Loss 5  5      Shift Total(mL/kg) 405(7.7) 1800(34.3) 2205(42.1) 300(5.7)  300(5.7)   NET 45 -1200 -1155 -60  -60   Weight (kg) 52.7 52.4 52.4 52.4 52.4 52.4       LAB:  No results displayed because visit has over 200 results. IMAGING:  Xr Spine Lumb 2 Or 3 V    Result Date: 2/6/2021  Negative for acute hip fracture. Osteoarthritis. LUMBAR SPINE, 3 views. HISTORY: Low back pain following fall. COMPARISON: None. CT scan July 2011 TECHNIQUE: AP, lateral and cone-down lumbosacral junction views. FINDINGS: Spinal alignment: Anatomic. Disk spaces: Maintained. Pedicles:  Appear intact. Vertebral bodies: Decreased height of L3 SI joints:  Appear symmetric. Facet joints:  Also unremarkable. Other:  Osteophytes. Aortic calcifications. IMPRESSION: Age-indeterminate moderate compression fracture L3. Posterior intact. CHEST X-RAY, one view. HISTORY:  Chest pain  falls. TECHNIQUE:  AP supine view. COMPARISON: None. FINDINGS: Lungs: Hyperinflated and clear. Small calcified granulomas. No pneumothorax. Costophrenic angles: are sharp. Heart size: is normal. Pulmonary vasculature: is unremarkable. Aorta: Arch calcifications. . Included portion of the upper abdomen: is unremarkable. Bones: No gross bony lesions. Other: None. IMPRESSION:  Negative for acute change. Xr Hip Rt W Or Wo Pelv 2-3 Vws    Result Date: 2/6/2021  Negative for acute hip fracture. Osteoarthritis. LUMBAR SPINE, 3 views. HISTORY: Low back pain following fall. COMPARISON: None. CT scan July 2011 TECHNIQUE: AP, lateral and cone-down lumbosacral junction views. FINDINGS: Spinal alignment: Anatomic. Disk spaces: Maintained. Pedicles:  Appear intact. Vertebral bodies: Decreased height of L3 SI joints:  Appear symmetric. Facet joints:  Also unremarkable. Other:  Osteophytes. Aortic calcifications.  IMPRESSION: Age-indeterminate moderate compression fracture L3. Posterior intact. CHEST X-RAY, one view. HISTORY:  Chest pain  falls. TECHNIQUE:  AP supine view. COMPARISON: None. FINDINGS: Lungs: Hyperinflated and clear. Small calcified granulomas. No pneumothorax. Costophrenic angles: are sharp. Heart size: is normal. Pulmonary vasculature: is unremarkable. Aorta: Arch calcifications. . Included portion of the upper abdomen: is unremarkable. Bones: No gross bony lesions. Other: None. IMPRESSION:  Negative for acute change. Ir Kyphoplasty Lumbar    Result Date: 2/11/2021  Successful Vertebral Augmentation with Cavity Creation (Kyphoplasty) of L3. A bone biopsy was performed. PLAN:  1 hours bedrest.  Virtual Office follow-up in 1/2 weeks. The patient has been transported back to the [facility] in stable condition. ________________________________________________________________________________ _______ PROCEDURE SUMMARY: - Fluoroscopic guided vertebral augmentation with cavity creation (Kyphoplasty). -Fluoroscopic Guided Bone Biopsy:  was performed. PROCEDURE DETAILS: Pre-procedure Consent:  Informed written and oral consent for the procedure was obtained after explanation of risks (including, but not limitted to:  hemorrhage, bone fracture, infection, cement migration) benefits and alternatives. The patient's questions were answered to their satisfaction. They stated understanding and requested that we proceed. Final verification:  A time-out identifying the patient and planned procedure was performed prior to the procedure. Preparation:  Maximal sterile barrier technique (including:  cap, mask, sterile gown, sterile gloves, sterile sheet, hand hygiene, and cutaneous antisepsis) was used. The patient was placed prone on the fluoroscopy table. All contact points were appropriately padded.  Anesthesia/sedation Level of anesthesia/sedation:  Total intravenous sedation and analgesia Anesthesia/sedation administered by: Anesthesiology Service Total intra-service sedation time (minutes):  Not applicable Medications: Ancef 2 g IV. Vertebral/Sacral Body Biopsy Target level: L3 Pedicle access: Unipedicular Number of specimen:1 Vertebral Augmentation with Cavity Creation (Kyphoplasty) Target fracture level(s):  L3.  Local anesthesia was achieved with lidocaine and bupivacaine. Using fluoroscopic guidance, transpedicular approach was used at both the right and left pedicles. Pedicle access:  Bipedicular. Access trocar(s) gauge:  10 gauge Kyphon Express II. Cavity creation device utilized:  15 mm balloon. Cement type:  Polymethylmethacrylate (PMMA). :  [employer]. Clinically significant cement leak occurrence:  No. Closure The devices were removed and hemostasis was achieved with manual compression. Sterile glue was placed on the skin. Radiation Dose Reference Air Kerma (Ka,r) :  108  mGy Dose Area Product/Kerma Area Product (DAP/JANNIE/PKA) :  304.79 cGy-cm2 Fluoroscopy Exposure Time :  3 minutes 24 seconds  Fluorographic Images :  30 Additional Details Additional description of procedure:  None Equipment details:  None Specimens removed:  None Estimated blood loss (mL):  Less than 10 Standardized report: [email or web address] Attestation Signer name: Chris Louie M.D. I attest that I performed the entire procedure. I reviewed the stored images and agree with the report as written. Mri Lumb Spine Wo Cont    Result Date: 2/8/2021  1. Acute compression fracture causing mild vertebral body height loss at L3. There is an additional relatively acute injury at S3 which may be part of a larger sacral insufficiency fracture. 2. Mild foraminal stenosis at L2-L3. 3. Mild spinal stenosis with mild foraminal stenosis at L4-L5. 4. Incompletely imaged left hepatic lobe cyst. Consider routine follow-up abdominal sonography for further assessment.     Ct Head Wo Cont    Result Date: 2/6/2021  Negative for acute intracranial abnormality. Chronic changes. Xr Chest Port    Result Date: 2/6/2021  Negative for acute hip fracture. Osteoarthritis. LUMBAR SPINE, 3 views. HISTORY: Low back pain following fall. COMPARISON: None. CT scan July 2011 TECHNIQUE: AP, lateral and cone-down lumbosacral junction views. FINDINGS: Spinal alignment: Anatomic. Disk spaces: Maintained. Pedicles:  Appear intact. Vertebral bodies: Decreased height of L3 SI joints:  Appear symmetric. Facet joints:  Also unremarkable. Other:  Osteophytes. Aortic calcifications. IMPRESSION: Age-indeterminate moderate compression fracture L3. Posterior intact. CHEST X-RAY, one view. HISTORY:  Chest pain  falls. TECHNIQUE:  AP supine view. COMPARISON: None. FINDINGS: Lungs: Hyperinflated and clear. Small calcified granulomas. No pneumothorax. Costophrenic angles: are sharp. Heart size: is normal. Pulmonary vasculature: is unremarkable. Aorta: Arch calcifications. . Included portion of the upper abdomen: is unremarkable. Bones: No gross bony lesions. Other: None. IMPRESSION:  Negative for acute change. EKG:  No results found for this or any previous visit.           Rachelle Samuel DO  2/12/2021 4:29 PM

## 2021-02-12 NOTE — PROGRESS NOTES
Problem: Mobility Impaired (Adult and Pediatric)  Goal: *Acute Goals and Plan of Care (Insert Text)  Outcome: Progressing Towards Goal  Note:     LTG:  (1.)Ms. Leanne Martinez will move from supine to sit and sit to supine  in bed with CONTACT GUARD ASSIST within 7 treatment day(s). (2.)Ms. Leanne Martinez will transfer from bed to chair and chair to bed with CONTACT GUARD ASSIST using the least restrictive device within 7 treatment day(s). (3.)Ms. Bueno will ambulate with CONTACT GUARD ASSIST for 50 feet with the least restrictive device within 7 treatment day(s). ________________________________________________________________________________________________      PHYSICAL THERAPY: Daily Note and PM 2/12/2021  INPATIENT: PT Visit Days : 2  Payor: [address] [place],9D / Plan: [address] / Product Type: Managed Care Medicare /       NAME/AGE/GENDER: Nettie García is a 66 y.o. female   PRIMARY DIAGNOSIS: New onset atrial fibrillation ([address] [I48.91]  Falls frequently [R29.6] New onset atrial fibrillation ([address] New onset atrial fibrillation ([address]       ICD-10: Treatment Diagnosis:    Generalized Muscle Weakness (M62.81)  Other abnormalities of gait and mobility (R26.89)   Precaution/Allergies:  Amlodipine, Lisinopril, and Niaspan [niacin]      ASSESSMENT:     Ms. Leanne Martinez presents with weakness and limited functional mobility, decreased from her baseline. She will benefit from PT to increase her functional independence with bed mobility, transfers and gait and will need STR at discharge. Pt. Had a kyphoplasty yesterday and feels better. She is supine and agrees to get up. She needed min  Assistance with steps to the chair using RW. She did not want to walk any further due to fatigue, mild SOB after activity. She did some theraputic exercises in the chair. Plans are for rehab.     This section established at most recent assessment   PROBLEM LIST (Impairments causing functional limitations):  Decreased Strength  Decreased Transfer Abilities  Decreased Ambulation Ability/Technique  Decreased Balance   INTERVENTIONS PLANNED: (Benefits and precautions of physical therapy have been discussed with the patient.)  Bed Mobility  Gait Training  Therapeutic Exercise/Strengthening  Transfer Training     TREATMENT PLAN: Frequency/Duration: daily for duration of hospital stay  Rehabilitation Potential For Stated Goals: Good     REHAB RECOMMENDATIONS (at time of discharge pending progress):    Placement: It is my opinion, based on this patient's performance to date, that Ms. Cortes Reid may benefit from intensive therapy at a [address] [place] [address] after discharge due to the functional deficits listed above that are likely to improve with skilled rehabilitation and concerns that he/she may be unsafe to be unsupervised at home due to chronic falls and increased weakness . Equipment:   None at this time              HISTORY:   History of Present Injury/Illness (Reason for Referral):  Minal Chase is a 66 y.o. female currently admitted to [facility] after a fall resulting in LBP. Conventional radiographs show an L3, wedge-shaped, Vertebral Compression Fracture as well as diffuse osteopenia. The VCF is new since at least 4/23/19 CT scan (the most recent lumbar imaging available for comparison). Newly diagnosed Atrial Fibrillation treated w rate control and low dose Eliquis (started yesterday). Severe COPD requiring 4L O2 NC at baseline. Unexplained weight loss. Chronic tobacco use.       Will order a MRI of the lumbar region to evaluate for chronicity of the L3 fracture, and to look for any other fractures  Past Medical History/Comorbidities:   Ms. Cortes Reid  has a past medical history of Anxiety (3/6/2013), Arthritis, B12 deficiency (3/6/2013), Chronic back pain (3/6/2013), Chronic kidney disease (CKD) stage G3b/A1, moderately decreased glomerular filtration rate (GFR) between 30-44 mL/min/1.73 square meter and albuminuria creatinine ratio less than 30 mg/g (10/14/2016), Constipation (11/4/2015), COPD (2006), Decreased GFR (5/26/2016), Depression (10/25/2012), Depression (10/25/2012), Depression, major, in remission ([facility] (1/31/2018), diverticulitis, Encounter for long-term (current) use of other medications (3/6/2013), Environmental allergies (3/6/2013), Fecal incontinence (12/7/2016), Fibromyalgia (9/15/2015), GERD (gastroesophageal reflux disease) (\"years\"), Headache(784.0) (2/12/2013), HTN (hypertension) (10/25/2012), HTN (hypertension) (10/25/2012), Hyperlipidemia (10/25/2012), Lymphocytic colitis (15/9/8501), Metabolic syndrome (2/6/3559), Microalbuminuria (11/4/2015), Osteoporosis (10/25/2012), polypectomy, PUD (peptic ulcer disease) (\"years ago\"), thyroid mass, Unspecified adverse effect of anesthesia, Unspecified sleep apnea, and Vitamin D deficiency (3/6/2013). Ms. Mary Jo Abdi  has a past surgical history that includes hx partial thyroidectomy (1990s); pr colostomy (1990s); hx appendectomy (1970s); hx cholecystectomy (1970s); hx gyn (1970s); pr breast surgery procedure unlisted (1980s); hx hernia repair (unknown); hx orthopaedic (2232,5307); hx cataract removal (Right, 2016); hx breast biopsy (Right); and ir kyphoplasty lumbar (2/11/2021). Social History/Living Environment: lives alone and has [address] aides come 4 hours a day 7 days a week. Prior Level of Function/Work/Activity:  Chronic falling. Uses a rolling walker or wheelchair in the home. Requires assist with ADL's. Number of Personal Factors/Comorbidities that affect the Plan of Care: 1-2: MODERATE COMPLEXITY   EXAMINATION:   Most Recent Physical Functioning:   Gross Assessment:                  Posture:     Balance:  Sitting: Intact  Standing: Pull to stand; With support Bed Mobility:  Supine to Sit: Contact guard assistance(with rail)  Wheelchair Mobility:     Transfers:  Sit to Stand: Minimum assistance  Stand to Sit: Minimum assistance  Bed to Chair: Minimum assistance  Duration: 25 Minutes  Gait:     Base of Support: Narrowed  Speed/Reva: Delayed  Step Length: Left shortened;Right shortened  Gait Abnormalities: Decreased step clearance;Shuffling gait  Distance (ft): 3 Feet (ft)  Assistive Device: Walker, rolling  Ambulation - Level of Assistance: Minimal assistance  Interventions: Safety awareness training;Verbal cues      Body Structures Involved:  Muscles Body Functions Affected: Movement Related Activities and Participation Affected: Mobility   Number of elements that affect the Plan of Care: 3: MODERATE COMPLEXITY   CLINICAL PRESENTATION:   Presentation: Stable and uncomplicated: LOW COMPLEXITY   CLINICAL DECISION MAKING:   [facility] AM-PAC 6 Clicks   Basic Mobility Inpatient Short Form  How much difficulty does the patient currently have. .. Unable A Lot A Little None   1. Turning over in bed (including adjusting bedclothes, sheets and blankets)? [] 1   [x] 2   [] 3   [] 4   2. Sitting down on and standing up from a chair with arms ( e.g., wheelchair, bedside commode, etc.)   [] 1   [x] 2   [] 3   [] 4   3. Moving from lying on back to sitting on the side of the bed? [] 1   [x] 2   [] 3   [] 4   How much help from another person does the patient currently need. .. Total A Lot A Little None   4. Moving to and from a bed to a chair (including a wheelchair)? [] 1   [x] 2   [] 3   [] 4   5. Need to walk in hospital room? [] 1   [x] 2   [] 3   [] 4   6. Climbing 3-5 steps with a railing? [x] 1   [] 2   [] 3   [] 4   © 2007, Trustees of [facility], under license to [employer]. All rights reserved      Score:  Initial: 11 Most Recent: X (Date: -- )    Interpretation of Tool:  Represents activities that are increasingly more difficult (i.e. Bed mobility, Transfers, Gait).     Medical Necessity:     Patient is expected to demonstrate progress in   strength, balance, and functional technique to   increase independence with bed mobility, transfers and gait  . Reason for Services/Other Comments:  Patient continues to require skilled intervention due to   Limited functional independence  . Use of outcome tool(s) and clinical judgement create a POC that gives a: Clear prediction of patient's progress: LOW COMPLEXITY            TREATMENT:   (In addition to Assessment/Re-Assessment sessions the following treatments were rendered)   Pre-treatment Symptoms/Complaints:  back pain  Pain: Initial:      Post Session:  7     Therapeutic Activity: (  25 Minutes ):  Therapeutic activities including Bed transfers, Chair transfers, Ambulation on level ground and standing with RW to improve mobility, strength and balance. Required minimal Safety awareness training;Verbal cues to promote static and dynamic balance in standing. She performed long arc quads, ankle pumps, quad sets, glut sets and hip abduction x 10 each in chair. Braces/Orthotics/Lines/Etc:   mcguire catheter  Treatment/Session Assessment:    Response to Treatment:  Did fine  Interdisciplinary Collaboration:   Registered Nurse  Certified Nursing Assistant/Patient Care Technician  After treatment position/precautions:   Up in chair  Bed/Chair-wheels locked  Bed in low position  Call light within reach  RN notified   Compliance with Program/Exercises: Compliant all of the time  Recommendations/Intent for next treatment session: \"Next visit will focus on advancements to more challenging activities and reduction in assistance provided\".   Total Treatment Duration:  PT Patient Time In/Time Out  Time In: 1200  Time Out: Raymond 57, PT

## 2021-02-13 LAB
ATRIAL RATE: 81 BPM
BACTERIA SPEC CULT: NORMAL
BACTERIA SPEC CULT: NORMAL
CALCULATED P AXIS, ECG09: 98 DEGREES
CALCULATED R AXIS, ECG10: -49 DEGREES
CALCULATED T AXIS, ECG11: -2 DEGREES
DIAGNOSIS, 93000: NORMAL
P-R INTERVAL, ECG05: 154 MS
Q-T INTERVAL, ECG07: 420 MS
QRS DURATION, ECG06: 106 MS
QTC CALCULATION (BEZET), ECG08: 487 MS
SERVICE CMNT-IMP: NORMAL
SERVICE CMNT-IMP: NORMAL
VENTRICULAR RATE, ECG03: 81 BPM

## 2021-02-13 PROCEDURE — 65660000000 HC RM CCU STEPDOWN

## 2021-02-13 PROCEDURE — 74011000258 HC RX REV CODE- 258: Performed by: HOSPITALIST

## 2021-02-13 PROCEDURE — 94760 N-INVAS EAR/PLS OXIMETRY 1: CPT

## 2021-02-13 PROCEDURE — 94640 AIRWAY INHALATION TREATMENT: CPT

## 2021-02-13 PROCEDURE — 97535 SELF CARE MNGMENT TRAINING: CPT

## 2021-02-13 PROCEDURE — 74011000250 HC RX REV CODE- 250: Performed by: HOSPITALIST

## 2021-02-13 PROCEDURE — 74011250637 HC RX REV CODE- 250/637: Performed by: HOSPITALIST

## 2021-02-13 PROCEDURE — 74011250636 HC RX REV CODE- 250/636: Performed by: HOSPITALIST

## 2021-02-13 PROCEDURE — 97530 THERAPEUTIC ACTIVITIES: CPT

## 2021-02-13 RX ORDER — POLYETHYLENE GLYCOL 3350 17 G/17G
17 POWDER, FOR SOLUTION ORAL DAILY
Status: DISCONTINUED | OUTPATIENT
Start: 2021-02-13 | End: 2021-02-15 | Stop reason: HOSPADM

## 2021-02-13 RX ORDER — METRONIDAZOLE 500 MG/100ML
500 INJECTION, SOLUTION INTRAVENOUS EVERY 8 HOURS
Status: COMPLETED | OUTPATIENT
Start: 2021-02-13 | End: 2021-02-14

## 2021-02-13 RX ORDER — ACETYLCYSTEINE 200 MG/ML
2 SOLUTION ORAL; RESPIRATORY (INHALATION)
Status: DISCONTINUED | OUTPATIENT
Start: 2021-02-13 | End: 2021-02-13

## 2021-02-13 RX ORDER — HYDROMORPHONE HYDROCHLORIDE 2 MG/1
1 TABLET ORAL
Status: DISCONTINUED | OUTPATIENT
Start: 2021-02-13 | End: 2021-02-15 | Stop reason: HOSPADM

## 2021-02-13 RX ORDER — GUAIFENESIN 600 MG/1
600 TABLET, EXTENDED RELEASE ORAL EVERY 12 HOURS
Status: DISCONTINUED | OUTPATIENT
Start: 2021-02-13 | End: 2021-02-15 | Stop reason: HOSPADM

## 2021-02-13 RX ADMIN — PRIMIDONE 50 MG: 50 TABLET ORAL at 21:17

## 2021-02-13 RX ADMIN — DIAZEPAM 2 MG: 2 TABLET ORAL at 09:41

## 2021-02-13 RX ADMIN — METOCLOPRAMIDE 5 MG: 10 TABLET ORAL at 15:30

## 2021-02-13 RX ADMIN — METRONIDAZOLE 500 MG: 500 INJECTION, SOLUTION INTRAVENOUS at 21:15

## 2021-02-13 RX ADMIN — BUDESONIDE 6 MG: 3 CAPSULE, GELATIN COATED ORAL at 08:08

## 2021-02-13 RX ADMIN — HYDROMORPHONE HYDROCHLORIDE 2 MG: 2 TABLET ORAL at 09:41

## 2021-02-13 RX ADMIN — BUSPIRONE HYDROCHLORIDE 15 MG: 5 TABLET ORAL at 21:17

## 2021-02-13 RX ADMIN — METOCLOPRAMIDE 5 MG: 10 TABLET ORAL at 21:18

## 2021-02-13 RX ADMIN — METOCLOPRAMIDE 5 MG: 10 TABLET ORAL at 08:08

## 2021-02-13 RX ADMIN — HYDROMORPHONE HYDROCHLORIDE 2 MG: 2 TABLET ORAL at 14:18

## 2021-02-13 RX ADMIN — POLYETHYLENE GLYCOL 3350 17 G: 17 POWDER, FOR SOLUTION ORAL at 08:11

## 2021-02-13 RX ADMIN — PANTOPRAZOLE SODIUM 40 MG: 40 TABLET, DELAYED RELEASE ORAL at 17:23

## 2021-02-13 RX ADMIN — HYDROMORPHONE HYDROCHLORIDE 2 MG: 2 TABLET ORAL at 18:23

## 2021-02-13 RX ADMIN — GUAIFENESIN 600 MG: 600 TABLET, EXTENDED RELEASE ORAL at 21:18

## 2021-02-13 RX ADMIN — TOPIRAMATE 50 MG: 50 TABLET, FILM COATED ORAL at 11:44

## 2021-02-13 RX ADMIN — BUSPIRONE HYDROCHLORIDE 15 MG: 5 TABLET ORAL at 08:09

## 2021-02-13 RX ADMIN — PRIMIDONE 50 MG: 50 TABLET ORAL at 15:27

## 2021-02-13 RX ADMIN — BUDESONIDE AND FORMOTEROL FUMARATE DIHYDRATE 2 PUFF: 160; 4.5 AEROSOL RESPIRATORY (INHALATION) at 08:15

## 2021-02-13 RX ADMIN — BUSPIRONE HYDROCHLORIDE 15 MG: 5 TABLET ORAL at 15:26

## 2021-02-13 RX ADMIN — AZITHROMYCIN MONOHYDRATE 250 MG: 250 TABLET ORAL at 21:17

## 2021-02-13 RX ADMIN — DOCUSATE SODIUM 50MG AND SENNOSIDES 8.6MG 1 TABLET: 8.6; 5 TABLET, FILM COATED ORAL at 08:12

## 2021-02-13 RX ADMIN — DIAZEPAM 2 MG: 2 TABLET ORAL at 15:27

## 2021-02-13 RX ADMIN — TOPIRAMATE 50 MG: 50 TABLET, FILM COATED ORAL at 08:10

## 2021-02-13 RX ADMIN — DIAZEPAM 2 MG: 2 TABLET ORAL at 03:10

## 2021-02-13 RX ADMIN — HYDROMORPHONE HYDROCHLORIDE 1 MG: 2 TABLET ORAL at 21:18

## 2021-02-13 RX ADMIN — HYDROMORPHONE HYDROCHLORIDE 2 MG: 2 TABLET ORAL at 03:10

## 2021-02-13 RX ADMIN — DOCUSATE SODIUM 50MG AND SENNOSIDES 8.6MG 1 TABLET: 8.6; 5 TABLET, FILM COATED ORAL at 17:23

## 2021-02-13 RX ADMIN — TIOTROPIUM BROMIDE INHALATION SPRAY 2 PUFF: 3.12 SPRAY, METERED RESPIRATORY (INHALATION) at 08:16

## 2021-02-13 RX ADMIN — CEFTRIAXONE 1 G: 1 INJECTION, POWDER, FOR SOLUTION INTRAMUSCULAR; INTRAVENOUS at 11:45

## 2021-02-13 RX ADMIN — SERTRALINE 100 MG: 100 TABLET, FILM COATED ORAL at 08:10

## 2021-02-13 RX ADMIN — DIAZEPAM 2 MG: 2 TABLET ORAL at 21:17

## 2021-02-13 RX ADMIN — TOPIRAMATE 50 MG: 50 TABLET, FILM COATED ORAL at 15:30

## 2021-02-13 RX ADMIN — SUCRALFATE 1 G: 1 TABLET ORAL at 13:00

## 2021-02-13 RX ADMIN — PANTOPRAZOLE SODIUM 40 MG: 40 TABLET, DELAYED RELEASE ORAL at 08:12

## 2021-02-13 RX ADMIN — SUCRALFATE 1 G: 1 TABLET ORAL at 17:23

## 2021-02-13 RX ADMIN — METRONIDAZOLE 500 MG: 500 INJECTION, SOLUTION INTRAVENOUS at 12:50

## 2021-02-13 RX ADMIN — METOCLOPRAMIDE 5 MG: 10 TABLET ORAL at 11:44

## 2021-02-13 RX ADMIN — PRIMIDONE 50 MG: 50 TABLET ORAL at 08:09

## 2021-02-13 RX ADMIN — SUCRALFATE 1 G: 1 TABLET ORAL at 08:12

## 2021-02-13 RX ADMIN — DILTIAZEM HYDROCHLORIDE 120 MG: 120 CAPSULE, COATED, EXTENDED RELEASE ORAL at 08:10

## 2021-02-13 RX ADMIN — SUCRALFATE 1 G: 1 TABLET ORAL at 21:17

## 2021-02-13 NOTE — PROGRESS NOTES
Problem: Falls - Risk of  Goal: *Absence of Falls  Description: Document Shelia Maya Fall Risk and appropriate interventions in the flowsheet. Outcome: Progressing Towards Goal  Note: Fall Risk Interventions:  Mobility Interventions: Bed/chair exit alarm, Patient to call before getting OOB         Medication Interventions: Bed/chair exit alarm, Patient to call before getting OOB    Elimination Interventions: Bed/chair exit alarm, Call light in reach    History of Falls Interventions: Bed/chair exit alarm         Problem: Patient Education: Go to Patient Education Activity  Goal: Patient/Family Education  Outcome: Progressing Towards Goal     Problem: Pressure Injury - Risk of  Goal: *Prevention of pressure injury  Description: Document Gen Scale and appropriate interventions in the flowsheet.   Outcome: Progressing Towards Goal  Note: Pressure Injury Interventions:  Sensory Interventions: Assess need for specialty bed, Check visual cues for pain, Minimize linen layers    Moisture Interventions: Apply protective barrier, creams and emollients, Minimize layers    Activity Interventions: Pressure redistribution bed/mattress(bed type), Increase time out of bed    Mobility Interventions: PT/OT evaluation, Pressure redistribution bed/mattress (bed type)    Nutrition Interventions: Document food/fluid/supplement intake    Friction and Shear Interventions: Apply protective barrier, creams and emollients, Minimize layers, HOB 30 degrees or less                Problem: Patient Education: Go to Patient Education Activity  Goal: Patient/Family Education  Outcome: Progressing Towards Goal     Problem: Patient Education: Go to Patient Education Activity  Goal: Patient/Family Education  Description: Pt/caregiver will demonstrate and verbalize good understanding of OT recommendations regarding ADL status, functional transfer status, home safety, DME, AE, energy conservation techniques, follow-up therapy, for increasing safety with functional tasks upon discharge.     Outcome: Progressing Towards Goal     Problem: Patient Education: Go to Patient Education Activity  Goal: Patient/Family Education  Outcome: Progressing Towards Goal     Problem: Patient Education: Go to Patient Education Activity  Goal: Patient/Family Education  Outcome: Progressing Towards Goal

## 2021-02-13 NOTE — PROGRESS NOTES
[facility] Hospitalist Service Progress Note      Assessment / Plan:      Severe back pain due to recent fall, newly diagnosed acute [id number] fracture and sacral s3 sacral insufficiency fracture with opiate tolerance and dependency, chronic pain, high de-escalate pain medications to oral Dilaudid, avoid IV medications whenever possible due to tolerance  February 8better pain control, still uncomfortable, continue treatments, appreciate IR consult, MRI done, await further recommendation  February 10scheduled for kyphoplasty tomorrow, n.p.o. after midnight,  February 11status post kyphoplasty, with documented complications, continue inpatient pain control, physical therapy to evaluate tomorrow  Feb 12 - doing well , seen by pt, will benefit from snf with rehab     Severe constipations, with mild enteritis on CT  February 13we will start more aggressive bowel regimens, short course of empiric IV antibiotics Rocephin and Flagyl    Severe protein malnutritionlikely secondary to above, BMI 16, add Ensure 3 times daily, consider dietary consultation  February 8evidently weight loss is chronic, progressive, and unintentional, history of splenomegaly, outpatient work-up has been done, will contact family members obtain medical record, consider inpatient work-up if clinically indicated  Feb 12 -  ct abd/pelvis today   Feb 13no evidence of occult malignancy, possible malabsorption versus other causes of unintentional weight loss, advised patient to follow-up with primary care, GI referrals if indicated for suspected malabsorption, and other routine health screening     aortic regurg  2/9 - JOSE today for further evaluation, bcx pending (orderd by cardiology) for the possibility of endocarditis,   February 10JOSE no evidence of vegetations,  \"There was likely mild to moderate regurgitation with a very  eccentric jet which appears to fan out (no diastolic aortic flow reversal;  narrower appearing vena contracta). \" culture no growth,    New onset atrial fibrillation with rapid ventricular responsestatus post IV diltiazem's, heart rate improved, cardiology consulted, chest Vascor 3, with high bleeding risk due to frequent and recent falls, monitor without anticoagulation        Polypharmacyhistory of frequent high-dose opiates and Ativan useslowly wean patient's if tolerated,    Hypokalemia,-replace, continue to monitor while inpatient    COPD exacerbation with increased sputum lungs, chronic respiratory failure secondary to advanced COPD on 4 L at baseline -we will order nebulizer treatments, Mucomyst, Symbicort,  encourage pulmonary toilet, low-dose azithromycin  February 8exacerbation seemingly resolved, continue treatment above     History of severe depressions, anxiety, and high-dose antidepressants and frequent Ativan  2/9 -Long discussion with patient in son at bedside regarding the risks of polypharmacy, patient is agreeable to decrease  Polypharmacy slowly   Chief Complaint : Fall      Subjective:  Patient's pain has slowly improving, now status post kyphoplasty, cardiovascular, respiratory, GI review of system negative except mentioned above  Objective:  Visit Vitals  /64 (BP 1 Location: Right upper arm, BP Patient Position: At rest)   Pulse 69   Temp 98.3 °F (36.8 °C)   Resp 18   Ht 5' 8\" (1.727 m)   Wt 52.4 kg (115 lb 9.6 oz)   SpO2 96%   BMI 17.58 kg/m²                 Physical Exam:  General: No acute distress, speaking in full sentences, no use of accessory muscles, diffuse muscle loss  HEENT: Pupils equal and reactive to light and accommodation, oropharynx is clear   Neck: Supple, no lymphadenopathy, no JVD   Lungs:   mild decreased breath sound   cardiovascular:IR Regular rate and rhythm with normal S1 and S2   Abdomen: Soft, nontender, nondistended, normoactive bowel sounds   Extremities: No cyanosis  or edema   Neuro: Nonfocal, A&O x3   Psych: Normal affect     Intake and Output:  Date 02/12/21 0700 - 02/13/21 0659 02/13/21 0700 - 02/14/21 0659   Shift 7603-8637 8720-1399 24 Hour Total 8680-8120 8873-3610 24 Hour Total   INTAKE   P.O. 240 596 836 240  240     P. O. 240 596 836 240  240   Shift Total(mL/kg) 240(4.6) 596(11.4) 836(15.9) 240(4.6)  240(4.6)   OUTPUT   Urine(mL/kg/hr) 300(0.5) 800(1.3) 1100(0.9)        Urine Output (mL) (Urinary Catheter 02/06/21 2- way)       Stool           Stool Occurrence(s) 2 x  2 x      Shift Total(mL/kg) 300(5.7) 800(15.3) 1100(21)      NET -60 -204 -264 240  240   Weight (kg) 52.4 52.4 52.4 52.4 52.4 52.4       LAB:  No results displayed because visit has over 200 results. IMAGING:  Ir Kyphoplasty Lumbar    Result Date: 2/11/2021  Successful Vertebral Augmentation with Cavity Creation (Kyphoplasty) of L3. A bone biopsy was performed. PLAN:  1 hours bedrest.  Virtual Office follow-up in 1/2 weeks. The patient has been transported back to the [facility] in stable condition. ________________________________________________________________________________ _______ PROCEDURE SUMMARY: - Fluoroscopic guided vertebral augmentation with cavity creation (Kyphoplasty). -Fluoroscopic Guided Bone Biopsy:  was performed. PROCEDURE DETAILS: Pre-procedure Consent:  Informed written and oral consent for the procedure was obtained after explanation of risks (including, but not limitted to:  hemorrhage, bone fracture, infection, cement migration) benefits and alternatives. The patient's questions were answered to their satisfaction. They stated understanding and requested that we proceed. Final verification:  A time-out identifying the patient and planned procedure was performed prior to the procedure. Preparation:  Maximal sterile barrier technique (including:  cap, mask, sterile gown, sterile gloves, sterile sheet, hand hygiene, and cutaneous antisepsis) was used. The patient was placed prone on the fluoroscopy table.   All contact points were appropriately padded. Anesthesia/sedation Level of anesthesia/sedation:  Total intravenous sedation and analgesia Anesthesia/sedation administered by: Anesthesiology Service Total intra-service sedation time (minutes):  Not applicable Medications: Ancef 2 g IV. Vertebral/Sacral Body Biopsy Target level: L3 Pedicle access: Unipedicular Number of specimen:1 Vertebral Augmentation with Cavity Creation (Kyphoplasty) Target fracture level(s):  L3.  Local anesthesia was achieved with lidocaine and bupivacaine. Using fluoroscopic guidance, transpedicular approach was used at both the right and left pedicles. Pedicle access:  Bipedicular. Access trocar(s) gauge:  10 gauge Kyphon Express II. Cavity creation device utilized:  15 mm balloon. Cement type:  Polymethylmethacrylate (PMMA). :  Sionex/WALTOP. Clinically significant cement leak occurrence:  No. Closure The devices were removed and hemostasis was achieved with manual compression. Sterile glue was placed on the skin. Radiation Dose Reference Air Kerma (Ka,r) :  108  mGy Dose Area Product/Kerma Area Product (DAP/JANNIE/PKA) :  304.79 cGy-cm2 Fluoroscopy Exposure Time :  3 minutes 24 seconds  Fluorographic Images :  30 Additional Details Additional description of procedure:  None Equipment details:  None Specimens removed:  None Estimated blood loss (mL):  Less than 10 Standardized report: [email or web address] Attestation Signer name: Eliel Ritchie M.D. I attest that I performed the entire procedure. I reviewed the stored images and agree with the report as written. Mri Lumb Spine Wo Cont    Result Date: 2/8/2021  1. Acute compression fracture causing mild vertebral body height loss at L3. There is an additional relatively acute injury at S3 which may be part of a larger sacral insufficiency fracture. 2. Mild foraminal stenosis at L2-L3.  3. Mild spinal stenosis with mild foraminal stenosis at L4-L5. 4. Incompletely imaged left hepatic lobe cyst. Consider routine follow-up abdominal sonography for further assessment. Ct Abd Pelv W Cont    Result Date: 2/12/2021  1. Large amount stool throughout colon with probable mild enteritis. 2. Probable mucus plugging at right lung base with associated mild basilar atelectasis and small effusions. Dedicated chest CT is recommended for follow-up. CPT code(s): 75609, [id number]      EKG:  No results found for this or any previous visit.           Jessa Chandler DO  2/13/2021 4:29 PM

## 2021-02-13 NOTE — PROGRESS NOTES
Problem: Self Care Deficits Care Plan (Adult)  Goal: *Acute Goals and Plan of Care (Insert Text)  Description: Patient will complete toileting with contact guard assist to increase self care independence. Patient will complete bathing with contact guard assist to increase self care independence. Patient will improve static standing at sink for 3 minutes to improve independence with transfers and self cares. Patient will tolerate 30 minutes of OT treatment with self incorporated rest breaks to increase activity tolerance to enhance participation in hobbies. Patient will complete all functional transfers with contact guard assist using adaptive equipment as needed. Patient will complete UE exercises with contact guard assist to increase overall activity tolerance and strength. Timeframe: 7 visits       OCCUPATIONAL THERAPY: Daily Note and AM 2/13/2021  INPATIENT: OT Visit Days: 2  Payor: [address] [place],[address]D / Plan: [address] / Product Type: Managed Care Medicare /      NAME/AGE/GENDER: Suman Feliciano is a 66 y.o. female   PRIMARY DIAGNOSIS:  New onset atrial fibrillation ([address] [I48.91]  Falls frequently [R29.6] New onset atrial fibrillation ([address] New onset atrial fibrillation ([address]       ICD-10: Treatment Diagnosis:    Generalized Muscle Weakness (M62.81)  Other lack of cordination (R27.8)  Difficulty in walking, Not elsewhere classified (R26.2)   Precautions/Allergies:     Amlodipine, Lisinopril, and Niaspan [niacin]      ASSESSMENT:     Ms. Mervyn Peabody presents in ICU with COPD and new A-Fib. She lives alone but has a paid caretaker 4 hours every day. She uses a walker and has been falling more. She will need STR and possible LT placement as living alone is becoming more unsafe. She works hard during evaluation and treatment but fatigues quickly. She is below baseline for ADL's and mobility.    Initiate OT.     2/13/2021 Pt was sitting in bed and had just received breakfast. Pt was fearful of falling. Pt was willing to sit in chair to eat. Pt washed face with set up. Pt completed bed mobility and functional transfer with min A X2 using HHA. Pt was left sitting in chair with breakfast. Continue POC. This section established at most recent assessment   PROBLEM LIST (Impairments causing functional limitations):  Decreased Strength  Decreased ADL/Functional Activities  Decreased Transfer Abilities   INTERVENTIONS PLANNED: (Benefits and precautions of occupational therapy have been discussed with the patient.)  Activities of daily living training  Adaptive equipment training  Neuromuscular re-eduation  Therapeutic activity  Therapeutic exercise     TREATMENT PLAN: Frequency/Duration: Follow patient 3x a week to address above goals. Rehabilitation Potential For Stated Goals: Good     REHAB RECOMMENDATIONS (at time of discharge pending progress):    Placement: It is my opinion, based on this patient's performance to date, that Ms. Nathaniel Rosales may benefit from intensive therapy at a [address] [place] [address] after discharge due to the functional deficits listed above that are likely to improve with skilled rehabilitation and concerns that he/she may be unsafe to be unsupervised at home due to deficits above .   Equipment:   None at this time              [address]:   History of Present Injury/Illness (Reason for Referral):  Good, sitting up in recliner  Past Medical History/Comorbidities:   Ms. Nathaniel Rosales  has a past medical history of Anxiety (3/6/2013), Arthritis, B12 deficiency (3/6/2013), Chronic back pain (3/6/2013), Chronic kidney disease (CKD) stage G3b/A1, moderately decreased glomerular filtration rate (GFR) between 30-44 mL/min/1.73 square meter and albuminuria creatinine ratio less than 30 mg/g (10/14/2016), Constipation (11/4/2015), COPD (2006), Decreased GFR (5/26/2016), Depression (10/25/2012), Depression (10/25/2012), Depression, major, in [facility] ([facility] (1/31/2018), diverticulitis, Encounter for long-term (current) use of other medications (3/6/2013), Environmental allergies (3/6/2013), Fecal incontinence (12/7/2016), Fibromyalgia (9/15/2015), GERD (gastroesophageal reflux disease) (\"years\"), Headache(784.0) (2/12/2013), HTN (hypertension) (10/25/2012), HTN (hypertension) (10/25/2012), Hyperlipidemia (10/25/2012), Lymphocytic colitis (67/1/3340), Metabolic syndrome (9/5/1676), Microalbuminuria (11/4/2015), Osteoporosis (10/25/2012), polypectomy, PUD (peptic ulcer disease) (\"years ago\"), thyroid mass, Unspecified adverse effect of anesthesia, Unspecified sleep apnea, and Vitamin D deficiency (3/6/2013). Ms. Akash Perez  has a past surgical history that includes hx partial thyroidectomy (1990s); pr colostomy (1990s); hx appendectomy (1970s); hx cholecystectomy (1970s); hx gyn (1970s); pr breast surgery procedure unlisted (1980s); hx hernia repair (unknown); hx orthopaedic (3243,7840); hx cataract removal (Right, 2016); hx breast biopsy (Right); and ir kyphoplasty lumbar (2/11/2021). Social History/Living Environment:      Prior Level of Function/Work/Activity:  Caretaker 4 hours every day. Uses a walker. Number of Personal Factors/Comorbidities that affect the Plan of Care: Expanded review of therapy/medical records (1-2):  MODERATE COMPLEXITY   ASSESSMENT OF OCCUPATIONAL PERFORMANCE[de-identified]   Activities of Daily Living:   Basic ADLs (From Assessment) Complex ADLs (From Assessment)   Feeding: Setup, Stand-by assistance  Oral Facial Hygiene/Grooming: Setup  Bathing: Moderate assistance  Lower Body Dressing: Moderate assistance  Toileting:  Moderate assistance     Grooming/Bathing/Dressing Activities of Daily Living     Cognitive Retraining  Safety/Judgement: Fall prevention                       Bed/Mat Mobility  Rolling: Minimum assistance  Supine to Sit: Minimum assistance;Assist x2  Sit to Supine: Minimum assistance  Sit to Stand: Minimum assistance;Assist x2  Stand to Sit: Minimum assistance  Bed to Chair: Minimum assistance;Assist x2  Scooting: Minimum assistance     Most Recent Physical Functioning:   Gross Assessment:                  Posture:  Posture Assessment: Rounded shoulders, Forward head  Balance:  Sitting: Intact  Standing: Impaired  Standing - Static: Fair  Standing - Dynamic : Fair Bed Mobility:  Rolling: Minimum assistance  Supine to Sit: Minimum assistance;Assist x2  Sit to Supine: Minimum assistance  Scooting: Minimum assistance  Wheelchair Mobility:     Transfers:  Sit to Stand: Minimum assistance;Assist x2  Stand to Sit: Minimum assistance  Bed to Chair: Minimum assistance;Assist x2            Patient Vitals for the past 6 hrs:   BP BP Patient Position SpO2 Pulse   21 0758 117/62 At rest 98 % 73   21 0819   95 %        Mental Status  Neurologic State: Alert  Orientation Level: Oriented to person  Cognition: Follows commands  Perception: Tactile, Verbal  Perseveration: Verbal cues provided, Tactile cues provided  Safety/Judgement: Fall prevention                          Physical Skills Involved:  Range of Motion  Balance  Strength  Activity Tolerance Cognitive Skills Affected (resulting in the inability to perform in a timely and safe manner):  WFL Psychosocial Skills Affected:  WFL   Number of elements that affect the Plan of Care: 1-3:  LOW COMPLEXITY   CLINICAL DECISION MAKIN [facility] [place] [address] [address] AM-PAC 6 Clicks   Daily Activity Inpatient Short Form  How much help from another person does the patient currently need. .. Total A Lot A Little None   1. Putting on and taking off regular lower body clothing? [x] 1   [] 2   [] 3   [] 4   2. Bathing (including washing, rinsing, drying)? [] 1   [x] 2   [] 3   [] 4   3. Toileting, which includes using toilet, bedpan or urinal?   [x] 1   [] 2   [] 3   [] 4   4. Putting on and taking off regular upper body clothing? [] 1   [] 2   [x] 3   [] 4   5.   Taking care of personal grooming such as brushing teeth? [] 1   [] 2   [x] 3   [] 4   6. Eating meals? [] 1   [] 2   [x] 3   [] 4   © 2007, Trustees of [address] [place] [address] [address] [address], under license to [employer]. All rights reserved      Score:  Initial: 13 Most Recent: X (Date: -- )    Interpretation of Tool:  Represents activities that are increasingly more difficult (i.e. Bed mobility, Transfers, Gait). Medical Necessity:     Skilled intervention continues to be required due to deficits noted above. Reason for Services/Other Comments:  Patient continues to require skilled intervention due to   New Dx  . Use of outcome tool(s) and clinical judgement create a POC that gives a: MODERATE COMPLEXITY         TREATMENT:   (In addition to Assessment/Re-Assessment sessions the following treatments were rendered)     Pre-treatment Symptoms/Complaints:    Pain: Initial:   Pain Intensity 1: 0  Post Session: No change in back pain from start to end. Self Care: (10): Procedure(s) (per grid) utilized to improve and/or restore self-care/home management as related to grooming. Required minimal verbal cueing to facilitate activities of daily living skills. Braces/Orthotics/Lines/Etc:   O2 Device: Nasal cannula  Treatment/Session Assessment:    Response to Treatment:  Good. Interdisciplinary Collaboration:   Physical Therapist  Certified Occupational Therapy Assistant  Registered Nurse  After treatment position/precautions:   Up in chair  Bed/Chair-wheels locked  Bed in low position  Call light within reach  RN notified   Compliance with Program/Exercises: Compliant all of the time, Will assess as treatment progresses. Recommendations/Intent for next treatment session: \"Next visit will focus on advancements to more challenging activities and reduction in assistance provided\".   Total Treatment Duration:  OT Patient Time In/Time Out  Time In: 0900  Time Out: AMANDA Eaton

## 2021-02-13 NOTE — PROGRESS NOTES
Problem: Mobility Impaired (Adult and Pediatric)  Goal: *Acute Goals and Plan of Care (Insert Text)  Outcome: Progressing Towards Goal  Note:     LTG:  (1.)Ms. Quinten Bernstein will move from supine to sit and sit to supine  in bed with CONTACT GUARD ASSIST within 7 treatment day(s). (2.)Ms. Quinten Bernstein will transfer from bed to chair and chair to bed with CONTACT GUARD ASSIST using the least restrictive device within 7 treatment day(s). (3.)Ms. Bueno will ambulate with CONTACT GUARD ASSIST for 50 feet with the least restrictive device within 7 treatment day(s). ________________________________________________________________________________________________      PHYSICAL THERAPY: Daily Note and PM 2/13/2021  INPATIENT: PT Visit Days : 2  Payor: [address] [place],9D / Plan: [address] / Product Type: Managed Care Medicare /       NAME/AGE/GENDER: Leroy Sequeira is a 66 y.o. female   PRIMARY DIAGNOSIS: New onset atrial fibrillation ([address] [I48.91]  Falls frequently [R29.6] New onset atrial fibrillation ([address] New onset atrial fibrillation ([address]       ICD-10: Treatment Diagnosis:    Generalized Muscle Weakness (M62.81)  Other abnormalities of gait and mobility (R26.89)   Precaution/Allergies:  Amlodipine, Lisinopril, and Niaspan [niacin]      ASSESSMENT:     Ms. Quinten Bernstein presents with weakness and limited functional mobility, decreased from her baseline. She will benefit from PT to increase her functional independence with bed mobility, transfers and gait and will need STR at discharge. Pt. Had a kyphoplasty yesterday and feels better. She is supine and agrees to get up. She needed min  Assistance with steps to the chair using RW. She did not want to walk any further due to fatigue, mild SOB after activity. She did some theraputic exercises in the chair. Plans are for rehab.  2/13/2021: Patient actively participated with PT during AM session.   Patient required minimal assistance with bed mobility and transfer to bedside chair. Patient requested hand held assist today secondary to fear of walker \"getting away. \"  Patient would continue to benefit from skilled PT to improve overall mobility, strength, and endurance with daily functional mobility. This section established at most recent assessment   PROBLEM LIST (Impairments causing functional limitations):  Decreased Strength  Decreased Transfer Abilities  Decreased Ambulation Ability/Technique  Decreased Balance   INTERVENTIONS PLANNED: (Benefits and precautions of physical therapy have been discussed with the patient.)  Bed Mobility  Gait Training  Therapeutic Exercise/Strengthening  Transfer Training     TREATMENT PLAN: Frequency/Duration: daily for duration of hospital stay  Rehabilitation Potential For Stated Goals: Good     REHAB RECOMMENDATIONS (at time of discharge pending progress):    Placement: It is my opinion, based on this patient's performance to date, that Ms. Sarah Contreras may benefit from intensive therapy at a [facility] after discharge due to the functional deficits listed above that are likely to improve with skilled rehabilitation and concerns that he/she may be unsafe to be unsupervised at home due to chronic falls and increased weakness . Equipment:   None at this time              HISTORY:   History of Present Injury/Illness (Reason for Referral):  Jeremias Mendosa is a 66 y.o. female currently admitted to [facility] after a fall resulting in LBP. Conventional radiographs show an L3, wedge-shaped, Vertebral Compression Fracture as well as diffuse osteopenia. The VCF is new since at least 4/23/19 CT scan (the most recent lumbar imaging available for comparison). Newly diagnosed Atrial Fibrillation treated w rate control and low dose Eliquis (started yesterday). Severe COPD requiring 4L O2 NC at baseline. Unexplained weight loss. Chronic tobacco use.       Will order a MRI of the lumbar region to evaluate for chronicity of the L3 fracture, and to look for any other fractures  Past Medical History/Comorbidities:   Ms. Juliette Ramso  has a past medical history of Anxiety (3/6/2013), Arthritis, B12 deficiency (3/6/2013), Chronic back pain (3/6/2013), Chronic kidney disease (CKD) stage G3b/A1, moderately decreased glomerular filtration rate (GFR) between 30-44 mL/min/1.73 square meter and albuminuria creatinine ratio less than 30 mg/g (10/14/2016), Constipation (11/4/2015), COPD (2006), Decreased GFR (5/26/2016), Depression (10/25/2012), Depression (10/25/2012), Depression, major, in [facility] ([facility] (1/31/2018), diverticulitis, Encounter for long-term (current) use of other medications (3/6/2013), Environmental allergies (3/6/2013), Fecal incontinence (12/7/2016), Fibromyalgia (9/15/2015), GERD (gastroesophageal reflux disease) (\"years\"), Headache(784.0) (2/12/2013), HTN (hypertension) (10/25/2012), HTN (hypertension) (10/25/2012), Hyperlipidemia (10/25/2012), Lymphocytic colitis (21/5/0428), Metabolic syndrome (9/4/0065), Microalbuminuria (11/4/2015), Osteoporosis (10/25/2012), polypectomy, PUD (peptic ulcer disease) (\"years ago\"), thyroid mass, Unspecified adverse effect of anesthesia, Unspecified sleep apnea, and Vitamin D deficiency (3/6/2013). Ms. Juliette Ramos  has a past surgical history that includes hx partial thyroidectomy (1990s); pr colostomy (1990s); hx appendectomy (1970s); hx cholecystectomy (1970s); hx gyn (1970s); pr breast surgery procedure unlisted (1980s); hx hernia repair (unknown); hx orthopaedic (8632,1961); hx cataract removal (Right, 2016); hx breast biopsy (Right); and ir kyphoplasty lumbar (2/11/2021). Social History/Living Environment: lives alone and has [address] aides come 4 hours a day 7 days a week. Prior Level of Function/Work/Activity:  Chronic falling. Uses a rolling walker or wheelchair in the home. Requires assist with ADL's.       Number of Personal Factors/Comorbidities that affect the Plan of Care: 1-2: MODERATE COMPLEXITY   EXAMINATION:   Most Recent Physical Functioning:   Gross Assessment:  AROM: Generally decreased, functional  Strength: Generally decreased, functional              Posture:  Posture Assessment: Rounded shoulders, Forward head  Balance:  Sitting: Intact  Standing: Impaired  Standing - Static: Fair  Standing - Dynamic : Fair Bed Mobility:  Rolling: Minimum assistance  Supine to Sit: Minimum assistance  Sit to Supine: Minimum assistance  Scooting: Minimum assistance  Wheelchair Mobility:  N/A today    Transfers:  Sit to Stand: Minimum assistance  Stand to Sit: Minimum assistance  Bed to Chair: Minimum assistance  Gait:  Right Side Weight Bearing: Full  Left Side Weight Bearing: Full  Base of Support: Center of gravity altered;Narrowed  Speed/Reva: Pace decreased (<100 feet/min)  Step Length: Left shortened;Right shortened  Gait Abnormalities: Antalgic;Decreased step clearance  Distance (ft): 10 Feet (ft)  Assistive Device: Walker, rolling  Ambulation - Level of Assistance: Minimal assistance;Modified independent  Interventions: Verbal cues; Safety awareness training      Body Structures Involved:  Muscles Body Functions Affected: Movement Related Activities and Participation Affected: Mobility   Number of elements that affect the Plan of Care: 3: MODERATE COMPLEXITY   CLINICAL PRESENTATION:   Presentation: Stable and uncomplicated: LOW COMPLEXITY   CLINICAL DECISION MAKING:   [facility] AM-PAC 6 Clicks   Basic Mobility Inpatient Short Form  How much difficulty does the patient currently have. .. Unable A Lot A Little None   1. Turning over in bed (including adjusting bedclothes, sheets and blankets)? [] 1   [x] 2   [] 3   [] 4   2. Sitting down on and standing up from a chair with arms ( e.g., wheelchair, bedside commode, etc.)   [] 1   [x] 2   [] 3   [] 4   3. Moving from lying on back to sitting on the side of the bed?    [] 1   [x] 2   [] 3   [] 4   How much help from another person does the patient currently need. .. Total A Lot A Little None   4. Moving to and from a bed to a chair (including a wheelchair)? [] 1   [x] 2   [] 3   [] 4   5. Need to walk in hospital room? [] 1   [x] 2   [] 3   [] 4   6. Climbing 3-5 steps with a railing? [x] 1   [] 2   [] 3   [] 4   © 2007, Trustees of [address] [place] [address] [address] [address], under license to [employer]. All rights reserved      Score:  Initial: 11 Most Recent: X (Date: -- )    Interpretation of Tool:  Represents activities that are increasingly more difficult (i.e. Bed mobility, Transfers, Gait). Medical Necessity:     Patient is expected to demonstrate progress in strength, range of motion, balance, coordination and functional technique to improve safety during daily activities. Patient demonstrates good rehab potential due to higher previous functional level. Skilled intervention continues to be required due to decreased functional mobility. Reason for Services/Other Comments:  Patient continues to demonstrate capacity to improve overall functional mobility which will increase independence and increase safety. Use of outcome tool(s) and clinical judgement create a POC that gives a: Clear prediction of patient's progress: LOW COMPLEXITY            TREATMENT:   (In addition to Assessment/Re-Assessment sessions the following treatments were rendered)   Pre-treatment Symptoms/Complaints:  2/13/2021: Patient reports she is feeling better, but needs her anxiety pill this morning. Pain: Initial:   Pain Intensity 1: 0  Post Session:  0     Therapeutic Activity: ( 15 minutes   ):  Therapeutic activities including Bed transfers, Chair transfers, Ambulation on level ground and standing with RW to improve mobility, strength and balance. Required minimal Verbal cues; Safety awareness training to promote static and dynamic balance in standing.     Braces/Orthotics/Lines/Etc:   mcguire catheter  Treatment/Session Assessment:    Response to Treatment:  Patient tolerated treatment well without complaints of increased pain. Interdisciplinary Collaboration:   Certified Occupational Therapy Assistant  Registered Nurse  After treatment position/precautions:   Up in chair  Bed/Chair-wheels locked  Bed in low position  Call light within reach  RN notified   Compliance with Program/Exercises: Compliant all of the time  Recommendations/Intent for next treatment session: \"Next visit will focus on advancements to more challenging activities and reduction in assistance provided\".   Total Treatment Duration:  PT Patient Time In/Time Out  Time In: 0910  Time Out: [address], PT

## 2021-02-13 NOTE — PROGRESS NOTES
Patient refuses to take mucomst.  She states it is the worst smell and it nauseates her.    Flutter valve instruct and left at bedside

## 2021-02-13 NOTE — PROGRESS NOTES
Comprehensive Nutrition Assessment    Type and Reason for Visit: Initial, RD nutrition re-screen/LOS    Nutrition Recommendations/Plan:   • Change diet texture to soft  • Add prune juice to trays BID  • Brief education on increasing calories and protein at meals. Stressed consuming supplements between meals.      Malnutrition Assessment:  Malnutrition Status:  Severe Malnutrition  Context:  Chronic Illness  Findings of clinical characteristics of malnutrition:   Energy Intake:  7 - 75% or less est energy requirements for 1 month or longer  Weight Loss:  7.000 - Greater than 20% over 1 year     Body Fat Loss:  Unable to assess,     Muscle Mass Loss:  Unable to assess,        Nutrition Assessment:   Nutrition History: depression, GERD, hypertension, peptic ulcer disease, COPD on 4L O2, CKD3      Nutrition Background: Pt admitted for new onset A fib with RVR and severe aortic regurgitation, frequent falls. Most recent fall PTA resulted in L3 compression fracture requiring kyphoplasty on 2/10. Severe malnutrition documented by MD- notes wt loss has been chronic, progressive, and unintentional.  Daily Update:  Spoke with pt- reports progressive, unintentional wt loss of over 100#.States she buys food and her aid cooks food at home, but she has \"no appetite\" to eat. She typically eats a pancake with either londono or sausage. She says the next time she eats is dinner at home. Will snack on grapes between meals. She tries to drink Ensure, whole milk, or water at home. Of note, pt was refitted for dentures ~1 year ago, and notes that because of her weight loss, her new dentures do not fit, so she has to \"gum\" her food. Endorses constipation- states she had a small BM yesterday for the first time in ~1 week.    Labs: reviewed.   Meds: zoloft, pericolace, miralax    Nutrition Related Findings:   pt notes swelling in her feet. She had to be fitted for new dentures not long ago but has lost so much weight over the past year that  her new dentures dont fit. Weight Metrics 2/11/2021 2/11/2021 1/15/2021 8/4/2020 2/13/2020 2/10/2020 2/5/2020   Weight 116 lb 115 lb 9.6 oz 118 lb 118 lb 152 lb 155 lb 152 lb   BMI 17.64 kg/m2 17.58 kg/m2 17.94 kg/m2 17.43 kg/m2 22.45 kg/m2 22.89 kg/m2 22.45 kg/m2   EMR reflects 37# wt loss x 1 year. Cannot determine if wts are stated v. Measured due to CC functionality. If accurate, this is a 24% wt loss, which is clinically significant. Current Nutrition Therapies:  DIET NUTRITIONAL SUPPLEMENTS All Meals; Ensure Enlive  DIET REGULAR    Current Intake:   Average Meal Intake: (variable, 20-50%) Average Supplement Intake: Unable to assess      Anthropometric Measures:  Height: 5' 8\" (172.7 cm)  Current Body Wt: 52.2 kg (115 lb), Weight source: Bed scale  BMI: 17.5, Underweight (BMI less than 22) age over 72     Ideal Body Weight (lbs) (Calculated): 140 lbs (64 kg), 82.1 %     Estimated Daily Nutrient Needs:  Energy (kcal/day): 1572-1834kcal/d(30-34kcal/kg) (Kcal/kg, Weight Used: Current)  Protein (g/day): 62-78g/d(1.2-1.5g/kg) Weight Used: (Current)    Nutrition Diagnosis:   · Severe malnutrition related to (chronic illness) as evidenced by weight loss greater than or equal to 20% in 1 year(consuming <75% of est. needs for >1 month)    · Inadequate protein-energy intake related to increased demand for energy/nutrients(decreased ability to consume sufficient energy) as evidenced by poor intake prior to admission, BMI, constipation(pt reports ill-fitting dentures, intake insufficient to meet increased needs for COPD.)    Nutrition Interventions:   Food and/or Nutrient Delivery: Modify current diet, Continue oral nutrition supplement(Downgrade diet texture to soft. Prune juice at breakfast and lunch.)  Nutrition Education/Counseling: Survival skills/brief education completed(Discussed strategies to increase kcal/protein intake at meals without adding additional volume to food.  Encouraged consuming supplement between meals. Provided suggestions on easy to chew protein foods- will relay pt preferences to kitchen.)  Coordination of Nutrition Care: Continue to monitor while inpatient([facility]    Goals: Active Goal: Consume 75% of est. kcal/protein needs within 7 days.     Nutrition Monitoring and Evaluation:      Food/Nutrient Intake Outcomes: Food and nutrient intake, Supplement intake  Physical Signs/Symptoms Outcomes: Weight    Discharge Planning:    Continue oral nutrition supplement    Kim Curiel, [address] [place] [address], [address] [place] [address]    Disaster Mode active

## 2021-02-14 PROCEDURE — 74011000258 HC RX REV CODE- 258: Performed by: HOSPITALIST

## 2021-02-14 PROCEDURE — 74011250636 HC RX REV CODE- 250/636: Performed by: HOSPITALIST

## 2021-02-14 PROCEDURE — 2709999900 HC NON-CHARGEABLE SUPPLY

## 2021-02-14 PROCEDURE — 97530 THERAPEUTIC ACTIVITIES: CPT

## 2021-02-14 PROCEDURE — 94640 AIRWAY INHALATION TREATMENT: CPT

## 2021-02-14 PROCEDURE — 65660000000 HC RM CCU STEPDOWN

## 2021-02-14 PROCEDURE — 74011250637 HC RX REV CODE- 250/637: Performed by: HOSPITALIST

## 2021-02-14 PROCEDURE — 74011000250 HC RX REV CODE- 250: Performed by: HOSPITALIST

## 2021-02-14 PROCEDURE — 94760 N-INVAS EAR/PLS OXIMETRY 1: CPT

## 2021-02-14 RX ADMIN — PRIMIDONE 50 MG: 50 TABLET ORAL at 08:14

## 2021-02-14 RX ADMIN — SUCRALFATE 1 G: 1 TABLET ORAL at 17:09

## 2021-02-14 RX ADMIN — BUDESONIDE AND FORMOTEROL FUMARATE DIHYDRATE 2 PUFF: 160; 4.5 AEROSOL RESPIRATORY (INHALATION) at 20:45

## 2021-02-14 RX ADMIN — SUCRALFATE 1 G: 1 TABLET ORAL at 13:51

## 2021-02-14 RX ADMIN — PANTOPRAZOLE SODIUM 40 MG: 40 TABLET, DELAYED RELEASE ORAL at 17:10

## 2021-02-14 RX ADMIN — POLYETHYLENE GLYCOL 3350 17 G: 17 POWDER, FOR SOLUTION ORAL at 08:10

## 2021-02-14 RX ADMIN — HYDROMORPHONE HYDROCHLORIDE 1 MG: 2 TABLET ORAL at 19:32

## 2021-02-14 RX ADMIN — BUSPIRONE HYDROCHLORIDE 15 MG: 5 TABLET ORAL at 21:16

## 2021-02-14 RX ADMIN — DIAZEPAM 2 MG: 2 TABLET ORAL at 09:39

## 2021-02-14 RX ADMIN — PRIMIDONE 50 MG: 50 TABLET ORAL at 21:16

## 2021-02-14 RX ADMIN — METOCLOPRAMIDE 5 MG: 10 TABLET ORAL at 08:11

## 2021-02-14 RX ADMIN — CEFTRIAXONE 1 G: 1 INJECTION, POWDER, FOR SOLUTION INTRAMUSCULAR; INTRAVENOUS at 09:39

## 2021-02-14 RX ADMIN — HYDROMORPHONE HYDROCHLORIDE 2 MG: 2 TABLET ORAL at 08:15

## 2021-02-14 RX ADMIN — HYDROMORPHONE HYDROCHLORIDE 2 MG: 2 TABLET ORAL at 03:29

## 2021-02-14 RX ADMIN — METOCLOPRAMIDE 5 MG: 10 TABLET ORAL at 21:16

## 2021-02-14 RX ADMIN — GUAIFENESIN 600 MG: 600 TABLET, EXTENDED RELEASE ORAL at 08:14

## 2021-02-14 RX ADMIN — POLYETHYLENE GLYCOL 3350 17 G: 17 POWDER, FOR SOLUTION ORAL at 09:00

## 2021-02-14 RX ADMIN — AZITHROMYCIN MONOHYDRATE 250 MG: 250 TABLET ORAL at 21:16

## 2021-02-14 RX ADMIN — DIAZEPAM 2 MG: 2 TABLET ORAL at 21:10

## 2021-02-14 RX ADMIN — DOCUSATE SODIUM 50MG AND SENNOSIDES 8.6MG 1 TABLET: 8.6; 5 TABLET, FILM COATED ORAL at 17:09

## 2021-02-14 RX ADMIN — BUSPIRONE HYDROCHLORIDE 15 MG: 5 TABLET ORAL at 15:28

## 2021-02-14 RX ADMIN — PRIMIDONE 50 MG: 50 TABLET ORAL at 15:29

## 2021-02-14 RX ADMIN — TOPIRAMATE 50 MG: 50 TABLET, FILM COATED ORAL at 08:15

## 2021-02-14 RX ADMIN — BUSPIRONE HYDROCHLORIDE 15 MG: 5 TABLET ORAL at 08:13

## 2021-02-14 RX ADMIN — TIOTROPIUM BROMIDE INHALATION SPRAY 2 PUFF: 3.12 SPRAY, METERED RESPIRATORY (INHALATION) at 08:51

## 2021-02-14 RX ADMIN — SUCRALFATE 1 G: 1 TABLET ORAL at 08:14

## 2021-02-14 RX ADMIN — DIAZEPAM 2 MG: 2 TABLET ORAL at 15:29

## 2021-02-14 RX ADMIN — BUDESONIDE AND FORMOTEROL FUMARATE DIHYDRATE 2 PUFF: 160; 4.5 AEROSOL RESPIRATORY (INHALATION) at 08:51

## 2021-02-14 RX ADMIN — HYDROMORPHONE HYDROCHLORIDE 2 MG: 2 TABLET ORAL at 23:47

## 2021-02-14 RX ADMIN — SODIUM PHOSPHATE 1 ENEMA: 7; 19 ENEMA RECTAL at 17:10

## 2021-02-14 RX ADMIN — METRONIDAZOLE 500 MG: 500 INJECTION, SOLUTION INTRAVENOUS at 05:00

## 2021-02-14 RX ADMIN — TOPIRAMATE 50 MG: 50 TABLET, FILM COATED ORAL at 13:51

## 2021-02-14 RX ADMIN — HYDROMORPHONE HYDROCHLORIDE 2 MG: 2 TABLET ORAL at 15:29

## 2021-02-14 RX ADMIN — LACTULOSE 45 ML: 20 SOLUTION ORAL at 17:10

## 2021-02-14 RX ADMIN — GUAIFENESIN 600 MG: 600 TABLET, EXTENDED RELEASE ORAL at 21:16

## 2021-02-14 RX ADMIN — BUDESONIDE 6 MG: 3 CAPSULE, GELATIN COATED ORAL at 08:12

## 2021-02-14 RX ADMIN — METOCLOPRAMIDE 5 MG: 10 TABLET ORAL at 13:51

## 2021-02-14 RX ADMIN — PANTOPRAZOLE SODIUM 40 MG: 40 TABLET, DELAYED RELEASE ORAL at 08:13

## 2021-02-14 RX ADMIN — DIAZEPAM 2 MG: 2 TABLET ORAL at 03:29

## 2021-02-14 RX ADMIN — METOCLOPRAMIDE 5 MG: 10 TABLET ORAL at 17:09

## 2021-02-14 RX ADMIN — METRONIDAZOLE 500 MG: 500 INJECTION, SOLUTION INTRAVENOUS at 13:51

## 2021-02-14 RX ADMIN — DOCUSATE SODIUM 50MG AND SENNOSIDES 8.6MG 1 TABLET: 8.6; 5 TABLET, FILM COATED ORAL at 08:13

## 2021-02-14 RX ADMIN — SERTRALINE 100 MG: 100 TABLET, FILM COATED ORAL at 08:14

## 2021-02-14 RX ADMIN — TOPIRAMATE 50 MG: 50 TABLET, FILM COATED ORAL at 17:09

## 2021-02-14 RX ADMIN — METRONIDAZOLE 500 MG: 500 INJECTION, SOLUTION INTRAVENOUS at 22:22

## 2021-02-14 RX ADMIN — DILTIAZEM HYDROCHLORIDE 120 MG: 120 CAPSULE, COATED, EXTENDED RELEASE ORAL at 08:11

## 2021-02-14 RX ADMIN — SUCRALFATE 1 G: 1 TABLET ORAL at 21:16

## 2021-02-14 NOTE — PROGRESS NOTES
Problem: Falls - Risk of  Goal: *Absence of Falls  Description: Document Prachi Oscar Fall Risk and appropriate interventions in the flowsheet. Outcome: Progressing Towards Goal  Note: Fall Risk Interventions:  Mobility Interventions: Bed/chair exit alarm         Medication Interventions: Bed/chair exit alarm    Elimination Interventions: Bed/chair exit alarm, Call light in reach    History of Falls Interventions: Bed/chair exit alarm, Door open when patient unattended, Room close to nurse's station         Problem: Patient Education: Go to Patient Education Activity  Goal: Patient/Family Education  Outcome: Progressing Towards Goal     Problem: Pressure Injury - Risk of  Goal: *Prevention of pressure injury  Description: Document Gen Scale and appropriate interventions in the flowsheet. Outcome: Progressing Towards Goal  Note: Pressure Injury Interventions:  Sensory Interventions: Assess changes in LOC, Avoid rigorous massage over bony prominences    Moisture Interventions: Apply protective barrier, creams and emollients, Limit adult briefs, Minimize layers    Activity Interventions: Pressure redistribution bed/mattress(bed type)    Mobility Interventions: Pressure redistribution bed/mattress (bed type)    Nutrition Interventions: Document food/fluid/supplement intake    Friction and Shear Interventions: Apply protective barrier, creams and emollients, Minimize layers                Problem: Patient Education: Go to Patient Education Activity  Goal: Patient/Family Education  Outcome: Progressing Towards Goal     Problem: Patient Education: Go to Patient Education Activity  Goal: Patient/Family Education  Description: Pt/caregiver will demonstrate and verbalize good understanding of OT recommendations regarding ADL status, functional transfer status, home safety, DME, AE, energy conservation techniques, follow-up therapy, for increasing safety with functional tasks upon discharge.     Outcome: Progressing Towards Goal     Problem: Patient Education: Go to Patient Education Activity  Goal: Patient/Family Education  Outcome: Progressing Towards Goal     Problem: Patient Education: Go to Patient Education Activity  Goal: Patient/Family Education  Outcome: Progressing Towards Goal

## 2021-02-14 NOTE — PROGRESS NOTES
[employer] Hospitalist Service Progress Note      Assessment / Plan:          Severe constipations due to decreased functional status and frequent opiate use,, with mild enteritis on CT  February 13we will start more aggressive bowel regimens, short course of empiric IV antibiotics Rocephin and Flagyl  February 14still has no bowel movements, will order Fleet enema,      Severe back pain due to recent fall, newly diagnosed acute [id number] fracture and sacral s3 sacral insufficiency fracture with opiate tolerance and dependency, chronic pain, high de-escalate pain medications to oral Dilaudid, avoid IV medications whenever possible due to tolerance  February 8better pain control, still uncomfortable, continue treatments, appreciate IR consult, MRI done, await further recommendation  February 10scheduled for kyphoplasty tomorrow, n.p.o. after midnight,  February 11status post kyphoplasty, with documented complications, continue inpatient pain control, physical therapy to evaluate tomorrow  Feb 12 - doing well , seen by pt, will benefit from snf with rehab       Severe protein malnutritionlikely secondary to above, BMI 16, add Ensure 3 times daily, consider dietary consultation  February 8evidently weight loss is chronic, progressive, and unintentional, history of splenomegaly, outpatient work-up has been done, will contact family members obtain medical record, consider inpatient work-up if clinically indicated  Feb 12 -  ct abd/pelvis today   Feb 13no evidence of occult malignancy, possible malabsorption versus other causes of unintentional weight loss, advised patient to follow-up with primary care, GI referrals if indicated for suspected malabsorption, and other routine health screening     aortic regurg  2/9 - JOSE today for further evaluation, bcx pending (orderd by cardiology) for the possibility of endocarditis,   February 10JOSE no evidence of vegetations,  \"There was likely mild to moderate regurgitation with a very  eccentric jet which appears to fan out (no diastolic aortic flow reversal;  narrower appearing vena contracta). \" culture no growth,    New onset atrial fibrillation with rapid ventricular responsestatus post IV diltiazem's, heart rate improved, cardiology consulted, chest Vascor 3, with high bleeding risk due to frequent and recent falls, monitor without anticoagulation        Polypharmacyhistory of frequent high-dose opiates and Ativan useslowly wean patient's if tolerated,    Hypokalemia,-replace, continue to monitor while inpatient    COPD exacerbation with increased sputum lungs, chronic respiratory failure secondary to advanced COPD on 4 L at baseline -we will order nebulizer treatments, Mucomyst, Symbicort,  encourage pulmonary toilet, low-dose azithromycin  February 8exacerbation seemingly resolved, continue treatment above     History of severe depressions, anxiety, and high-dose antidepressants and frequent Ativan  2/9 -Long discussion with patient in son at bedside regarding the risks of polypharmacy, patient is agreeable to decrease  Polypharmacy slowly     dispo - nearly medically optimizedwaiting for resolution of constipation and mild enteritis, discharge to acute rehab when placement is arranged   Chief Complaint : Fall      Subjective:  Patient's pain has slowly improving, now status post kyphoplasty  Patient's current complaint is mild left lower quadrant tenderness, with \"only a little bit \"bowel movement    , cardiovascular, respiratory, GI review of system negative except mentioned above  Objective:  Visit Vitals  /69 (BP 1 Location: Right arm)   Pulse 81   Temp 98 °F (36.7 °C)   Resp 17   Ht 5' 8\" (1.727 m)   Wt 52.4 kg (115 lb 9.6 oz)   SpO2 95%   BMI 17.58 kg/m²                 Physical Exam:  General: No acute distress, speaking in full sentences, no use of accessory muscles, diffuse muscle loss  HEENT: Pupils equal and reactive to light and accommodation, oropharynx is clear   Neck: Supple, no lymphadenopathy, no JVD   Lungs:   mild decreased breath sound   cardiovascular:IR Regular rate and rhythm with normal S1 and S2   Abdomen: Soft, very mild left lower quadrant tenderness without guarding, nondistended, normoactive bowel sounds   Extremities: No cyanosis  or edema   Neuro: Nonfocal, A&O x3   Psych: Normal affect     Intake and Output:  Date 02/13/21 0700 - 02/14/21 0659 02/14/21 0700 - 02/15/21 0659   Shift 0700-1859 1900-0659 24 Hour Total 1778-8849 2212-8224 24 Hour Total   INTAKE   P.O. 240 267 507        P.O. 240 267 507      Shift Total(mL/kg) 240(4.6) 267(5.1) 507(9.7)      OUTPUT   Urine(mL/kg/hr)  950(1.5) 950(0.8)        Urine Output (mL) (Urinary Catheter 02/06/21 2- way)  950 950      Shift Total(mL/kg)  950(18.1) 950(18.1)       -367 -464      Weight (kg) 52.4 52.4 52.4 52.4 52.4 52.4       LAB:  No results displayed because visit has over 200 results. IMAGING:  Ir Kyphoplasty Lumbar    Result Date: 2/11/2021  Successful Vertebral Augmentation with Cavity Creation (Kyphoplasty) of L3. A bone biopsy was performed. PLAN:  1 hours bedrest.  Virtual Office follow-up in 1/2 weeks. The patient has been transported back [facility] the [facility] in stable condition. ________________________________________________________________________________ _______ PROCEDURE SUMMARY: - Fluoroscopic guided vertebral augmentation with cavity creation (Kyphoplasty). -Fluoroscopic Guided Bone Biopsy:  was performed. PROCEDURE DETAILS: Pre-procedure Consent:  Informed written and oral consent for the procedure was obtained after explanation of risks (including, but not limitted to:  hemorrhage, bone fracture, infection, cement migration) benefits and alternatives. The patient's questions were answered to their satisfaction. They stated understanding and requested that we proceed.  Final verification:  A time-out identifying the patient and planned procedure was performed prior to the procedure. Preparation:  Maximal sterile barrier technique (including:  cap, mask, sterile gown, sterile gloves, sterile sheet, hand hygiene, and cutaneous antisepsis) was used. The patient was placed prone on the fluoroscopy table. All contact points were appropriately padded. Anesthesia/sedation Level of anesthesia/sedation:  Total intravenous sedation and analgesia Anesthesia/sedation administered by: Anesthesiology Service Total intra-service sedation time (minutes):  Not applicable Medications: Ancef 2 g IV. Vertebral/Sacral Body Biopsy Target level: L3 Pedicle access: Unipedicular Number of specimen:1 Vertebral Augmentation with Cavity Creation (Kyphoplasty) Target fracture level(s):  L3.  Local anesthesia was achieved with lidocaine and bupivacaine. Using fluoroscopic guidance, transpedicular approach was used at both the right and left pedicles. Pedicle access:  Bipedicular. Access trocar(s) gauge:  10 gauge Kyphon Express II. Cavity creation device utilized:  15 mm balloon. Cement type:  Polymethylmethacrylate (PMMA). :  Quizens/Votigo. Clinically significant cement leak occurrence:  No. Closure The devices were removed and hemostasis was achieved with manual compression. Sterile glue was placed on the skin. Radiation Dose Reference Air Kerma (Ka,r) :  108  mGy Dose Area Product/Kerma Area Product (DAP/JANNIE/PKA) :  304.79 cGy-cm2 Fluoroscopy Exposure Time :  3 minutes 24 seconds  Fluorographic Images :  30 Additional Details Additional description of procedure:  None Equipment details:  None Specimens removed:  None Estimated blood loss (mL):  Less than 10 Standardized report: [email or web address] Attestation Signer name: Jaki Skaggs M.D. I attest that I performed the entire procedure. I reviewed the stored images and agree with the report as written. Mri Lumb Spine Wo Cont    Result Date: 2/8/2021  1.  Acute compression fracture causing mild vertebral body height loss at L3. There is an additional relatively acute injury at S3 which may be part of a larger sacral insufficiency fracture. 2. Mild foraminal stenosis at L2-L3. 3. Mild spinal stenosis with mild foraminal stenosis at L4-L5. 4. Incompletely imaged left hepatic lobe cyst. Consider routine follow-up abdominal sonography for further assessment. Ct Abd Pelv W Cont    Result Date: 2/12/2021  1. Large amount stool throughout colon with probable mild enteritis. 2. Probable mucus plugging at right lung base with associated mild basilar atelectasis and small effusions. Dedicated chest CT is recommended for follow-up. CPT code(s): 06445, [id number]      EKG:  No results found for this or any previous visit.           Laurita Gibson DO  2/14/2021 4:29 PM

## 2021-02-14 NOTE — PROGRESS NOTES
Problem: Mobility Impaired (Adult and Pediatric)  Goal: *Acute Goals and Plan of Care (Insert Text)  Outcome: Progressing Towards Goal  Note:     LTG:  (1.)Ms. Maral Means will move from supine to sit and sit to supine  in bed with CONTACT GUARD ASSIST within 7 treatment day(s). (2.)Ms. Maral Means will transfer from bed to chair and chair to bed with CONTACT GUARD ASSIST using the least restrictive device within 7 treatment day(s). (3.)Ms. Bueno will ambulate with CONTACT GUARD ASSIST for 50 feet with the least restrictive device within 7 treatment day(s). ________________________________________________________________________________________________      PHYSICAL THERAPY: Daily Note and PM 2/14/2021  INPATIENT: PT Visit Days : 4  Payor: [address] [place],9D / Plan: [address] / Product Type: Managed Care Medicare /       NAME/AGE/GENDER: Asuncion Walls is a 66 y.o. female   PRIMARY DIAGNOSIS: New onset atrial fibrillation ([address] [I48.91]  Falls frequently [R29.6] New onset atrial fibrillation ([address] New onset atrial fibrillation ([address]       ICD-10: Treatment Diagnosis:    Generalized Muscle Weakness (M62.81)  Other abnormalities of gait and mobility (R26.89)   Precaution/Allergies:  Amlodipine, Lisinopril, and Niaspan [niacin]      ASSESSMENT:     Ms. Maral Means presents with weakness and limited functional mobility, decreased from her baseline. She will benefit from PT to increase her functional independence with bed mobility, transfers and gait and will need STR at discharge. Pt. Had a kyphoplasty yesterday and feels better. She is supine and agrees to get up. She needed min  Assistance with steps to the chair using RW. She did not want to walk any further due to fatigue, mild SOB after activity. She did some theraputic exercises in the chair. Plans are for rehab.  2/13/2021: Patient actively participated with PT during AM session.   Patient required minimal assistance with bed mobility and transfer to bedside chair. Patient requested hand held assist today secondary to fear of walker \"getting away. \"  Patient would continue to benefit from skilled PT to improve overall mobility, strength, and endurance with daily functional mobility. 2/14/21:  Patient participated well today. SBA for bed mobility with no complaints. Patient does not like RW-seems to have a rollator at home that was given to her. Thinks the RW is a death trap and refuses to use it. Patient used rollator in the room and did perfectly fine-SBA only at most.  Could have ambulated further but not overly motivated. Patient up in chair and set up with lunch-asks for people to do things she is perfectly capable of doing on her own such as opening bottles and containers. This section established at most recent assessment   PROBLEM LIST (Impairments causing functional limitations):  Decreased Strength  Decreased Transfer Abilities  Decreased Ambulation Ability/Technique  Decreased Balance   INTERVENTIONS PLANNED: (Benefits and precautions of physical therapy have been discussed with the patient.)  Bed Mobility  Gait Training  Therapeutic Exercise/Strengthening  Transfer Training     TREATMENT PLAN: Frequency/Duration: daily for duration of hospital stay  Rehabilitation Potential For Stated Goals: Good     REHAB RECOMMENDATIONS (at time of discharge pending progress):    Placement: It is my opinion, based on this patient's performance to date, that Ms. Silvana Reza may benefit from intensive therapy at a [address] [place] [facility] [facility] after discharge due to the functional deficits listed above that are likely to improve with skilled rehabilitation and concerns that he/she may be unsafe to be unsupervised at home due to chronic falls and increased weakness .   Equipment:   None at this time              HISTORY:   History of Present Injury/Illness (Reason for Referral):  Darrel Arboleda is a 66 y.o. female currently admitted to [facility] after a fall resulting in LBP. Conventional radiographs show an L3, wedge-shaped, Vertebral Compression Fracture as well as diffuse osteopenia. The VCF is new since at least 4/23/19 CT scan (the most recent lumbar imaging available for comparison). Newly diagnosed Atrial Fibrillation treated w rate control and low dose Eliquis (started yesterday). Severe COPD requiring 4L O2 NC at baseline. Unexplained weight loss. Chronic tobacco use. Will order a MRI of the lumbar region to evaluate for chronicity of the L3 fracture, and to look for any other fractures  Past Medical History/Comorbidities:   Ms. Cortes Reid  has a past medical history of Anxiety (3/6/2013), Arthritis, B12 deficiency (3/6/2013), Chronic back pain (3/6/2013), Chronic kidney disease (CKD) stage G3b/A1, moderately decreased glomerular filtration rate (GFR) between 30-44 mL/min/1.73 square meter and albuminuria creatinine ratio less than 30 mg/g (10/14/2016), Constipation (11/4/2015), COPD (2006), Decreased GFR (5/26/2016), Depression (10/25/2012), Depression (10/25/2012), Depression, major, in remission ([facility] (1/31/2018), diverticulitis, Encounter for long-term (current) use of other medications (3/6/2013), Environmental allergies (3/6/2013), Fecal incontinence (12/7/2016), Fibromyalgia (9/15/2015), GERD (gastroesophageal reflux disease) (\"years\"), Headache(784.0) (2/12/2013), HTN (hypertension) (10/25/2012), HTN (hypertension) (10/25/2012), Hyperlipidemia (10/25/2012), Lymphocytic colitis (59/3/3209), Metabolic syndrome (9/1/7302), Microalbuminuria (11/4/2015), Osteoporosis (10/25/2012), polypectomy, PUD (peptic ulcer disease) (\"years ago\"), thyroid mass, Unspecified adverse effect of anesthesia, Unspecified sleep apnea, and Vitamin D deficiency (3/6/2013).   Ms. Cortes Reid  has a past surgical history that includes hx partial thyroidectomy (1990s); pr colostomy (1990s); hx appendectomy (1970s); hx cholecystectomy (1970s); hx gyn (1970s); pr breast surgery procedure unlisted (1980s); hx hernia repair (unknown); hx orthopaedic (7065,4009); hx cataract removal (Right, 2016); hx breast biopsy (Right); and ir kyphoplasty lumbar (2021). Social History/Living Environment: lives alone and has [address] aides come 4 hours a day 7 days a week. Prior Level of Function/Work/Activity:  Chronic falling. Uses a rolling walker or wheelchair in the home. Requires assist with ADL's. Number of Personal Factors/Comorbidities that affect the Plan of Care: 1-2: MODERATE COMPLEXITY   EXAMINATION:   Most Recent Physical Functioning:   Gross Assessment:  AROM: Generally decreased, functional  Strength: Generally decreased, functional              Posture:     Balance:  Sitting: Intact  Standing: With support Bed Mobility:  Supine to Sit: Stand-by assistance  Scooting: Stand-by assistance  Wheelchair Mobility:  N/A today    Transfers:  Sit to Stand: Stand-by assistance  Stand to Sit: Stand-by assistance  Bed to Chair: Stand-by assistance  Gait:     Speed/Reva: Pace decreased (<100 feet/min)  Step Length: Left shortened;Right shortened  Distance (ft): 15 Feet (ft)  Assistive Device: Walker, rollator  Ambulation - Level of Assistance: Stand-by assistance  Interventions: Verbal cues      Body Structures Involved:  Muscles Body Functions Affected: Movement Related Activities and Participation Affected: Mobility   Number of elements that affect the Plan of Care: 3: MODERATE COMPLEXITY   CLINICAL PRESENTATION:   Presentation: Stable and uncomplicated: LOW COMPLEXITY   CLINICAL DECISION MAKIN hospitals [address] [id number] AM-PAC 6 Clicks   Basic Mobility Inpatient Short Form  How much difficulty does the patient currently have. .. Unable A Lot A Little None   1. Turning over in bed (including adjusting bedclothes, sheets and blankets)? [] 1   [x] 2   [] 3   [] 4   2.   Sitting down on and standing up from a chair with arms ( e.g., wheelchair, bedside commode, etc.)   [] 1   [x] 2   [] 3   [] 4   3. Moving from lying on back to sitting on the side of the bed? [] 1   [x] 2   [] 3   [] 4   How much help from another person does the patient currently need. .. Total A Lot A Little None   4. Moving to and from a bed to a chair (including a wheelchair)? [] 1   [x] 2   [] 3   [] 4   5. Need to walk in hospital room? [] 1   [x] 2   [] 3   [] 4   6. Climbing 3-5 steps with a railing? [x] 1   [] 2   [] 3   [] 4   © 2007, Trustees of [address] [place] [address] [address] [id number], under license to [employer]. All rights reserved      Score:  Initial: 11 Most Recent: X (Date: -- )    Interpretation of Tool:  Represents activities that are increasingly more difficult (i.e. Bed mobility, Transfers, Gait). Medical Necessity:     Patient is expected to demonstrate progress in strength, range of motion, balance, coordination and functional technique to improve safety during daily activities. Patient demonstrates good rehab potential due to higher previous functional level. Skilled intervention continues to be required due to decreased functional mobility. Reason for Services/Other Comments:  Patient continues to demonstrate capacity to improve overall functional mobility which will increase independence and increase safety. Use of outcome tool(s) and clinical judgement create a POC that gives a: Clear prediction of patient's progress: LOW COMPLEXITY            TREATMENT:   (In addition to Assessment/Re-Assessment sessions the following treatments were rendered)   Pre-treatment Symptoms/Complaints:  2/14/2021: Patient agreeable but not wanting to use RW. Pain: Initial:   Pain Intensity 1: 6  Post Session:  0     Therapeutic Activity: ( 25 minutes   ):  Therapeutic activities including Bed transfers, Chair transfers, Ambulation on level ground and standing with RW to improve mobility, strength and balance.   Required minimal Verbal cues to promote static and dynamic balance in standing. Braces/Orthotics/Lines/Etc:   IV  mcguire catheter  Treatment/Session Assessment:    Response to Treatment:  Patient moves very well with rollator and gives her more confidence. Interdisciplinary Collaboration:   Physical Therapist  Registered Nurse  After treatment position/precautions:   Up in chair  Bed/Chair-wheels locked  Bed in low position  Call light within reach  RN notified   Compliance with Program/Exercises: Compliant all of the time  Recommendations/Intent for next treatment session: \"Next visit will focus on advancements to more challenging activities and reduction in assistance provided\".   Total Treatment Duration:  PT Patient Time In/Time Out  Time In: 1320  Time Out: [address]

## 2021-02-15 VITALS
OXYGEN SATURATION: 95 % | HEART RATE: 74 BPM | RESPIRATION RATE: 16 BRPM | TEMPERATURE: 97.9 F | DIASTOLIC BLOOD PRESSURE: 57 MMHG | WEIGHT: 115.6 LBS | HEIGHT: 68 IN | SYSTOLIC BLOOD PRESSURE: 110 MMHG | BODY MASS INDEX: 17.52 KG/M2

## 2021-02-15 PROBLEM — E43 SEVERE PROTEIN-CALORIE MALNUTRITION (HCC): Status: ACTIVE | Noted: 2021-02-15

## 2021-02-15 PROCEDURE — 94640 AIRWAY INHALATION TREATMENT: CPT

## 2021-02-15 PROCEDURE — 2709999900 HC NON-CHARGEABLE SUPPLY

## 2021-02-15 PROCEDURE — 74011250636 HC RX REV CODE- 250/636: Performed by: HOSPITALIST

## 2021-02-15 PROCEDURE — 94760 N-INVAS EAR/PLS OXIMETRY 1: CPT

## 2021-02-15 PROCEDURE — 97530 THERAPEUTIC ACTIVITIES: CPT

## 2021-02-15 PROCEDURE — 77030040393 HC DRSG OPTIFOAM GENT MDII -B

## 2021-02-15 PROCEDURE — 97535 SELF CARE MNGMENT TRAINING: CPT

## 2021-02-15 PROCEDURE — 74011250637 HC RX REV CODE- 250/637: Performed by: HOSPITALIST

## 2021-02-15 PROCEDURE — 74011000258 HC RX REV CODE- 258: Performed by: HOSPITALIST

## 2021-02-15 RX ORDER — DILTIAZEM HYDROCHLORIDE 120 MG/1
120 CAPSULE, COATED, EXTENDED RELEASE ORAL DAILY
Qty: 30 CAP | Refills: 0 | Status: SHIPPED | OUTPATIENT
Start: 2021-02-16 | End: 2021-03-18

## 2021-02-15 RX ORDER — AMOXICILLIN 250 MG
1 CAPSULE ORAL
Qty: 30 TAB | Refills: 0 | Status: SHIPPED | OUTPATIENT
Start: 2021-02-15

## 2021-02-15 RX ORDER — DIAZEPAM 2 MG/1
2 TABLET ORAL
Qty: 10 TAB | Refills: 0 | Status: SHIPPED | OUTPATIENT
Start: 2021-02-15 | End: 2021-03-14

## 2021-02-15 RX ORDER — LIDOCAINE 4 G/100G
PATCH TOPICAL
Qty: 30 PATCH | Refills: 0 | Status: SHIPPED | OUTPATIENT
Start: 2021-02-15

## 2021-02-15 RX ORDER — ACETAMINOPHEN 325 MG/1
650 TABLET ORAL
Qty: 30 TAB | Refills: 0 | Status: SHIPPED
Start: 2021-02-15

## 2021-02-15 RX ORDER — GUAIFENESIN 600 MG/1
600 TABLET, EXTENDED RELEASE ORAL EVERY 12 HOURS
Qty: 20 TAB | Refills: 0 | Status: ON HOLD | OUTPATIENT
Start: 2021-02-15 | End: 2022-04-10

## 2021-02-15 RX ORDER — NALOXONE HYDROCHLORIDE 4 MG/.1ML
SPRAY NASAL
Qty: 1 EACH | Refills: 0 | Status: ON HOLD | OUTPATIENT
Start: 2021-02-15 | End: 2022-04-10

## 2021-02-15 RX ORDER — HYDROMORPHONE HYDROCHLORIDE 2 MG/1
1 TABLET ORAL
Qty: 20 TAB | Refills: 0 | Status: SHIPPED | OUTPATIENT
Start: 2021-02-15 | End: 2021-03-03

## 2021-02-15 RX ADMIN — PANTOPRAZOLE SODIUM 40 MG: 40 TABLET, DELAYED RELEASE ORAL at 08:48

## 2021-02-15 RX ADMIN — DIAZEPAM 2 MG: 2 TABLET ORAL at 09:13

## 2021-02-15 RX ADMIN — GUAIFENESIN 600 MG: 600 TABLET, EXTENDED RELEASE ORAL at 08:48

## 2021-02-15 RX ADMIN — CEFTRIAXONE 1 G: 1 INJECTION, POWDER, FOR SOLUTION INTRAMUSCULAR; INTRAVENOUS at 11:44

## 2021-02-15 RX ADMIN — ACETAMINOPHEN 650 MG: 325 TABLET, FILM COATED ORAL at 07:42

## 2021-02-15 RX ADMIN — BUDESONIDE 6 MG: 3 CAPSULE, GELATIN COATED ORAL at 07:38

## 2021-02-15 RX ADMIN — DILTIAZEM HYDROCHLORIDE 120 MG: 120 CAPSULE, COATED, EXTENDED RELEASE ORAL at 08:49

## 2021-02-15 RX ADMIN — HYDROMORPHONE HYDROCHLORIDE 2 MG: 2 TABLET ORAL at 11:45

## 2021-02-15 RX ADMIN — SERTRALINE 100 MG: 100 TABLET, FILM COATED ORAL at 08:49

## 2021-02-15 RX ADMIN — BUDESONIDE AND FORMOTEROL FUMARATE DIHYDRATE 2 PUFF: 160; 4.5 AEROSOL RESPIRATORY (INHALATION) at 08:23

## 2021-02-15 RX ADMIN — TIOTROPIUM BROMIDE INHALATION SPRAY 2 PUFF: 3.12 SPRAY, METERED RESPIRATORY (INHALATION) at 08:23

## 2021-02-15 RX ADMIN — TOPIRAMATE 50 MG: 50 TABLET, FILM COATED ORAL at 11:45

## 2021-02-15 RX ADMIN — HYDROMORPHONE HYDROCHLORIDE 2 MG: 2 TABLET ORAL at 03:10

## 2021-02-15 RX ADMIN — SUCRALFATE 1 G: 1 TABLET ORAL at 08:46

## 2021-02-15 RX ADMIN — DIAZEPAM 2 MG: 2 TABLET ORAL at 03:10

## 2021-02-15 RX ADMIN — HYDROMORPHONE HYDROCHLORIDE 2 MG: 2 TABLET ORAL at 07:43

## 2021-02-15 RX ADMIN — PRIMIDONE 50 MG: 50 TABLET ORAL at 08:49

## 2021-02-15 RX ADMIN — BUSPIRONE HYDROCHLORIDE 15 MG: 5 TABLET ORAL at 08:48

## 2021-02-15 RX ADMIN — TOPIRAMATE 50 MG: 50 TABLET, FILM COATED ORAL at 07:39

## 2021-02-15 NOTE — PROGRESS NOTES
SW spoke with Gustavo Jamison at [facility] who states pt has a bed available today at [facility] ( room 106 D ) Report #  [id number].

## 2021-02-15 NOTE — PROGRESS NOTES
TRANSFER - OUT REPORT:    Verbal report given to Leobardo CUEVAS  on Beverly Roy  being transferred to [facility] room 106-D  for routine progression of care       Report consisted of patients Situation, Background, Assessment and   Recommendations(SBAR). Information from the following report(s) Kardex, MAR, Accordion and Recent Results was reviewed with the receiving nurse. Opportunity for questions and clarification was provided. (Report given at 1330). Patient transported to [facility] at this time, with:   O2 @ 2 liters and mcguire catheter. Via stretcher, accompanied by [employer].

## 2021-02-15 NOTE — DISCHARGE SUMMARY
[facility] Discharge Summary    Patient ID:  Piyush briscoe.   1945.  [id number]    Admit date: 2/6/2021  1:20 PM    Discharge date: 02/15/21     Admitting Physician: Ed Tanner MD  Attending Physician: Xiomara Andrade MD  Primary Care Physician: Baltazar Queen MD     Discharge Physician: Nathaly Beck MD    Discharged Condition: Stable    Indication for Admission:   Chief Complaint   Patient presents with    Fall        Reason for hospitalization:   Patient Active Problem List   Diagnosis Code    Osteoarthritis of left knee M17.12    Total knee replacement status Z96.659    COPD (chronic obstructive pulmonary disease) ([facility] J44.9    GERD (gastroesophageal reflux disease) K21.9    Sleep apnea G47.30    Acute blood loss anemia D62    Hyperlipidemia E78.5    Depression F32.9    Osteoporosis M81.0    Peripheral neuropathy G62.9    Headache(784.0) R51    Vitamin D deficiency E55.9    Environmental allergies Z91.09    Anxiety F41.9    Chronic back pain M54.9, G89.29    Encounter for long-term (current) use of other medications Z79.899    B12 deficiency L11.9    Metabolic syndrome T92.28    Fibromyalgia M79.7    Constipation K59.00    Smoking 1/2 pack a day or less F17.210    Microalbuminuria R80.9    Polypharmacy Z79.899    Decreased GFR R94.4    Chronic kidney disease (CKD) stage G3b/A1, moderately decreased glomerular filtration rate (GFR) between 30-44 mL/min/1.73 square meter and albuminuria creatinine ratio less than 30 mg/g N18.32    Lymphocytic colitis K52.832    Fecal incontinence R15.9    Depression, major, in remission ([facility] F32.5    Splenomegaly R16.1    Leukocytosis D72.829    Chronic diarrhea K52.9    Creatinine elevation R79.89    Skin lesion L98.9    New onset atrial fibrillation (HCC) I48.91    Falls frequently R29.6    Hypokalemia E87.6       Discharge Diagnosis: Severe constipation, acute L3 compression fracture, sacral S3 fracture, severe protein calorie malnutrition, aortic regurgitation, atrial fibrillation, COPD exacerbation  Discharge Disposition: Discharged to at [facility]   Follow up Instructions: With cardiology, primary care physician, subacute rehab at [facility]  Did Patient have Sepsis (YES OR NO): No sepsis    Hospital Course:   73-year-old female with history of severe COPD due to long-term smoking history, chronic hypoxemic respiratory failure on 4 L oxygen, hypertension, PUD GERD, depression. She was initially admitted February 6, 2021 due to falling at home. On admission she was noted to be tachycardic and in atrial fibrillation with rapid ventricular response.,  CT of the head showed no acute intracranial abnormality. Initial spine radiograph showed compression fracture of L3. MRI showed acute compression fracture of the L3 vertebrae, and S3 larger sacral insufficiency fracture. Interventional radiology was consulted and performed L3 kyphoplasty. Due to atrial fibrillation rapid ventricular response a 2D transthoracic echocardiogram was performed followed by a JOSE which showed no evidence of endocarditis or vegetations however there was mild to moderate aortic regurgitation, blood culture showed no growth. She was started on diltiazem 120 mg daily, and due to her frequent falls resulting into vertebral compression fracture, and sacral fracture, and overall functional debility with protein calorie malnutrition/cachexia of COPD and low BMI She was determined to be a high bleeding risk and not placed on anticoagulation. Additionally she was in COPD with exacerbation CT scan did show mucous plugging, she was started on azithromycin and ceftriaxone in addition to Symbicort, and Spiriva.   She did have significant amount of stool burden which was compounded by the need of opioid medications to control her acute pain due to the acute L3 vertebral compression fractures, she was started on aggressive bowel regimen and was able to have effective bowel movements. Her pain was effectively managed, physical therapy was consulted and assessed for functional status and recommended skilled nursing facility placement with rehab. Additional  Incidental findings on imaging scans hepatic lobe cyst  And to Consider routine follow-up  abdominal sonography for further assessment, and Probable mucus plugging at right lung base with associated mild basilar  atelectasis and small effusions. Dedicated chest CT is recommended for  follow-up.       Discharge Exam:  Visit Vitals  /66   Pulse 66   Temp 98.4 °F (36.9 °C)   Resp 18   Ht 5' 8\" (1.727 m)   Wt 52.4 kg (115 lb 9.6 oz)   SpO2 95%   BMI 17.58 kg/m²      General: Thin, but in No acute distress, speaking in full sentences, no use of accessory muscles   HEENT: Pupils equal and reactive to light and accommodation, oropharynx is clear   Neck: Supple, no lymphadenopathy, no JVD   Lungs: Clear to auscultation bilaterally   Cardiovascular: Regular rate and rhythm with normal S1 and S2   Abdomen: Soft, nontender, nondistended, normoactive bowel sounds   Extremities: No cyanosis clubbing or edema   Neuro: Nonfocal, A&O x3   Psych: Normal affect     Consults: None    Code Status: Prior    Significant Diagnostic Studies:   CMP: No results found for: NA, K, CL, CO2, AGAP, GLU, BUN, CREA, GFRAA, GFRNA, CA, MG, PHOS, ALB, TBIL, TBILI, TP, ALB, GLOB, AGRAT, ALT      CBC:  No results found for: WBC, HGB, HCT, PLT, HGBEXT, HCTEXT, PLTEXT    Lab Results   Component Value Date/Time    INR 1.1 02/06/2021 01:43 PM    INR 0.9 11/29/2015 09:48 AM    INR 1.6 (H) 01/02/2010 05:15 AM    Prothrombin time 14.6 02/06/2021 01:43 PM    Prothrombin time 9.9 11/29/2015 09:48 AM    Prothrombin time 16.1 (H) 01/02/2010 05:15 AM       ABG:  No results found for: PH, PHI, PCO2, PCO2I, PO2, PO2I, HCO3, HCO3I, FIO2, FIO2I        Lab Results   Component Value Date/Time     (H) 02/16/2020 04:06 AM    Troponin-I, Qt. 0.04 02/16/2020 04:06 AM Radiology Reports :   [unfilled]      Discharge Medications:  Current Discharge Medication List      CONTINUE these medications which have NOT CHANGED    Details   traZODone (DESYREL) 50 mg tablet Take 1 Tab by mouth nightly. Indications: insomnia associated with depression  Qty: 90 Tab, Refills: 3    Associated Diagnoses: Insomnia, unspecified type      LORazepam (ATIVAN) 2 mg tablet Take 1 Tab by mouth three (3) times daily. 1 TAB PO QHS OR TID PRN  Indications: anxious  Qty: 270 Tab, Refills: 1    Associated Diagnoses: Anxiety      topiramate (TOPAMAX) 50 mg tablet Take 1 tablet with breakfast, 1 tablet with lunch, and 2 with dinner. Indications: migraine prevention  Qty: 300 Tab, Refills: 3    Associated Diagnoses: Migraine with aura and without status migrainosus, not intractable      pantoprazole (PROTONIX) 40 mg tablet Take 1 Tab by mouth two (2) times a day. Indications: gastroesophageal reflux disease  Qty: 180 Tab, Refills: 3      ondansetron (ZOFRAN ODT) 4 mg disintegrating tablet Take 1 Tab by mouth every eight (8) hours as needed for Nausea. Qty: 120 Tab, Refills: 3    Associated Diagnoses: Nausea      alendronate (FOSAMAX) 70 mg tablet Take 1 Tab by mouth every seven (7) days. Qty: 12 Tab, Refills: 3      primidone (MYSOLINE) 50 mg tablet Take 1 Tab by mouth three (3) times daily. Indications: essential tremor  Qty: 270 Tab, Refills: 3    Associated Diagnoses: Tremors of nervous system      tiotropium (SPIRIVA) 18 mcg inhalation capsule Take 1 Cap by inhalation daily. Indications: bronchospasm prevention with COPD  Qty: 30 Cap, Refills: 11    Associated Diagnoses: Chronic obstructive pulmonary disease, unspecified COPD type ([facility]      atorvastatin (LIPITOR) 80 mg tablet Take 1 Tab by mouth daily.  Indications: primary prevention of heart attack  Qty: 90 Tab, Refills: 3    Associated Diagnoses: Mixed hyperlipidemia      budesonide-formoteroL (SYMBICORT) 160-4.5 mcg/actuation HFAA Take 2 Puffs by inhalation two (2) times a day. Qty: 3 Inhaler, Refills: 3      albuterol (PROVENTIL VENTOLIN) 2.5 mg /3 mL (0.083 %) nebu 3 mL by Nebulization route every four (4) hours as needed for Wheezing or Shortness of Breath. Qty: 24 Nebule, Refills: 0    Associated Diagnoses: Gastroesophageal reflux disease without esophagitis      budesonide (ENTOCORT EC) 3 mg capsule Take 6 mg by mouth every morning. ondansetron hcl (ZOFRAN) 4 mg tablet Take 4 mg by mouth every eight (8) hours as needed for Nausea or Vomiting.      busPIRone (BUSPAR) 15 mg tablet Take 1 Tab by mouth three (3) times daily. Indications: repeated episodes of anxiety  Qty: 270 Tab, Refills: 3    Associated Diagnoses: Depression, unspecified depression type      artificial tears, dextran-hypromellose-glycerin, (TEARS NATURALE FORTE) 0.1-0.3-0.2 % ophthalmic solution Administer 1 Drop to both eyes every six (6) hours. Indications: dry eye  Qty: 5 mL, Refills: 11    Associated Diagnoses: Dry eyes      albuterol (PROVENTIL HFA, VENTOLIN HFA, PROAIR HFA) 90 mcg/actuation inhaler Take 2 Puffs by inhalation every four (4) hours as needed for Wheezing or Shortness of Breath. Qty: 3 Inhaler, Refills: 5    Associated Diagnoses: Chronic obstructive pulmonary disease, unspecified COPD type (HCC)      Oxygen       metoclopramide HCl (REGLAN) 10 mg tablet Take 1 Tab by mouth Before breakfast, lunch, dinner and at bedtime. Indications: stomach muscle paralysis and decreased function from diabetes  Qty: 120 Tab, Refills: 5    Associated Diagnoses: Insomnia, unspecified type      !! predniSONE (DELTASONE) 10 mg tablet Take 10 mg by mouth two (2) times a day. Qty: 10 Tab, Refills: 0    Associated Diagnoses: Rash      dicyclomine (BENTYL) 10 mg capsule Take 1 Cap by mouth three (3) times daily as needed for Abdominal Cramps.  Indications: irritable colon  Qty: 270 Cap, Refills: 3    Associated Diagnoses: Irritable bowel syndrome with diarrhea      furosemide (LASIX) 20 mg tablet Take 1 Tab by mouth daily as needed (edema). Qty: 90 Tab, Refills: 1    Associated Diagnoses: Bilateral leg edema      linaCLOtide (Linzess) 72 mcg cap capsule Take 1 Cap by mouth Daily (before breakfast). Indications: irritable bowel syndrome with constipation  Qty: 90 Cap, Refills: 1      ergocalciferol (ERGOCALCIFEROL) 1,250 mcg (50,000 unit) capsule Take 1 Cap by mouth every seven (7) days. Qty: 13 Cap, Refills: 3    Associated Diagnoses: Vitamin D deficiency      sertraline (ZOLOFT) 100 mg tablet Take 2 tablets daily ( Dose increased from previously 100 mg daily )  Indications: anxiousness associated with depression  Qty: 180 Tab, Refills: 3    Associated Diagnoses: Recurrent major depressive disorder, in partial remission ([facility]      ! ! predniSONE (DELTASONE) 10 mg tablet TK 1 T PO D  Refills: 1      sucralfate (CARAFATE) 1 gram tablet Take 1 g by mouth four (4) times daily. colestipol (COLESTID) 1 gram tablet Take 1 g by mouth two (2) times a day. OTHER        !! - Potential duplicate medications found. Please discuss with provider. Medications Discontinued during this hospitalization:   Medications Discontinued During This Encounter   Medication Reason    heparin (porcine) injection 3,200 Units     heparin 25,000 units in dextrose 500 mL infusion     morphine injection 1 mg     morphine injection 4 mg     acetylcysteine (MUCOMYST) 200 mg/mL (20 %) solution 800 mg     enoxaparin (LOVENOX) injection 30 mg Alternate Therapy    . PHARMACY TO SUBSTITUTE PER PROTOCOL (Reordered from: artificial tears, dextran-hypromellose-glycerin, (TEARS NATURALE FORTE) 0.1-0.3-0.2 % ophthalmic solution) Not A Current Medication    . PHARMACY TO SUBSTITUTE PER PROTOCOL (Reordered from: linaCLOtide (Linzess) 72 mcg cap capsule) Not A Current Medication    . PHARMACY TO SUBSTITUTE PER PROTOCOL (Reordered from: Oxygen) Not A Current Medication    budesonide-formoteroL (SYMBICORT) 160-4.5 mcg/actuation HFA inhaler 2 Puff DUPLICATE ORDER    busPIRone (BUSPAR) tablet 15 mg REORDER    azithromycin (ZITHROMAX) 250 mg in 0.9% sodium chloride 250 mL IVPB Alternate Therapy    acetylcysteine (MUCOMYST) 200 mg/mL (20 %) solution 400 mg     azithromycin (ZITHROMAX) tablet 250 mg Patient Discharge    busPIRone (BUSPAR) tablet 15 mg Patient Discharge    influenza vaccine 2020-21 (6 mos+)(PF) (FLUARIX/FLULAVAL/FLUZONE QUAD) injection 0.5 mL Patient Discharge    apixaban (ELIQUIS) tablet 2.5 mg Patient Discharge    albuterol-ipratropium (DUO-NEB) 2.5 MG-0.5 MG/3 ML Patient Discharge    budesonide-formoteroL (SYMBICORT) 160-4.5 mcg/actuation HFA inhaler 2 Puff Patient Discharge    LORazepam (ATIVAN) tablet 1 mg Patient Discharge    nicotine (NICODERM CQ) 14 mg/24 hr patch 1 Patch Patient Discharge    lidocaine 4 % patch 1 Patch Patient Discharge    pregabalin (LYRICA) capsule 25 mg Patient Discharge    HYDROmorphone (DILAUDID) tablet 2 mg Patient Discharge    HYDROcodone-acetaminophen (NORCO) 7.5-325 mg per tablet 1 Tab Patient Discharge    magnesium sulfate 2 g/50 ml IVPB (premix or compounded) Patient Discharge    LORazepam (ATIVAN) tablet 1 mg Patient Discharge    traZODone (DESYREL) tablet 50 mg Patient Discharge    topiramate (TOPAMAX) tablet 50 mg Patient Discharge    tiotropium bromide (SPIRIVA RESPIMAT) 2.5 mcg /actuation Patient Discharge    primidone (MYSOLINE) tablet 50 mg Patient Discharge    pantoprazole (PROTONIX) tablet 40 mg Patient Discharge    metoclopramide HCl (REGLAN) tablet 10 mg Patient Discharge    atorvastatin (LIPITOR) tablet 80 mg Patient Discharge    levalbuterol (XOPENEX) nebulizer soln 1.25 mg/3 mL Patient Discharge    senna-docusate (PERICOLACE) 8.6-50 mg per tablet 1 Tab Patient Discharge    naloxone (NARCAN) injection 0.4 mg Patient Discharge    HYDROcodone-acetaminophen (NORCO) 5-325 mg per tablet 1 Tab Patient Discharge    acetaminophen (TYLENOL) tablet 650 mg Patient Discharge    dilTIAZem IR (CARDIZEM) tablet 30 mg Patient Discharge    sodium chloride (NS) flush 5-40 mL Patient Discharge    sodium chloride (NS) flush 5-40 mL Patient Discharge    sertraline (ZOLOFT) tablet 200 mg     senna-docusate (PERICOLACE) 8.6-50 mg per tablet 1 Tab     polyethylene glycol (MIRALAX) packet 17 g DUPLICATE ORDER    dilTIAZem ER (CARDIZEM CD) capsule 120 mg     apixaban (ELIQUIS) tablet 2.5 mg     atorvastatin (LIPITOR) tablet 80 mg     atorvastatin (LIPITOR) tablet 80 mg DUPLICATE ORDER    metoclopramide HCl (REGLAN) tablet 10 mg     metoclopramide HCl (REGLAN) tablet 5 mg Patient Discharge    dilTIAZem ER (CARDIZEM CD) capsule 120 mg Patient Discharge    senna-docusate (PERICOLACE) 8.6-50 mg per tablet 1 Tab Patient Discharge    sertraline (ZOLOFT) tablet 100 mg Patient Discharge    naloxone [facility]) injection 0.4 mg Patient Discharge    lidocaine 4 % patch 1 Patch Patient Discharge    lactulose (CHRONULAC) 10 gram/15 mL solution 45 mL Patient Discharge    bisacodyL (DULCOLAX) suppository 10 mg Patient Discharge    azithromycin (ZITHROMAX) tablet 250 mg Patient Discharge    acetaminophen (TYLENOL) tablet 650 mg Patient Discharge    diazePAM (VALIUM) tablet 2 mg Patient Discharge    polyethylene glycol (MIRALAX) packet 17 g Patient Discharge    HYDROmorphone (DILAUDID) tablet 2 mg Patient Discharge    sucralfate (CARAFATE) tablet 1 g Patient Discharge    topiramate (TOPAMAX) tablet 50 mg Patient Discharge    budesonide (ENTOCORT EC) capsule 6 mg Patient Discharge    tiotropium bromide (SPIRIVA RESPIMAT) 2.5 mcg /actuation Patient Discharge    primidone (MYSOLINE) tablet 50 mg Patient Discharge    pantoprazole (PROTONIX) tablet 40 mg Patient Discharge    busPIRone (BUSPAR) tablet 15 mg Patient Discharge    budesonide-formoteroL (SYMBICORT) 160-4.5 mcg/actuation HFA inhaler 2 Puff Patient Discharge    acetylcysteine (MUCOMYST) 200 mg/mL (20 %) solution 400 mg        Patient Instructions: Activity: as tolerated. Diet:   DIET NUTRITIONAL SUPPLEMENTS All Meals; Ensure Enlive  DIET MECHANICAL SOFT    Therapy Ordered:  Subacute rehab    Follow-up appointments: With primary care physician    Time Spent on Discharge greater than 30 minutes.     Valerio Barnes MD  2/15/2021 9:11 AM

## 2021-02-15 NOTE — PROGRESS NOTES
Patient requested pain meds consistently throughout the night. Abdomen rigid and patient was passing gas. She has now had 6 stools overnight and her last 1 was formed. Patient states she feels much better. VSS, no concerns voiced at this time. END OF SHIFT NOTE:    Intake/Output  02/14 1901 - 02/15 0700  In: -   Out: 200 [Urine:200]   Voiding: YES  Catheter: YES  Drain:    Stool:  6 occurrences. Stool Assessment  Stool Color: [place] (02/15/21 0536)  Stool Appearance: Formed (02/15/21 0536)  Stool Amount: Large (02/15/21 0536)  Stool Source/Status: Rectum (02/15/21 0536)    Emesis:  0 occurrences. VITAL SIGNS  Patient Vitals for the past 12 hrs:   Temp Pulse Resp BP SpO2   02/15/21 0314 97.5 °F (36.4 °C) 63 12 (!) 122/46 93 %   02/14/21 2324 98.7 °F (37.1 °C) 68 16 (!) 152/66 95 %   02/14/21 2045     96 %   02/14/21 2007 97.9 °F (36.6 °C) 70 14 (!) 121/54 97 %       Pain Assessment  Pain 1  Pain Scale 1: Visual (02/15/21 0354)  Pain Intensity 1: 0 (02/15/21 0354)  Patient Stated Pain Goal: 0 (02/15/21 0354)  Pain Reassessment 1: Patient resting w/respiratory rate greater than 10 (02/15/21 0354)  Pain Onset 1: post op (02/12/21 1205)  Pain Location 1: Buttocks (02/15/21 0310)  Pain Orientation 1: Lower (02/14/21 0742)  Pain Description 1: Constant (02/14/21 0742)  Pain Intervention(s) 1: Medication (see MAR) (02/15/21 0310)    Ambulating  Yes    Additional Information:     Shift report given to oncoming nurse at the bedside.     Ashly Hendricks

## 2021-02-15 NOTE — PROGRESS NOTES
Problem: Self Care Deficits Care Plan (Adult)  Goal: *Acute Goals and Plan of Care (Insert Text)  Description: Patient will complete toileting with contact guard assist to increase self care independence. Patient will complete bathing with contact guard assist to increase self care independence. Patient will improve static standing at sink for 3 minutes to improve independence with transfers and self cares. Patient will tolerate 30 minutes of OT treatment with self incorporated rest breaks to increase activity tolerance to enhance participation in hobbies. Patient will complete all functional transfers with contact guard assist using adaptive equipment as needed. Patient will complete UE exercises with contact guard assist to increase overall activity tolerance and strength. Timeframe: 7 visits       OCCUPATIONAL THERAPY: Daily Note and AM 2/15/2021  INPATIENT: OT Visit Days: 3  Payor: Aris Jenkins / Plan: [address] / Product Type: Managed Care Medicare /      NAME/AGE/GENDER: Benoit Engel is a 66 y.o. female   PRIMARY DIAGNOSIS:  New onset atrial fibrillation ([address] [I48.91]  Falls frequently [R29.6] New onset atrial fibrillation ([address] New onset atrial fibrillation ([address]       ICD-10: Treatment Diagnosis:    Generalized Muscle Weakness (M62.81)  Other lack of cordination (R27.8)  Difficulty in walking, Not elsewhere classified (R26.2)   Precautions/Allergies:     Amlodipine, Lisinopril, and Niaspan [niacin]      ASSESSMENT:     Ms. Daniel Priest presents in ICU with COPD and new A-Fib. She lives alone but has a paid caretaker 4 hours every day. She uses a walker and has been falling more. She will need STR and possible LT placement as living alone is becoming more unsafe. She works hard during evaluation and treatment but fatigues quickly. She is below baseline for ADL's and mobility.    Initiate OT.     2/13/2021 Pt was sitting in bed and had just received breakfast. Pt was fearful of falling. Pt was willing to sit in chair to eat. Pt washed face with set up. Pt completed bed mobility and functional transfer with min A X2 using HHA. Pt was left sitting in chair with breakfast. Continue POC.     2/15/2021 1140 She got up with PT and ambulated with CGa with use of rollator. She needed assist to don her socks. She sat in recliner and brushed her teeth. She looks much better than in bed. She plans to go to rehab today. She is motivated for rehab. Will continue with OT poc. This section established at most recent assessment   PROBLEM LIST (Impairments causing functional limitations):  Decreased Strength  Decreased ADL/Functional Activities  Decreased Transfer Abilities   INTERVENTIONS PLANNED: (Benefits and precautions of occupational therapy have been discussed with the patient.)  Activities of daily living training  Adaptive equipment training  Neuromuscular re-eduation  Therapeutic activity  Therapeutic exercise     TREATMENT PLAN: Frequency/Duration: Follow patient 3x a week to address above goals. Rehabilitation Potential For Stated Goals: Good     REHAB RECOMMENDATIONS (at time of discharge pending progress):    Placement: It is my opinion, based on this patient's performance to date, that Ms. Chen Garcia may benefit from intensive therapy at [address] after discharge due to the functional deficits listed above that are likely to improve with skilled rehabilitation and concerns that he/she may be unsafe to be unsupervised at home due to deficits above .   Equipment:   None at this time              OCCUPATIONAL PROFILE AND HISTORY:   History of Present Injury/Illness (Reason for Referral):  Good, sitting up in recliner  Past Medical History/Comorbidities:   Ms. Chen Garcia  has a past medical history of Anxiety (3/6/2013), Arthritis, B12 deficiency (3/6/2013), Chronic back pain (3/6/2013), Chronic kidney disease (CKD) stage G3b/A1, moderately decreased glomerular filtration rate (GFR) between 30-44 mL/min/1.73 square meter and albuminuria creatinine ratio less than 30 mg/g (10/14/2016), Constipation (11/4/2015), COPD (2006), Decreased GFR (5/26/2016), Depression (10/25/2012), Depression (10/25/2012), Depression, major, in remission ([facility] (1/31/2018), diverticulitis, Encounter for long-term (current) use of other medications (3/6/2013), Environmental allergies (3/6/2013), Fecal incontinence (12/7/2016), Fibromyalgia (9/15/2015), GERD (gastroesophageal reflux disease) (\"years\"), Headache(784.0) (2/12/2013), HTN (hypertension) (10/25/2012), HTN (hypertension) (10/25/2012), Hyperlipidemia (10/25/2012), Lymphocytic colitis (99/6/5561), Metabolic syndrome (5/8/9584), Microalbuminuria (11/4/2015), Osteoporosis (10/25/2012), polypectomy, PUD (peptic ulcer disease) (\"years ago\"), thyroid mass, Unspecified adverse effect of anesthesia, Unspecified sleep apnea, and Vitamin D deficiency (3/6/2013). Ms. Nathaniel Rosales  has a past surgical history that includes hx partial thyroidectomy (1990s); pr colostomy (1990s); hx appendectomy (1970s); hx cholecystectomy (1970s); hx gyn (1970s); pr breast surgery procedure unlisted (1980s); hx hernia repair (unknown); hx orthopaedic (0325,2296); hx cataract removal (Right, 2016); hx breast biopsy (Right); and ir kyphoplasty lumbar (2/11/2021). Social History/Living Environment:      Prior Level of Function/Work/Activity:  Caretaker 4 hours every day. Uses a walker. Number of Personal Factors/Comorbidities that affect the Plan of Care: Expanded review of therapy/medical records (1-2):  MODERATE COMPLEXITY   ASSESSMENT OF OCCUPATIONAL PERFORMANCE[de-identified]   Activities of Daily Living:   Basic ADLs (From Assessment) Complex ADLs (From Assessment)   Feeding: Setup, Stand-by assistance  Oral Facial Hygiene/Grooming: Setup  Bathing: Moderate assistance  Lower Body Dressing: Moderate assistance  Toileting:  Moderate assistance     Grooming/Bathing/Dressing Activities of Daily Living   Grooming  Grooming Assistance: Set-up  Brushing Teeth: Set-up Cognitive Retraining  Safety/Judgement: Awareness of environment; Fall prevention                     Lower Body Dressing Assistance  Socks: Moderate assistance       Most Recent Physical Functioning:   Gross Assessment:                  Posture:  Posture Assessment: Rounded shoulders, Forward head  Balance:    Bed Mobility:     Wheelchair Mobility:     Transfers:               Patient Vitals for the past 6 hrs:   BP BP Patient Position SpO2 O2 Flow Rate (L/min) Pulse   02/15/21 0736 (!) 121/49 At rest 96 %  66   02/15/21 0823   95 % 0 l/min    02/15/21 0846 120/66    66   02/15/21 1213 (!) 110/57  95 %  74       Mental Status  Neurologic State: Alert, Appropriate for age  Orientation Level: Appropriate for age  Cognition: Appropriate decision making, Appropriate for age attention/concentration, Appropriate safety awareness, Follows commands  Perception: Appears intact  Perseveration: No perseveration noted  Safety/Judgement: Awareness of environment, Fall prevention                          Physical Skills Involved:  Range of Motion  Balance  Strength  Activity Tolerance Cognitive Skills Affected (resulting in the inability to perform in a timely and safe manner):  WFL Psychosocial Skills Affected:  WFL   Number of elements that affect the Plan of Care: 1-3:  LOW COMPLEXITY   CLINICAL DECISION MAKIN [facility] [address] [address] AM-PAC 6 Clicks   Daily Activity Inpatient Short Form  How much help from another person does the patient currently need. .. Total A Lot A Little None   1. Putting on and taking off regular lower body clothing? [x] 1   [] 2   [] 3   [] 4   2. Bathing (including washing, rinsing, drying)? [] 1   [x] 2   [] 3   [] 4   3. Toileting, which includes using toilet, bedpan or urinal?   [x] 1   [] 2   [] 3   [] 4   4. Putting on and taking off regular upper body clothing?    [] 1   [] 2   [x] 3   [] 4 5. Taking care of personal grooming such as brushing teeth? [] 1   [] 2   [x] 3   [] 4   6. Eating meals? [] 1   [] 2   [x] 3   [] 4   © 2007, Trustees of [address] [place] [address] [address] [address], under license to [employer]. All rights reserved      Score:  Initial: 13 Most Recent: X (Date: -- )    Interpretation of Tool:  Represents activities that are increasingly more difficult (i.e. Bed mobility, Transfers, Gait). Medical Necessity:     Skilled intervention continues to be required due to deficits noted above. Reason for Services/Other Comments:  Patient continues to require skilled intervention due to   New Dx  . Use of outcome tool(s) and clinical judgement create a POC that gives a: MODERATE COMPLEXITY         TREATMENT:   (In addition to Assessment/Re-Assessment sessions the following treatments were rendered)     Pre-treatment Symptoms/Complaints:    Pain: Initial:   Pain Intensity 1: 7 when asked Post Session nurse present giving pain meds     Self Care: (10): Procedure(s) (per grid) utilized to improve and/or restore self-care/home management as related to grooming. Required minimal verbal cueing to facilitate activities of daily living skills. Braces/Orthotics/Lines/Etc:   IV  mcguire catheter  Treatment/Session Assessment:    Response to Treatment:  Good. Interdisciplinary Collaboration:   Physical Therapist  Occupational Therapist  Registered Nurse    After treatment position/precautions:   Up in chair  Bed/Chair-wheels locked  Bed in low position  Caregiver at bedside  Call light within reach  Nurse at bedside   Compliance with Program/Exercises: Compliant all of the time. Recommendations/Intent for next treatment session: \"Next visit will focus on advancements to more challenging activities and reduction in assistance provided\".   Total Treatment Duration:10  OT Patient Time In/Time Out  Time In: 1140  Time Out: [address] Dolores Kingston OT

## 2021-02-15 NOTE — PROGRESS NOTES
Care Management Interventions  PCP Verified by CM: Yes  Mode of Transport at Discharge: BLS  Transition of Care Consult (CM Consult): Discharge Planning, SNF  Physical Therapy Consult: Yes  Occupational Therapy Consult: Yes  Current Support Network: Own Home, Has Personal Caregivers  Confirm Follow Up Transport: Other (see comment)  The Plan for Transition of Care is Related to the Following Treatment Goals : STR potentially LTC  The Patient and/or Patient Representative was Provided with a Choice of Provider and Agrees with the Discharge Plan?: Yes  Name of the Patient Representative Who was Provided with a Choice of Provider and Agrees with the Discharge Plan: [facility] [place] of Choice List was Provided with Basic Dialogue that Supports the Patient's Individualized Plan of Care/Goals, Treatment Preferences and Shares the Quality Data Associated with the Providers?: Yes  Discharge Location  Discharge Placement: Skilled nursing facility      Per MD pt stable for d/c. BYRON spoke with [employer] at [facility] & confirmed bed still available. BYRON spoke with pt & advised of d/c time to [facility]. BYRON spoke with pt's daughter Learta Person ) & advised of d/c, room # & transport time. Chart copied, nsg called report, transport arranged.

## 2021-02-15 NOTE — PROGRESS NOTES
Problem: Mobility Impaired (Adult and Pediatric)  Goal: *Acute Goals and Plan of Care (Insert Text)  Outcome: Progressing Towards Goal  Note:     LTG:  (1.)Ms. Johnna Watson will move from supine to sit and sit to supine  in bed with CONTACT GUARD ASSIST within 7 treatment day(s). (2.)Ms. Johnna Watson will transfer from bed to chair and chair to bed with CONTACT GUARD ASSIST using the least restrictive device within 7 treatment day(s). (3.)Ms. Bueno will ambulate with CONTACT GUARD ASSIST for 50 feet with the least restrictive device within 7 treatment day(s). ________________________________________________________________________________________________      PHYSICAL THERAPY: Daily Note and PM 2/15/2021  INPATIENT: PT Visit Days : 4  Payor: [address] [place] / Plan: [address] / Product Type: Managed Care Medicare /       NAME/AGE/GENDER: Piyush Hsu is a 66 y.o. female   PRIMARY DIAGNOSIS: New onset atrial fibrillation ([address] [I48.91]  Falls frequently [R29.6] New onset atrial fibrillation ([address] New onset atrial fibrillation ([address]       ICD-10: Treatment Diagnosis:    Generalized Muscle Weakness (M62.81)  Other abnormalities of gait and mobility (R26.89)   Precaution/Allergies:  Amlodipine, Lisinopril, and Niaspan [niacin]      ASSESSMENT:     Ms. Johnna Watson presents with weakness and limited functional mobility, decreased from her baseline. She will benefit from PT to increase her functional independence with bed mobility, transfers and gait and will need STR at discharge. Pt. Had a kyphoplasty yesterday and feels better. She is supine and agrees to get up. She needed min  Assistance with steps to the chair using RW. She did not want to walk any further due to fatigue, mild SOB after activity. She did some theraputic exercises in the chair. Plans are for rehab.  2/13/2021: Patient actively participated with PT during AM session.   Patient required minimal assistance with bed mobility and transfer to bedside chair. Patient requested hand held assist today secondary to fear of walker \"getting away. \"  Patient would continue to benefit from skilled PT to improve overall mobility, strength, and endurance with daily functional mobility. 2/14/21:  Patient participated well today. SBA for bed mobility with no complaints. Patient does not like RW-seems to have a rollator at home that was given to her. Thinks the RW is a death trap and refuses to use it. Patient used rollator in the room and did perfectly fine-SBA only at most.  Could have ambulated further but not overly motivated. Patient up in chair and set up with lunch-asks for people to do things she is perfectly capable of doing on her own such as opening bottles and containers. 2/15/21--Pt reluctantly agreeable to therapy on second attempt. Pt had a bad night. Pt performed supine to sit to stand. Pt ambulated in room. Pt in bedside chair with needs in reach. OT and RN attending. This section established at most recent assessment   PROBLEM LIST (Impairments causing functional limitations):  Decreased Strength  Decreased Transfer Abilities  Decreased Ambulation Ability/Technique  Decreased Balance   INTERVENTIONS PLANNED: (Benefits and precautions of physical therapy have been discussed with the patient.)  Bed Mobility  Gait Training  Therapeutic Exercise/Strengthening  Transfer Training     TREATMENT PLAN: Frequency/Duration: daily for duration of hospital stay  Rehabilitation Potential For Stated Goals: Good     REHAB RECOMMENDATIONS (at time of discharge pending progress):    Placement: It is my opinion, based on this patient's performance to date, that Ms. Dorian Galicia may benefit from intensive therapy at a [address] [place] [address] after discharge due to the functional deficits listed above that are likely to improve with skilled rehabilitation and concerns that he/she may be unsafe to be unsupervised at home due to chronic falls and increased weakness . Equipment:   None at this time              HISTORY:   History of Present Injury/Illness (Reason for Referral):  Nirmala Villatoro is a 66 y.o. female currently admitted to [facility] after a fall resulting in LBP. Conventional radiographs show an L3, wedge-shaped, Vertebral Compression Fracture as well as diffuse osteopenia. The VCF is new since at least 4/23/19 CT scan (the most recent lumbar imaging available for comparison). Newly diagnosed Atrial Fibrillation treated w rate control and low dose Eliquis (started yesterday). Severe COPD requiring 4L O2 NC at baseline. Unexplained weight loss. Chronic tobacco use.       Will order a MRI of the lumbar region to evaluate for chronicity of the L3 fracture, and to look for any other fractures  Past Medical History/Comorbidities:   Ms. Pasquale Humphreys  has a past medical history of Anxiety (3/6/2013), Arthritis, B12 deficiency (3/6/2013), Chronic back pain (3/6/2013), Chronic kidney disease (CKD) stage G3b/A1, moderately decreased glomerular filtration rate (GFR) between 30-44 mL/min/1.73 square meter and albuminuria creatinine ratio less than 30 mg/g (10/14/2016), Constipation (11/4/2015), COPD (2006), Decreased GFR (5/26/2016), Depression (10/25/2012), Depression (10/25/2012), Depression, major, in remission ([facility] (1/31/2018), diverticulitis, Encounter for long-term (current) use of other medications (3/6/2013), Environmental allergies (3/6/2013), Fecal incontinence (12/7/2016), Fibromyalgia (9/15/2015), GERD (gastroesophageal reflux disease) (\"years\"), Headache(784.0) (2/12/2013), HTN (hypertension) (10/25/2012), HTN (hypertension) (10/25/2012), Hyperlipidemia (10/25/2012), Lymphocytic colitis (74/7/7151), Metabolic syndrome (5/0/8814), Microalbuminuria (11/4/2015), Osteoporosis (10/25/2012), polypectomy, PUD (peptic ulcer disease) (\"years ago\"), thyroid mass, Unspecified adverse effect of anesthesia, Unspecified sleep apnea, and Vitamin D deficiency (3/6/2013). Ms. Maral Means  has a past surgical history that includes hx partial thyroidectomy (1990s); pr colostomy (1990s); hx appendectomy (1970s); hx cholecystectomy (1970s); hx gyn (1970s); pr breast surgery procedure unlisted (1980s); hx hernia repair (unknown); hx orthopaedic (7475,6050); hx cataract removal (Right, 2016); hx breast biopsy (Right); and ir kyphoplasty lumbar (2/11/2021). Social History/Living Environment: lives alone and has [address] aides come 4 hours a day 7 days a week. Prior Level of Function/Work/Activity:  Chronic falling. Uses a rolling walker or wheelchair in the home. Requires assist with ADL's. Number of Personal Factors/Comorbidities that affect the Plan of Care: 1-2: MODERATE COMPLEXITY   EXAMINATION:   Most Recent Physical Functioning:   Gross Assessment:                 Posture:     Balance:  Sitting: Intact  Standing: Pull to stand; With support  Standing - Static: Fair  Standing - Dynamic : Fair Bed Mobility:  Supine to Sit: Contact guard assistance  Wheelchair Mobility:  N/A today    Transfers:  Sit to Stand: Contact guard assistance  Stand to Sit: Contact guard assistance  Gait:     Distance (ft): 75 Feet (ft)  Assistive Device: Walker, rollator  Ambulation - Level of Assistance: Contact guard assistance      Body Structures Involved:  Muscles Body Functions Affected: Movement Related Activities and Participation Affected: Mobility   Number of elements that affect the Plan of Care: 3: MODERATE COMPLEXITY   CLINICAL PRESENTATION:   Presentation: Stable and uncomplicated: LOW COMPLEXITY   CLINICAL DECISION MAKING:   M [facility] -PAC 6 Clicks   Basic Mobility Inpatient Short Form  How much difficulty does the patient currently have. .. Unable A Lot A Little None   1. Turning over in bed (including adjusting bedclothes, sheets and blankets)? [] 1   [x] 2   [] 3   [] 4   2.   Sitting down on and standing up from a chair with arms ( e.g., wheelchair, bedside commode, etc.)   [] 1   [x] 2   [] 3   [] 4   3. Moving from lying on back to sitting on the side of the bed? [] 1   [x] 2   [] 3   [] 4   How much help from another person does the patient currently need. .. Total A Lot A Little None   4. Moving to and from a bed to a chair (including a wheelchair)? [] 1   [x] 2   [] 3   [] 4   5. Need to walk in hospital room? [] 1   [x] 2   [] 3   [] 4   6. Climbing 3-5 steps with a railing? [x] 1   [] 2   [] 3   [] 4   © 2007, Trustees of [address] [place] [address] [address] [address], under license to [employer]. All rights reserved      Score:  Initial: 11 Most Recent: X (Date: -- )    Interpretation of Tool:  Represents activities that are increasingly more difficult (i.e. Bed mobility, Transfers, Gait). Medical Necessity:     Patient is expected to demonstrate progress in strength, range of motion, balance, coordination and functional technique to improve safety during daily activities. Patient demonstrates good rehab potential due to higher previous functional level. Skilled intervention continues to be required due to decreased functional mobility. Reason for Services/Other Comments:  Patient continues to demonstrate capacity to improve overall functional mobility which will increase independence and increase safety. Use of outcome tool(s) and clinical judgement create a POC that gives a: Clear prediction of patient's progress: LOW COMPLEXITY            TREATMENT:   (In addition to Assessment/Re-Assessment sessions the following treatments were rendered)   Pre-treatment Symptoms/Complaints:  2/15/2021: Patient agreeable but not wanting to use RW. Pain: Initial:      Post Session:  0     Therapeutic Activity: ( 10 minutes   ):  Therapeutic activities including Bed transfers, Chair transfers, Ambulation on level ground and standing with RW to improve mobility, strength and balance.   Required minimal   to promote static and dynamic balance in standing. Braces/Orthotics/Lines/Etc:   IV  mcguire catheter  Treatment/Session Assessment:    Response to Treatment:  Patient prefers rollator and ambulates well. Interdisciplinary Collaboration:   Physical Therapist  Registered Nurse  After treatment position/precautions:   Up in chair  Bed/Chair-wheels locked  Bed in low position  Call light within reach  RN notified   Compliance with Program/Exercises: Compliant all of the time  Recommendations/Intent for next treatment session: \"Next visit will focus on advancements to more challenging activities and reduction in assistance provided\".   Total Treatment Duration:  PT Patient Time In/Time Out  Time In: 1130  Time Out: CHALINO Martinez

## 2021-02-15 NOTE — PROGRESS NOTES
Pt resting in bed. C/o back pain. See MAR and pain scale and pain assessment. Dilaudid given as ordered. Student nurse from [facility] at bedside.

## 2021-02-15 NOTE — PROGRESS NOTES
Problem: Falls - Risk of  Goal: *Absence of Falls  Description: Document Bard  Fall Risk and appropriate interventions in the flowsheet.   Outcome: Progressing Towards Goal  Note: Fall Risk Interventions:  Mobility Interventions: Communicate number of staff needed for ambulation/transfer, Bed/chair exit alarm, Patient to call before getting OOB         Medication Interventions: Bed/chair exit alarm    Elimination Interventions: Bed/chair exit alarm, Call light in reach, Patient to call for help with toileting needs    History of Falls Interventions: Bed/chair exit alarm, Room close to nurse's station         Problem: Patient Education: Go to Patient Education Activity  Goal: Patient/Family Education  Outcome: Progressing Towards Goal

## 2021-03-04 ENCOUNTER — PATIENT OUTREACH (OUTPATIENT)
Dept: CASE MANAGEMENT | Age: 76
End: 2021-03-04

## 2021-03-04 NOTE — PROGRESS NOTES
Community Care Team documentation for patient in [facility]    The information below provided by:Nayely  PT Update:  Mod I for all adls        Nursing Update:no acute nsg issues      Discharge Date:3/3/21 W/ [facility] home health and [facility]      Assign to Dunia Lisa [phone]

## 2021-03-11 ENCOUNTER — APPOINTMENT (OUTPATIENT)
Dept: GENERAL RADIOLOGY | Age: 76
End: 2021-03-11
Attending: EMERGENCY MEDICINE
Payer: MEDICARE

## 2021-03-11 ENCOUNTER — HOSPITAL ENCOUNTER (OUTPATIENT)
Age: 76
Setting detail: OBSERVATION
Discharge: HOME HEALTH | End: 2021-03-14
Attending: EMERGENCY MEDICINE | Admitting: HOSPITALIST
Payer: MEDICARE

## 2021-03-11 DIAGNOSIS — T88.7XXA MEDICATION SIDE EFFECT: ICD-10-CM

## 2021-03-11 DIAGNOSIS — R40.4 ALTERED AWARENESS, TRANSIENT: Primary | ICD-10-CM

## 2021-03-11 LAB
ALBUMIN SERPL-MCNC: 2.6 G/DL (ref 3.2–4.6)
ALBUMIN/GLOB SERPL: 1 {RATIO} (ref 1.2–3.5)
ALP SERPL-CCNC: 83 U/L (ref 50–136)
ALT SERPL-CCNC: 15 U/L (ref 12–65)
ANION GAP SERPL CALC-SCNC: 5 MMOL/L (ref 7–16)
AST SERPL-CCNC: 12 U/L (ref 15–37)
BILIRUB SERPL-MCNC: 0.2 MG/DL (ref 0.2–1.1)
BUN SERPL-MCNC: 27 MG/DL (ref 8–23)
CALCIUM SERPL-MCNC: 8.1 MG/DL (ref 8.3–10.4)
CHLORIDE SERPL-SCNC: 113 MMOL/L (ref 98–107)
CO2 SERPL-SCNC: 26 MMOL/L (ref 21–32)
CREAT SERPL-MCNC: 0.93 MG/DL (ref 0.6–1)
GLOBULIN SER CALC-MCNC: 2.5 G/DL (ref 2.3–3.5)
GLUCOSE SERPL-MCNC: 86 MG/DL (ref 65–100)
INR PPP: 1.1
LIPASE SERPL-CCNC: 40 U/L (ref 73–393)
MAGNESIUM SERPL-MCNC: 2 MG/DL (ref 1.8–2.4)
POTASSIUM SERPL-SCNC: 3.8 MMOL/L (ref 3.5–5.1)
PROT SERPL-MCNC: 5.1 G/DL (ref 6.3–8.2)
PROTHROMBIN TIME: 14.6 SEC (ref 12.5–14.7)
SODIUM SERPL-SCNC: 144 MMOL/L (ref 136–145)

## 2021-03-11 PROCEDURE — 74011250636 HC RX REV CODE- 250/636: Performed by: EMERGENCY MEDICINE

## 2021-03-11 PROCEDURE — 83735 ASSAY OF MAGNESIUM: CPT

## 2021-03-11 PROCEDURE — 85025 COMPLETE CBC W/AUTO DIFF WBC: CPT

## 2021-03-11 PROCEDURE — 94640 AIRWAY INHALATION TREATMENT: CPT

## 2021-03-11 PROCEDURE — 99285 EMERGENCY DEPT VISIT HI MDM: CPT

## 2021-03-11 PROCEDURE — 51701 INSERT BLADDER CATHETER: CPT

## 2021-03-11 PROCEDURE — 71045 X-RAY EXAM CHEST 1 VIEW: CPT

## 2021-03-11 PROCEDURE — 83690 ASSAY OF LIPASE: CPT

## 2021-03-11 PROCEDURE — 93005 ELECTROCARDIOGRAM TRACING: CPT | Performed by: EMERGENCY MEDICINE

## 2021-03-11 PROCEDURE — 85610 PROTHROMBIN TIME: CPT

## 2021-03-11 PROCEDURE — 80053 COMPREHEN METABOLIC PANEL: CPT

## 2021-03-11 PROCEDURE — 96360 HYDRATION IV INFUSION INIT: CPT

## 2021-03-11 RX ORDER — SODIUM CHLORIDE, SODIUM LACTATE, POTASSIUM CHLORIDE, CALCIUM CHLORIDE 600; 310; 30; 20 MG/100ML; MG/100ML; MG/100ML; MG/100ML
1000 INJECTION, SOLUTION INTRAVENOUS CONTINUOUS
Status: DISCONTINUED | OUTPATIENT
Start: 2021-03-11 | End: 2021-03-13

## 2021-03-11 RX ADMIN — SODIUM CHLORIDE 1000 ML: 900 INJECTION, SOLUTION INTRAVENOUS at 22:41

## 2021-03-12 ENCOUNTER — APPOINTMENT (OUTPATIENT)
Dept: CT IMAGING | Age: 76
End: 2021-03-12
Attending: EMERGENCY MEDICINE
Payer: MEDICARE

## 2021-03-12 ENCOUNTER — APPOINTMENT (OUTPATIENT)
Dept: GENERAL RADIOLOGY | Age: 76
End: 2021-03-12
Attending: INTERNAL MEDICINE
Payer: MEDICARE
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
## 2021-03-12 PROBLEM — D72.829 LEUKOCYTOSIS: Status: RESOLVED | Noted: 2019-06-14 | Resolved: 2021-03-12

## 2021-03-12 PROBLEM — I48.91 NEW ONSET ATRIAL FIBRILLATION (HCC): Status: RESOLVED | Noted: 2021-02-06 | Resolved: 2021-03-12

## 2021-03-12 PROBLEM — I95.9 HYPOTENSION: Status: ACTIVE | Noted: 2021-03-12

## 2021-03-12 PROBLEM — D53.9 MACROCYTIC ANEMIA: Status: ACTIVE | Noted: 2021-03-12

## 2021-03-12 LAB
APPEARANCE UR: CLEAR
ARTERIAL PATENCY WRIST A: YES
ATRIAL RATE: 103 BPM
BASE DEFICIT BLD-SCNC: 4 MMOL/L
BASOPHILS # BLD: 0 K/UL (ref 0–0.2)
BASOPHILS NFR BLD: 1 % (ref 0–2)
BDY SITE: ABNORMAL
BILIRUB UR QL: NEGATIVE
CALCULATED R AXIS, ECG10: 31 DEGREES
CALCULATED T AXIS, ECG11: 53 DEGREES
CO2 BLD-SCNC: 22 MMOL/L
COLLECT TIME,HTIME: 150
COLOR UR: YELLOW
DIAGNOSIS, 93000: NORMAL
DIFFERENTIAL METHOD BLD: ABNORMAL
EOSINOPHIL # BLD: 0.1 K/UL (ref 0–0.8)
EOSINOPHIL NFR BLD: 2 % (ref 0.5–7.8)
ERYTHROCYTE [DISTWIDTH] IN BLOOD BY AUTOMATED COUNT: 13.4 % (ref 11.9–14.6)
GAS FLOW.O2 O2 DELIVERY SYS: ABNORMAL L/MIN
GLUCOSE UR STRIP.AUTO-MCNC: NEGATIVE MG/DL
HCO3 BLD-SCNC: 21.2 MMOL/L (ref 22–26)
HCT VFR BLD AUTO: 29.7 % (ref 35.8–46.3)
HGB BLD-MCNC: 9.3 G/DL (ref 11.7–15.4)
HGB UR QL STRIP: NEGATIVE
IMM GRANULOCYTES # BLD AUTO: 0 K/UL (ref 0–0.5)
IMM GRANULOCYTES NFR BLD AUTO: 0 % (ref 0–5)
KETONES UR QL STRIP.AUTO: NEGATIVE MG/DL
LEUKOCYTE ESTERASE UR QL STRIP.AUTO: NEGATIVE
LYMPHOCYTES # BLD: 1.5 K/UL (ref 0.5–4.6)
LYMPHOCYTES NFR BLD: 34 % (ref 13–44)
MCH RBC QN AUTO: 31.7 PG (ref 26.1–32.9)
MCHC RBC AUTO-ENTMCNC: 31.3 G/DL (ref 31.4–35)
MCV RBC AUTO: 101.4 FL (ref 79.6–97.8)
MONOCYTES # BLD: 0.4 K/UL (ref 0.1–1.3)
MONOCYTES NFR BLD: 8 % (ref 4–12)
NEUTS SEG # BLD: 2.5 K/UL (ref 1.7–8.2)
NEUTS SEG NFR BLD: 55 % (ref 43–78)
NITRITE UR QL STRIP.AUTO: NEGATIVE
NRBC # BLD: 0 K/UL (ref 0–0.2)
O2/TOTAL GAS SETTING VFR VENT: 21 %
PCO2 BLD: 39.3 MMHG (ref 35–45)
PH BLD: 7.34 [PH] (ref 7.35–7.45)
PH UR STRIP: 5.5 [PH] (ref 5–9)
PLATELET # BLD AUTO: 115 K/UL (ref 150–450)
PLATELET COMMENTS,PCOM: ADEQUATE
PMV BLD AUTO: 11.7 FL (ref 9.4–12.3)
PO2 BLD: 54 MMHG (ref 75–100)
PROT UR STRIP-MCNC: NEGATIVE MG/DL
Q-T INTERVAL, ECG07: 408 MS
QRS DURATION, ECG06: 112 MS
QTC CALCULATION (BEZET), ECG08: 420 MS
RBC # BLD AUTO: 2.93 M/UL (ref 4.05–5.2)
RBC MORPH BLD: ABNORMAL
SAO2 % BLD: 86 % (ref 95–98)
SERVICE CMNT-IMP: ABNORMAL
SERVICE CMNT-IMP: ABNORMAL
SP GR UR REFRACTOMETRY: 1.01 (ref 1–1.02)
SPECIMEN TYPE: ABNORMAL
UROBILINOGEN UR QL STRIP.AUTO: 0.2 EU/DL (ref 0.2–1)
VENTRICULAR RATE, ECG03: 64 BPM
WBC # BLD AUTO: 4.5 K/UL (ref 4.3–11.1)
WBC MORPH BLD: ABNORMAL

## 2021-03-12 PROCEDURE — 97535 SELF CARE MNGMENT TRAINING: CPT

## 2021-03-12 PROCEDURE — 81003 URINALYSIS AUTO W/O SCOPE: CPT

## 2021-03-12 PROCEDURE — 82803 BLOOD GASES ANY COMBINATION: CPT

## 2021-03-12 PROCEDURE — 74011000250 HC RX REV CODE- 250: Performed by: EMERGENCY MEDICINE

## 2021-03-12 PROCEDURE — 77010033678 HC OXYGEN DAILY

## 2021-03-12 PROCEDURE — 97112 NEUROMUSCULAR REEDUCATION: CPT

## 2021-03-12 PROCEDURE — 94760 N-INVAS EAR/PLS OXIMETRY 1: CPT

## 2021-03-12 PROCEDURE — 86580 TB INTRADERMAL TEST: CPT | Performed by: FAMILY MEDICINE

## 2021-03-12 PROCEDURE — 2709999900 HC NON-CHARGEABLE SUPPLY

## 2021-03-12 PROCEDURE — 72100 X-RAY EXAM L-S SPINE 2/3 VWS: CPT

## 2021-03-12 PROCEDURE — 97165 OT EVAL LOW COMPLEX 30 MIN: CPT

## 2021-03-12 PROCEDURE — 74011250637 HC RX REV CODE- 250/637: Performed by: FAMILY MEDICINE

## 2021-03-12 PROCEDURE — 99218 HC RM OBSERVATION: CPT

## 2021-03-12 PROCEDURE — 36600 WITHDRAWAL OF ARTERIAL BLOOD: CPT

## 2021-03-12 PROCEDURE — 74011250637 HC RX REV CODE- 250/637

## 2021-03-12 PROCEDURE — 96372 THER/PROPH/DIAG INJ SC/IM: CPT

## 2021-03-12 PROCEDURE — 94640 AIRWAY INHALATION TREATMENT: CPT

## 2021-03-12 PROCEDURE — 74011250636 HC RX REV CODE- 250/636: Performed by: FAMILY MEDICINE

## 2021-03-12 PROCEDURE — 97161 PT EVAL LOW COMPLEX 20 MIN: CPT

## 2021-03-12 PROCEDURE — 97530 THERAPEUTIC ACTIVITIES: CPT

## 2021-03-12 PROCEDURE — 73100 X-RAY EXAM OF WRIST: CPT

## 2021-03-12 PROCEDURE — 70450 CT HEAD/BRAIN W/O DYE: CPT

## 2021-03-12 PROCEDURE — 96361 HYDRATE IV INFUSION ADD-ON: CPT

## 2021-03-12 PROCEDURE — 74011000302 HC RX REV CODE- 302: Performed by: FAMILY MEDICINE

## 2021-03-12 PROCEDURE — 74011250636 HC RX REV CODE- 250/636: Performed by: EMERGENCY MEDICINE

## 2021-03-12 PROCEDURE — 71250 CT THORAX DX C-: CPT

## 2021-03-12 PROCEDURE — 65270000029 HC RM PRIVATE

## 2021-03-12 RX ORDER — BUDESONIDE AND FORMOTEROL FUMARATE DIHYDRATE 160; 4.5 UG/1; UG/1
2 AEROSOL RESPIRATORY (INHALATION)
Status: DISCONTINUED | OUTPATIENT
Start: 2021-03-12 | End: 2021-03-14 | Stop reason: HOSPADM

## 2021-03-12 RX ORDER — SERTRALINE HYDROCHLORIDE 50 MG/1
200 TABLET, FILM COATED ORAL DAILY
Status: DISCONTINUED | OUTPATIENT
Start: 2021-03-12 | End: 2021-03-14 | Stop reason: HOSPADM

## 2021-03-12 RX ORDER — METOCLOPRAMIDE 10 MG/1
10 TABLET ORAL
Status: DISCONTINUED | OUTPATIENT
Start: 2021-03-12 | End: 2021-03-14 | Stop reason: HOSPADM

## 2021-03-12 RX ORDER — LORAZEPAM 1 MG/1
2 TABLET ORAL
Status: DISCONTINUED | OUTPATIENT
Start: 2021-03-12 | End: 2021-03-14 | Stop reason: HOSPADM

## 2021-03-12 RX ORDER — SODIUM CHLORIDE 0.9 % (FLUSH) 0.9 %
5-40 SYRINGE (ML) INJECTION AS NEEDED
Status: DISCONTINUED | OUTPATIENT
Start: 2021-03-12 | End: 2021-03-14 | Stop reason: HOSPADM

## 2021-03-12 RX ORDER — ACETAMINOPHEN 650 MG/1
650 SUPPOSITORY RECTAL
Status: DISCONTINUED | OUTPATIENT
Start: 2021-03-12 | End: 2021-03-14 | Stop reason: HOSPADM

## 2021-03-12 RX ORDER — PROMETHAZINE HYDROCHLORIDE 25 MG/1
12.5 TABLET ORAL
Status: DISCONTINUED | OUTPATIENT
Start: 2021-03-12 | End: 2021-03-14 | Stop reason: HOSPADM

## 2021-03-12 RX ORDER — BUDESONIDE AND FORMOTEROL FUMARATE DIHYDRATE 160; 4.5 UG/1; UG/1
2 AEROSOL RESPIRATORY (INHALATION) 2 TIMES DAILY
Status: DISCONTINUED | OUTPATIENT
Start: 2021-03-12 | End: 2021-03-12

## 2021-03-12 RX ORDER — GUAIFENESIN 600 MG/1
600 TABLET, EXTENDED RELEASE ORAL EVERY 12 HOURS
Status: DISCONTINUED | OUTPATIENT
Start: 2021-03-12 | End: 2021-03-14 | Stop reason: HOSPADM

## 2021-03-12 RX ORDER — PRIMIDONE 50 MG/1
50 TABLET ORAL 3 TIMES DAILY
Status: DISCONTINUED | OUTPATIENT
Start: 2021-03-12 | End: 2021-03-14 | Stop reason: HOSPADM

## 2021-03-12 RX ORDER — DILTIAZEM HYDROCHLORIDE 120 MG/1
120 CAPSULE, COATED, EXTENDED RELEASE ORAL DAILY
Status: DISCONTINUED | OUTPATIENT
Start: 2021-03-12 | End: 2021-03-14 | Stop reason: HOSPADM

## 2021-03-12 RX ORDER — AMOXICILLIN 250 MG
1 CAPSULE ORAL
Status: DISCONTINUED | OUTPATIENT
Start: 2021-03-12 | End: 2021-03-14 | Stop reason: HOSPADM

## 2021-03-12 RX ORDER — SODIUM CHLORIDE, SODIUM LACTATE, POTASSIUM CHLORIDE, CALCIUM CHLORIDE 600; 310; 30; 20 MG/100ML; MG/100ML; MG/100ML; MG/100ML
1000 INJECTION, SOLUTION INTRAVENOUS CONTINUOUS
Status: DISCONTINUED | OUTPATIENT
Start: 2021-03-12 | End: 2021-03-13

## 2021-03-12 RX ORDER — TOPIRAMATE 50 MG/1
100 TABLET, FILM COATED ORAL
Status: DISCONTINUED | OUTPATIENT
Start: 2021-03-12 | End: 2021-03-14 | Stop reason: HOSPADM

## 2021-03-12 RX ORDER — SODIUM CHLORIDE 9 MG/ML
150 INJECTION, SOLUTION INTRAVENOUS CONTINUOUS
Status: DISCONTINUED | OUTPATIENT
Start: 2021-03-12 | End: 2021-03-13

## 2021-03-12 RX ORDER — IPRATROPIUM BROMIDE AND ALBUTEROL SULFATE 2.5; .5 MG/3ML; MG/3ML
3 SOLUTION RESPIRATORY (INHALATION)
Status: COMPLETED | OUTPATIENT
Start: 2021-03-12 | End: 2021-03-12

## 2021-03-12 RX ORDER — ENOXAPARIN SODIUM 100 MG/ML
30 INJECTION SUBCUTANEOUS DAILY
Status: DISCONTINUED | OUTPATIENT
Start: 2021-03-12 | End: 2021-03-14 | Stop reason: HOSPADM

## 2021-03-12 RX ORDER — PANTOPRAZOLE SODIUM 40 MG/1
40 TABLET, DELAYED RELEASE ORAL 2 TIMES DAILY
Status: DISCONTINUED | OUTPATIENT
Start: 2021-03-12 | End: 2021-03-14 | Stop reason: HOSPADM

## 2021-03-12 RX ORDER — TRAZODONE HYDROCHLORIDE 50 MG/1
100 TABLET ORAL
Status: DISCONTINUED | OUTPATIENT
Start: 2021-03-12 | End: 2021-03-14 | Stop reason: HOSPADM

## 2021-03-12 RX ORDER — POLYETHYLENE GLYCOL 3350 17 G/17G
17 POWDER, FOR SOLUTION ORAL DAILY PRN
Status: DISCONTINUED | OUTPATIENT
Start: 2021-03-12 | End: 2021-03-14 | Stop reason: HOSPADM

## 2021-03-12 RX ORDER — SODIUM CHLORIDE 0.9 % (FLUSH) 0.9 %
5-40 SYRINGE (ML) INJECTION EVERY 8 HOURS
Status: DISCONTINUED | OUTPATIENT
Start: 2021-03-12 | End: 2021-03-14 | Stop reason: HOSPADM

## 2021-03-12 RX ORDER — BUDESONIDE 3 MG/1
6 CAPSULE, COATED PELLETS ORAL DAILY
Status: DISCONTINUED | OUTPATIENT
Start: 2021-03-12 | End: 2021-03-14 | Stop reason: HOSPADM

## 2021-03-12 RX ORDER — MONTELUKAST SODIUM 4 MG/1
1 TABLET, CHEWABLE ORAL 2 TIMES DAILY
Status: DISCONTINUED | OUTPATIENT
Start: 2021-03-12 | End: 2021-03-14 | Stop reason: HOSPADM

## 2021-03-12 RX ORDER — GABAPENTIN 100 MG/1
100 CAPSULE ORAL 3 TIMES DAILY
Status: DISCONTINUED | OUTPATIENT
Start: 2021-03-12 | End: 2021-03-14 | Stop reason: HOSPADM

## 2021-03-12 RX ORDER — TOPIRAMATE 50 MG/1
50 TABLET, FILM COATED ORAL 2 TIMES DAILY WITH MEALS
Status: DISCONTINUED | OUTPATIENT
Start: 2021-03-12 | End: 2021-03-14 | Stop reason: HOSPADM

## 2021-03-12 RX ORDER — CARBOXYMETHYLCELLULOSE SODIUM 10 MG/ML
2 GEL OPHTHALMIC
Status: DISCONTINUED | OUTPATIENT
Start: 2021-03-12 | End: 2021-03-14 | Stop reason: HOSPADM

## 2021-03-12 RX ORDER — SUCRALFATE 1 G/1
1 TABLET ORAL 4 TIMES DAILY
Status: DISCONTINUED | OUTPATIENT
Start: 2021-03-12 | End: 2021-03-14 | Stop reason: HOSPADM

## 2021-03-12 RX ORDER — ONDANSETRON 2 MG/ML
4 INJECTION INTRAMUSCULAR; INTRAVENOUS
Status: DISCONTINUED | OUTPATIENT
Start: 2021-03-12 | End: 2021-03-14 | Stop reason: HOSPADM

## 2021-03-12 RX ORDER — HYDROCODONE BITARTRATE AND ACETAMINOPHEN 7.5; 325 MG/1; MG/1
1 TABLET ORAL
Status: DISCONTINUED | OUTPATIENT
Start: 2021-03-12 | End: 2021-03-14 | Stop reason: HOSPADM

## 2021-03-12 RX ORDER — ATORVASTATIN CALCIUM 80 MG/1
80 TABLET, FILM COATED ORAL DAILY
Status: DISCONTINUED | OUTPATIENT
Start: 2021-03-12 | End: 2021-03-14 | Stop reason: HOSPADM

## 2021-03-12 RX ORDER — ACETAMINOPHEN 325 MG/1
650 TABLET ORAL
Status: DISCONTINUED | OUTPATIENT
Start: 2021-03-12 | End: 2021-03-14 | Stop reason: HOSPADM

## 2021-03-12 RX ORDER — ALBUTEROL SULFATE 0.83 MG/ML
2.5 SOLUTION RESPIRATORY (INHALATION)
Status: DISCONTINUED | OUTPATIENT
Start: 2021-03-12 | End: 2021-03-14 | Stop reason: HOSPADM

## 2021-03-12 RX ORDER — DICYCLOMINE HYDROCHLORIDE 10 MG/1
10 CAPSULE ORAL
Status: DISCONTINUED | OUTPATIENT
Start: 2021-03-12 | End: 2021-03-14 | Stop reason: HOSPADM

## 2021-03-12 RX ADMIN — BUSPIRONE HYDROCHLORIDE 15 MG: 10 TABLET ORAL at 21:10

## 2021-03-12 RX ADMIN — SENNOSIDES AND DOCUSATE SODIUM 1 TABLET: 8.6; 5 TABLET ORAL at 21:11

## 2021-03-12 RX ADMIN — ATORVASTATIN CALCIUM 80 MG: 80 TABLET, FILM COATED ORAL at 08:23

## 2021-03-12 RX ADMIN — BUSPIRONE HYDROCHLORIDE 15 MG: 10 TABLET ORAL at 16:01

## 2021-03-12 RX ADMIN — BUDESONIDE AND FORMOTEROL FUMARATE DIHYDRATE 2 PUFF: 160; 4.5 AEROSOL RESPIRATORY (INHALATION) at 19:45

## 2021-03-12 RX ADMIN — SUCRALFATE 1 G: 1 TABLET ORAL at 21:11

## 2021-03-12 RX ADMIN — SUCRALFATE 1 G: 1 TABLET ORAL at 18:05

## 2021-03-12 RX ADMIN — LORAZEPAM 2 MG: 1 TABLET ORAL at 16:27

## 2021-03-12 RX ADMIN — TOPIRAMATE 50 MG: 50 TABLET, FILM COATED ORAL at 12:30

## 2021-03-12 RX ADMIN — ENOXAPARIN SODIUM 30 MG: 30 INJECTION SUBCUTANEOUS at 08:25

## 2021-03-12 RX ADMIN — GABAPENTIN 100 MG: 100 CAPSULE ORAL at 16:02

## 2021-03-12 RX ADMIN — PRIMIDONE 50 MG: 50 TABLET ORAL at 21:10

## 2021-03-12 RX ADMIN — METOCLOPRAMIDE 10 MG: 10 TABLET ORAL at 08:16

## 2021-03-12 RX ADMIN — PRIMIDONE 50 MG: 50 TABLET ORAL at 08:15

## 2021-03-12 RX ADMIN — MONTELUKAST SODIUM 1 G: 4 TABLET, CHEWABLE ORAL at 09:00

## 2021-03-12 RX ADMIN — TUBERCULIN PURIFIED PROTEIN DERIVATIVE 5 UNITS: 5 INJECTION, SOLUTION INTRADERMAL at 12:31

## 2021-03-12 RX ADMIN — ACETAMINOPHEN 650 MG: 325 TABLET ORAL at 21:15

## 2021-03-12 RX ADMIN — GUAIFENESIN 600 MG: 600 TABLET ORAL at 21:10

## 2021-03-12 RX ADMIN — GABAPENTIN 100 MG: 100 CAPSULE ORAL at 21:10

## 2021-03-12 RX ADMIN — TRAZODONE HYDROCHLORIDE 100 MG: 50 TABLET ORAL at 21:10

## 2021-03-12 RX ADMIN — TOPIRAMATE 50 MG: 50 TABLET, FILM COATED ORAL at 08:17

## 2021-03-12 RX ADMIN — GUAIFENESIN 600 MG: 600 TABLET ORAL at 08:18

## 2021-03-12 RX ADMIN — HYDROCODONE BITARTRATE AND ACETAMINOPHEN 1 TABLET: 7.5; 325 TABLET ORAL at 22:49

## 2021-03-12 RX ADMIN — BUDESONIDE AND FORMOTEROL FUMARATE DIHYDRATE 2 PUFF: 160; 4.5 AEROSOL RESPIRATORY (INHALATION) at 09:02

## 2021-03-12 RX ADMIN — PANTOPRAZOLE SODIUM 40 MG: 40 TABLET, DELAYED RELEASE ORAL at 08:16

## 2021-03-12 RX ADMIN — SUCRALFATE 1 G: 1 TABLET ORAL at 08:16

## 2021-03-12 RX ADMIN — TOPIRAMATE 100 MG: 50 TABLET, FILM COATED ORAL at 21:10

## 2021-03-12 RX ADMIN — PRIMIDONE 50 MG: 50 TABLET ORAL at 16:02

## 2021-03-12 RX ADMIN — DILTIAZEM HYDROCHLORIDE 120 MG: 120 CAPSULE, COATED, EXTENDED RELEASE ORAL at 08:17

## 2021-03-12 RX ADMIN — BUSPIRONE HYDROCHLORIDE 15 MG: 10 TABLET ORAL at 08:17

## 2021-03-12 RX ADMIN — SODIUM CHLORIDE, SODIUM LACTATE, POTASSIUM CHLORIDE, AND CALCIUM CHLORIDE 1000 ML: 600; 310; 30; 20 INJECTION, SOLUTION INTRAVENOUS at 00:01

## 2021-03-12 RX ADMIN — METOCLOPRAMIDE 10 MG: 10 TABLET ORAL at 16:01

## 2021-03-12 RX ADMIN — METOCLOPRAMIDE 10 MG: 10 TABLET ORAL at 21:10

## 2021-03-12 RX ADMIN — Medication 10 ML: at 21:11

## 2021-03-12 RX ADMIN — METOCLOPRAMIDE 10 MG: 10 TABLET ORAL at 12:34

## 2021-03-12 RX ADMIN — PANTOPRAZOLE SODIUM 40 MG: 40 TABLET, DELAYED RELEASE ORAL at 18:05

## 2021-03-12 RX ADMIN — GABAPENTIN 100 MG: 100 CAPSULE ORAL at 08:18

## 2021-03-12 RX ADMIN — IPRATROPIUM BROMIDE AND ALBUTEROL SULFATE 3 ML: .5; 3 SOLUTION RESPIRATORY (INHALATION) at 01:11

## 2021-03-12 RX ADMIN — Medication 10 ML: at 15:17

## 2021-03-12 RX ADMIN — SUCRALFATE 1 G: 1 TABLET ORAL at 12:31

## 2021-03-12 RX ADMIN — Medication 10 ML: at 08:25

## 2021-03-12 RX ADMIN — SODIUM CHLORIDE, SODIUM LACTATE, POTASSIUM CHLORIDE, AND CALCIUM CHLORIDE 1000 ML: 600; 310; 30; 20 INJECTION, SOLUTION INTRAVENOUS at 01:11

## 2021-03-12 RX ADMIN — TIOTROPIUM BROMIDE INHALATION SPRAY 2 PUFF: 3.12 SPRAY, METERED RESPIRATORY (INHALATION) at 09:02

## 2021-03-12 RX ADMIN — SODIUM CHLORIDE 150 ML/HR: 9 INJECTION, SOLUTION INTRAVENOUS at 07:00

## 2021-03-12 RX ADMIN — SERTRALINE 200 MG: 50 TABLET, FILM COATED ORAL at 08:16

## 2021-03-12 RX ADMIN — BUDESONIDE 6 MG: 3 CAPSULE, GELATIN COATED ORAL at 08:17

## 2021-03-12 NOTE — H&P
[facility] Initial History and Physical Note    Patient: Halley Sandoval Date: 3/12/2021  female, 66 y.o. Admit Date: 3/11/2021  Attending: Malcom Reina MD     ASSESSMENT AND PLAN:     Principal Problem:    Hypotension (3/12/2021)    Improved in ER s/p IVF. Continue aggressive IVF, follow hypotension. Active Problems:    Fall (3/12/2021)    Likely 2/2 hypotension. PT/OT consults. Chronic back pain (3/6/2013)    Stable. Continue home meds. B12 deficiency (3/6/2013)    Consider rechecking B12, folate given macrocytosis. Chronic kidney disease (CKD) stage G3b/A1, moderately decreased glomerular filtration rate (GFR) between 30-44 mL/min/1.73 square meter and albuminuria creatinine ratio less than 30 mg/g (10/14/2016)    Stable. Macrocytic anemia (3/12/2021)    Stable, follow CBC      COPD (chronic obstructive pulmonary disease) ([address] (12/30/2009)    Stable. Continue home meds. Atrial fibrillation ([address] (2/6/2021)    Stable. Continue home meds. GERD (gastroesophageal reflux disease) (12/30/2009)    Stable. Continue home meds. Hyperlipidemia (10/25/2012)    Stable. Continue home meds. Anxiety (3/6/2013)    Stable. Continue home meds. Fibromyalgia (9/15/2015)    Stable. Continue home meds. Polypharmacy (1/12/2016)    May also be contributing to hypotension. Hold anxiolitics for now. Depression, major, in remission ([address] (1/31/2018)    Stable. Continue home meds. Severe protein-calorie malnutrition ([address] (2/15/2021)    Stable. DVT Prophylaxis: Lovenox      Code Status: FULL CODE      Disposition: Admit to med/surg for evaluation and treatment as per above.       Anticipated discharge: 2-3 days     CHIEF COMPLAINT:  Weakness, falls    HISTORY OF PRESENT ILLNESS:      Patient Active Problem List   Diagnosis Code    Osteoarthritis of left knee M17.12    Total knee replacement status Z96.659    COPD (chronic obstructive pulmonary disease) ([facility] J44.9    GERD (gastroesophageal reflux disease) K21.9    Sleep apnea G47.30    Hyperlipidemia E78.5    Osteoporosis M81.0    Peripheral neuropathy G62.9    Headache(784.0) R51    Vitamin D deficiency E55.9    Environmental allergies Z91.09    Anxiety F41.9    Chronic back pain M54.9, G89.29    Encounter for long-term (current) use of other medications Z79.899    B12 deficiency G79.9    Metabolic syndrome B69.15    Fibromyalgia M79.7    Smoking 1/2 pack a day or less F17.210    Microalbuminuria R80.9    Polypharmacy Z79.899    Decreased GFR R94.4    Chronic kidney disease (CKD) stage G3b/A1, moderately decreased glomerular filtration rate (GFR) between 30-44 mL/min/1.73 square meter and albuminuria creatinine ratio less than 30 mg/g N18.32    Lymphocytic colitis K52.832    Fecal incontinence R15.9    Depression, major, in remission (HCC) F32.5    Splenomegaly R16.1    Chronic diarrhea K52.9    Creatinine elevation R79.89    Skin lesion L98.9    Atrial fibrillation (HCC) I48.91    Falls frequently R29.6    Hypokalemia E87.6    Severe protein-calorie malnutrition (HCC) E43    Hypotension I95.9    Macrocytic anemia D53.9    Fall W19. Rebecca Storm is a 66 y.o. female, with a history of  has a past medical history of Anxiety (3/6/2013), Arthritis, B12 deficiency (3/6/2013), Chronic back pain (3/6/2013), Chronic kidney disease (CKD) stage G3b/A1, moderately decreased glomerular filtration rate (GFR) between 30-44 mL/min/1.73 square meter and albuminuria creatinine ratio less than 30 mg/g (10/14/2016), Constipation (11/4/2015), COPD (2006), Decreased GFR (5/26/2016), Depression (10/25/2012), Depression (10/25/2012), Depression, major, in remission ([facility] (1/31/2018), diverticulitis, Encounter for long-term (current) use of other medications (3/6/2013), Environmental allergies (3/6/2013), Fecal incontinence (12/7/2016), Fibromyalgia (9/15/2015), GERD (gastroesophageal reflux disease) (\"years\"), Headache(784.0) (2/12/2013), HTN (hypertension) (10/25/2012), HTN (hypertension) (10/25/2012), Hyperlipidemia (10/25/2012), Lymphocytic colitis (67/5/8620), Metabolic syndrome (4/3/3302), Microalbuminuria (11/4/2015), Osteoporosis (10/25/2012), polypectomy, PUD (peptic ulcer disease) (\"years ago\"), thyroid mass, Unspecified adverse effect of anesthesia, Unspecified sleep apnea, and Vitamin D deficiency (3/6/2013). ,  has a past surgical history that includes hx partial thyroidectomy (1990s); pr colostomy (1990s); hx appendectomy (1970s); hx cholecystectomy (1970s); hx gyn (1970s); pr breast surgery procedure unlisted (1980s); hx hernia repair (unknown); hx orthopaedic ([phone]); hx cataract removal (Right, 2016); hx breast biopsy (Right); and ir kyphoplasty lumbar (2/11/2021). who presents to the ER with report of weakness and fall. Sussy Kyles tonight after [employer] gave out. \" Reports low BP for several days. Denies hitting her head with the fall. Denies any fevers, chills, nausea, vomiting. Allergy  Allergies   Allergen Reactions    Amlodipine Hives    Lisinopril Diarrhea    Niaspan [Niacin] Rash     Patient was not taking it with  mg 30 min before. She wants to give it another try. Pt takes this med and it does not cause a rash anymore         Medication list  Prior to Admission Medications   Prescriptions Last Dose Informant Patient Reported? Taking? LORazepam (ATIVAN) 2 mg tablet   Yes No   Sig: Take 2 mg by mouth every eight (8) hours as needed. OTHER   Yes No   Oxygen   Yes No   acetaminophen (TYLENOL) 325 mg tablet   No No   Sig: Take 2 Tabs by mouth every six (6) hours as needed for Pain. Indications: pain   albuterol (PROVENTIL HFA, VENTOLIN HFA, PROAIR HFA) 90 mcg/actuation inhaler   No No   Sig: Take 2 Puffs by inhalation every four (4) hours as needed for Wheezing or Shortness of Breath.    albuterol (PROVENTIL VENTOLIN) 2.5 mg /3 mL (0.083 %) nebu   No No   Sig: 3 mL by Nebulization route every four (4) hours as needed for Wheezing or Shortness of Breath. alendronate (FOSAMAX) 70 mg tablet   No No   Sig: Take 1 Tab by mouth every seven (7) days. artificial tears, dextran-hypromellose-glycerin, (TEARS NATURALE FORTE) 0.1-0.3-0.2 % ophthalmic solution   No No   Sig: Administer 1 Drop to both eyes every six (6) hours. Indications: dry eye   atorvastatin (LIPITOR) 80 mg tablet   No No   Sig: Take 1 Tab by mouth daily. Indications: primary prevention of heart attack   budesonide (ENTOCORT EC) 3 mg capsule   Yes No   Sig: Take 6 mg by mouth every morning. budesonide-formoteroL (SYMBICORT) 160-4.5 mcg/actuation HFAA   No No   Sig: Take 2 Puffs by inhalation two (2) times a day. busPIRone (BUSPAR) 15 mg tablet   No No   Sig: Take 1 Tab by mouth three (3) times daily. Indications: repeated episodes of anxiety   colestipol (COLESTID) 1 gram tablet   Yes No   Sig: Take 1 g by mouth two (2) times a day. diazePAM (VALIUM) 2 mg tablet   No No   Sig: Take 1 Tab by mouth every twelve (12) hours as needed for Anxiety. Max Daily Amount: 4 mg.   dicyclomine (BENTYL) 10 mg capsule   No No   Sig: Take 1 Cap by mouth three (3) times daily as needed for Abdominal Cramps. Indications: irritable colon   dilTIAZem ER (CARDIZEM CD) 120 mg capsule   No No   Sig: Take 1 Cap by mouth daily for 30 days. ergocalciferol (ERGOCALCIFEROL) 1,250 mcg (50,000 unit) capsule   No No   Sig: Take 1 Cap by mouth every seven (7) days. gabapentin (NEURONTIN) 100 mg capsule   Yes No   Sig: Take 100 mg by mouth three (3) times daily. guaiFENesin ER (MUCINEX) 600 mg ER tablet   No No   Sig: Take 1 Tab by mouth every twelve (12) hours. lidocaine 4 % patch   No No   Sig: Apply to lower back   linaCLOtide (Linzess) 72 mcg cap capsule   No No   Sig: Take 1 Cap by mouth Daily (before breakfast).  Indications: irritable bowel syndrome with constipation   metoclopramide HCl (REGLAN) 10 mg tablet   No No   Sig: Take 1 Tab by mouth Before breakfast, lunch, dinner and at bedtime. Indications: stomach muscle paralysis and decreased function from diabetes   naloxone (NARCAN) 4 mg/actuation nasal spray   No No   Sig: Use 1 spray intranasally, then discard. Repeat with new spray every 2 min as needed for opioid overdose symptoms, alternating nostrils. ondansetron hcl (ZOFRAN) 4 mg tablet   Yes No   Sig: Take 4 mg by mouth every eight (8) hours as needed for Nausea or Vomiting. pantoprazole (PROTONIX) 40 mg tablet   No No   Sig: Take 1 Tab by mouth two (2) times a day. Indications: gastroesophageal reflux disease   primidone (MYSOLINE) 50 mg tablet   No No   Sig: Take 1 Tab by mouth three (3) times daily. Indications: essential tremor   senna-docusate (PERICOLACE) 8.6-50 mg per tablet   No No   Sig: Take 1 Tab by mouth nightly. sertraline (ZOLOFT) 100 mg tablet   No No   Sig: Take 2 tablets daily ( Dose increased from previously 100 mg daily )  Indications: anxiousness associated with depression   sucralfate (CARAFATE) 1 gram tablet   Yes No   Sig: Take 1 g by mouth four (4) times daily. tiotropium (SPIRIVA) 18 mcg inhalation capsule   No No   Sig: Take 1 Cap by inhalation daily. Indications: bronchospasm prevention with COPD   topiramate (TOPAMAX) 50 mg tablet   No No   Sig: Take 1 tablet with breakfast, 1 tablet with lunch, and 2 with dinner. Indications: migraine prevention   traZODone (DESYREL) 100 mg tablet   Yes No   Sig: Take 100 mg by mouth At bedtime.       Facility-Administered Medications: None       Past Medical History   Past Medical History:   Diagnosis Date    Anxiety 3/6/2013    Arthritis     osteoarthritis    B12 deficiency 3/6/2013    Chronic back pain 3/6/2013    Chronic kidney disease (CKD) stage G3b/A1, moderately decreased glomerular filtration rate (GFR) between 30-44 mL/min/1.73 square meter and albuminuria creatinine ratio less than 30 mg/g 10/14/2016    Constipation 11/4/2015    COPD 2006 [facility]/ patient on 2.5 liters O2 nightly at home    Decreased GFR 5/26/2016    Depression 10/25/2012    Depression 10/25/2012    Depression, major, in remission ([facility] 1/31/2018    diverticulitis     Encounter for long-term (current) use of other medications 3/6/2013    Environmental allergies 3/6/2013    Fecal incontinence 12/7/2016    Fibromyalgia 9/15/2015    GERD (gastroesophageal reflux disease) \"years\"    Headache(784.0) 2/12/2013    HTN (hypertension) 10/25/2012    HTN (hypertension) 10/25/2012    Hyperlipidemia 10/25/2012    Lymphocytic colitis 93/0/1687    Metabolic syndrome 5/4/5207    Microalbuminuria 11/4/2015    Osteoporosis 10/25/2012    polypectomy     PUD (peptic ulcer disease) \"years ago\"    Had peptic ulcers    thyroid mass     partial removal, benign    Unspecified adverse effect of anesthesia     PATIENT STATES SHE DOESN'T GET GENERAL ANESTHESIA DUE TO COPD    Unspecified sleep apnea     wears O2 at night.   no c-jeffrey    Vitamin D deficiency 3/6/2013       Past Surgical History:   Procedure Laterality Date    HX APPENDECTOMY  1970s    HX BREAST BIOPSY Right     excision of cyst    HX CATARACT REMOVAL Right 2016    right eye    HX CHOLECYSTECTOMY  1970s    HX GYN  1970s    TIA BSO    HX HERNIA REPAIR  unknown    hiatal hernia repair    HX ORTHOPAEDIC  [id number]    right knee replaced and then left the next year    HX PARTIAL THYROIDECTOMY  1990s    [facility]    IR KYPHOPLASTY LUMBAR  2/11/2021    VA BREAST SURGERY PROCEDURE UNLISTED  1980s    cyst removed from right breast/benign    VA COLOSTOMY  1990s    placed, then reversed       Social History   Social History     Socioeconomic History    Marital status:      Spouse name: Not on file    Number of children: Not on file    Years of education: Not on file    Highest education level: Not on file   Tobacco Use    Smoking status: Current Every Day Smoker     Packs/day: 0.50     Years: 45.00 Pack years: 22.50     Types: Cigarettes    Smokeless tobacco: Never Used   Substance and Sexual Activity    Alcohol use: Yes     Comment: states no drinking beer in 2 months    Drug use: Yes     Types: Marijuana     Comment: smoked it last night    Sexual activity: Never       Family History:   Family History   Problem Relation Age of Onset    Kidney Disease Mother     Heart Disease Father     Heart Attack Brother        REVIEW OF SYSTEMS:   A 14 point review of systems was taken and pertinent positive as per HPI. PHYSICAL EXAMINATION:  Vital 24 Hour Range Most Recent Value  Temperature Temp  Min: 97.9 °F (36.6 °C)  Max: 97.9 °F (36.6 °C) 97.9 °F (36.6 °C)    Pulse Pulse  Min: 54  Max: 84 77  Respiratory Resp  Min: 10  Max: 21 18  Blood Pressure BP  Min: 78/44  Max: 136/56 (!) 115/55  Pulse Oximetry SpO2  Min: 69 %  Max: 100 % 95 %  O2 No data recorded      Vital Most Recent Value First Value  Weight 49.9 kg (110 lb) Weight: 49.9 kg (110 lb)  Height 5' 9\" (175.3 cm) Height: 5' 9\" (175.3 cm)  BMI   N/A    Physical Exam:   General:     No acute distress, speaking in full sentences. Eyes:   No palpebral pallor or scleral icterus. ENT:   External auricular and nasal exam within normal limits. Cardiovascular: No cyanosis or edema of extremities. Respiratory:    No respiratory distress or accessory muscle use. Gastrointestinal:   Not actively vomiting, abdomen non-distended   Skin:      Normal color. No rashes, lesions, or jaundice. Neurologic:  CN II-XII grossly intact and symmetrical.      Moving all four extremities well with no focal deficits. Psychiatric:  Appropriate affect.  Alert       Intake/Output last 3 shifts:      Labs:  magnesium:7,phos:7)CMP:   Lab Results   Component Value Date/Time     03/11/2021 10:36 PM    K 3.8 03/11/2021 10:36 PM     (H) 03/11/2021 10:36 PM    CO2 26 03/11/2021 10:36 PM    AGAP 5 (L) 03/11/2021 10:36 PM    GLU 86 03/11/2021 10:36 PM    BUN 27 (H) 03/11/2021 10:36 PM    CREA 0.93 03/11/2021 10:36 PM    GFRAA >60 03/11/2021 10:36 PM    GFRNA >60 03/11/2021 10:36 PM    CA 8.1 (L) 03/11/2021 10:36 PM    MG 2.0 03/11/2021 10:36 PM    ALB 2.6 (L) 03/11/2021 10:36 PM    TBILI 0.2 03/11/2021 10:36 PM    TP 5.1 (L) 03/11/2021 10:36 PM    GLOB 2.5 03/11/2021 10:36 PM    AGRAT 1.0 (L) 03/11/2021 10:36 PM    ALT 15 03/11/2021 10:36 PM         CBC:    Lab Results   Component Value Date/Time    WBC 4.5 03/11/2021 10:36 PM    HGB 9.3 (L) 03/11/2021 10:36 PM    HCT 29.7 (L) 03/11/2021 10:36 PM     (L) 03/11/2021 10:36 PM       Lab Results   Component Value Date/Time    INR 1.1 03/11/2021 10:36 PM    INR 1.1 02/06/2021 01:43 PM    INR 0.9 11/29/2015 09:48 AM    Prothrombin time 14.6 03/11/2021 10:36 PM    Prothrombin time 14.6 02/06/2021 01:43 PM    Prothrombin time 9.9 11/29/2015 09:48 AM       ABG:    Lab Results   Component Value Date/Time    PHI 7.34 (L) 03/12/2021 02:02 AM    PCO2I 39.3 03/12/2021 02:02 AM    PO2I 54 (L) 03/12/2021 02:02 AM    HCO3I 21.2 (L) 03/12/2021 02:02 AM    FIO2I 21 03/12/2021 02:02 AM           Lab Results   Component Value Date/Time     (H) 02/16/2020 04:06 AM    Troponin-I, Qt. 0.04 02/16/2020 04:06 AM       Imagining & Other Studies    XR Results (maximum last 3): Results from [place] encounter on 03/11/21   XR CHEST PORT    Narrative Portable chest xray      COMPARISON: February 6, 2021    INDICATION: Hypotension    FINDINGS:     Heart is mildly enlarged. Mediastinal contour is within normal limits. There is  no focal pulmonary consolidation, pneumothorax or pulmonary edema. No large  pleural effusion. Surrounding bones are stable. Impression 1. No pulmonary consolidation or pulmonary edema. Results from [place] encounter on 02/06/21   XR HIP RT W OR WO PELV 2-3 VWS    Narrative PELVIS AND RIGHT HIP, 3 views. HISTORY: Hip pain following fall.     TECHNIQUE: AP view of the pelvis and coned down AP and frogleg lateral of the  hip. FINDINGS:   -Bony pelvic ring: Appears intact. -SI joints: Appear symmetric.   -Pubic rami: Appear intact. -Femoral head:  Has a round smooth contour.  -Joint space: Symmetrically. Acetabular osteophytes. -Femoral neck and proximal femoral shaft:  Appear intact.  -Lower lumbar spine: Mild DJD. Impression Negative for acute hip fracture. Osteoarthritis. LUMBAR SPINE, 3 views. HISTORY: Low back pain following fall. COMPARISON: None. CT scan July 2011    TECHNIQUE: AP, lateral and cone-down lumbosacral junction views. FINDINGS:   Spinal alignment: Anatomic. Disk spaces: Maintained. Pedicles:  Appear intact. Vertebral bodies: Decreased height of L3  SI joints:  Appear symmetric. Facet joints:  Also unremarkable. Other:  Osteophytes. Aortic calcifications. IMPRESSION: Age-indeterminate moderate compression fracture L3. Posterior  intact. CHEST X-RAY, one view. HISTORY:  Chest pain  falls. TECHNIQUE:  AP supine view. COMPARISON: None. FINDINGS:   Lungs: Hyperinflated and clear. Small calcified granulomas. No pneumothorax. Costophrenic angles: are sharp. Heart size: is normal.   Pulmonary vasculature: is unremarkable. Aorta: Arch calcifications. .   Included portion of the upper abdomen: is unremarkable. Bones: No gross bony lesions. Other: None. IMPRESSION:  Negative for acute change. XR SPINE LUMB 2 OR 3 V    Narrative PELVIS AND RIGHT HIP, 3 views. HISTORY: Hip pain following fall. TECHNIQUE: AP view of the pelvis and coned down AP and frogleg lateral of the  hip. FINDINGS:   -Bony pelvic ring: Appears intact. -SI joints: Appear symmetric.   -Pubic rami: Appear intact. -Femoral head:  Has a round smooth contour.  -Joint space: Symmetrically. Acetabular osteophytes. -Femoral neck and proximal femoral shaft:  Appear intact.  -Lower lumbar spine: Mild DJD.       Impression Negative for acute hip fracture. Osteoarthritis. LUMBAR SPINE, 3 views. HISTORY: Low back pain following fall. COMPARISON: None. CT scan July 2011    TECHNIQUE: AP, lateral and cone-down lumbosacral junction views. FINDINGS:   Spinal alignment: Anatomic. Disk spaces: Maintained. Pedicles:  Appear intact. Vertebral bodies: Decreased height of L3  SI joints:  Appear symmetric. Facet joints:  Also unremarkable. Other:  Osteophytes. Aortic calcifications. IMPRESSION: Age-indeterminate moderate compression fracture L3. Posterior  intact. CHEST X-RAY, one view. HISTORY:  Chest pain  falls. TECHNIQUE:  AP supine view. COMPARISON: None. FINDINGS:   Lungs: Hyperinflated and clear. Small calcified granulomas. No pneumothorax. Costophrenic angles: are sharp. Heart size: is normal.   Pulmonary vasculature: is unremarkable. Aorta: Arch calcifications. .   Included portion of the upper abdomen: is unremarkable. Bones: No gross bony lesions. Other: None. IMPRESSION:  Negative for acute change. CT Results (maximum last 3): Results from [place] encounter on 03/11/21   CT CHEST WO CONT    Narrative CT Chest without contrast    INDICATION: Cough, low O2 sats     COMPARISON: Chest x-ray earlier today    TECHNIQUE: Contiguous axial images were obtained from the neck base through the  upper abdomen without intravenous contrast. Radiation dose reduction techniques  were used for this study:  Our CT scanners use one or all of the following:  Automated exposure control, adjustment of the mA and/or kVp according to  patient's size, iterative reconstruction. FINDINGS:    The heart is mildly enlarged. There is no pericardial effusion. There is  enlargement of the main pulmonary artery, suggesting pulmonary arterial  hypertension. The thoracic aorta is normal in course and caliber with  atherosclerotic calcifications. There is no evidence of lymphadenopathy.     There is a 3 cm left thyroid nodule. There is moderate centrilobular emphysema. There is small platelike density in  the right middle lobe, consistent with scarring/subsegmental atelectasis. There  is no focal pulmonary consolidation, pleural effusion or pneumothorax. There is  no pulmonary edema. There is no endobronchial lesion. Included upper abdomen demonstrates a stable 3 cm cyst in the left hepatic lobe. There is suggestion of mild right hydronephrosis. Old/healed sternal fracture is  noted. Impression 1. Scarring in the right middle lobe. Negative for pneumonia, pulmonary edema or  pleural effusion. 2. Moderate centrilobular emphysema. 3. An approximately 3 cm left thyroid nodule, similar to February 16, 2020 CT. Consider follow-up outpatient thyroid ultrasound to better evaluate. 4. Suggestion of mild right hydronephrosis, captured at the periphery of the  imaging field of view. CT HEAD WO CONT    Narrative Noncontrast CT of the brain. COMPARISON: February 6, 2021    INDICATION: Fall. TECHNIQUE: Contiguous axial images were obtained from the skull base through the  vertex without IV contrast. Radiation dose reduction techniques were used for  this study:  Our CT scanners use one or all of the following: Automated exposure  control, adjustment of the mA and/or kVp according to patient's size, iterative  reconstruction. FINDINGS:    There is no acute intracranial hemorrhage or evidence for acute territorial  infarction. There is no mass effect, midline shift or hydrocephalus. There is no  extra-axial fluid collection. The cerebellum and brainstem are grossly  unremarkable. Periventricular diffuse hypodensities are nonspecific and likely secondary to  chronic small vessel changes. Mild generalized cerebral volume loss,  age-related. Included globes appear intact. The paranasal sinuses and the mastoid air cells  are aerated. There is no skull fracture. Impression 1.  No CT evidence of acute intracranial abnormality. Results from Hospital Encounter encounter on 02/06/21   CT ABD PELV W CONT    Narrative Exam: CT ABD PELV W CONT on 2/12/2021 6:33 PM    Clinical History: The Female patient is 66years old  presenting for  unintentional wt loss (IV and oral contrast). Technique: Thin slice axial images were obtained through the abdomen and pelvis with  intravenous and with oral contrast.  Coronal reformatted images were also  provided for review. A total of 100 ml of Iopamidol (ISOVUE-370) 76 % contrast was administered  intravenously. All CT scans at this facility are performed using dose reduction/dose modulation  techniques, as appropriate the performed exam, including the following:   Automated Exposure Control; Adjustment of the mA and/or kV according to patient  size (this includes techniques or standardized protocols for targeted exams  where dose is matched to indication/reason for exam); and Use of Iterative  Reconstruction Technique. Radiation Exposure Indices:  Reference Air Kerma (Ka,r) = 640 mGy-cm    COMPARISON:  Abdomen pelvis CT 4/23/2019. FINDINGS:      Abdomen:  Lung bases: An arborizing pattern of tubular structures at the posterior right  lung base suggests mucous plugging with mild right basilar atelectasis and small  bibasilar effusions superimposed on emphysematous lung disease. Liver: Borderline hepatomegaly at 17.8 cm. Stable left hepatic cyst    Biliary: Cholecystectomy. No evidence of intra or extrahepatic biliary  dilatation. Pancreas: Normal in size and contour without focal lesion. Spleen: Upper limits of normal in size at 13 cm. Adrenal glands: Normal in size without focal lesion. Kidneys: Symmetric without evidence of hydronephrosis or nephrolithiasis. Normal  enhancement throughout all visualized phases of contrast excretion.  Small  bilateral subcentimeter renal cysts    Bowel: Scattered air-fluid levels throughout small bowel with large amount stool  in the cecum and transverse as well as proximal descending colon. Retroperitoneum/vasculature: No evidence of significant adenopathy. Mild  aortoiliac atherosclerotic disease without evidence of aneurysmal dilatation. Abdominal soft tissues: Periumbilical dysraphism of the anterior abdominal  musculature is again noted. .    Osseous structures: No acute osseous abnormality. Remote L3 vertebral  augmentation    Pelvis:  Hernadez balloon decompressing the bladder. No significant adenopathy or free  fluid. Wava Prim Hysterectomy        Impression 1. Large amount stool throughout colon with probable mild enteritis. 2. Probable mucus plugging at right lung base with associated mild basilar  atelectasis and small effusions. Dedicated chest CT is recommended for  follow-up. CPT code(s): 92581, [id number]       MRI Results (maximum last 3): Results from [place] encounter on 02/06/21   MRI LUMB SPINE WO CONT    Narrative MRI LUMBAR SPINE WITHOUT CONTRAST 2/8/2021    HISTORY: Back pain after fall with L3 injury. TECHNIQUE: Sagittal T2-weighted, T1-weighted, and STIR images were obtained from  T11 through S3. Axial T2-weighted and T1-weighted images were obtained from  L1-L2 through L5-S1.    COMPARISON: Lumbar radiographs 2/6/2021    FINDINGS: A compression deformity causes mild vertebral body height loss at L3. Marrow edema is present as well. In addition, there is marrow edema at the S3  level, suggesting an additional relatively acute sacral injury. The remaining  lumbar vertebrae are normal in height and alignment. The conus terminates at L1. An incompletely imaged T2 hyperintense lesion in the left hepatic lobe measuring  3 cm in diameter is suggestive of a cyst. This could be further characterized  with sonography. Axial images:    L1-L2: No significant spinal stenosis or foraminal stenosis. L2-L3: No significant spinal stenosis.  Small left foraminal protrusion with mild  left foraminal stenosis. L3-L4: Facet degenerative change. No significant spinal stenosis or foraminal  stenosis. L4-L5: Facet degenerative change and redundancy of the ligamentum flavum. Mild  spinal stenosis. Mild bilateral foraminal stenosis. L5-S1: Facet degenerative change. No significant spinal stenosis or foraminal  stenosis. Impression 1. Acute compression fracture causing mild vertebral body height loss at L3. There is an additional relatively acute injury at S3 which may be part of a  larger sacral insufficiency fracture. 2. Mild foraminal stenosis at L2-L3. 3. Mild spinal stenosis with mild foraminal stenosis at L4-L5.    4. Incompletely imaged left hepatic lobe cyst. Consider routine follow-up  abdominal sonography for further assessment. Results from [place] encounter on 13   MRI BRAIN W WO CONT    Narrative [address] [address]                                                            [facility]          NAME: Jenny Hernandez                                                            PT : 1945               SEX: F         MR#: [id number]     LOCATION/NS: MR-                 AGE: 67Y      ACCT: [de-identified]     ORDERINGClarnce Cabot [facility]                        PT TYPE: Vena Piper                         RADIOLOGIST: [facility] ([id number])  **Final Report**       ICD Codes / Adm. Diagnosis:    /     Examination:  MRI BRAIN W WO  - [id number] - 2013 11:46AM    Reason:  CANTU 784.00, NUMBNESS 782.0 BROTHER W BRAIN TUMOR    REPORT:  MRI brain with and without contrast: 2013    History: Headache, tremors, brother with brain tumor, recently . Imaging sequences: Sagittal short TR/short TE, axial short TR/short TE, long   TR/long TE, FLAIR, gradient recall, diffusion weighted images and ADC   mapping.  Axial and coronal short TR/short TE postcontrast images. The study   was performed on a 1.5 Liz magnet utilizing the uneventful administration   of 20 cc's of intravenous MultiHance in order to better evaluate for   intracranial pathology. Comparison: None. Correlation was made to the CT scan of the brain 2/17/2008. Findings:  The ventricles are normal in size and configuration. The sulci and   extra-axial spaces along the frontal and parietal lobes are prominent   compatible with mild to moderate volume loss. There are no extra-axial fluid   collections. No evidence of intraparenchymal hemorrhage or mass effect is   identified. There are no areas of restricted diffusion to suggest an acute   or subacute infarction. Normal flow voids are present within all of the   major intracranial vessels. Patchy and discrete of T2 prolongation are present within the supratentorial   white matter. These are nonspecific findings but would be most compatible   with mild chronic small vessel ischemic changes. There are no abnormal areas of enhancement are identified following contrast   administration. There are mild bilateral mastoid effusions. Within the right maxillary sinus   is a 1.8 cm retention cyst or polyp. Windy Ang IMPRESSION:  1. Mild chronic small vessel ischemic changes. 2. Frontal and parietal lobe volume loss. 3. Mild bilateral mastoid effusions and right maxillary sinus retention cyst   or polyp. Signing/Reading Doctor: Thelma Arechiga DO ([id number])    James Sunshine DO ([id number])  Jan 24 2013 11:58AM                                 Nuclear Medicine Results (maximum last 3): No results found for this or any previous visit. US Results (maximum last 3):   Results from [place] encounter on 09/18/12   US DUPLEX AORTA/IVC/ILIAC/GRAFTS [address] [address] ULTRASOUND                          NAME: Sinda Ormond                                                            PT : 1945               SEX: F         MR#: [id number]     LOCATION/NS: US-                 AGE: 67Y      ACCT: [id number]     ORDERING: RADHA Lezama            PT TYPE: O                         RADIOLOGIST: Issac Campos MD ([id number])  **Final Report**       ICD Codes / Adm. Diagnosis:    /     Examination:  AORTA ULTRASOUND  - [id number] - Sep 18 2012  9:59AM    Reason:  dizziness/tobacco addiction    REPORT:  Examination:  Ultrasound  Aorta 2012    Indication: AAA, tobacco abuse    COMPARISON: Ultrasound Aorta 3/11/2011    Findings:  Amado Jesus scale, color, and spectral duplex doppler sonography of the   abdominal aorta, iliac, femoral and popliteal arteries was performed. Proximal aorta measured 1.7 cm. Mid aorta measured 1.6 cm. Distal aorta   measured 1.2 cm. Right and left common iliac arteries measured 0.6 cm and 0.9 cm,   respectively. Right and left common femoral arteries measured 1.0 cm and 0.9 cm,   respectively. Right and left popliteal arteries measured 0.5 cm and 0.6 cm, respectively. There were normal velocities and triphasic waveforms.       IMPRESSION:  Normal abdominal aorta            Signing/Reading Doctor: Issac Campos MD ([id number])    Approved: Issac Campos MD ([id number])  2012                                  US DUPLEX CAROTID BILATERAL    Narrative [address] [address]                                                            ULTRASOUND                          NAME: Sinda Ormond                                                            PT : 1945               SEX: F         MR#: [id number]     LOCATION/NS: US-                 AGE: Soledad Alberts      ACCT: [id number]     ORDERING: Makeda Blackman KYRA Mcguire            PT TYPE: O                         RADIOLOGIST: Lianna Singh MD ([id number])  **Final Report**       ICD Codes / Adm. Diagnosis:    /     Examination:  CAROTID ULTRASOUND  - [id number] - Sep 18 2012  9:59AM    Reason:  dizziness tobacco addiction    REPORT:  TITLE:  Carotid and Vertebral Ultrasound Examination. 2012       INDICATION:  Dyslipidemia, syncope, dizziness. TECHNIQUE: Grayscale, color, and spectral duplex Doppler interrogation   performed. NASCET criteria was utilized. COMPARISON: None. RIGHT NECK:  The peak systolic velocity in the Common Carotid Artery = 76   cm/sec; Internal Carotid Artery = 96 cm/sec. Ratio = 1.3. End diastolic   velocity in the internal carotid artery = 40. No increased spectral broadening. There is intimal thickening and mild   plaque of the carotid bulb. Antegrade flow in the vertebral artery. LEFT NECK:  The peak systolic velocity in the Common Carotid Artery = 104   cm/sec; Internal Carotid Artery = 109 cm/sec. Ratio = 1.0. End diastolic   velocity in the internal carotid artery = 33. No increased spectral broadening. Intimal thickening in the distal common   carotid artery. Antegrade flow in the vertebral artery. IMPRESSION:  Less than 50% stenosis in the bilateral internal carotid   arteries. Intimal thickening in the bilateral carotid arteries.             Signing/Reading Doctor: Lianna Singh MD ([id number])    Approved: Lianna Singh MD ([id number])  2012                                  Results from Hospital Encounter encounter on 11   US DUPLEX AORTA/IVC/ILIAC/GRAFTS [address] [address]                                                            ULTRASOUND                          NAME: Jimy Cast                                                            PT : 1945               SEX: F         MR#: [id number]     LOCATION/NS: US-                 AGE: 61L      ACCT: [de-identified]     ORDERING: RADHA SAMUELS PT TYPE: Maramírez Jeferson Baron ([id number])  **Final Report**       ICD Codes / Adm. Diagnosis:    /     Examination:  AORTA ULTRASOUND  - [id number] - Mar 11 2011  8:35AM    Reason:  aaa screening hyperlipidemia    REPORT:  Ultrasound of the abdominal aorta. Indication abdominal aortic aneurysm. COMPARISON: None. FINDINGS: The maximum diameter of the infrarenal abdominal aorta is 2.7 cm   proximally. The common femoral and popliteal arteries appear within normal   limits. The velocities within the vessels appear within normal limits. There is a 2.5 cm echoic area with acoustical enhancement        IMPRESSION: Maximum diameter of infrarenal abdominal aorta is 2.7 cm. Probable simple cyst of left lobe of the liver. Interpreting/Reading Doctor: Kami Lay ([id number])  Transcribed:  on 03/11/2011  Approved: Kami Lay ([id number])  03/11/2011             Distribution:  Attending Doctor: Beto Márquez Results (maximum last 3): Results from [place] encounter on 09/13/18   DEXA BONE DENSITY STUDY AXIAL    Narrative Bone Mineral Density     Indication: Annual screening, 74 years, Female     Comparison: Bone mineral density study August 13, 2010      Findings:  Lumbar spine: L1  Bone mineral density (gm/cm2):  0.83  T score:  -2.5  Z score:  -0.9     Hip: Bilateral femoral necks mean  Bone mineral density (gm/cm2):  0.91  T score:  -1.0  Z score:  0.9    10 year fracture risk (FRAX):  Major osteoporotic:  9.2%  Hip fracture:  1.5%       Impression Impression:  Using the World Health Organization criteria, the bone mineral density is  borderline osteoporotic, slightly improved since prior.     **    The National Osteoporosis Foundation recommends that medical therapy be  considered in postmenopausal women and men age 48 years and older with a:       a. Hip or vertebral fracture,       b. T-score <= -2.5 in the spine or hip (osteoporotic), or       c.  T-score between -1.0 and -2.5 (low bone mass, osteopenic), and FRAX =>  3% for hip fracture or => 20% for major osteoporotic fracture. Results from [place] encounter on 08/13/10   [facility]/ [address]                    [address] [facility]                                            [address] [address]                                                            WOMEN'S IMAGING                     NAME: Robyn Johnson                                                            PT : 1945               SEX: F         MR#: [id number]     LOCATION/NS: BS-                 AGE: 65Y      ACCT: [de-identified]     ORDERING: Myla Moreland PT TYPE: Leslie Benitez ([id number])  Final Report       ICD Codes / Adm. Diagnosis:    /     Examination:  DEXA BONE DENSITY STUDY  - [id number] - Aug 13 2010  9:41AM    BONE DENSITOMETRY 08/13/10     Reason:  Post-menopausal screening. REPORT:  Without prior study for comparison, bone mineral density in the   lumbar spine at L1-4 was measured at 0.951 gm/cm2 with a T-score of -2.0. Minimal bone mineral density at the hips was measured at the right femoral   neck at 0.861 gm/cm2 with a T-score of -1.3. IMPRESSION:  OSTEOPENIA IN THE LUMBAR SPINE AND HIPS. FOLLOW-UP SCAN SHOULD   BE CONSIDERED IN 1-2 YEARS AS CLINICALLY INDICATED. Interpreting/Reading Doctor: Akiko Neff ([id number])  Transcribed: TLS on 2010  Approved: Akiko Neff ([id number])  2010             Distribution:  Attending Doctor: Myla TSANG Results (maximum last 3):   Results from [place] encounter on 01/15/21   [facility] MAMMO BI SCREENING INCL CAD    Narrative STUDY:  Bilateral digital screening mammogram      INDICATION:  Screening    COMPARISON:  01/10/2020    FINDINGS: Bilateral digital mammography was performed, and is interpreted in  conjunction with a computer assisted detection (CAD) system. The breast parenchyma is very dense, much denser than on prior studies. There  is also diffuse skin thickening. Scattered calcifications appear benign. No  significant masses or suspicious calcifications are identified. Impression IMPRESSION:    1. No evidence of malignancy in either breast.  2.  Significant increased density of both breasts and bilateral skin thickening. Most likely soft tissue edema associated with congestive heart or renal  failure. BI-RADS Assessment Category 2: Benign finding. Annual mammograms are recommended for all women over the age of 36. A reminder  letter will be sent to the patient. We are mailing breast density information to the patient along with the  mammogram report. BD4   Results from Hospital Encounter encounter on 01/10/20   TRINH 3D CAROLYN W MAMMO BI SCREENING INCL CAD    Narrative STUDY:  Bilateral digital screening mammogram with CAD and Tomosynthesis  1/10/2020    INDICATION:  Routine screening mammogram.    COMPARISON:  Bilateral mammograms 8/9/2018, and 5/1/2015. Additional prior  studies may have been reviewed, as needed, for completion of this report. FINDINGS:   Bilateral digital screening mammography, interpreted in conjunction with CAD  overread. The breasts are composed of scattered fibroglandular elements. The  axilla contain benign appearing lymph nodes. No evidence of evolving skin  thickening, or nipple retraction is seen. Stable typically benign-appearing  calcifications are seen. No evolving unique clustered microcalcifications are  seen to suggest a worrisome process. No evolving dominant mass, spiculated  density, or architectural distortion is seen.     Bilateral breast tomosynthesis was performed, and is interpreted in conjunction  with a computer assisted detection (CAD) system. No suspicious masses,  calcifications, or architectural distortion are identified otherwise. Impression IMPRESSION:    1. No mammographic evidence of malignancy. Recommend annual mammogram in one  year. A reminder letter will be scheduled. We are mailing breast density information to the patient along with the  mammogram report. BI-RADS Assessment Category 2: Benign finding. BD2   Results from [place] encounter on 09/13/18   TRINH MAMMO LT DX INCL CAD    Narrative LEFT DIAGNOSTIC DIGITAL MAMMOGRAPHY, September 13, 2018    CLINICAL HISTORY: Followup abnormal screening mammogram.    FINDINGS: Straight lateral and spot compression views are compared with August 9, 2018 and earlier studies. They demonstrate change in configuration of the  focal asymmetries, suggesting that they represented summation shadows of the  heterogeneous breast parenchyma. No discrete soft tissue mass, persistent  architectural distortion, or other definite evidence for malignancy is seen. Impression IMPRESSION:      ADDITIONAL VIEWS FAIL TO CONFIRM A DISCRETE ABNORMALITY SUSPICIOUS FOR  MALIGNANCY. FOLLOWUP BILATERAL SCREENING MAMMOGRAPHY IS RECOMMENDED IN ONE  YEAR. BI-RADS Assessment Category 2: Benign finding. IR Results (maximum last 3): Results from Hospital Encounter encounter on 02/06/21   IR KYPHOPLASTY LUMBAR    Narrative PROCEDURE: Vertebral Augmentation with Cavity Creation (Kyphoplasty). Procedural Personnel  Attending physician(s): Cristina Collier M.D. Pre-procedure diagnosis:  26-year-old woman with acute L3 vertebral compression  fracture. MRI performed 02/08/2021 confirms marrow edema and wedge-shaped  compression.   Post-procedure diagnosis:  Same  Indication:  Osteoporotic compression fracture  Additional clinical history:  Lifelong tobacco abuse, unintentional 100 pound  weight loss in the last one year. Given this history, a biopsy will be  performed. COMPLICATION: No immediate complications. Impression Successful Vertebral Augmentation with Cavity Creation (Kyphoplasty)  of L3. A bone biopsy was performed. PLAN:  1 hours bedrest.  Virtual Office follow-up in 1/2 weeks. The patient has  [facility]. ________________________________________________________________________________    _______    PROCEDURE SUMMARY:  - Fluoroscopic guided vertebral augmentation with cavity creation (Kyphoplasty). -Fluoroscopic Guided Bone Biopsy:  was performed. PROCEDURE DETAILS:    Pre-procedure  Consent:  Informed written and oral consent for the procedure was obtained after  explanation of risks (including, but not limitted to:  hemorrhage, bone  fracture, infection, cement migration) benefits and alternatives. The patient's  questions were answered to their satisfaction. They stated understanding and  requested that we proceed. Final verification:  A time-out identifying the patient and planned procedure  was performed prior to the procedure. Preparation:  Maximal sterile barrier technique (including:  cap, mask, sterile  gown, sterile gloves, sterile sheet, hand hygiene, and cutaneous antisepsis) was  used. The patient was placed prone on the fluoroscopy table. All contact  points were appropriately padded. Anesthesia/sedation  Level of anesthesia/sedation:  Total intravenous sedation and analgesia  Anesthesia/sedation administered by: Anesthesiology Service  Total intra-service sedation time (minutes):  Not applicable  Medications: Ancef 2 g IV. Vertebral/Sacral Body Biopsy  Target level: L3  Pedicle access: Unipedicular  Number of specimen:1    Vertebral Augmentation with Cavity Creation (Kyphoplasty)  Target fracture level(s):  L3.  Local anesthesia was achieved with lidocaine and  bupivacaine.   Using fluoroscopic guidance, transpedicular approach was used at  both the right and left pedicles. Pedicle access:  Bipedicular. Access trocar(s) gauge:  10 gauge Kyphon Express II. Cavity creation device utilized:  15 mm balloon. Cement type:  Polymethylmethacrylate (PMMA). :  DalloulNW/Spokeable. Clinically significant cement leak occurrence:  No.    Closure  The devices were removed and hemostasis was achieved with manual compression. Sterile glue was placed on the skin. Radiation Dose  Reference Air Kerma (Ka,r) :  108  mGy  Dose Area Product/Kerma Area Product (DAP/JANNIE/PKA) :  304.79 cGy-cm2  Fluoroscopy Exposure Time :  3 minutes 24 seconds    Fluorographic Images :  30    Additional Details  Additional description of procedure:  None  Equipment details:  None  Specimens removed:  None  Estimated blood loss (mL):  Less than 10  Standardized report: [email or web address]    Attestation  Signer name: Kevin Nevarez M.D. I attest that I performed the entire procedure. I reviewed the stored images and  agree with the report as written. VAS/US Results (maximum last 3): No results found for this or any previous visit. PET Results (maximum last 3): No results found for this or any previous visit. EKG Results     Procedure 720 Value Units Date/Time    EKG, 12 LEAD, INITIAL [[id number]] Collected: 03/11/21 2225    Order Status: Completed Updated: 03/11/21 2240     Ventricular Rate 64 BPM      Atrial Rate 103 BPM      QRS Duration 112 ms      Q-T Interval 408 ms      QTC Calculation (Bezet) 420 ms      Calculated R Axis 31 degrees      Calculated T Axis 53 degrees      Diagnosis --     !! AGE AND GENDER SPECIFIC ECG ANALYSIS !!   Atrial fibrillation with a competing junctional pacemaker  Indeterminate axis  Right bundle branch block  Abnormal ECG  When compared with ECG of 09-FEB-2021 20:23,  Atrial fibrillation has replaced Sinus rhythm  Criteria for Septal infarct are no longer Present  T wave inversion no longer evident in Anterior leads  QT has shortened            Vivian Hussein MD  3/12/2021 5:12 AM

## 2021-03-12 NOTE — PROGRESS NOTES
03/12/21 0617   Dual Skin Pressure Injury Assessment   Dual Skin Pressure Injury Assessment WDL   Second Care Provider (Based on [address] [place] [address]) Amira Torres RN   Skin Integumentary   Skin Integumentary (WDL) X    Pressure  Injury Documentation No Pressure Injury Noted-Pressure Ulcer Prevention Initiated   Skin Color Ecchymosis (comment)   Skin Condition/Temp Fragile; Other (comment)  (Scattered bruising)   Skin Integrity Tear;Abrasion   Turgor Epidermis thin w/ loss of subcut tissue   Hair Growth Present   Nails X   Varicosities Present   Exceptions to WDL Thick   Wound Prevention and Protection Methods   Orientation of Wound Prevention Posterior   Location of Wound Prevention Sacrum/Coccyx   Dressing Present  Yes   Dressing Status Changed   Wound Offloading (Prevention Methods) Foam silicone   Skin tears to bilateral wrist, scattered ecchymosis and bruising, sacrum red but blanchable, scars to abdomen.

## 2021-03-12 NOTE — ED TRIAGE NOTES
Arrives via [facility] for falls/hypotension. Pt reports has fallen twice in past week; tonight fell \"when my knees gave out\". Pt takes Ativan, hydrocodone, and possibly another sedating med at night; has already taken it tonight at 2000. States BP has been running low for past week; thinks her BP med was changed recently. Denies pain anywhere besides left arm skin tear from fall tonight. Denies injury from fall earlier this week. Pt alert and oriented x 4; appears sleepy but is answering all questions appropriately. Reports being admitted to [facility] [address] for 11 days in [place]; then [facility]; discharged from there 3/3. Admission was for frequent falls and hypotension.

## 2021-03-12 NOTE — PROGRESS NOTES
Pt is a 67 yo female admitted due to hypotension. SW consult received to assist with pt's \"dispo\". SW met with pt and her CLTC caregiver, Derek Alexander, at the bedside. Per hospital protocol, CM wore appropriate PPE and observed social distancing. No direct physical contact. They confirmed that the pt lives alone and has a CLTC caregiver daily for 4-5 hours. Pt uses a rollator for ambulation assistance. Pt with recent STR admission to [facility] 2/15-3/3/21. SW confirmed that the pt is current with [facility] at [facility] for RN services only. PT/OT have evaluated the pt but a definite recommendation is pending. STR is recommended unless pt progresses during the hospitalization at which time [facility] would be recommended. BYRON broached this subject with the pt and she became somewhat agitated and stated she did not ever want to go back to a rehab facility again. BYRON did leave a list of in-network facilities in her room for them to review should she change her mind. If pt dc's home with [facility], SW will ensure that a ABDULKADIR order is sent to [place] that will also include therapy services. Pt's dc needs are undecided at present. SW to continue to follow to assist as needed. Care Management Interventions  PCP Verified by CM:  Yes  Last Visit to PCP: 08/04/20  Mode of Transport at Discharge: (family or [id number])  Transition of Care Consult (CM Consult): Discharge Planning  Discharge Durable Medical Equipment: No(has rollator at home)  Physical Therapy Consult: Yes  Occupational Therapy Consult: Yes  Speech Therapy Consult: No  Current Support Network: Own Home, Lives Alone, Family Lives [place], Has Personal Caregivers(CLTC aide 7 days/week for 4-5 hrs/day)  Confirm Follow Up Transport: Other (see comment)(CLTC aide, family or Medicaid [facility] Romero)  Discharge Location  Discharge Placement: Unable to determine at this time

## 2021-03-12 NOTE — ED PROVIDER NOTES
52-year-old female presenting for weakness, fall, head injury and low blood pressure. The history is provided by the patient. Hypotension   This is a recurrent problem. The current episode started 2 days ago. The problem has not changed since onset. Associated symptoms include somnolence and weakness. Pertinent negatives include no confusion, no seizures, no unresponsiveness, no agitation, no delusions, no hallucinations, no self-injury, no violence, no tingling and no numbness. Her past medical history does not include diabetes, seizures, liver disease, CVA, TIA, AIDS, hypertension, COPD, depression, dementia, head trauma or heart disease.         Past Medical History:   Diagnosis Date    Anxiety 3/6/2013    Arthritis     osteoarthritis    B12 deficiency 3/6/2013    Chronic back pain 3/6/2013    Chronic kidney disease (CKD) stage G3b/A1, moderately decreased glomerular filtration rate (GFR) between 30-44 mL/min/1.73 square meter and albuminuria creatinine ratio less than 30 mg/g 10/14/2016    Constipation 11/4/2015    COPD 2006    [facility]/ patient on 2.5 liters O2 nightly at home    Decreased GFR 5/26/2016    Depression 10/25/2012    Depression 10/25/2012    Depression, major, in remission ([facility] [address] 1/31/2018    diverticulitis     Encounter for long-term (current) use of other medications 3/6/2013    Environmental allergies 3/6/2013    Fecal incontinence 12/7/2016    Fibromyalgia 9/15/2015    GERD (gastroesophageal reflux disease) \"years\"    Headache(784.0) 2/12/2013    HTN (hypertension) 10/25/2012    HTN (hypertension) 10/25/2012    Hyperlipidemia 10/25/2012    Lymphocytic colitis 37/7/0834    Metabolic syndrome 7/8/2686    Microalbuminuria 11/4/2015    Osteoporosis 10/25/2012    polypectomy     PUD (peptic ulcer disease) \"years ago\"    Had peptic ulcers    thyroid mass     partial removal, benign    Unspecified adverse effect of anesthesia     PATIENT STATES SHE DOESN'T GET GENERAL ANESTHESIA DUE TO COPD    Unspecified sleep apnea     wears O2 at night.   no c-jeffrey    Vitamin D deficiency 3/6/2013       Past Surgical History:   Procedure Laterality Date    HX APPENDECTOMY  1970s    HX BREAST BIOPSY Right     excision of cyst    HX CATARACT REMOVAL Right 2016    right eye    HX CHOLECYSTECTOMY  1970s    HX GYN  1970s    TIA BSO    HX HERNIA REPAIR  unknown    hiatal hernia repair    HX ORTHOPAEDIC  [id number]    right knee replaced and then left the next year    HX PARTIAL THYROIDECTOMY  1990s    Heather Mura    IR KYPHOPLASTY LUMBAR  2/11/2021    NH BREAST SURGERY PROCEDURE UNLISTED  1980s    cyst removed from right breast/benign    NH COLOSTOMY  1990s    placed, then reversed         Family History:   Problem Relation Age of Onset    Kidney Disease Mother     Heart Disease Father     Heart Attack Brother        Social History     Socioeconomic History    Marital status:      Spouse name: Not on file    Number of children: Not on file    Years of education: Not on file    Highest education level: Not on file   Occupational History    Not on file   Social Needs    Financial resource strain: Not on file    Food insecurity     Worry: Not on file     Inability: Not on file    Transportation needs     Medical: Not on file     Non-medical: Not on file   Tobacco Use    Smoking status: Current Every Day Smoker     Packs/day: 0.50     Years: 45.00     Pack years: 22.50     Types: Cigarettes    Smokeless tobacco: Never Used   Substance and Sexual Activity    Alcohol use: Yes     Comment: states no drinking beer in 2 months    Drug use: Yes     Types: Marijuana     Comment: smoked it last night    Sexual activity: Never   Lifestyle    Physical activity     Days per week: Not on file     Minutes per session: Not on file    Stress: Not on file   Relationships    Social connections     Talks on phone: Not on file     Gets together: Not on file     Attends Shinto service: Not on file     Active member of club or organization: Not on file     Attends meetings of clubs or organizations: Not on file     Relationship status: Not on file    Intimate partner violence     Fear of current or ex partner: Not on file     Emotionally abused: Not on file     Physically abused: Not on file     Forced sexual activity: Not on file   Other Topics Concern    Not on file   Social History Narrative    Not on file         ALLERGIES: Amlodipine, Lisinopril, and Niaspan [niacin]    Review of Systems   Neurological: Positive for weakness. Negative for tingling, seizures and numbness. Psychiatric/Behavioral: Negative for agitation, confusion, hallucinations and self-injury. All other systems reviewed and are negative. Vitals:    03/11/21 2241 03/11/21 2246 03/11/21 2301 03/11/21 2316   BP:  (!) 85/52 (!) 78/44 (!) 83/46   Pulse:  (!) 56 (!) 54 65   Resp:  13     Temp:       SpO2: 95% 95% 94% 94%   Weight:       Height:                Physical Exam  Vitals signs and nursing note reviewed. Constitutional:       Appearance: She is well-developed. Comments: Frail appearing     HENT:      Head: Normocephalic and atraumatic. Eyes:      Conjunctiva/sclera: Conjunctivae normal.      Pupils: Pupils are equal, round, and reactive to light. Neck:      Musculoskeletal: Neck supple. Cardiovascular:      Rate and Rhythm: Normal rate and regular rhythm. Pulmonary:      Effort: Pulmonary effort is normal.      Breath sounds: Normal breath sounds. Abdominal:      General: Bowel sounds are normal.      Palpations: Abdomen is soft. Musculoskeletal: Normal range of motion. Skin:     General: Skin is warm and dry. Neurological:      Mental Status: She is alert and oriented to person, place, and time.           MDM  Number of Diagnoses or Management Options  Altered awareness, transient  Medication side effect  Diagnosis management comments: 64-year-old female presenting for fatigue and hypotension. Amount and/or Complexity of Data Reviewed  Clinical lab tests: ordered and reviewed (Results for orders placed or performed during the hospital encounter of 03/11/21  -CBC WITH AUTOMATED DIFF       Result                      Value             Ref Range           WBC                         4.5               4.3 - 11.1 K*       RBC                         2.93 (L)          4.05 - 5.2 M*       HGB                         9.3 (L)           11.7 - 15.4 *       HCT                         29.7 (L)          35.8 - 46.3 %       MCV                         101.4 (H)         79.6 - 97.8 *       MCH                         31.7              26.1 - 32.9 *       MCHC                        31.3 (L)          31.4 - 35.0 *       RDW                         13.4              11.9 - 14.6 %       PLATELET                    115 (L)           150 - 450 K/*       MPV                         11.7              9.4 - 12.3 FL       ABSOLUTE NRBC               0.00              0.0 - 0.2 K/*       NEUTROPHILS                 55                43 - 78 %           LYMPHOCYTES                 34                13 - 44 %           MONOCYTES                   8                 4.0 - 12.0 %        EOSINOPHILS                 2                 0.5 - 7.8 %         BASOPHILS                   1                 0.0 - 2.0 %         IMMATURE GRANULOCYTES       0                 0.0 - 5.0 %         ABS. NEUTROPHILS            2.5               1.7 - 8.2 K/*       ABS. LYMPHOCYTES            1.5               0.5 - 4.6 K/*       ABS. MONOCYTES              0.4               0.1 - 1.3 K/*       ABS. EOSINOPHILS            0.1               0.0 - 0.8 K/*       ABS. BASOPHILS              0.0               0.0 - 0.2 K/*       ABS. IMM.  GRANS.            0.0               0.0 - 0.5 K/*       RBC COMMENTS                                                  NORMOCYTIC/NORMOCHROMIC       WBC COMMENTS Result Confirmed By Smear       PLATELET COMMENTS           ADEQUATE                              DF                          AUTOMATED                        -PROTHROMBIN TIME + INR       Result                      Value             Ref Range           Prothrombin time            14.6              12.5 - 14.7 *       INR                         1.1                              -LIPASE       Result                      Value             Ref Range           Lipase                      40 (L)            73 - 393 U/L   -MAGNESIUM       Result                      Value             Ref Range           Magnesium                   2.0               1.8 - 2.4 mg*  -METABOLIC PANEL, COMPREHENSIVE       Result                      Value             Ref Range           Sodium                      144               136 - 145 mm*       Potassium                   3.8               3.5 - 5.1 mm*       Chloride                    113 (H)           98 - 107 mmo*       CO2                         26                21 - 32 mmol*       Anion gap                   5 (L)             7 - 16 mmol/L       Glucose                     86                65 - 100 mg/*       BUN                         27 (H)            8 - 23 MG/DL        Creatinine                  0.93              0.6 - 1.0 MG*       GFR est AA                  >60               >60 ml/min/1*       GFR est non-AA              >60               >60 ml/min/1*       Calcium                     8.1 (L)           8.3 - 10.4 M*       Bilirubin, total            0.2               0.2 - 1.1 MG*       ALT (SGPT)                  15                12 - 65 U/L         AST (SGOT)                  12 (L)            15 - 37 U/L         Alk.  phosphatase            83                50 - 136 U/L        Protein, total              5.1 (L)           6.3 - 8.2 g/*       Albumin                     2.6 (L)           3.2 - 4.6 g/*       Globulin                    2.5               2.3 - 3.5 g/*       A-G Ratio                   1.0 (L)           1.2 - 3.5      -URINALYSIS W/ RFLX MICROSCOPIC       Result                      Value             Ref Range           Color                       YELLOW                                Appearance                  CLEAR                                 Specific gravity            1.008             1.001 - 1.02*       pH (UA)                     5.5               5.0 - 9.0           Protein                     Negative          NEG mg/dL           Glucose                     Negative          mg/dL               Ketone                      Negative          NEG mg/dL           Bilirubin                   Negative          NEG                 Blood                       Negative          NEG                 Urobilinogen                0.2               0.2 - 1.0 EU*       Nitrites                    Negative          NEG                 Leukocyte Esterase          Negative          NEG            -METABOLIC PANEL, BASIC       Result                      Value             Ref Range           Sodium                      148 (H)           136 - 145 mm*       Potassium                   3.7               3.5 - 5.1 mm*       Chloride                    119 (H)           98 - 107 mmo*       CO2                         24                21 - 32 mmol*       Anion gap                   5 (L)             7 - 16 mmol/L       Glucose                     78                65 - 100 mg/*       BUN                         21                8 - 23 MG/DL        Creatinine                  0.75              0.6 - 1.0 MG*       GFR est AA                  >60               >60 ml/min/1*       GFR est non-AA              >60               >60 ml/min/1*       Calcium                     8.2 (L)           8.3 - 10.4 M*  -CBC WITH AUTOMATED DIFF       Result                      Value             Ref Range           WBC                         5.0               4.3 - 11.1 K*       RBC 2.99 (L)          4.05 - 5.2 M*       HGB                         9.5 (L)           11.7 - 15.4 *       HCT                         30.6 (L)          35.8 - 46.3 %       MCV                         102.3 (H)         79.6 - 97.8 *       MCH                         31.8              26.1 - 32.9 *       MCHC                        31.0 (L)          31.4 - 35.0 *       RDW                         13.1              11.9 - 14.6 %       PLATELET                    153               150 - 450 K/*       MPV                         11.6              9.4 - 12.3 FL       ABSOLUTE NRBC               0.00              0.0 - 0.2 K/*       DF                          AUTOMATED                             NEUTROPHILS                 57                43 - 78 %           LYMPHOCYTES                 34                13 - 44 %           MONOCYTES                   7                 4.0 - 12.0 %        EOSINOPHILS                 1                 0.5 - 7.8 %         BASOPHILS                   1                 0.0 - 2.0 %         IMMATURE GRANULOCYTES       0                 0.0 - 5.0 %         ABS. NEUTROPHILS            2.8               1.7 - 8.2 K/*       ABS. LYMPHOCYTES            1.7               0.5 - 4.6 K/*       ABS. MONOCYTES              0.3               0.1 - 1.3 K/*       ABS. EOSINOPHILS            0.1               0.0 - 0.8 K/*       ABS. BASOPHILS              0.0               0.0 - 0.2 K/*       ABS. IMM.  GRANS.            0.0               0.0 - 0.5 K/*  -METABOLIC PANEL, BASIC       Result                      Value             Ref Range           Sodium                      145               136 - 145 mm*       Potassium                   4.0               3.5 - 5.1 mm*       Chloride                    116 (H)           98 - 107 mmo*       CO2                         22                21 - 32 mmol*       Anion gap                   7                 7 - 16 mmol/L       Glucose 89                65 - 100 mg/*       BUN                         23                8 - 23 MG/DL        Creatinine                  0.83              0.6 - 1.0 MG*       GFR est AA                  >60               >60 ml/min/1*       GFR est non-AA              >60               >60 ml/min/1*       Calcium                     7.9 (L)           8.3 - 10.4 M*  -CBC WITH AUTOMATED DIFF       Result                      Value             Ref Range           WBC                         4.8               4.3 - 11.1 K*       RBC                         2.97 (L)          4.05 - 5.2 M*       HGB                         9.5 (L)           11.7 - 15.4 *       HCT                         30.7 (L)          35.8 - 46.3 %       MCV                         103.4 (H)         79.6 - 97.8 *       MCH                         32.0              26.1 - 32.9 *       MCHC                        30.9 (L)          31.4 - 35.0 *       RDW                         12.8              11.9 - 14.6 %       PLATELET                    153               150 - 450 K/*       MPV                         11.6              9.4 - 12.3 FL       ABSOLUTE NRBC               0.00              0.0 - 0.2 K/*       DF                          AUTOMATED                             NEUTROPHILS                 57                43 - 78 %           LYMPHOCYTES                 35                13 - 44 %           MONOCYTES                   7                 4.0 - 12.0 %        EOSINOPHILS                 1                 0.5 - 7.8 %         BASOPHILS                   0                 0.0 - 2.0 %         IMMATURE GRANULOCYTES       0                 0.0 - 5.0 %         ABS. NEUTROPHILS            2.7               1.7 - 8.2 K/*       ABS. LYMPHOCYTES            1.7               0.5 - 4.6 K/*       ABS. MONOCYTES              0.3               0.1 - 1.3 K/*       ABS. EOSINOPHILS            0.1               0.0 - 0.8 K/*       ABS.  BASOPHILS 0.0               0.0 - 0.2 K/*       ABS. IMM.  GRANS.            0.0               0.0 - 0.5 K/*  -CORTISOL, AM       Result                      Value             Ref Range           Cortisol, a.m.              4.0 (L)           7 - 25 ug/dL   -POC G3       Result                      Value             Ref Range           Device:                     ROOM AIR                              FIO2 (POC)                  21                %                   pH (POC)                    7.34 (L)          7.35 - 7.45         pCO2 (POC)                  39.3              35 - 45 MMHG        pO2 (POC)                   54 (L)            75 - 100 MMHG       HCO3 (POC)                  21.2 (L)          22 - 26 MMOL*       sO2 (POC)                   86 (L)            95 - 98 %           Base deficit (POC)          4                 mmol/L              Allens test (POC)           YES                                   Site                        RIGHT RADIAL                          Specimen type (POC)         ARTERIAL                              Performed by                                                  Maria C       CO2, POC                    22                MMOL/L              Respiratory comment:        NurseNotified                         COLLECT TIME                150                              -PLEASE READ & DOCUMENT PPD TEST IN 24 HRS       Result                      Value             Ref Range           PPD                         Negative          Negative            mm Induration               0                 0 - 5 mm       -EKG, 12 LEAD, INITIAL       Result                      Value             Ref Range           Ventricular Rate            64                BPM                 Atrial Rate                 103               BPM                 QRS Duration                112               ms                  Q-T Interval                408               ms                  QTC Calculation (Bezet)     420               ms                  Calculated R Axis           31                degrees             Calculated T Axis           53                degrees             Diagnosis                                                     Sinus bradycardia with frequent Premature atrial complexes   Indeterminate axis   Right bundle branch block   Abnormal ECG   When compared with ECG of 09-FEB-2021 20:23,   Criteria for Septal infarct are no longer Present   T wave inversion no longer evident in Anterior leads   QT has shortened   Confirmed by Suzy Thompson ([id number]) on 3/12/2021 6:59:00 AM     )  Tests in the radiology section of CPT®: ordered and reviewed (Xr Spine Lumb 2 Or 3 V    Result Date: 3/12/2021  Lumbar spine History: Pain after fall AP and lateral views of the lumbar spine were obtained. Comparison: 02/06/2021 Findings: There is methylmethacrylate within the L3 vertebral body demonstrating mild loss of height, unchanged. Slight concavity along the inferior endplate of L5 is unchanged. The disc space heights are well-preserved. There is no bony destruction. Atherosclerotic changes are present. Interval methylmethacrylate placement at L3. Xr Wrist Lt Ap/lat    Result Date: 3/12/2021  EXAM: Left wrist series INDICATION:  wrist pain and bruising. Recent fall. COMPARISON: None. FINDINGS: 2  views of the left wrist demonstrate normal bone mineralization. There is no evidence of acute fracture or dislocation. There are advanced degenerative changes at the basal joint of the thumb with lateral subluxation of the first metacarpal bone. Advanced degenerative changes at the basal joint of the thumb. Ct Head Wo Cont    Result Date: 3/12/2021  Noncontrast CT of the brain. COMPARISON: February 6, 2021 INDICATION: Fall.  TECHNIQUE: Contiguous axial images were obtained from the skull base through the vertex without IV contrast. Radiation dose reduction techniques were used for this study: Our CT scanners use one or all of the following: Automated exposure control, adjustment of the mA and/or kVp according to patient's size, iterative reconstruction. FINDINGS: There is no acute intracranial hemorrhage or evidence for acute territorial infarction. There is no mass effect, midline shift or hydrocephalus. There is no extra-axial fluid collection. The cerebellum and brainstem are grossly unremarkable. Periventricular diffuse hypodensities are nonspecific and likely secondary to chronic small vessel changes. Mild generalized cerebral volume loss, age-related. Included globes appear intact. The paranasal sinuses and the mastoid air cells are aerated. There is no skull fracture. 1. No CT evidence of acute intracranial abnormality. Ct Chest Wo Cont    Result Date: 3/12/2021  CT Chest without contrast INDICATION: Cough, low O2 sats COMPARISON: Chest x-ray earlier today TECHNIQUE: Contiguous axial images were obtained from the neck base through the upper abdomen without intravenous contrast. Radiation dose reduction techniques were used for this study:  Our CT scanners use one or all of the following: Automated exposure control, adjustment of the mA and/or kVp according to patient's size, iterative reconstruction. FINDINGS: The heart is mildly enlarged. There is no pericardial effusion. There is enlargement of the main pulmonary artery, suggesting pulmonary arterial hypertension. The thoracic aorta is normal in course and caliber with atherosclerotic calcifications. There is no evidence of lymphadenopathy. There is a 3 cm left thyroid nodule. There is moderate centrilobular emphysema. There is small platelike density in the right middle lobe, consistent with scarring/subsegmental atelectasis. There is no focal pulmonary consolidation, pleural effusion or pneumothorax. There is no pulmonary edema. There is no endobronchial lesion.  Included upper abdomen demonstrates a stable 3 cm cyst in the left hepatic lobe. There is suggestion of mild right hydronephrosis. Old/healed sternal fracture is noted. 1. Scarring in the right middle lobe. Negative for pneumonia, pulmonary edema or pleural effusion. 2. Moderate centrilobular emphysema. 3. An approximately 3 cm left thyroid nodule, similar to February 16, 2020 CT. Consider follow-up outpatient thyroid ultrasound to better evaluate. 4. Suggestion of mild right hydronephrosis, captured at the periphery of the imaging field of view. Xr Chest Port    Result Date: 3/11/2021  Portable chest xray  COMPARISON: February 6, 2021 INDICATION: Hypotension FINDINGS: Heart is mildly enlarged. Mediastinal contour is within normal limits. There is no focal pulmonary consolidation, pneumothorax or pulmonary edema. No large pleural effusion. Surrounding bones are stable. 1. No pulmonary consolidation or pulmonary edema.     )  Tests in the medicine section of CPT®: ordered and reviewed  Decide to obtain previous medical records or to obtain history from someone other than the patient: yes  Discuss the patient with other providers: yes  Independent visualization of images, tracings, or specimens: yes    Risk of Complications, Morbidity, and/or Mortality  Presenting problems: high  Diagnostic procedures: high  Management options: high  General comments: I personally reviewed the patient's vital signs, laboratory tests, and/or radiological findings. I discussed these findings with the patient and their significance. I answered all questions and explained that given these findings there is significant concern for increased morbidity and/or mortality without immediate intervention. As a result, I recommended admission to the hospital, consulted the appropriate service, and transitioned care to that service in improved condition      Patient Progress  Patient progress: improved    ED Course as of Mar 23 0544   Fri Mar 12, 2021   0005 Patient's chest x-ray is clear.     [JS]   0005 Patient CBC is at her baseline and CMP is unremarkable. [JS]   0005 Lipase is negative    [JS]   0005 Blood pressure seems to be responding to fluids. Still awaiting urinalysis. [JS]   [id number] Patient provided a long list of medications that she takes and she took a bunch of them at 8:00. She also reports that they changed her blood pressure medication recently so my suspicion is a lot of this is medication induced.     [JS]      ED Course User Index  [JS] Padmini Ojeda MD       Procedures

## 2021-03-12 NOTE — ED NOTES
TRANSFER - OUT REPORT:    Verbal report given to Chevy(name) on Jorge Gates  being transferred to [facility](unit) for routine progression of care       Report consisted of patients Situation, Background, Assessment and   Recommendations(SBAR). Information from the following report(s) SBAR was reviewed with the receiving nurse. Lines:   Peripheral IV 03/11/21 Right Antecubital (Active)   Site Assessment Clean, dry, & intact 03/11/21 2241   Phlebitis Assessment 0 03/11/21 2241   Infiltration Assessment 0 03/11/21 2241   Dressing Status Clean, dry, & intact 03/11/21 2241        Opportunity for questions and clarification was provided.       Patient transported with:   Macheen

## 2021-03-12 NOTE — PROGRESS NOTES
Problem: Pressure Injury - Risk of  Goal: *Prevention of pressure injury  Description: Document Gen Scale and appropriate interventions in the flowsheet. Outcome: Progressing Towards Goal  Note: Pressure Injury Interventions:  Sensory Interventions: Assess changes in LOC    Moisture Interventions: Absorbent underpads, Apply protective barrier, creams and emollients, Check for incontinence Q2 hours and as needed, Limit adult briefs    Activity Interventions: PT/OT evaluation, Pressure redistribution bed/mattress(bed type), Increase time out of bed    Mobility Interventions: PT/OT evaluation, Pressure redistribution bed/mattress (bed type), HOB 30 degrees or less, Turn and reposition approx. every two hours(pillow and wedges)    Nutrition Interventions: Document food/fluid/supplement intake    Friction and Shear Interventions: Apply protective barrier, creams and emollients, Feet elevated on foot rest, HOB 30 degrees or less                Problem: Patient Education: Go to Patient Education Activity  Goal: Patient/Family Education  Outcome: Progressing Towards Goal     Problem: Falls - Risk of  Goal: *Absence of Falls  Description: Document Tenzin Fall Risk and appropriate interventions in the flowsheet.   Outcome: Progressing Towards Goal  Note: Fall Risk Interventions:                 Elimination Interventions: Bed/chair exit alarm, Call light in reach, Elevated toilet seat, Stay With Me (per policy), Patient to call for help with toileting needs, Toilet paper/wipes in reach, Toileting schedule/hourly rounds    History of Falls Interventions: Bed/chair exit alarm, Door open when patient unattended, Investigate reason for fall         Problem: Patient Education: Go to Patient Education Activity  Goal: Patient/Family Education  Outcome: Progressing Towards Goal

## 2021-03-12 NOTE — PROGRESS NOTES
ACUTE OT GOALS:  (Developed with and agreed upon by patient and/or caregiver.)  1. Patient will complete lower body bathing and dressing with minimal assistance and adaptive equipment as needed. 2. Patient will complete toileting with modified independence. 3. Patient will tolerate 25 minutes of OT treatment with 1-2 rest breaks to increase activity tolerance for ADLs. 4. Patient will complete functional transfers with modified independence and adaptive equipment as needed. 5. Patient will tolerate 10 minutes BUE exercises to increase strength for safe, functional transfers. Timeframe: 7 visits     OCCUPATIONAL THERAPY ASSESSMENT: Initial Assessment and Daily Note OT Treatment Day # 1    Gertha Lesches is a 66 y.o. female   PRIMARY DIAGNOSIS: Hypotension  Hypotension [I95.9]       Reason for Referral:    ICD-10: Treatment Diagnosis: Generalized Muscle Weakness (M62.81)  Other lack of cordination (R27.8)  Difficulty in walking, Not elsewhere classified (R26.2)  Repeated Falls (R29.6)  INPATIENT: Payor: [facility] MEDICARE / Plan: [address] / Product Type: [facility] Medicare /   ASSESSMENT:     REHAB RECOMMENDATIONS:   Recommendation to date pending progress:  Setting:   Short-term Rehab vs. [facility] pending progress  Equipment:    None pt has all needs at home     PRIOR LEVEL OF FUNCTION:  (Prior to Hospitalization)  INITIAL/CURRENT LEVEL OF FUNCTION:  (Based on today's evaluation)   Bathing:   Modified Independent - Minimal Assistance  Dressing:   Independent  Feeding/Grooming:   Independent  Toileting:   Modified Independent  Functional Mobility:   Modified Independent Bathing:   Moderate Assistance  Dressing:   Minimal Assistance  Feeding/Grooming:   Set Up  Toileting:   Minimal Assistance  Functional Mobility:   Minimal Assistance     ASSESSMENT:  Ms. Estrella Hurtado is a 65 y/o female presents with hypotension and history of frequent falls.  Pt endorses 4-6 falls in the past 6 months. Pt lives alone in a one level home with 1 ANTONI. Pt has tub shower with SC, uses a rollator for ambulation, has w/c and SPC at home and has hospital bed. Pt reports having home care aide half days 7 days/wk to assist with IADLs and bathing. Pt wears 2.5L O2 NC at home. Pt with decreased functional ADLs, mobility and strength. Pt completed bed mobility with CGA and sit<>stand with min A x2 RW. Pt BP monitored throughout. Pt completed grooming ADLs washing face and combing hair sitting EOB unsupported. Pt is currently functioning below baseline and would benefit from skilled OT services to address deficits and goals. Rec STR vs. [facility] pending progress at d/c.     SUBJECTIVE:   Ms. Liban Sampson states, \"I hurt my wrist when I fell. \"    SOCIAL HISTORY/LIVING ENVIRONMENT: Pt lives alone in a one level home with 1 ANTONI. Pt has tub shower with SC, uses a rollator for ambulation, has w/c and SPC at home and has hospital bed. Pt reports having home care aide half days 7 days/wk to assist with IADLs and bathing. Pt wears 2.5L O2 NC at home. OBJECTIVE:     PAIN: VITAL SIGNS: LINES/DRAINS:   Pre Treatment: Pain Screen  Pain Scale 1: Numeric (0 - 10)  Pain Intensity 1: 5  Pain Onset 1: acute  Pain Location 1: Wrist  Pain Orientation 1: Left  Pain Description 1: Aching; Sore  Pain Intervention(s) 1: Nurse notified  Post Treatment: same, RN notified Vital Signs  Pulse (Heart Rate): 68  BP: 118/86  MAP (Calculated): 97  BP 1 Location: Left upper arm  BP 1 Method: Automatic  BP Patient Position: At rest;Supine  More BP/Pulse rows needed?: Yes  O2 Sat (%): 95 %  O2 Device: Nasal cannula  O2 Flow Rate (L/min): 2.5 l/min  Additional Blood Pressure/Pulse Data  Pulse 2: 54  BP 2: 102/55  MAP 2 (Calculated): 71  BP 2 Location: Left arm  BP Method 2: Automatic  Patient Position 2: Sitting  Pulse 3: 54  BP 3: 104/50  MAP 3 (Calculated): 68  BP 3 Location: Left arm  BP Method 3: Automatic  Patient Position 3: Standing none  O2 Device: Nasal cannula     GROSS EVALUATION:  BUE Within Functional Limits Abnormal/ Functional Abnormal/ Non-Functional (see comments) Not Tested Comments:   AROM [x] [] [] []    PROM [] [] [] []    Strength [] [x] [] []    Balance [] [x] [] []    Posture [] [x] [] []    Sensation [x] [] [] []    Coordination [] [x] [] []    Tone [x] [] [] []    Edema [x] [] [] []    Activity Tolerance [] [x] [] []     [] [] [] []      COGNITION/  PERCEPTION: Intact Impaired   (see comments) Comments:   Orientation [x] []    Vision [] [x] Wears glasses   Hearing [] [x] B hearing aides   Judgment/ Insight [] [x]    Attention [x] []    Memory [x] []    Command Following [x] []    Emotional Regulation [x] []     [] []      ACTIVITIES OF DAILY LIVING: I Mod I S SBA CGA Min Mod Max Total NT Comments   BASIC ADLs:              Bathing/ Showering [] [] [] [] [] [] [] [] [] []    Toileting [] [] [] [] [] [] [] [] [] []    Dressing [] [] [] [] [] [] [] [] [] []    Feeding [] [] [] [] [] [] [] [] [] []    Grooming [] [] [x] [] [] [] [] [] [] [] Sitting EOB unupported   Personal Device Care [x] [] [] [] [] [] [] [] [] [] Sitting EOB unsupported ~5 minutes    Functional Mobility [] [] [] [] [] [x] [] [] [] [] RW   I=Independent, Mod I=Modified Independent, S=Supervision, SBA=Standby Assistance, CGA=Contact Guard Assistance,   Min=Minimal Assistance, Mod=Moderate Assistance, Max=Maximal Assistance, Total=Total Assistance, NT=Not Tested    MOBILITY: I Mod I S SBA CGA Min Mod Max Total  NT x2 Comments:   Supine to sit [] [] [] [] [x] [] [] [] [] [] []    Sit to supine [] [] [] [] [x] [] [] [] [] [] []    Sit to stand [] [] [] [] [] [x] [] [] [] [] [x] RW   Bed to chair [] [] [] [] [] [] [] [] [] [] []    I=Independent, Mod I=Modified Independent, S=Supervision, SBA=Standby Assistance, CGA=Contact Guard Assistance,   Min=Minimal Assistance, Mod=Moderate Assistance, Max=Maximal Assistance, Total=Total Assistance, NT=Not Tested    [employer] AM-PAC 6 Clicks   Daily Activity Inpatient Short Form        How much help from another person does the patient currently need. .. Total A Lot A Little None   1. Putting on and taking off regular lower body clothing? [] 1   [] 2   [x] 3   [] 4   2. Bathing (including washing, rinsing, drying)? [] 1   [] 2   [x] 3   [] 4   3. Toileting, which includes using toilet, bedpan or urinal?   [] 1   [] 2   [x] 3   [] 4   4. Putting on and taking off regular upper body clothing? [] 1   [] 2   [] 3   [x] 4   5. Taking care of personal grooming such as brushing teeth? [] 1   [] 2   [] 3   [x] 4   6. Eating meals? [] 1   [] 2   [] 3   [x] 4   © 2007, Trustees of [facility], under license to [employer]. All rights reserved     Score:  Initial: 21 Most Recent: X (Date: -- )   Interpretation of Tool:  Represents activities that are increasingly more difficult (i.e. Bed mobility, Transfers, Gait). PLAN:   FREQUENCY/DURATION: OT Plan of Care: 3 times/week for duration of hospital stay or until stated goals are met, whichever comes first.    PROBLEM LIST:   (Skilled intervention is medically necessary to address:)  1. Decreased ADL/Functional Activities  2. Decreased Activity Tolerance  3. Decreased AROM/PROM  4. Decreased Balance  5. Decreased Coordination  6. Decreased Gait Ability  7. Decreased Strength  8. Decreased Transfer Abilities  9. Increased Pain   INTERVENTIONS PLANNED:   (Benefits and precautions of occupational therapy have been discussed with the patient.)  1. Self Care Training  2. Therapeutic Activity  3. Therapeutic Exercise/HEP  4. Neuromuscular Re-education  5. Gait Training  6.  Education     TREATMENT:     EVALUATION: Low Complexity : (Untimed Charge)    TREATMENT:   ($$ Self Care/Home Management: 8-22 mins$$ Neuromuscular Re-Education: 8-22 mins   )  Co-Treatment PT/OT necessary due to patient's decreased overall endurance/tolerance levels, as well as need for high level skilled assistance to complete functional transfers/mobility and functional tasks  Self Care (10 Minutes): Self care including Personal Device Care and Grooming to increase independence and decrease level of assistance required. Neuromuscular Re-education (10 Minutes): Neuromuscular Re-education included Balance Training, Coordination training, Functional mobility with facilitation, Postural training, Sitting balance training and Standing balance training to improve Balance, Coordination, Functional Mobility and Postural Control.     TREATMENT GRID:  N/A    AFTER TREATMENT POSITION/PRECAUTIONS:  Alarm Activated, Bed, Needs within reach and RN notified    INTERDISCIPLINARY COLLABORATION:  RN/PCT, PT/PTA and OT/PATEL    TOTAL TREATMENT DURATION:  OT Patient Time In/Time Out  Time In: 1030  Time Out: [address], OT

## 2021-03-12 NOTE — PROGRESS NOTES
ACUTE PHYSICAL THERAPY GOALS:  (Developed with and agreed upon by patient and/or caregiver.)    1. Patient will perform bed mobility with INDEPENDENCE within 7 days. 2. Patient will transfer bed to chair with CONTACT GUARD ASSISTANCE within 7 days. 3. Patient will demonstrate FAIR+ DYNAMIC STANDING balance within 7 day(s). 4. Patient will ambulate 150+ using least restrictive assistive device and MODIFIED INDEPENDENCE within 7 days. 5. Patient will tolerate 25+ minutes of therapeutic activity/exercise and/or neuromuscular re-education while maintaining stable vitals to improve functional strength and activity tolerance within 7 days. PHYSICAL THERAPY ASSESSMENT: Initial Assessment, Daily Note and AM PT Treatment Day # 1      Gertha Lesches is a 66 y.o. female   PRIMARY DIAGNOSIS: Hypotension  Hypotension [I95.9]       Reason for Referral:    ICD-10: Treatment Diagnosis: Difficulty in walking, Not elsewhere classified (R26.2)  History of falling (Z91.81)  Dizziness and Giddiness (R42)  INPATIENT: Payor: [employer] MEDICARE / Plan: [employer] [address] / Product Type: Managed Care Medicare /     ASSESSMENT:     REHAB RECOMMENDATIONS:   Recommendation to date pending progress:  Setting:   HHPT vs. STR pending progress  Equipment:    To Be Determined     PRIOR LEVEL OF FUNCTION:  (Prior to Hospitalization) INITIAL/CURRENT LEVEL OF FUNCTION:  (Most Recently Demonstrated)   Bed Mobility:   Modified Independent  Sit to Stand:   Modified Independent  Transfers:   Modified Independent  Gait/Mobility:   Modified Independent Bed Mobility:   Standby Assistance  Sit to Stand:   Contact Guard Assistance  Transfers:   Contact Guard Assistance  Gait/Mobility:   Minimal Assistance     ASSESSMENT:  Ms. Estrella Hurtado is a 66year old female admitted with hypotension and falls. Patient seen this AM for initial physical therapy evaluation.  At baseline, patient is a modified independent, household level ambulator with use of rollator. Has home health aid that assists with ADLs/ IADLs. History of 4-6 falls in the last 6 months. Today, Ms. Shona Anaya presents with generalized weakness, decreased functional activity tolerance, and decreased balance/gait status from baseline. Orthostatic vitals monitored throughout and document. Recommend STR vs. HHPT pending patient progress toward acute PT goals. Will follow with stated plan of care. SUBJECTIVE:   Ms. Shona Anaya states, \"I'm cold. \"    SOCIAL HISTORY/LIVING ENVIRONMENT: At baseline, patient is a modified independent,   household level ambulator with use of rollator. Has home health aid that assists with ADLs/ IADLs. History of 4-6 falls in the last 6 months.       OBJECTIVE:     PAIN: VITAL SIGNS: LINES/DRAINS:   Pre Treatment: Pain Screen  Pain Scale 1: Numeric (0 - 10)  Pain Intensity 1: 8  Pain Onset 1: PTA  Pain Location 1: Wrist;Back  Post Treatment: 8/10; RN notified Vital Signs  Pulse (Heart Rate): 68  BP: 118/86  MAP (Calculated): 97  BP 1 Location: Left upper arm  BP 1 Method: Automatic  BP Patient Position: Supine  More BP/Pulse rows needed?: Yes  Additional Blood Pressure/Pulse Data  Pulse 2: 54  BP 2: 102/55  MAP 2 (Calculated): 71  BP 2 Location: Left arm  BP Method 2: Automatic  Patient Position 2: Sitting  Pulse 3: 54  BP 3: 104/50  MAP 3 (Calculated): 68  BP 3 Location: Left arm  BP Method 3: Automatic  Patient Position 3: Standing IV  O2 Device: Nasal cannula     GROSS EVALUATION:   Within Functional Limits Abnormal/ Functional Abnormal/ Non-Functional (see comments) Not Tested Comments:   AROM [] [x] [] [] Tight hamstrings bilaterally   PROM [] [] [] [x]    Strength [] [x] [] [] Generalized 4-/5; non focal weakness   Balance [] [x] [] [] Fair+ dynamically in sitting; fair dynamically in standing   Posture [] [x] [] [] Forward head, rounded shoulders   Sensation [] [x] [] [] Neuropathy bilaterally   Coordination [] [] [] [x]    Tone [] [] [] [x] Edema [] [] [] [x]    Activity Tolerance [] [x] [] [] Decreased    [] [] [] []      COGNITION/  PERCEPTION: Intact Impaired   (see comments) Comments:   Orientation [x] [] Oriented x4   Vision [] [] Corrective lenses   Hearing [x] []    Command Following [] []    Safety Awareness [] [] History of falls    [] []      MOBILITY: I Mod I S SBA CGA Min Mod Max Total  NT x2 Comments:   Bed Mobility    Rolling [] [] [] [x] [] [] [] [] [] [] []    Supine to Sit [] [] [] [x] [] [] [] [] [] [] []    Scooting [] [] [] [] [x] [] [] [] [] [] []    Sit to Supine [] [] [] [] [x] [] [] [] [] [] []    Transfers    Sit to Stand [] [] [] [] [x] [x] [] [] [] [] []    Bed to Chair [] [] [] [] [] [] [] [] [] [x] []    Stand to Sit [] [] [] [] [x] [x] [] [] [] [] []    I=Independent, Mod I=Modified Independent, S=Supervision, SBA=Standby Assistance, CGA=Contact Guard Assistance,   Min=Minimal Assistance, Mod=Moderate Assistance, Max=Maximal Assistance, Total=Total Assistance, NT=Not Tested  GAIT: I Mod I S SBA CGA Min Mod Max Total  NT x2 Comments:   Level of Assistance [] [] [] [] [x] [] [] [] [] [] []    Distance 5ft    DME Rolling Walker    Gait Quality Shortened step length, narrowed base of support, fair dynamic balance    Weightbearing Status N/A     I=Independent, Mod I=Modified Independent, S=Supervision, SBA=Standby Assistance, CGA=Contact Guard Assistance,   Min=Minimal Assistance, Mod=Moderate Assistance, Max=Maximal Assistance, Total=Total Assistance, NT=Not Tested    [address] [place] [address] [address] AM-PAC 6 Clicks   Basic Mobility Inpatient Short Form       How much difficulty does the patient currently have. .. Unable A Lot A Little None   1. Turning over in bed (including adjusting bedclothes, sheets and blankets)? [] 1   [] 2   [x] 3   [] 4   2. Sitting down on and standing up from a chair with arms ( e.g., wheelchair, bedside commode, etc.)   [] 1   [] 2   [x] 3   [] 4   3.   Moving from lying on back to sitting on the side of the bed? [] 1   [] 2   [x] 3   [] 4   How much help from another person does the patient currently need. .. Total A Lot A Little None   4. Moving to and from a bed to a chair (including a wheelchair)? [] 1   [] 2   [x] 3   [] 4   5. Need to walk in hospital room? [] 1   [x] 2   [] 3   [] 4   6. Climbing 3-5 steps with a railing? [] 1   [x] 2   [] 3   [] 4   © 2007, Trustees of [address] [place] [address] [address] [address], under license to [email or web address]. All rights reserved     Score:  Initial: 16 Most Recent: X (Date: -- )    Interpretation of Tool:  Represents activities that are increasingly more difficult (i.e. Bed mobility, Transfers, Gait). PLAN:   FREQUENCY/DURATION: PT Plan of Care: 3 times/week for duration of hospital stay or until stated goals are met, whichever comes first.    PROBLEM LIST:   (Skilled intervention is medically necessary to address:)  1. Decreased Activity Tolerance  2. Decreased Balance  3. Decreased Gait Ability  4. Decreased Strength  5. Decreased Transfer Abilities  6. Increased Pain   INTERVENTIONS PLANNED:   (Benefits and precautions of physical therapy have been discussed with the patient.)  1. Therapeutic Activity  2. Therapeutic Exercise/HEP  3. Neuromuscular Re-education  4. Gait Training  5. Manual Therapy  6. Education     TREATMENT:     EVALUATION: Low Complexity : (Untimed Charge)    TREATMENT:   ($$ Therapeutic Activity: 23-37 mins    )  Therapeutic Activity (23 Minutes): Therapeutic activity included Supine to Sit, Sit to Supine, Transfer Training and Ambulation on level ground to improve functional Mobility, Strength and Activity tolerance. At this time, patient is appropriate for Co-treatment with occupational therapy due to patient's decreased overall endurance/tolerance levels, as well as need for high level skilled assistance to complete functional transfers/mobility and functional tasks.  Juanita Newman is appropriate for a multidisciplinary co-treatment of PT and OT to address goals of both disciplines.        TREATMENT GRID:  N/A    AFTER TREATMENT POSITION/PRECAUTIONS:  Alarm Activated, Bed, Needs within reach and RN notified of vitals, pain, and patient status    INTERDISCIPLINARY COLLABORATION:  RN/PCT, PT/PTA and OT/PATEL    TOTAL TREATMENT DURATION:  PT Patient Time In/Time Out  Time In: 1030  Time Out: [address]

## 2021-03-13 LAB
ANION GAP SERPL CALC-SCNC: 5 MMOL/L (ref 7–16)
BASOPHILS # BLD: 0 K/UL (ref 0–0.2)
BASOPHILS NFR BLD: 1 % (ref 0–2)
BUN SERPL-MCNC: 21 MG/DL (ref 8–23)
CALCIUM SERPL-MCNC: 8.2 MG/DL (ref 8.3–10.4)
CHLORIDE SERPL-SCNC: 119 MMOL/L (ref 98–107)
CO2 SERPL-SCNC: 24 MMOL/L (ref 21–32)
CREAT SERPL-MCNC: 0.75 MG/DL (ref 0.6–1)
DIFFERENTIAL METHOD BLD: ABNORMAL
EOSINOPHIL # BLD: 0.1 K/UL (ref 0–0.8)
EOSINOPHIL NFR BLD: 1 % (ref 0.5–7.8)
ERYTHROCYTE [DISTWIDTH] IN BLOOD BY AUTOMATED COUNT: 13.1 % (ref 11.9–14.6)
GLUCOSE SERPL-MCNC: 78 MG/DL (ref 65–100)
HCT VFR BLD AUTO: 30.6 % (ref 35.8–46.3)
HGB BLD-MCNC: 9.5 G/DL (ref 11.7–15.4)
IMM GRANULOCYTES # BLD AUTO: 0 K/UL (ref 0–0.5)
IMM GRANULOCYTES NFR BLD AUTO: 0 % (ref 0–5)
LYMPHOCYTES # BLD: 1.7 K/UL (ref 0.5–4.6)
LYMPHOCYTES NFR BLD: 34 % (ref 13–44)
MCH RBC QN AUTO: 31.8 PG (ref 26.1–32.9)
MCHC RBC AUTO-ENTMCNC: 31 G/DL (ref 31.4–35)
MCV RBC AUTO: 102.3 FL (ref 79.6–97.8)
MM INDURATION POC: 0 MM (ref 0–5)
MONOCYTES # BLD: 0.3 K/UL (ref 0.1–1.3)
MONOCYTES NFR BLD: 7 % (ref 4–12)
NEUTS SEG # BLD: 2.8 K/UL (ref 1.7–8.2)
NEUTS SEG NFR BLD: 57 % (ref 43–78)
NRBC # BLD: 0 K/UL (ref 0–0.2)
PLATELET # BLD AUTO: 153 K/UL (ref 150–450)
PMV BLD AUTO: 11.6 FL (ref 9.4–12.3)
POTASSIUM SERPL-SCNC: 3.7 MMOL/L (ref 3.5–5.1)
PPD POC: NEGATIVE NEGATIVE
RBC # BLD AUTO: 2.99 M/UL (ref 4.05–5.2)
SODIUM SERPL-SCNC: 148 MMOL/L (ref 136–145)
WBC # BLD AUTO: 5 K/UL (ref 4.3–11.1)

## 2021-03-13 PROCEDURE — 80048 BASIC METABOLIC PNL TOTAL CA: CPT

## 2021-03-13 PROCEDURE — 74011250636 HC RX REV CODE- 250/636: Performed by: FAMILY MEDICINE

## 2021-03-13 PROCEDURE — 36415 COLL VENOUS BLD VENIPUNCTURE: CPT

## 2021-03-13 PROCEDURE — 96372 THER/PROPH/DIAG INJ SC/IM: CPT

## 2021-03-13 PROCEDURE — 94640 AIRWAY INHALATION TREATMENT: CPT

## 2021-03-13 PROCEDURE — 74011250637 HC RX REV CODE- 250/637

## 2021-03-13 PROCEDURE — 65270000029 HC RM PRIVATE

## 2021-03-13 PROCEDURE — 99218 HC RM OBSERVATION: CPT

## 2021-03-13 PROCEDURE — 77010033678 HC OXYGEN DAILY

## 2021-03-13 PROCEDURE — 74011250636 HC RX REV CODE- 250/636: Performed by: HOSPITALIST

## 2021-03-13 PROCEDURE — 74011250637 HC RX REV CODE- 250/637: Performed by: FAMILY MEDICINE

## 2021-03-13 PROCEDURE — 2709999900 HC NON-CHARGEABLE SUPPLY

## 2021-03-13 PROCEDURE — 74011250637 HC RX REV CODE- 250/637: Performed by: HOSPITALIST

## 2021-03-13 PROCEDURE — 85025 COMPLETE CBC W/AUTO DIFF WBC: CPT

## 2021-03-13 PROCEDURE — 94760 N-INVAS EAR/PLS OXIMETRY 1: CPT

## 2021-03-13 RX ORDER — MIDODRINE HYDROCHLORIDE 5 MG/1
2.5 TABLET ORAL
Status: DISCONTINUED | OUTPATIENT
Start: 2021-03-13 | End: 2021-03-13

## 2021-03-13 RX ORDER — MIDODRINE HYDROCHLORIDE 5 MG/1
5 TABLET ORAL
Status: DISCONTINUED | OUTPATIENT
Start: 2021-03-13 | End: 2021-03-14 | Stop reason: HOSPADM

## 2021-03-13 RX ORDER — SODIUM CHLORIDE, SODIUM LACTATE, POTASSIUM CHLORIDE, CALCIUM CHLORIDE 600; 310; 30; 20 MG/100ML; MG/100ML; MG/100ML; MG/100ML
100 INJECTION, SOLUTION INTRAVENOUS CONTINUOUS
Status: DISCONTINUED | OUTPATIENT
Start: 2021-03-13 | End: 2021-03-14 | Stop reason: HOSPADM

## 2021-03-13 RX ADMIN — BUDESONIDE AND FORMOTEROL FUMARATE DIHYDRATE 2 PUFF: 160; 4.5 AEROSOL RESPIRATORY (INHALATION) at 08:50

## 2021-03-13 RX ADMIN — GABAPENTIN 100 MG: 100 CAPSULE ORAL at 08:35

## 2021-03-13 RX ADMIN — HYDROCODONE BITARTRATE AND ACETAMINOPHEN 1 TABLET: 7.5; 325 TABLET ORAL at 05:30

## 2021-03-13 RX ADMIN — PRIMIDONE 50 MG: 50 TABLET ORAL at 08:34

## 2021-03-13 RX ADMIN — METOCLOPRAMIDE 10 MG: 10 TABLET ORAL at 17:04

## 2021-03-13 RX ADMIN — ATORVASTATIN CALCIUM 80 MG: 80 TABLET, FILM COATED ORAL at 08:33

## 2021-03-13 RX ADMIN — TRAZODONE HYDROCHLORIDE 100 MG: 50 TABLET ORAL at 21:13

## 2021-03-13 RX ADMIN — Medication 10 ML: at 21:21

## 2021-03-13 RX ADMIN — Medication 10 ML: at 15:29

## 2021-03-13 RX ADMIN — SUCRALFATE 1 G: 1 TABLET ORAL at 21:13

## 2021-03-13 RX ADMIN — PANTOPRAZOLE SODIUM 40 MG: 40 TABLET, DELAYED RELEASE ORAL at 17:04

## 2021-03-13 RX ADMIN — BUDESONIDE AND FORMOTEROL FUMARATE DIHYDRATE 2 PUFF: 160; 4.5 AEROSOL RESPIRATORY (INHALATION) at 20:22

## 2021-03-13 RX ADMIN — GABAPENTIN 100 MG: 100 CAPSULE ORAL at 17:02

## 2021-03-13 RX ADMIN — GABAPENTIN 100 MG: 100 CAPSULE ORAL at 21:13

## 2021-03-13 RX ADMIN — BUSPIRONE HYDROCHLORIDE 15 MG: 10 TABLET ORAL at 21:14

## 2021-03-13 RX ADMIN — METOCLOPRAMIDE 10 MG: 10 TABLET ORAL at 05:30

## 2021-03-13 RX ADMIN — ENOXAPARIN SODIUM 30 MG: 30 INJECTION SUBCUTANEOUS at 08:45

## 2021-03-13 RX ADMIN — METOCLOPRAMIDE 10 MG: 10 TABLET ORAL at 21:13

## 2021-03-13 RX ADMIN — TOPIRAMATE 50 MG: 50 TABLET, FILM COATED ORAL at 11:59

## 2021-03-13 RX ADMIN — SERTRALINE 200 MG: 50 TABLET, FILM COATED ORAL at 08:34

## 2021-03-13 RX ADMIN — TOPIRAMATE 100 MG: 50 TABLET, FILM COATED ORAL at 21:13

## 2021-03-13 RX ADMIN — PRIMIDONE 50 MG: 50 TABLET ORAL at 21:14

## 2021-03-13 RX ADMIN — TIOTROPIUM BROMIDE INHALATION SPRAY 2 PUFF: 3.12 SPRAY, METERED RESPIRATORY (INHALATION) at 08:50

## 2021-03-13 RX ADMIN — GUAIFENESIN 600 MG: 600 TABLET ORAL at 08:36

## 2021-03-13 RX ADMIN — BUSPIRONE HYDROCHLORIDE 15 MG: 10 TABLET ORAL at 17:03

## 2021-03-13 RX ADMIN — MIDODRINE HYDROCHLORIDE 2.5 MG: 5 TABLET ORAL at 12:00

## 2021-03-13 RX ADMIN — METOCLOPRAMIDE 10 MG: 10 TABLET ORAL at 12:02

## 2021-03-13 RX ADMIN — SODIUM CHLORIDE, SODIUM LACTATE, POTASSIUM CHLORIDE, AND CALCIUM CHLORIDE 100 ML/HR: 600; 310; 30; 20 INJECTION, SOLUTION INTRAVENOUS at 21:17

## 2021-03-13 RX ADMIN — GUAIFENESIN 600 MG: 600 TABLET ORAL at 21:14

## 2021-03-13 RX ADMIN — SUCRALFATE 1 G: 1 TABLET ORAL at 12:47

## 2021-03-13 RX ADMIN — BUSPIRONE HYDROCHLORIDE 15 MG: 10 TABLET ORAL at 08:37

## 2021-03-13 RX ADMIN — SUCRALFATE 1 G: 1 TABLET ORAL at 17:02

## 2021-03-13 RX ADMIN — TOPIRAMATE 50 MG: 50 TABLET, FILM COATED ORAL at 06:06

## 2021-03-13 RX ADMIN — PRIMIDONE 50 MG: 50 TABLET ORAL at 17:02

## 2021-03-13 RX ADMIN — SUCRALFATE 1 G: 1 TABLET ORAL at 08:36

## 2021-03-13 RX ADMIN — HYDROCODONE BITARTRATE AND ACETAMINOPHEN 1 TABLET: 7.5; 325 TABLET ORAL at 21:13

## 2021-03-13 RX ADMIN — SODIUM CHLORIDE, SODIUM LACTATE, POTASSIUM CHLORIDE, AND CALCIUM CHLORIDE 100 ML/HR: 600; 310; 30; 20 INJECTION, SOLUTION INTRAVENOUS at 08:43

## 2021-03-13 RX ADMIN — PANTOPRAZOLE SODIUM 40 MG: 40 TABLET, DELAYED RELEASE ORAL at 05:30

## 2021-03-13 RX ADMIN — BUDESONIDE 6 MG: 3 CAPSULE, GELATIN COATED ORAL at 08:38

## 2021-03-13 RX ADMIN — Medication 10 ML: at 05:30

## 2021-03-13 RX ADMIN — HYDROCODONE BITARTRATE AND ACETAMINOPHEN 1 TABLET: 7.5; 325 TABLET ORAL at 10:20

## 2021-03-13 RX ADMIN — SENNOSIDES AND DOCUSATE SODIUM 1 TABLET: 8.6; 5 TABLET ORAL at 21:13

## 2021-03-13 RX ADMIN — MIDODRINE HYDROCHLORIDE 5 MG: 5 TABLET ORAL at 17:02

## 2021-03-13 NOTE — PROGRESS NOTES
Hospitalist Progress Note    3/13/2021  Admit Date: 3/11/2021 10:17 PM   NAME: Corrinne Dus   :  1945   MRN:  [id number]   Attending: Ying Sharma MD  PCP:  Jeffry Gomez MD    SUBJECTIVE:     Corrinne Dus is a 26-year-old  female with history of chronic back pain, vitamin B12 deficiency, depression, fibromyalgia, GERD, HTN, severe protein energy malnutrition admitted on 3/12 for hypotension and mechanical fall. Patient had a skeletal survey showing advanced degenerative changes at the basal joint of the thumb on the left side, CT chest showing scarring in the right middle lobe and moderate centrilobular emphysema, interval kyphoplasty at L3, CT head unremarkable for acute changes. Hospital course has been uneventful so far, patient denies any further dizziness lightheadedness or postural hypotension. 3/13: Patient seen at bedside, reports feeling mild pain in left wrist which is currently bandaged. Denies chest pain, palpitations, nausea vomiting. Discussed about postural hypotension secondary to dehydration/hypovolemia.       Review of Systems negative with exception of pertinent positives noted above      PHYSICAL EXAM       Visit Vitals  /67 (BP 1 Location: Left upper arm, BP Patient Position: At rest;Lying)   Pulse 60   Temp 97.6 °F (36.4 °C)   Resp 18   Ht 5' 9\" (1.753 m)   Wt 49.9 kg (110 lb)   SpO2 95%   BMI 16.24 kg/m²      Temp (24hrs), Av.9 °F (36.6 °C), Min:97.5 °F (36.4 °C), Max:98.3 °F (36.8 °C)    Oxygen Therapy  O2 Sat (%): 95 % (21 0500)  Pulse via Oximetry: 66 beats per minute (21)  O2 Device: Nasal cannula (21)  Skin Assessment: Clean, dry, & intact (21)  Skin Protection for O2 Device: No (21)  O2 Flow Rate (L/min): 1.5 l/min (21)  FIO2 (%): 28 % (21)  No intake or output data in the 24 hours ending 21 0718       General: Frail, elderly, NAD, alert awake, on room air  Head:  Atraumatic Normocephalic. Eyes:  PERRLA, EOMI, Anicteric. ENT:  No discharges/lesions. Lungs:  CTA Bilaterally. CVS:  Regular rate and rhythm,  No murmur, rub, or gallop, No JVD, No lower   extremity edema. Abdomen: Soft, Non distended, Non tender, Positive bowel sounds. MSK:  No deformities, lesions, Spontaneously moves extremities. Left lateral wrist Bandaged with swelling and blue discoloration. Neurologic:  GCS 15, no motor or sensory deficits, cranial through grossly intact  Psychiatry:      Flat affect  Skin:   No rash/lesions. Good skin turgor  Heme/Lymph/Immune:  No petechiae, ecchymoses, overt signs of bleeding or    lymphadenopathy noted. Recent Results (from the past 24 hour(s))   METABOLIC PANEL, BASIC    Collection Time: 03/13/21  5:24 AM   Result Value Ref Range    Sodium 148 (H) 136 - 145 mmol/L    Potassium 3.7 3.5 - 5.1 mmol/L    Chloride 119 (H) 98 - 107 mmol/L    CO2 24 21 - 32 mmol/L    Anion gap 5 (L) 7 - 16 mmol/L    Glucose 78 65 - 100 mg/dL    BUN 21 8 - 23 MG/DL    Creatinine 0.75 0.6 - 1.0 MG/DL    GFR est AA >60 >60 ml/min/1.73m2    GFR est non-AA >60 >60 ml/min/1.73m2    Calcium 8.2 (L) 8.3 - 10.4 MG/DL   CBC WITH AUTOMATED DIFF    Collection Time: 03/13/21  5:24 AM   Result Value Ref Range    WBC 5.0 4.3 - 11.1 K/uL    RBC 2.99 (L) 4.05 - 5.2 M/uL    HGB 9.5 (L) 11.7 - 15.4 g/dL    HCT 30.6 (L) 35.8 - 46.3 %    .3 (H) 79.6 - 97.8 FL    MCH 31.8 26.1 - 32.9 PG    MCHC 31.0 (L) 31.4 - 35.0 g/dL    RDW 13.1 11.9 - 14.6 %    PLATELET 661 206 - 002 K/uL    MPV 11.6 9.4 - 12.3 FL    ABSOLUTE NRBC 0.00 0.0 - 0.2 K/uL    DF AUTOMATED      NEUTROPHILS 57 43 - 78 %    LYMPHOCYTES 34 13 - 44 %    MONOCYTES 7 4.0 - 12.0 %    EOSINOPHILS 1 0.5 - 7.8 %    BASOPHILS 1 0.0 - 2.0 %    IMMATURE GRANULOCYTES 0 0.0 - 5.0 %    ABS. NEUTROPHILS 2.8 1.7 - 8.2 K/UL    ABS. LYMPHOCYTES 1.7 0.5 - 4.6 K/UL    ABS. MONOCYTES 0.3 0.1 - 1.3 K/UL    ABS.  EOSINOPHILS 0.1 0.0 - 0.8 K/UL ABS. BASOPHILS 0.0 0.0 - 0.2 K/UL    ABS. IMM. GRANS. 0.0 0.0 - 0.5 K/UL         Imaging /Procedures /Studies   Lumbar spine     History: Pain after fall     AP and lateral views of the lumbar spine were obtained.      Comparison: 02/06/2021     Findings: There is methylmethacrylate within the L3 vertebral body  demonstrating mild loss of height, unchanged. Slight concavity along the  inferior endplate of L5 is unchanged. The disc space heights are well-preserved. There is no bony destruction. Atherosclerotic changes are present.     IMPRESSION  Interval methylmethacrylate placement at L3. EXAM: Left wrist series     INDICATION:  wrist pain and bruising. Recent fall.     COMPARISON: None.     FINDINGS: 2  views of the left wrist demonstrate normal bone mineralization. There is no evidence of acute fracture or dislocation. There are advanced  degenerative changes at the basal joint of the thumb with lateral subluxation of  the first metacarpal bone.     IMPRESSION  Advanced degenerative changes at the basal joint of the thumb. CT chest:  IMPRESSION     1. Scarring in the right middle lobe. Negative for pneumonia, pulmonary edema or  pleural effusion.     2. Moderate centrilobular emphysema.     3. An approximately 3 cm left thyroid nodule, similar to February 16, 2020 CT. Consider follow-up outpatient thyroid ultrasound to better evaluate.     4. Suggestion of mild right hydronephrosis, captured at the periphery of the  imaging field of view.     Noncontrast CT of the brain.      COMPARISON: February 6, 2021     INDICATION: Fall.     TECHNIQUE: Contiguous axial images were obtained from the skull base through the  vertex without IV contrast. Radiation dose reduction techniques were used for  this study:  Our CT scanners use one or all of the following: Automated exposure  control, adjustment of the mA and/or kVp according to patient's size, iterative  reconstruction.     FINDINGS:     There is no acute intracranial hemorrhage or evidence for acute territorial  infarction. There is no mass effect, midline shift or hydrocephalus. There is no  extra-axial fluid collection. The cerebellum and brainstem are grossly  unremarkable.     Periventricular diffuse hypodensities are nonspecific and likely secondary to  chronic small vessel changes. Mild generalized cerebral volume loss,  age-related.     Included globes appear intact. The paranasal sinuses and the mastoid air cells  are aerated. There is no skull fracture.        IMPRESSION     1. No CT evidence of acute intracranial abnormality. ASSESSMENT      Hospital Problems as of 3/13/2021 Date Reviewed: 3/8/2021          Codes Class Noted - Resolved POA    * (Principal) Hypotension ICD-10-CM: I95.9  ICD-9-CM: 458.9  3/12/2021 - Present Yes        Macrocytic anemia ICD-10-CM: D53.9  ICD-9-CM: 281.9  3/12/2021 - Present Yes        Fall ICD-10-CM: Inder Laity. Keegan Six  ICD-9-CM: E888.9  3/12/2021 - Present Yes        Severe protein-calorie malnutrition ([facility] [address] ICD-10-CM: E43  ICD-9-CM: 571  2/15/2021 - Present Yes        Atrial fibrillation ([facility] [address] ICD-10-CM: I48.91  ICD-9-CM: 427.31  2/6/2021 - Present Unknown        Depression, major, in remission [facility]) ICD-10-CM: F32.5  ICD-9-CM: 296.25  1/31/2018 - Present Yes        Chronic kidney disease (CKD) stage G3b/A1, moderately decreased glomerular filtration rate (GFR) between 30-44 mL/min/1.73 square meter and albuminuria creatinine ratio less than 30 mg/g ICD-10-CM: N18.32  ICD-9-CM: 585.3  10/14/2016 - Present Yes        Polypharmacy ICD-10-CM: M41.767  ICD-9-CM: V58.69  1/12/2016 - Present Yes        Fibromyalgia ICD-10-CM: M79.7  ICD-9-CM: 729.1  9/15/2015 - Present Yes        Anxiety ICD-10-CM: F41.9  ICD-9-CM: 300.00  3/6/2013 - Present Yes        Chronic back pain ICD-10-CM: M54.9, G89.29  ICD-9-CM: 724.5, 338.29  3/6/2013 - Present Yes        B12 deficiency ICD-10-CM: E53.8  ICD-9-CM: 266.2  3/6/2013 - Present Yes Hyperlipidemia ICD-10-CM: E78.5  ICD-9-CM: 272.4  10/25/2012 - Present Yes        COPD (chronic obstructive pulmonary disease) (HCC) (Chronic) ICD-10-CM: J44.9  ICD-9-CM: 391  12/30/2009 - Present Yes        GERD (gastroesophageal reflux disease) (Chronic) ICD-10-CM: K21.9  ICD-9-CM: 530.81  12/30/2009 - Present Yes        RESOLVED: Depression ICD-10-CM: F32.9  ICD-9-CM: 616  10/25/2012 - 3/12/2021 Yes                  Plan:  #Hypotension:  3/13:  Currently resolved, BP has been stable since admission. Likely etiology could be poor oral intake/volume depletion versus medication induced. Patient is currently on multiple antianxiety and antidepressants. Need to evaluate for potential polypharmacy. Starting on midodrine 5 mg p.o. 3 times daily with meals. We will continue to monitor hemodynamic parameters for next 1 to 4 hours. #Mechanical fall:  3/13: PT and OT on board, will continue to optimize mobility. #Severe protein calorie malnutrition:  3/13: Patient advised to continue with protein supplement with each meal.    #Anxiety and major depression:  3/13: Continue BuSpar 50 mg p.o. 3 times daily, Zoloft 200 mg p.o. daily, trazodone 100 g p.o. nightly and Ativan 2 mg every 8 hours as needed. All other chronic medical issues including vitamin B12 deficiency dyslipidemia, COPD, HTN, atrial fibrillation have been addressed and all home medications have been reconciled in [facility]. DVT Prophylaxis:  Lovenox  Disposition:  Pending clinical course and PT/OT evaluation.     Tracy Kaye MD

## 2021-03-14 VITALS
SYSTOLIC BLOOD PRESSURE: 116 MMHG | TEMPERATURE: 97.9 F | DIASTOLIC BLOOD PRESSURE: 65 MMHG | RESPIRATION RATE: 18 BRPM | WEIGHT: 110 LBS | OXYGEN SATURATION: 97 % | BODY MASS INDEX: 16.29 KG/M2 | HEIGHT: 69 IN | HEART RATE: 57 BPM

## 2021-03-14 LAB
ANION GAP SERPL CALC-SCNC: 7 MMOL/L (ref 7–16)
BASOPHILS # BLD: 0 K/UL (ref 0–0.2)
BASOPHILS NFR BLD: 0 % (ref 0–2)
BUN SERPL-MCNC: 23 MG/DL (ref 8–23)
CALCIUM SERPL-MCNC: 7.9 MG/DL (ref 8.3–10.4)
CHLORIDE SERPL-SCNC: 116 MMOL/L (ref 98–107)
CO2 SERPL-SCNC: 22 MMOL/L (ref 21–32)
CORTIS AM PEAK SERPL-MCNC: 4 UG/DL (ref 7–25)
CREAT SERPL-MCNC: 0.83 MG/DL (ref 0.6–1)
DIFFERENTIAL METHOD BLD: ABNORMAL
EOSINOPHIL # BLD: 0.1 K/UL (ref 0–0.8)
EOSINOPHIL NFR BLD: 1 % (ref 0.5–7.8)
ERYTHROCYTE [DISTWIDTH] IN BLOOD BY AUTOMATED COUNT: 12.8 % (ref 11.9–14.6)
GLUCOSE SERPL-MCNC: 89 MG/DL (ref 65–100)
HCT VFR BLD AUTO: 30.7 % (ref 35.8–46.3)
HGB BLD-MCNC: 9.5 G/DL (ref 11.7–15.4)
IMM GRANULOCYTES # BLD AUTO: 0 K/UL (ref 0–0.5)
IMM GRANULOCYTES NFR BLD AUTO: 0 % (ref 0–5)
LYMPHOCYTES # BLD: 1.7 K/UL (ref 0.5–4.6)
LYMPHOCYTES NFR BLD: 35 % (ref 13–44)
MCH RBC QN AUTO: 32 PG (ref 26.1–32.9)
MCHC RBC AUTO-ENTMCNC: 30.9 G/DL (ref 31.4–35)
MCV RBC AUTO: 103.4 FL (ref 79.6–97.8)
MONOCYTES # BLD: 0.3 K/UL (ref 0.1–1.3)
MONOCYTES NFR BLD: 7 % (ref 4–12)
NEUTS SEG # BLD: 2.7 K/UL (ref 1.7–8.2)
NEUTS SEG NFR BLD: 57 % (ref 43–78)
NRBC # BLD: 0 K/UL (ref 0–0.2)
PLATELET # BLD AUTO: 153 K/UL (ref 150–450)
PMV BLD AUTO: 11.6 FL (ref 9.4–12.3)
POTASSIUM SERPL-SCNC: 4 MMOL/L (ref 3.5–5.1)
RBC # BLD AUTO: 2.97 M/UL (ref 4.05–5.2)
SODIUM SERPL-SCNC: 145 MMOL/L (ref 136–145)
WBC # BLD AUTO: 4.8 K/UL (ref 4.3–11.1)

## 2021-03-14 PROCEDURE — 94640 AIRWAY INHALATION TREATMENT: CPT

## 2021-03-14 PROCEDURE — 74011250637 HC RX REV CODE- 250/637

## 2021-03-14 PROCEDURE — 80048 BASIC METABOLIC PNL TOTAL CA: CPT

## 2021-03-14 PROCEDURE — 82533 TOTAL CORTISOL: CPT

## 2021-03-14 PROCEDURE — 85025 COMPLETE CBC W/AUTO DIFF WBC: CPT

## 2021-03-14 PROCEDURE — 99218 HC RM OBSERVATION: CPT

## 2021-03-14 PROCEDURE — 74011250637 HC RX REV CODE- 250/637: Performed by: FAMILY MEDICINE

## 2021-03-14 PROCEDURE — 96372 THER/PROPH/DIAG INJ SC/IM: CPT

## 2021-03-14 PROCEDURE — 36415 COLL VENOUS BLD VENIPUNCTURE: CPT

## 2021-03-14 PROCEDURE — 74011250636 HC RX REV CODE- 250/636: Performed by: FAMILY MEDICINE

## 2021-03-14 PROCEDURE — 2709999900 HC NON-CHARGEABLE SUPPLY

## 2021-03-14 PROCEDURE — 74011250636 HC RX REV CODE- 250/636: Performed by: HOSPITALIST

## 2021-03-14 PROCEDURE — 74011250637 HC RX REV CODE- 250/637: Performed by: HOSPITALIST

## 2021-03-14 PROCEDURE — 94760 N-INVAS EAR/PLS OXIMETRY 1: CPT

## 2021-03-14 RX ORDER — MIDODRINE HYDROCHLORIDE 5 MG/1
5 TABLET ORAL
Qty: 90 TAB | Refills: 0 | Status: SHIPPED | OUTPATIENT
Start: 2021-03-14 | End: 2021-04-13

## 2021-03-14 RX ADMIN — TOPIRAMATE 50 MG: 50 TABLET, FILM COATED ORAL at 05:31

## 2021-03-14 RX ADMIN — SERTRALINE 200 MG: 50 TABLET, FILM COATED ORAL at 08:17

## 2021-03-14 RX ADMIN — ATORVASTATIN CALCIUM 80 MG: 80 TABLET, FILM COATED ORAL at 08:17

## 2021-03-14 RX ADMIN — METOCLOPRAMIDE 10 MG: 10 TABLET ORAL at 05:31

## 2021-03-14 RX ADMIN — GUAIFENESIN 600 MG: 600 TABLET ORAL at 08:17

## 2021-03-14 RX ADMIN — BUDESONIDE AND FORMOTEROL FUMARATE DIHYDRATE 2 PUFF: 160; 4.5 AEROSOL RESPIRATORY (INHALATION) at 09:31

## 2021-03-14 RX ADMIN — MIDODRINE HYDROCHLORIDE 5 MG: 5 TABLET ORAL at 08:18

## 2021-03-14 RX ADMIN — PRIMIDONE 50 MG: 50 TABLET ORAL at 08:18

## 2021-03-14 RX ADMIN — DILTIAZEM HYDROCHLORIDE 120 MG: 120 CAPSULE, COATED, EXTENDED RELEASE ORAL at 08:20

## 2021-03-14 RX ADMIN — LORAZEPAM 2 MG: 1 TABLET ORAL at 08:25

## 2021-03-14 RX ADMIN — BUSPIRONE HYDROCHLORIDE 15 MG: 10 TABLET ORAL at 08:18

## 2021-03-14 RX ADMIN — GABAPENTIN 100 MG: 100 CAPSULE ORAL at 08:18

## 2021-03-14 RX ADMIN — SUCRALFATE 1 G: 1 TABLET ORAL at 11:17

## 2021-03-14 RX ADMIN — SUCRALFATE 1 G: 1 TABLET ORAL at 08:18

## 2021-03-14 RX ADMIN — PANTOPRAZOLE SODIUM 40 MG: 40 TABLET, DELAYED RELEASE ORAL at 04:19

## 2021-03-14 RX ADMIN — MIDODRINE HYDROCHLORIDE 5 MG: 5 TABLET ORAL at 11:18

## 2021-03-14 RX ADMIN — ENOXAPARIN SODIUM 30 MG: 30 INJECTION SUBCUTANEOUS at 08:18

## 2021-03-14 RX ADMIN — TOPIRAMATE 50 MG: 50 TABLET, FILM COATED ORAL at 11:18

## 2021-03-14 RX ADMIN — SODIUM CHLORIDE, SODIUM LACTATE, POTASSIUM CHLORIDE, AND CALCIUM CHLORIDE 100 ML/HR: 600; 310; 30; 20 INJECTION, SOLUTION INTRAVENOUS at 08:21

## 2021-03-14 RX ADMIN — HYDROCODONE BITARTRATE AND ACETAMINOPHEN 1 TABLET: 7.5; 325 TABLET ORAL at 10:00

## 2021-03-14 RX ADMIN — METOCLOPRAMIDE 10 MG: 10 TABLET ORAL at 11:19

## 2021-03-14 RX ADMIN — BUDESONIDE 6 MG: 3 CAPSULE, GELATIN COATED ORAL at 08:17

## 2021-03-14 RX ADMIN — HYDROCODONE BITARTRATE AND ACETAMINOPHEN 1 TABLET: 7.5; 325 TABLET ORAL at 04:19

## 2021-03-14 RX ADMIN — Medication 10 ML: at 05:31

## 2021-03-14 RX ADMIN — TIOTROPIUM BROMIDE INHALATION SPRAY 2 PUFF: 3.12 SPRAY, METERED RESPIRATORY (INHALATION) at 09:31

## 2021-03-14 NOTE — PROGRESS NOTES
Problem: Pressure Injury - Risk of  Goal: *Prevention of pressure injury  Description: Document Gen Scale and appropriate interventions in the flowsheet.   Outcome: Resolved/Met  Note: Pressure Injury Interventions:  Sensory Interventions: Assess changes in LOC, Avoid rigorous massage over bony prominences    Moisture Interventions: Absorbent underpads, Check for incontinence Q2 hours and as needed    Activity Interventions: Increase time out of bed, Pressure redistribution bed/mattress(bed type), PT/OT evaluation    Mobility Interventions: HOB 30 degrees or less, Pressure redistribution bed/mattress (bed type), PT/OT evaluation    Nutrition Interventions: Offer support with meals,snacks and hydration, Document food/fluid/supplement intake    Friction and Shear Interventions: HOB 30 degrees or less

## 2021-03-14 NOTE — DISCHARGE SUMMARY
Hospitalist Discharge Summary     Patient ID:  Juanita Newman  [id number]  1 y.o.  1945  Admit date: 3/11/2021 10:17 PM  Discharge date and time: 3/14/2021  Attending: Ryan Long MD  PCP:  Kathleen Aguirre MD  Treatment Team: Attending Provider: Ryan Long MD; Utilization Review: [place]; Utilization Review: [facility]; Charge Nurse: Susy Alford RN; Charge Nurse: Oh Chin    Principal Diagnosis Hypotension   Principal Problem:    Hypotension (3/12/2021)    Active Problems:    COPD (chronic obstructive pulmonary disease) ([facility] [address] (12/30/2009)      GERD (gastroesophageal reflux disease) (12/30/2009)      Hyperlipidemia (10/25/2012)      Anxiety (3/6/2013)      Chronic back pain (3/6/2013)      B12 deficiency (3/6/2013)      Fibromyalgia (9/15/2015)      Polypharmacy (1/12/2016)      Chronic kidney disease (CKD) stage G3b/A1, moderately decreased glomerular filtration rate (GFR) between 30-44 mL/min/1.73 square meter and albuminuria creatinine ratio less than 30 mg/g (10/14/2016)      Depression, major, in remission ([facility] [address] (1/31/2018)      At[facility] fibrillation ([facility] (2/6/2021)      Severe protein-calorie malnutrition ([facility] (2/15/2021)      Macrocytic anemia (3/12/2021)      Fall (3/12/2021)             Hospital Course:  Please refer to the admission H&P for details of presentation. In summary, the patient is is a 64-year-old  female with history of chronic back pain, vitamin B12 deficiency, depression, fibromyalgia, GERD, HTN, severe protein energy malnutrition admitted on 3/12 for hypotension and mechanical fall. Patient had a skeletal survey showing advanced degenerative changes at the basal joint of the thumb on the left side, CT chest showing scarring in the right middle lobe and moderate centrilobular emphysema, interval kyphoplasty at L3, CT head unremarkable for acute changes.   Hospital course has been uneventful so far, patient denies any further dizziness lightheadedness or postural hypotension. Pt is being started on midodrine 5 mg po tid with meals to minimize risk of orthostatic hypotension. Pt is on diltiazem for underlying A.fib, cannot be stopped. Pt does not have a good oral intake at baseline, is frail and with severe protein calorie malnutrition. She is encouraged to have protein supplement drinks with meals. Pt is adamant to go home. I have discussed about goals of care with pt and her daughter in law. Pt had a superficial skin tear on left wrist, which will need to be addressed by skilled nursing at home. Pt will be discharged today to home with HHA/SN. Rest of the hospital course was uneventful, for further details, please refer to daily progress notes. Significant Diagnostic Studies:   Lumbar spine     History: Pain after fall     AP and lateral views of the lumbar spine were obtained.      Comparison: 02/06/2021     Findings: There is methylmethacrylate within the L3 vertebral body  demonstrating mild loss of height, unchanged. Slight concavity along the  inferior endplate of L5 is unchanged. The disc space heights are well-preserved. There is no bony destruction. Atherosclerotic changes are present.     IMPRESSION  Interval methylmethacrylate placement at L3. EXAM: Left wrist series     INDICATION:  wrist pain and bruising. Recent fall.     COMPARISON: None.     FINDINGS: 2  views of the left wrist demonstrate normal bone mineralization. There is no evidence of acute fracture or dislocation. There are advanced  degenerative changes at the basal joint of the thumb with lateral subluxation of  the first metacarpal bone.     IMPRESSION  Advanced degenerative changes at the basal joint of the thumb.     Labs: Results:       Chemistry Recent Labs     03/14/21  0412 03/13/21  0524 03/11/21  2236   GLU 89 78 86    148* 144   K 4.0 3.7 3.8   * 119* 113*   CO2 22 24 26   BUN 23 21 27*   CREA 0.83 0.75 0.93   CA 7.9* 8.2* 8.1*   AGAP 7 5* 5*   AP  --   --  83   TP  --   --  5.1*   ALB  --   --  2.6*   GLOB  --   --  2.5   AGRAT  --   --  1.0*      CBC w/Diff Recent Labs     03/14/21  0412 03/13/21  0524 03/11/21 2236   WBC 4.8 5.0 4.5   RBC 2.97* 2.99* 2.93*   HGB 9.5* 9.5* 9.3*   HCT 30.7* 30.6* 29.7*    153 115*   GRANS 57 57 55   LYMPH 35 34 34   EOS 1 1 2      Cardiac Enzymes No results for input(s): CPK, CKND1, CINTHYA in the last 72 hours. No lab exists for component: CKRMB, TROIP   Coagulation Recent Labs     03/11/21 2236   PTP 14.6   INR 1.1       Lipid Panel Lab Results   Component Value Date/Time    Cholesterol, total 129 12/05/2018 02:51 PM    HDL Cholesterol 27 (L) 12/05/2018 02:51 PM    LDL, calculated 62 12/05/2018 02:51 PM    VLDL, calculated 40 12/05/2018 02:51 PM    Triglyceride 199 (H) 12/05/2018 02:51 PM      BNP No results for input(s): BNPP in the last 72 hours. Liver Enzymes Recent Labs     03/11/21 2236   TP 5.1*   ALB 2.6*   AP 83      Thyroid Studies Lab Results   Component Value Date/Time    TSH 4.180 02/06/2021 04:13 PM            Discharge Exam:  Visit Vitals  /72 (BP 1 Location: Right upper arm, BP Patient Position: At rest;Lying)   Pulse 67   Temp 98.2 °F (36.8 °C)   Resp 18   Ht 5' 9\" (1.753 m)   Wt 49.9 kg (110 lb)   SpO2 94%   BMI 16.24 kg/m²     General appearance: alert, awake, NAD, on RA, frail and cachectic  Lungs: clear to auscultation bilaterally  Heart: regular rate and rhythm, S1, S2 normal, no murmur, click, rub or gallop  Abdomen: soft, non-tender. Bowel sounds normal. No masses,  no organomegaly  Extremities: no cyanosis or edema  Neurologic: Grossly normal    Disposition: home with A/  Discharge Condition: stable  Patient Instructions:   Current Discharge Medication List      START taking these medications    Details   midodrine (PROAMATINE) 5 mg tablet Take 1 Tab by mouth three (3) times daily (with meals) for 30 days.   Qty: 90 Tab, Refills: 0         CONTINUE these medications which have NOT CHANGED    Details   gabapentin (NEURONTIN) 100 mg capsule Take 100 mg by mouth three (3) times daily. traZODone (DESYREL) 100 mg tablet Take 100 mg by mouth At bedtime. acetaminophen (TYLENOL) 325 mg tablet Take 2 Tabs by mouth every six (6) hours as needed for Pain. Indications: pain  Qty: 30 Tab, Refills: 0      dilTIAZem ER (CARDIZEM CD) 120 mg capsule Take 1 Cap by mouth daily for 30 days. Qty: 30 Cap, Refills: 0      senna-docusate (PERICOLACE) 8.6-50 mg per tablet Take 1 Tab by mouth nightly. Qty: 30 Tab, Refills: 0      lidocaine 4 % patch Apply to lower back  Qty: 30 Patch, Refills: 0      guaiFENesin ER (MUCINEX) 600 mg ER tablet Take 1 Tab by mouth every twelve (12) hours. Qty: 20 Tab, Refills: 0      naloxone (NARCAN) 4 mg/actuation nasal spray Use 1 spray intranasally, then discard. Repeat with new spray every 2 min as needed for opioid overdose symptoms, alternating nostrils. Qty: 1 Each, Refills: 0      metoclopramide HCl (REGLAN) 10 mg tablet Take 1 Tab by mouth Before breakfast, lunch, dinner and at bedtime. Indications: stomach muscle paralysis and decreased function from diabetes  Qty: 120 Tab, Refills: 5    Associated Diagnoses: Insomnia, unspecified type      dicyclomine (BENTYL) 10 mg capsule Take 1 Cap by mouth three (3) times daily as needed for Abdominal Cramps. Indications: irritable colon  Qty: 270 Cap, Refills: 3    Associated Diagnoses: Irritable bowel syndrome with diarrhea      linaCLOtide (Linzess) 72 mcg cap capsule Take 1 Cap by mouth Daily (before breakfast). Indications: irritable bowel syndrome with constipation  Qty: 90 Cap, Refills: 1      topiramate (TOPAMAX) 50 mg tablet Take 1 tablet with breakfast, 1 tablet with lunch, and 2 with dinner.   Indications: migraine prevention  Qty: 300 Tab, Refills: 3    Associated Diagnoses: Migraine with aura and without status migrainosus, not intractable      pantoprazole (PROTONIX) 40 mg tablet Take 1 Tab by mouth two (2) times a day. Indications: gastroesophageal reflux disease  Qty: 180 Tab, Refills: 3      ergocalciferol (ERGOCALCIFEROL) 1,250 mcg (50,000 unit) capsule Take 1 Cap by mouth every seven (7) days. Qty: 13 Cap, Refills: 3    Associated Diagnoses: Vitamin D deficiency      alendronate (FOSAMAX) 70 mg tablet Take 1 Tab by mouth every seven (7) days. Qty: 12 Tab, Refills: 3      primidone (MYSOLINE) 50 mg tablet Take 1 Tab by mouth three (3) times daily. Indications: essential tremor  Qty: 270 Tab, Refills: 3    Associated Diagnoses: Tremors of nervous system      tiotropium (SPIRIVA) 18 mcg inhalation capsule Take 1 Cap by inhalation daily. Indications: bronchospasm prevention with COPD  Qty: 30 Cap, Refills: 11    Associated Diagnoses: Chronic obstructive pulmonary disease, unspecified COPD type ([facility] [address]      atorvastatin (LIPITOR) 80 mg tablet Take 1 Tab by mouth daily. Indications: primary prevention of heart attack  Qty: 90 Tab, Refills: 3    Associated Diagnoses: Mixed hyperlipidemia      budesonide-formoteroL (SYMBICORT) 160-4.5 mcg/actuation HFAA Take 2 Puffs by inhalation two (2) times a day. Qty: 3 Inhaler, Refills: 3      albuterol (PROVENTIL VENTOLIN) 2.5 mg /3 mL (0.083 %) nebu 3 mL by Nebulization route every four (4) hours as needed for Wheezing or Shortness of Breath. Qty: 24 Nebule, Refills: 0    Associated Diagnoses: Gastroesophageal reflux disease without esophagitis      budesonide (ENTOCORT EC) 3 mg capsule Take 6 mg by mouth every morning.       ondansetron hcl (ZOFRAN) 4 mg tablet Take 4 mg by mouth every eight (8) hours as needed for Nausea or Vomiting.      sertraline (ZOLOFT) 100 mg tablet Take 2 tablets daily ( Dose increased from previously 100 mg daily )  Indications: anxiousness associated with depression  Qty: 180 Tab, Refills: 3    Associated Diagnoses: Recurrent major depressive disorder, in partial remission (HCC)      busPIRone (BUSPAR) 15 mg tablet Take 1 Tab by mouth three (3) times daily. Indications: repeated episodes of anxiety  Qty: 270 Tab, Refills: 3    Associated Diagnoses: Depression, unspecified depression type      artificial tears, dextran-hypromellose-glycerin, (TEARS NATURALE FORTE) 0.1-0.3-0.2 % ophthalmic solution Administer 1 Drop to both eyes every six (6) hours. Indications: dry eye  Qty: 5 mL, Refills: 11    Associated Diagnoses: Dry eyes      sucralfate (CARAFATE) 1 gram tablet Take 1 g by mouth four (4) times daily. albuterol (PROVENTIL HFA, VENTOLIN HFA, PROAIR HFA) 90 mcg/actuation inhaler Take 2 Puffs by inhalation every four (4) hours as needed for Wheezing or Shortness of Breath. Qty: 3 Inhaler, Refills: 5    Associated Diagnoses: Chronic obstructive pulmonary disease, unspecified COPD type (HCC)      colestipol (COLESTID) 1 gram tablet Take 1 g by mouth two (2) times a day.       Oxygen          STOP taking these medications       LORazepam (ATIVAN) 2 mg tablet Comments:   Reason for Stopping:         diazePAM (VALIUM) 2 mg tablet Comments:   Reason for Stopping:         OTHER Comments:   Reason for Stopping:               Activity: PT/OT per Home Health  Diet: Regular Diet  Wound Care: None needed    Follow-up  ·   FOLLOW UP WITH PCP IN 1-2 WEEKS  Time spent to discharge patient 35 minutes  Signed:  Carolin Singh MD  3/14/2021  8:26 AM

## 2021-03-14 NOTE — PROGRESS NOTES
Pt discharged home today with her CLTC aide. SNF rehab was recommended to pt but she refused rehab placement at this time. She was recently at [facility] rehab and discharged home with [place] . Resumed [facility] [place] services and her CLTC aide will also continue to provide her support daily. [facility] updated pt's erinn Field [phone] who called from [place] concerned about his Mother's recent falls and that she shouldn't be living alone. Also talked to her DILGt by phone about pt decision to go home. Made sure that [facility] and [facility] both had contact information for Shea , and [facility]  to continue to follow up about placement for pt from home when she is agreeable or it becomes absolutely necessary. Care Management Interventions  PCP Verified by CM: Yes  Last Visit to PCP: 08/04/20  Mode of Transport at Discharge: (CLTC aide)  Transition of Care Consult (CM Consult): [address]: No  [facility]: Patient already serviced by other home care/hospice agency(resumed [facility] [place])  Discharge Durable Medical Equipment: No  Physical Therapy Consult: Yes  Occupational Therapy Consult: Yes  Speech Therapy Consult: No  Current Support Network: Own Home, Lives Alone, Has Personal Caregivers(Pt lives alone with CLTC aide services in 7 days a week for 4-5 hours per days per report. Erinn Field [phone] in [place] called to check on pt and is concerned about pt care needs.)  Confirm Follow Up Transport: Other (see comment)(CLTC aide, family or Medicaid [place])  The Plan for Transition of Care is Related to the Following Treatment Goals : Pt will need continued rehab home care services to return to her functional baseline.     The Patient and/or Patient Representative was Provided with a Choice of Provider and Agrees with the Discharge Plan?: Yes  Name of the Patient Representative Who was Provided with a Choice of Provider and Agrees with the Discharge Plan: pt/erinn Vargas  Freedom of Choice List was Provided with Basic Dialogue that Supports the Patient's Individualized Plan of Care/Goals, Treatment Preferences and Shares the Quality Data Associated with the Providers?: Yes  Discharge Location  Discharge Placement: Home with home health([facility] and [facility])

## 2021-03-14 NOTE — DISCHARGE INSTRUCTIONS
Preventing Falls: Care Instructions  Your Care Instructions     Getting around your home safely can be a challenge if you have injuries or health problems that make it easy for you to fall. Loose rugs and furniture in walkways are among the dangers for many older people who have problems walking or who have poor eyesight. People who have conditions such as arthritis, osteoporosis, or dementia also have to be careful not to fall. You can make your home safer with a few simple measures. Follow-up care is a key part of your treatment and safety. Be sure to make and go to all appointments, and call your doctor if you are having problems. It's also a good idea to know your test results and keep a list of the medicines you take. How can you care for yourself at home? Taking care of yourself  · You may get dizzy if you do not drink enough water. To prevent dehydration, drink plenty of fluids, enough so that your urine is light yellow or clear like water. Choose water and other caffeine-free clear liquids. If you have kidney, heart, or liver disease and have to limit fluids, talk with your doctor before you increase the amount of fluids you drink. · Exercise regularly to improve your strength, muscle tone, and balance. Walk if you can. Swimming may be a good choice if you cannot walk easily. · Have your vision and hearing checked each year or any time you notice a change. If you have trouble seeing and hearing, you might not be able to avoid objects and could lose your balance. · Know the side effects of the medicines you take. Ask your doctor or pharmacist whether the medicines you take can affect your balance. Sleeping pills or sedatives can affect your balance. · Limit the amount of alcohol you drink. Alcohol can impair your balance and other senses. · Ask your doctor whether calluses or corns on your feet need to be removed.  If you wear loose-fitting shoes because of calluses or corns, you can lose your balance and fall. · Talk to your doctor if you have numbness in your feet. Preventing falls at home  · Remove raised doorway thresholds, throw rugs, and clutter. Repair loose carpet or raised areas in the floor. · Move furniture and electrical cords to keep them out of walking paths. · Use nonskid floor wax, and wipe up spills right away, especially on ceramic tile floors. · If you use a walker or cane, put rubber tips on it. If you use crutches, clean the bottoms of them regularly with an abrasive pad, such as steel wool. · Keep your house well lit, especially [place], and outside walkways. Use night-lights in areas such as hallways and bathrooms. Add extra light switches or use remote switches (such as switches that go on or off when you clap your hands) to make it easier to turn lights on if you have to get up during the night. · Install sturdy handrails on stairways. · Move items in your cabinets so that the things you use a lot are on the lower shelves (about waist level). · Keep a cordless phone and a flashlight with new batteries by your bed. If possible, put a phone in each of the main rooms of your house, or carry a cell phone in case you fall and cannot reach a phone. Or, you can wear a device around your neck or wrist. You push a button that sends a signal for help. · Wear low-heeled shoes that fit well and give your feet good support. Use footwear with nonskid soles. Check the heels and soles of your shoes for wear. Repair or replace worn heels or soles. · Do not wear socks without shoes on wood floors. · Walk on the grass when the sidewalks are slippery. If you live in an area that gets snow and ice in the winter, sprinkle salt on slippery steps and sidewalks. Preventing falls in the bath  · Install grab bars and nonskid mats inside and outside your shower or tub and near the toilet and sinks. · Use shower chairs and bath benches.   · Use a hand-held shower head that will allow you to sit while showering. · Get into a tub or shower by putting the weaker leg in first. Get out of a tub or shower with your strong side first.  · Repair loose toilet seats and consider installing a raised toilet seat to make getting on and off the toilet easier. · Keep your bathroom door unlocked while you are in the shower. Where can you learn more? Go to http://www.pollard.com/  Enter G117 in the search box to learn more about \"Preventing Falls: Care Instructions. \"  Current as of: April 15, 2020               Content Version: 12.6  © 8807-0345 [employer]. Care instructions adapted under license by [employer] (which disclaims liability or warranty for this information). If you have questions about a medical condition or this instruction, always ask your healthcare professional. [address] any warranty or liability for your use of this information. Low Blood Pressure: Care Instructions  Your Care Instructions     Blood pressure is a measurement of the force of the blood against the walls of the blood vessels during and after each beat of the heart. Low blood pressure is also called hypotension. It means that your blood pressure is much lower than normal. Some people, especially young, slim women, may have slightly low blood pressure without symptoms. But in many people, low blood pressure can cause symptoms such as feeling dizzy or lightheaded. When your blood pressure is too low, your heart, brain, and other organs do not get enough blood. Low blood pressure can be caused by many things, including heart problems and some medicines. Diabetes that is not under control can cause your blood pressure to drop. And so can a severe allergic reaction or infection. Another cause is dehydration, which is when your body loses too much fluid. Treatment for low blood pressure depends on the cause.   Follow-up care is a key part of your treatment and safety. Be sure to make and go to all appointments, and call your doctor if you are having problems. It's also a good idea to know your test results and keep a list of the medicines you take. How can you care for yourself at home? · Be safe with medicines. Call your doctor if you think you are having a problem with your medicine. You will get more details on the specific medicines your doctor prescribes. · If you feel dizzy or lightheaded, sit down or lie down for a few minutes. Or you can sit down and put your head between your knees. This will help your blood pressure go back to normal and help your symptoms go away. · Follow your doctor's suggestions for ways to prevent symptoms like dizziness. These suggestions may include:  ? Get up slowly from bed or after sitting for a long time. If you are in bed, roll to your side and swing your legs over the edge of the bed and onto the floor. Push your body up to a sitting position. Wait for a while before you slowly stand up.  ? Add more salt to your diet, if your doctor recommends it. ? Drink plenty of fluids, enough so that your urine is light yellow or clear like water. Choose water and other caffeine-free clear liquids. If you have kidney, heart, or liver disease and have to limit fluids, talk with your doctor before you increase the amount of fluids you drink. ? Avoid or limit alcohol to 2 drinks a day for men and 1 drink a day for women. Alcohol may interfere with your medicine. In addition, alcohol can make your low blood pressure worse by causing your body to lose water. ? Avoid or limit caffeine. Caffeine can cause your body to lose water. ? Wear compression stockings to help improve blood flow. When should you call for help? Call 911 anytime you think you may need emergency care. For example, call if:    · You passed out (lost consciousness).    Call your doctor now or seek immediate medical care if:    · You are dizzy or lightheaded, or you feel like you may faint. Watch closely for changes in your health, and be sure to contact your doctor if you have any problems. Where can you learn more? Go to http://www.gray.com/  Enter C304 in the search box to learn more about \"Low Blood Pressure: Care Instructions. \"  Current as of: December 16, 2019               Content Version: 12.6  © 0264-7598 [employer]. Care instructions adapted under license by [employer] (which disclaims liability or warranty for this information). If you have questions about a medical condition or this instruction, always ask your healthcare professional. [address] any warranty or liability for your use of this information.

## 2021-10-31 ENCOUNTER — APPOINTMENT (OUTPATIENT)
Dept: GENERAL RADIOLOGY | Age: 76
End: 2021-10-31
Attending: EMERGENCY MEDICINE
Payer: MEDICARE

## 2021-10-31 ENCOUNTER — APPOINTMENT (OUTPATIENT)
Dept: CT IMAGING | Age: 76
End: 2021-10-31
Attending: EMERGENCY MEDICINE
Payer: MEDICARE

## 2021-10-31 ENCOUNTER — HOSPITAL ENCOUNTER (EMERGENCY)
Age: 76
Discharge: HOME | End: 2021-10-31
Attending: EMERGENCY MEDICINE
Payer: MEDICARE

## 2021-10-31 VITALS
OXYGEN SATURATION: 97 % | BODY MASS INDEX: 17.48 KG/M2 | DIASTOLIC BLOOD PRESSURE: 68 MMHG | WEIGHT: 118 LBS | RESPIRATION RATE: 16 BRPM | TEMPERATURE: 97.8 F | HEIGHT: 69 IN | SYSTOLIC BLOOD PRESSURE: 139 MMHG | HEART RATE: 106 BPM
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
## 2021-10-31 DIAGNOSIS — S81.811A SKIN TEAR OF RIGHT LOWER LEG WITHOUT COMPLICATION, INITIAL ENCOUNTER: ICD-10-CM

## 2021-10-31 DIAGNOSIS — S09.90XA INJURY OF HEAD, INITIAL ENCOUNTER: ICD-10-CM

## 2021-10-31 DIAGNOSIS — N30.00 ACUTE CYSTITIS WITHOUT HEMATURIA: ICD-10-CM

## 2021-10-31 LAB
ANION GAP SERPL CALC-SCNC: 8 MMOL/L (ref 7–16)
APPEARANCE UR: ABNORMAL
BACTERIA URNS QL MICRO: ABNORMAL /HPF
BASOPHILS # BLD: 0.1 K/UL (ref 0–0.2)
BASOPHILS NFR BLD: 1 % (ref 0–2)
BILIRUB UR QL: NEGATIVE
BUN SERPL-MCNC: 16 MG/DL (ref 8–23)
CALCIUM SERPL-MCNC: 9 MG/DL (ref 8.3–10.4)
CASTS URNS QL MICRO: ABNORMAL /LPF
CHLORIDE SERPL-SCNC: 107 MMOL/L (ref 98–107)
CO2 SERPL-SCNC: 22 MMOL/L (ref 21–32)
COLOR UR: YELLOW
CREAT SERPL-MCNC: 0.93 MG/DL (ref 0.6–1)
DIFFERENTIAL METHOD BLD: ABNORMAL
EOSINOPHIL # BLD: 0.1 K/UL (ref 0–0.8)
EOSINOPHIL NFR BLD: 1 % (ref 0.5–7.8)
EPI CELLS #/AREA URNS HPF: ABNORMAL /HPF
ERYTHROCYTE [DISTWIDTH] IN BLOOD BY AUTOMATED COUNT: 15.9 % (ref 11.9–14.6)
GLUCOSE SERPL-MCNC: 85 MG/DL (ref 65–100)
GLUCOSE UR STRIP.AUTO-MCNC: NEGATIVE MG/DL
HCT VFR BLD AUTO: 37 % (ref 35.8–46.3)
HGB BLD-MCNC: 11.3 G/DL (ref 11.7–15.4)
HGB UR QL STRIP: ABNORMAL
IMM GRANULOCYTES # BLD AUTO: 0 K/UL (ref 0–0.5)
IMM GRANULOCYTES NFR BLD AUTO: 1 % (ref 0–5)
KETONES UR QL STRIP.AUTO: NEGATIVE MG/DL
LEUKOCYTE ESTERASE UR QL STRIP.AUTO: ABNORMAL
LYMPHOCYTES # BLD: 1.4 K/UL (ref 0.5–4.6)
LYMPHOCYTES NFR BLD: 23 % (ref 13–44)
MAGNESIUM SERPL-MCNC: 2.2 MG/DL (ref 1.8–2.4)
MCH RBC QN AUTO: 29.7 PG (ref 26.1–32.9)
MCHC RBC AUTO-ENTMCNC: 30.5 G/DL (ref 31.4–35)
MCV RBC AUTO: 97.1 FL (ref 79.6–97.8)
MONOCYTES # BLD: 0.4 K/UL (ref 0.1–1.3)
MONOCYTES NFR BLD: 7 % (ref 4–12)
NEUTS SEG # BLD: 4.2 K/UL (ref 1.7–8.2)
NEUTS SEG NFR BLD: 68 % (ref 43–78)
NITRITE UR QL STRIP.AUTO: NEGATIVE
NRBC # BLD: 0 K/UL (ref 0–0.2)
PH UR STRIP: 6 [PH] (ref 5–9)
PLATELET # BLD AUTO: 210 K/UL (ref 150–450)
PMV BLD AUTO: 10.6 FL (ref 9.4–12.3)
POTASSIUM SERPL-SCNC: 3.8 MMOL/L (ref 3.5–5.1)
PROT UR STRIP-MCNC: NEGATIVE MG/DL
RBC # BLD AUTO: 3.81 M/UL (ref 4.05–5.2)
RBC #/AREA URNS HPF: ABNORMAL /HPF
SODIUM SERPL-SCNC: 137 MMOL/L (ref 136–145)
SP GR UR REFRACTOMETRY: 1.02 (ref 1–1.02)
UROBILINOGEN UR QL STRIP.AUTO: 0.2 EU/DL (ref 0.2–1)
WBC # BLD AUTO: 6.2 K/UL (ref 4.3–11.1)
WBC URNS QL MICRO: >100 /HPF

## 2021-10-31 PROCEDURE — 73610 X-RAY EXAM OF ANKLE: CPT

## 2021-10-31 PROCEDURE — 96365 THER/PROPH/DIAG IV INF INIT: CPT

## 2021-10-31 PROCEDURE — 83735 ASSAY OF MAGNESIUM: CPT

## 2021-10-31 PROCEDURE — 85025 COMPLETE CBC W/AUTO DIFF WBC: CPT

## 2021-10-31 PROCEDURE — 96375 TX/PRO/DX INJ NEW DRUG ADDON: CPT

## 2021-10-31 PROCEDURE — 74011000258 HC RX REV CODE- 258: Performed by: EMERGENCY MEDICINE

## 2021-10-31 PROCEDURE — 72125 CT NECK SPINE W/O DYE: CPT

## 2021-10-31 PROCEDURE — 70450 CT HEAD/BRAIN W/O DYE: CPT

## 2021-10-31 PROCEDURE — 99284 EMERGENCY DEPT VISIT MOD MDM: CPT

## 2021-10-31 PROCEDURE — 80048 BASIC METABOLIC PNL TOTAL CA: CPT

## 2021-10-31 PROCEDURE — 73630 X-RAY EXAM OF FOOT: CPT

## 2021-10-31 PROCEDURE — 74011250636 HC RX REV CODE- 250/636: Performed by: EMERGENCY MEDICINE

## 2021-10-31 PROCEDURE — 81001 URINALYSIS AUTO W/SCOPE: CPT

## 2021-10-31 RX ORDER — CEPHALEXIN 500 MG/1
500 CAPSULE ORAL 3 TIMES DAILY
Qty: 21 CAPSULE | Refills: 0 | Status: SHIPPED | OUTPATIENT
Start: 2021-10-31 | End: 2021-11-07

## 2021-10-31 RX ORDER — ONDANSETRON 2 MG/ML
8 INJECTION INTRAMUSCULAR; INTRAVENOUS
Status: COMPLETED | OUTPATIENT
Start: 2021-10-31 | End: 2021-10-31

## 2021-10-31 RX ADMIN — ONDANSETRON 8 MG: 2 INJECTION INTRAMUSCULAR; INTRAVENOUS at 17:14

## 2021-10-31 RX ADMIN — CEFTRIAXONE 1 G: 1 INJECTION, POWDER, FOR SOLUTION INTRAMUSCULAR; INTRAVENOUS at 16:46

## 2021-10-31 NOTE — ED PROVIDER NOTES
[address] EMERGENCY DEPARTMENT     Young Durant is a 64 y.o. female seen on 10/31/2021 in the [facility] EMERGENCY DEPT in room ERE/E. Chief Complaint   Patient presents with   Minh Loser Fall     HPI: 73-year-old  female presented to the emergency department for evaluation after sustaining a fall late last evening. Patient states that she has felt slightly weak since starting on clonazepam that her doctor gave her for her tremor. Patient has fallen twice since starting her medication last week. Patient states that she fell last night when she got up to go to the bathroom. She was using her walker but stumbled causing her to fall. Patient states that she struck her head but she did not lose consciousness. Patient also complains of a skin tear to her right foot and ankle. Patient only complains of mild headache and foot pain at this time. She denies any chest pain, shortness of breath, fever, cough, chills. Patient has noted that she has some increased urination and diarrhea over the past couple of days. Historian: Patient    REVIEW OF SYSTEMS     Review of Systems   Constitutional: Negative. HENT: Negative. Respiratory: Negative. Cardiovascular: Negative. Gastrointestinal: Positive for diarrhea. Genitourinary: Positive for frequency. Musculoskeletal: Positive for arthralgias. Skin: Positive for wound. Neurological: Positive for headaches. Psychiatric/Behavioral: Negative. All other systems reviewed and are negative.       PAST MEDICAL HISTORY     Past Medical History:   Diagnosis Date    Anxiety 3/6/2013    Arthritis     osteoarthritis    B12 deficiency 3/6/2013    Chronic back pain 3/6/2013    Chronic kidney disease (CKD) stage G3b/A1, moderately decreased glomerular filtration rate (GFR) between 30-44 mL/min/1.73 square meter and albuminuria creatinine ratio less than 30 mg/g ([facility]) 10/14/2016    Constipation 11/4/2015    COPD 2006    [facility]/ patient on 2.5 liters O2 nightly at home    Decreased GFR 5/26/2016    Depression 10/25/2012    Depression 10/25/2012    Depression, major, in remission ([facility] [address] 1/31/2018    diverticulitis     Encounter for long-term (current) use of other medications 3/6/2013    Environmental allergies 3/6/2013    Fecal incontinence 12/7/2016    Fibromyalgia 9/15/2015    GERD (gastroesophageal reflux disease) \"years\"    Headache(784.0) 2/12/2013    HTN (hypertension) 10/25/2012    HTN (hypertension) 10/25/2012    Hyperlipidemia 10/25/2012    Lymphocytic colitis 13/9/0304    Metabolic syndrome 7/5/3583    Microalbuminuria 11/4/2015    Osteoporosis 10/25/2012    polypectomy     PUD (peptic ulcer disease) \"years ago\"    Had peptic ulcers    thyroid mass     partial removal, benign    Unspecified adverse effect of anesthesia     PATIENT STATES SHE DOESN'T GET GENERAL ANESTHESIA DUE TO COPD    Unspecified sleep apnea     wears O2 at night.   no c-jeffrey    Vitamin D deficiency 3/6/2013     Past Surgical History:   Procedure Laterality Date    HX APPENDECTOMY  1970s    HX BREAST BIOPSY Right     excision of cyst    HX CATARACT REMOVAL Right 2016    right eye    HX CHOLECYSTECTOMY  1970s    HX GYN  1970s    TIA BSO    HX HERNIA REPAIR  unknown    hiatal hernia repair    HX ORTHOPAEDIC  [id number]    right knee replaced and then left the next year    HX PARTIAL THYROIDECTOMY  1990s    Gopi Rosario    IR KYPHOPLASTY LUMBAR  2/11/2021    KS BREAST SURGERY PROCEDURE UNLISTED  1980s    cyst removed from right breast/benign    KS COLOSTOMY  1990s    placed, then reversed     Social History     Socioeconomic History    Marital status:      Spouse name: Not on file    Number of children: Not on file    Years of education: Not on file    Highest education level: Not on file   Tobacco Use    Smoking status: Current Every Day Smoker     Packs/day: 0.50     Years: 45.00     Pack years: 22.50     Types: Cigarettes  Smokeless tobacco: Never Used   Substance and Sexual Activity    Alcohol use: Yes     Comment: states no drinking beer in 2 months    Drug use: Yes     Types: Marijuana     Comment: smoked it last night    Sexual activity: Never     Social Determinants of Health     Financial Resource Strain:     Difficulty of Paying Living Expenses:    Food Insecurity:     Worried About Running Out of Food in the Last Year:     Ran Out of Food in the Last Year:    Transportation Needs:     Lack of Transportation (Medical):  Lack of Transportation (Non-Medical):    Physical Activity:     Days of Exercise per Week:     Minutes of Exercise per Session:    Stress:     Feeling of Stress :    Social Connections:     Frequency of Communication with Friends and Family:     Frequency of Social Gatherings with Friends and Family:     Attends [employer] Services:     Active Member of Clubs or Organizations:     Attends Club or Organization Meetings:     Marital Status:      Prior to Admission Medications   Prescriptions Last Dose Informant Patient Reported? Taking? Oxygen   Yes No   acetaminophen (TYLENOL) 325 mg tablet   No No   Sig: Take 2 Tabs by mouth every six (6) hours as needed for Pain. Indications: pain   albuterol (PROVENTIL HFA, VENTOLIN HFA, PROAIR HFA) 90 mcg/actuation inhaler   No No   Sig: Take 2 Puffs by inhalation every four (4) hours as needed for Wheezing or Shortness of Breath. albuterol (PROVENTIL VENTOLIN) 2.5 mg /3 mL (0.083 %) nebu   No No   Sig: 3 mL by Nebulization route every four (4) hours as needed for Wheezing or Shortness of Breath. alendronate (FOSAMAX) 70 mg tablet   No No   Sig: Take 1 Tab by mouth every seven (7) days. artificial tears, dextran-hypromellose-glycerin, (TEARS NATURALE FORTE) 0.1-0.3-0.2 % ophthalmic solution   No No   Sig: Administer 1 Drop to both eyes every six (6) hours.  Indications: dry eye   atorvastatin (LIPITOR) 80 mg tablet   No No   Sig: Take 1 Tab by mouth daily. Indications: primary prevention of heart attack   budesonide (ENTOCORT EC) 3 mg capsule   Yes No   Sig: Take 6 mg by mouth every morning. budesonide-formoteroL (SYMBICORT) 160-4.5 mcg/actuation HFAA   No No   Sig: Take 2 Puffs by inhalation two (2) times a day. busPIRone (BUSPAR) 15 mg tablet   No No   Sig: Take 1 Tab by mouth three (3) times daily. Indications: repeated episodes of anxiety   clonazePAM (KlonoPIN) 1 mg tablet   No No   Sig: Take 1 Tablet by mouth three (3) times daily as needed for Anxiety (tremor). Max Daily Amount: 3 mg.   colestipol (COLESTID) 1 gram tablet   Yes No   Sig: Take 1 g by mouth two (2) times a day. dicyclomine (BENTYL) 10 mg capsule   No No   Sig: Take 1 Cap by mouth three (3) times daily as needed for Abdominal Cramps. Indications: irritable colon   ergocalciferol (ERGOCALCIFEROL) 1,250 mcg (50,000 unit) capsule   No No   Sig: Take 1 Cap by mouth every seven (7) days. guaiFENesin ER (MUCINEX) 600 mg ER tablet   No No   Sig: Take 1 Tab by mouth every twelve (12) hours. lidocaine 4 % patch   No No   Sig: Apply to lower back   linaCLOtide (Linzess) 72 mcg cap capsule   No No   Sig: Take 1 Cap by mouth Daily (before breakfast). Indications: irritable bowel syndrome with constipation   metoclopramide HCl (REGLAN) 10 mg tablet   No No   Sig: Take 1 Tab by mouth Before breakfast, lunch, dinner and at bedtime. Indications: stomach muscle paralysis and decreased function from diabetes   naloxone (NARCAN) 4 mg/actuation nasal spray   No No   Sig: Use 1 spray intranasally, then discard. Repeat with new spray every 2 min as needed for opioid overdose symptoms, alternating nostrils. ondansetron hcl (ZOFRAN) 4 mg tablet   Yes No   Sig: Take 4 mg by mouth every eight (8) hours as needed for Nausea or Vomiting. pantoprazole (PROTONIX) 40 mg tablet   No No   Sig: Take 1 Tab by mouth two (2) times a day.  Indications: gastroesophageal reflux disease   primidone (MYSOLINE) 50 mg tablet   No No   Sig: Take 2 Tablets by mouth three (3) times daily. Indications: essential tremor   senna-docusate (PERICOLACE) 8.6-50 mg per tablet   No No   Sig: Take 1 Tab by mouth nightly. sertraline (ZOLOFT) 100 mg tablet   No No   Sig: Take 2 tablets daily ( Dose increased from previously 100 mg daily )  Indications: anxiousness associated with depression   tiotropium (SPIRIVA) 18 mcg inhalation capsule   No No   Sig: Take 1 Cap by inhalation daily. Indications: bronchospasm prevention with COPD   topiramate (TOPAMAX) 50 mg tablet   No No   Sig: Take 1 tablet with breakfast, 1 tablet with lunch, and 2 with dinner. Indications: migraine prevention   traZODone (DESYREL) 100 mg tablet   Yes No   Sig: Take 100 mg by mouth At bedtime. Facility-Administered Medications: None     Allergies   Allergen Reactions    Amlodipine Hives    Lisinopril Diarrhea    Niaspan [Niacin] Rash     Patient was not taking it with  mg 30 min before. She wants to give it another try. Pt takes this med and it does not cause a rash anymore          PHYSICAL EXAM       Vitals:    10/31/21 1328 10/31/21 1438 10/31/21 1439 10/31/21 1443   BP: 132/80  134/81    Pulse: (!) 106      Resp: 16      Temp: 97.8 °F (36.6 °C)      SpO2: 93% 96% 96% 96%    Vital signs were reviewed. Physical Exam  Vitals and nursing note reviewed. Constitutional:       Appearance: She is not ill-appearing. HENT:      Head: Normocephalic. Comments: Mild tenderness and swelling to the right temporoparietal region     Mouth/Throat:      Mouth: Mucous membranes are moist.   Eyes:      Extraocular Movements: Extraocular movements intact. Pupils: Pupils are equal, round, and reactive to light. Cardiovascular:      Rate and Rhythm: Normal rate and regular rhythm. Pulses: Normal pulses. Heart sounds: Normal heart sounds. Pulmonary:      Effort: Pulmonary effort is normal.      Breath sounds: Normal breath sounds. Abdominal:      Palpations: Abdomen is soft. Tenderness: There is no abdominal tenderness. There is no guarding or rebound. Musculoskeletal:         General: Signs of injury present. Cervical back: Normal range of motion. Comments: Large skin tear to the dorsum of right foot with mild swelling and ecchymosis to the foot and lateral ankle   Skin:     General: Skin is warm and dry. Neurological:      General: No focal deficit present. Mental Status: She is alert and oriented to person, place, and time. Psychiatric:         Behavior: Behavior normal.         Thought Content: Thought content normal.         Judgment: Judgment normal.          MEDICAL DECISION MAKING     ED Course:    Orders Placed This Encounter    CT HEAD WO CONT    CT SPINE CERV WO CONT    XR ANKLE RT MIN 3 V    XR FOOT RT MIN 3 V    CBC WITH AUTOMATED DIFF    METABOLIC PANEL, BASIC    MAGNESIUM    URINALYSIS W/ RFLX MICROSCOPIC    SALINE LOCK IV ONE TIME STAT    cefTRIAXone (ROCEPHIN) 1 g in 0.9% sodium chloride (MBP/ADV) 50 mL MBP    cephALEXin (Keflex) 500 mg capsule     Recent Results (from the past 8 hour(s))   CBC WITH AUTOMATED DIFF    Collection Time: 10/31/21  2:55 PM   Result Value Ref Range    WBC 6.2 4.3 - 11.1 K/uL    RBC 3.81 (L) 4.05 - 5.2 M/uL    HGB 11.3 (L) 11.7 - 15.4 g/dL    HCT 37.0 35.8 - 46.3 %    MCV 97.1 79.6 - 97.8 FL    MCH 29.7 26.1 - 32.9 PG    MCHC 30.5 (L) 31.4 - 35.0 g/dL    RDW 15.9 (H) 11.9 - 14.6 %    PLATELET 475 356 - 826 K/uL    MPV 10.6 9.4 - 12.3 FL    ABSOLUTE NRBC 0.00 0.0 - 0.2 K/uL    DF AUTOMATED      NEUTROPHILS 68 43 - 78 %    LYMPHOCYTES 23 13 - 44 %    MONOCYTES 7 4.0 - 12.0 %    EOSINOPHILS 1 0.5 - 7.8 %    BASOPHILS 1 0.0 - 2.0 %    IMMATURE GRANULOCYTES 1 0.0 - 5.0 %    ABS. NEUTROPHILS 4.2 1.7 - 8.2 K/UL    ABS. LYMPHOCYTES 1.4 0.5 - 4.6 K/UL    ABS. MONOCYTES 0.4 0.1 - 1.3 K/UL    ABS. EOSINOPHILS 0.1 0.0 - 0.8 K/UL    ABS. BASOPHILS 0.1 0.0 - 0.2 K/UL    ABS. IMM. [place]. 0.0 0.0 - 0.5 K/UL   METABOLIC PANEL, BASIC    Collection Time: 10/31/21  2:55 PM   Result Value Ref Range    Sodium 137 136 - 145 mmol/L    Potassium 3.8 3.5 - 5.1 mmol/L    Chloride 107 98 - 107 mmol/L    CO2 22 21 - 32 mmol/L    Anion gap 8 7 - 16 mmol/L    Glucose 85 65 - 100 mg/dL    BUN 16 8 - 23 MG/DL    Creatinine 0.93 0.6 - 1.0 MG/DL    GFR est AA >60 >60 ml/min/1.73m2    GFR est non-AA >60 >60 ml/min/1.73m2    Calcium 9.0 8.3 - 10.4 MG/DL   MAGNESIUM    Collection Time: 10/31/21  2:55 PM   Result Value Ref Range    Magnesium 2.2 1.8 - 2.4 mg/dL   URINALYSIS W/ RFLX MICROSCOPIC    Collection Time: 10/31/21  4:02 PM   Result Value Ref Range    Color YELLOW      Appearance CLOUDY      Specific gravity 1.017 1.001 - 1.023      pH (UA) 6.0 5.0 - 9.0      Protein Negative NEG mg/dL    Glucose Negative mg/dL    Ketone Negative NEG mg/dL    Bilirubin Negative NEG      Blood MODERATE (A) NEG      Urobilinogen 0.2 0.2 - 1.0 EU/dL    Nitrites Negative NEG      Leukocyte Esterase LARGE (A) NEG      WBC >100 (H) 0 /hpf    RBC 5-10 0 /hpf    Epithelial cells 0-3 0 /hpf    Bacteria 3+ (H) 0 /hpf    Casts 0-3 0 /lpf     XR ANKLE RT MIN 3 V    Result Date: 10/31/2021  EXAM: XR ANKLE RT MIN 3 V INDICATION: Fall COMPARISON: None. FINDINGS: Three views of the right ankle demonstrate lateral soft tissue swelling without evidence of fracture or dislocation. . Plantar calcaneal spur. .     Lateral soft tissue swelling. No fractures. XR FOOT RT MIN 3 V    Result Date: 10/31/2021  EXAM:  XR FOOT RT MIN 3 V INDICATION:   Fall COMPARISON:  None. FINDINGS:  3 views of the right foot demonstrate no fracture or other osseous, articular or soft tissue abnormality. Hammertoe deformity. Plantar calcaneal spur. Plantar calcaneal spur. Hammertoe deformity. CT HEAD WO CONT    Result Date: 10/31/2021  CT HEAD WITHOUT CONTRAST HISTORY:  Head trauma. COMPARISON: None.  TECHNIQUE: Axial imaging was performed without intravenous contrast utilizing 5mm slice thickness. Sagittal and coronal reformats were performed. Radiation dose reduction techniques were used for this study. Our CT scanner uses one or all of the following: Automated exposure control, adjustment of the MAS or KUB according to patient's size and iterative reconstruction. FINDINGS:    *BRAIN:    -  There are no early signs of territorial or lacunar infarction by CT.    -  No intracranial mass, hematoma, or hydrocephalus.    -  No gross white matter abnormality by CT. *VISUALIZED PARANASAL SINUSES: Well aerated. *MASTOIDS:  Clear. *CALVARIUM AND SCALP: Unremarkable. Unremarkable brain CT without intravenous contrast. Date of Dictation: 10/31/2021 2:21 PM     CT SPINE CERV WO CONT    Result Date: 10/31/2021  CT CERVICAL SPINE WITHOUT CONTRAST HISTORY: Pain. COMPARISON: None. TECHNIQUE: Helical imaging was performed through the cervical spine and reconstructed in multiple planes. Dose reduction techniques used: Automated exposure control, adjustment of the mAs and/or kVp according to patient's size, and iterative reconstruction techniques. FINDINGS: *  ALIGNMENT: Within normal limits. *  FRACTURES: None. *  PREVERTEBRAL SOFT TISSUES: No swelling. The disc evaluation is suboptimal by CT. C2-C3: Right facet arthropathy. C3-C4: Bilateral facet arthropathy. C4-C5: Bilateral facet arthropathy. C5-C6: Spondylosis. Right facet arthropathy. C6-C7: Spondylosis. C7-T1: Unremarkable. Spondylosis and facet arthropathy. No fractures are identified. Date of Dictation: 10/31/2021 2:12 PM           MDM  Number of Diagnoses or Management Options  Diagnosis management comments: 78-year-old female present emergency department for evaluation after a fall. Patient's imaging is unremarkable. Patient's labs are reassuring. Patient does have a urinary tract emergency was given IV Rocephin here in the emergency department. She will be discharged home with prescription for Keflex. She will return the emergency department for any concerns. Amount and/or Complexity of Data Reviewed  Clinical lab tests: ordered and reviewed  Tests in the radiology section of CPT®: ordered and reviewed  Independent visualization of images, tracings, or specimens: yes    Patient Progress  Patient progress: improved        Disposition: Discharged   diagnosis:     ICD-10-CM ICD-9-CM   1. Fall, initial encounter  [address] XXXA E888.9   2. Injury of head, initial encounter  S09.90XA 959.01   3. Skin tear of right lower leg without complication, initial encounter  S81.811A 891.0   4. Acute cystitis without hematuria  N30.00 595.0     ____________________________________________________________________  A portion of this note was generated using voice recognition dictation software. While the note has been reviewed for accuracy, please note certain words and phrases may not be transcribed as intended and some grammatical and/or typographical errors may be present.

## 2021-10-31 NOTE — ED TRIAGE NOTES
Patient presents via [facility] with complaints of fall. Patient has been having increasing shakes and patient was placed on clonazepam. Patient with fall at 0300 with complaints of head pain with hematoma, right foot skin tear. Patient denies LOC. Vital signs for 130/60  HR 98  RR 22 sat 95% temp 97.8 patient denies blood thinners.

## 2021-10-31 NOTE — ED NOTES
I have reviewed discharge instructions with the patient and caregiver. The patient and caregiver verbalized understanding. Patient left ED via Discharge Method: wheelchair to Home with son. Opportunity for questions and clarification provided. Patient given 1 scripts. To continue your aftercare when you leave the hospital, you may receive an automated call from our care team to check in on how you are doing. This is a free service and part of our promise to provide the best care and service to meet your aftercare needs.  If you have questions, or wish to unsubscribe from this service please call [phone]. Thank you for Choosing our [employer] Emergency Department.

## 2021-10-31 NOTE — ED NOTES
Skin tear to top of pt's right foot cleaned. Steri-strips placed and wound covered with non-stick bandage secured with Coban.

## 2021-11-16 PROBLEM — Z51.5 HOSPICE CARE PATIENT: Status: ACTIVE | Noted: 2021-11-16

## 2021-11-16 PROBLEM — G89.29 OTHER CHRONIC PAIN: Status: ACTIVE | Noted: 2021-11-16

## 2021-12-24 ENCOUNTER — APPOINTMENT (OUTPATIENT)
Dept: GENERAL RADIOLOGY | Age: 76
End: 2021-12-24
Attending: INTERNAL MEDICINE
Payer: MEDICARE

## 2021-12-24 ENCOUNTER — HOSPITAL ENCOUNTER (OUTPATIENT)
Age: 76
Setting detail: OBSERVATION
Discharge: LEFT BEFORE DISCHARGE | End: 2021-12-25
Attending: EMERGENCY MEDICINE | Admitting: INTERNAL MEDICINE
Payer: MEDICARE

## 2021-12-24 DIAGNOSIS — J44.1 ACUTE EXACERBATION OF CHRONIC OBSTRUCTIVE PULMONARY DISEASE (COPD) (HCC): ICD-10-CM

## 2021-12-24 DIAGNOSIS — R60.0 BILATERAL LEG EDEMA: Primary | ICD-10-CM

## 2021-12-24 LAB
ALBUMIN SERPL-MCNC: 2.9 G/DL (ref 3.2–4.6)
ALBUMIN/GLOB SERPL: 1.1 {RATIO} (ref 1.2–3.5)
ALP SERPL-CCNC: 77 U/L (ref 50–136)
ALT SERPL-CCNC: 12 U/L (ref 12–65)
ANION GAP SERPL CALC-SCNC: 6 MMOL/L (ref 7–16)
AST SERPL-CCNC: 12 U/L (ref 15–37)
BASOPHILS # BLD: 0.1 K/UL (ref 0–0.2)
BASOPHILS NFR BLD: 1 % (ref 0–2)
BILIRUB SERPL-MCNC: 0.2 MG/DL (ref 0.2–1.1)
BNP SERPL-MCNC: 5130 PG/ML
BUN SERPL-MCNC: 22 MG/DL (ref 8–23)
CALCIUM SERPL-MCNC: 7.9 MG/DL (ref 8.3–10.4)
CHLORIDE SERPL-SCNC: 114 MMOL/L (ref 98–107)
CO2 SERPL-SCNC: 22 MMOL/L (ref 21–32)
CREAT SERPL-MCNC: 0.87 MG/DL (ref 0.6–1)
DIFFERENTIAL METHOD BLD: ABNORMAL
EOSINOPHIL # BLD: 0 K/UL (ref 0–0.8)
EOSINOPHIL NFR BLD: 1 % (ref 0.5–7.8)
ERYTHROCYTE [DISTWIDTH] IN BLOOD BY AUTOMATED COUNT: 15.6 % (ref 11.9–14.6)
GLOBULIN SER CALC-MCNC: 2.6 G/DL (ref 2.3–3.5)
GLUCOSE SERPL-MCNC: 88 MG/DL (ref 65–100)
HCT VFR BLD AUTO: 31.7 % (ref 35.8–46.3)
HGB BLD-MCNC: 9.8 G/DL (ref 11.7–15.4)
IMM GRANULOCYTES # BLD AUTO: 0 K/UL (ref 0–0.5)
IMM GRANULOCYTES NFR BLD AUTO: 1 % (ref 0–5)
LYMPHOCYTES # BLD: 1.3 K/UL (ref 0.5–4.6)
LYMPHOCYTES NFR BLD: 21 % (ref 13–44)
MAGNESIUM SERPL-MCNC: 1.8 MG/DL (ref 1.8–2.4)
MCH RBC QN AUTO: 29.8 PG (ref 26.1–32.9)
MCHC RBC AUTO-ENTMCNC: 30.9 G/DL (ref 31.4–35)
MCV RBC AUTO: 96.4 FL (ref 79.6–97.8)
MONOCYTES # BLD: 0.4 K/UL (ref 0.1–1.3)
MONOCYTES NFR BLD: 7 % (ref 4–12)
NEUTS SEG # BLD: 4.1 K/UL (ref 1.7–8.2)
NEUTS SEG NFR BLD: 69 % (ref 43–78)
NRBC # BLD: 0 K/UL (ref 0–0.2)
PLATELET # BLD AUTO: 203 K/UL (ref 150–450)
PMV BLD AUTO: 10.3 FL (ref 9.4–12.3)
POTASSIUM SERPL-SCNC: 4.2 MMOL/L (ref 3.5–5.1)
PROT SERPL-MCNC: 5.5 G/DL (ref 6.3–8.2)
RBC # BLD AUTO: 3.29 M/UL (ref 4.05–5.2)
SODIUM SERPL-SCNC: 142 MMOL/L (ref 136–145)
WBC # BLD AUTO: 6 K/UL (ref 4.3–11.1)

## 2021-12-24 PROCEDURE — 74011000250 HC RX REV CODE- 250: Performed by: INTERNAL MEDICINE

## 2021-12-24 PROCEDURE — 85025 COMPLETE CBC W/AUTO DIFF WBC: CPT

## 2021-12-24 PROCEDURE — 96374 THER/PROPH/DIAG INJ IV PUSH: CPT

## 2021-12-24 PROCEDURE — 74011250637 HC RX REV CODE- 250/637: Performed by: INTERNAL MEDICINE

## 2021-12-24 PROCEDURE — 81003 URINALYSIS AUTO W/O SCOPE: CPT

## 2021-12-24 PROCEDURE — 80053 COMPREHEN METABOLIC PANEL: CPT

## 2021-12-24 PROCEDURE — 74011250637 HC RX REV CODE- 250/637: Performed by: EMERGENCY MEDICINE

## 2021-12-24 PROCEDURE — 83540 ASSAY OF IRON: CPT

## 2021-12-24 PROCEDURE — 74011250636 HC RX REV CODE- 250/636: Performed by: INTERNAL MEDICINE

## 2021-12-24 PROCEDURE — 71045 X-RAY EXAM CHEST 1 VIEW: CPT

## 2021-12-24 PROCEDURE — 74011250636 HC RX REV CODE- 250/636: Performed by: EMERGENCY MEDICINE

## 2021-12-24 PROCEDURE — 93005 ELECTROCARDIOGRAM TRACING: CPT | Performed by: EMERGENCY MEDICINE

## 2021-12-24 PROCEDURE — 82728 ASSAY OF FERRITIN: CPT

## 2021-12-24 PROCEDURE — 83880 ASSAY OF NATRIURETIC PEPTIDE: CPT

## 2021-12-24 PROCEDURE — 74011000250 HC RX REV CODE- 250: Performed by: EMERGENCY MEDICINE

## 2021-12-24 PROCEDURE — 96376 TX/PRO/DX INJ SAME DRUG ADON: CPT

## 2021-12-24 PROCEDURE — 94640 AIRWAY INHALATION TREATMENT: CPT

## 2021-12-24 PROCEDURE — 84443 ASSAY THYROID STIM HORMONE: CPT

## 2021-12-24 PROCEDURE — 96375 TX/PRO/DX INJ NEW DRUG ADDON: CPT

## 2021-12-24 PROCEDURE — 99285 EMERGENCY DEPT VISIT HI MDM: CPT

## 2021-12-24 PROCEDURE — 86580 TB INTRADERMAL TEST: CPT | Performed by: INTERNAL MEDICINE

## 2021-12-24 PROCEDURE — 83735 ASSAY OF MAGNESIUM: CPT

## 2021-12-24 RX ORDER — ALBUTEROL SULFATE 0.83 MG/ML
2.5 SOLUTION RESPIRATORY (INHALATION)
Status: DISCONTINUED | OUTPATIENT
Start: 2021-12-24 | End: 2021-12-25 | Stop reason: HOSPADM

## 2021-12-24 RX ORDER — AMOXICILLIN 250 MG
1 CAPSULE ORAL
Status: DISCONTINUED | OUTPATIENT
Start: 2021-12-24 | End: 2021-12-25 | Stop reason: HOSPADM

## 2021-12-24 RX ORDER — LIDOCAINE 4 G/100G
1 PATCH TOPICAL EVERY 24 HOURS
Status: DISCONTINUED | OUTPATIENT
Start: 2021-12-25 | End: 2021-12-25 | Stop reason: HOSPADM

## 2021-12-24 RX ORDER — FUROSEMIDE 10 MG/ML
40 INJECTION INTRAMUSCULAR; INTRAVENOUS DAILY
Status: DISCONTINUED | OUTPATIENT
Start: 2021-12-25 | End: 2021-12-25 | Stop reason: HOSPADM

## 2021-12-24 RX ORDER — GUAIFENESIN 600 MG/1
600 TABLET, EXTENDED RELEASE ORAL EVERY 12 HOURS
Status: DISCONTINUED | OUTPATIENT
Start: 2021-12-24 | End: 2021-12-25 | Stop reason: HOSPADM

## 2021-12-24 RX ORDER — BUDESONIDE 3 MG/1
6 CAPSULE, COATED PELLETS ORAL
Status: DISCONTINUED | OUTPATIENT
Start: 2021-12-25 | End: 2021-12-25 | Stop reason: HOSPADM

## 2021-12-24 RX ORDER — ASPIRIN 81 MG/1
81 TABLET ORAL DAILY
Status: DISCONTINUED | OUTPATIENT
Start: 2021-12-25 | End: 2021-12-25 | Stop reason: HOSPADM

## 2021-12-24 RX ORDER — FUROSEMIDE 10 MG/ML
40 INJECTION INTRAMUSCULAR; INTRAVENOUS
Status: COMPLETED | OUTPATIENT
Start: 2021-12-24 | End: 2021-12-24

## 2021-12-24 RX ORDER — BUSPIRONE HYDROCHLORIDE 10 MG/1
15 TABLET ORAL 3 TIMES DAILY
Status: DISCONTINUED | OUTPATIENT
Start: 2021-12-24 | End: 2021-12-25 | Stop reason: HOSPADM

## 2021-12-24 RX ORDER — PANTOPRAZOLE SODIUM 40 MG/1
40 TABLET, DELAYED RELEASE ORAL 2 TIMES DAILY
Status: DISCONTINUED | OUTPATIENT
Start: 2021-12-25 | End: 2021-12-25 | Stop reason: HOSPADM

## 2021-12-24 RX ORDER — SERTRALINE HYDROCHLORIDE 100 MG/1
200 TABLET, FILM COATED ORAL EVERY EVENING
Status: DISCONTINUED | OUTPATIENT
Start: 2021-12-25 | End: 2021-12-25 | Stop reason: HOSPADM

## 2021-12-24 RX ORDER — ACETAMINOPHEN 325 MG/1
650 TABLET ORAL
Status: DISCONTINUED | OUTPATIENT
Start: 2021-12-24 | End: 2021-12-25 | Stop reason: HOSPADM

## 2021-12-24 RX ORDER — IPRATROPIUM BROMIDE AND ALBUTEROL SULFATE 2.5; .5 MG/3ML; MG/3ML
3 SOLUTION RESPIRATORY (INHALATION)
Status: COMPLETED | OUTPATIENT
Start: 2021-12-24 | End: 2021-12-24

## 2021-12-24 RX ORDER — LIDOCAINE 4 G/100G
1 PATCH TOPICAL EVERY 24 HOURS
Status: DISCONTINUED | OUTPATIENT
Start: 2021-12-24 | End: 2021-12-24

## 2021-12-24 RX ORDER — TOPIRAMATE 25 MG/1
50 TABLET ORAL DAILY
Status: DISCONTINUED | OUTPATIENT
Start: 2021-12-25 | End: 2021-12-25 | Stop reason: HOSPADM

## 2021-12-24 RX ORDER — ATORVASTATIN CALCIUM 80 MG/1
80 TABLET, FILM COATED ORAL DAILY
Status: DISCONTINUED | OUTPATIENT
Start: 2021-12-25 | End: 2021-12-25 | Stop reason: HOSPADM

## 2021-12-24 RX ORDER — SODIUM CHLORIDE 0.9 % (FLUSH) 0.9 %
5-10 SYRINGE (ML) INJECTION EVERY 8 HOURS
Status: DISCONTINUED | OUTPATIENT
Start: 2021-12-24 | End: 2021-12-25 | Stop reason: HOSPADM

## 2021-12-24 RX ORDER — DICYCLOMINE HYDROCHLORIDE 10 MG/1
10 CAPSULE ORAL
Status: DISCONTINUED | OUTPATIENT
Start: 2021-12-24 | End: 2021-12-25 | Stop reason: HOSPADM

## 2021-12-24 RX ORDER — PRIMIDONE 50 MG/1
100 TABLET ORAL 3 TIMES DAILY
Status: DISCONTINUED | OUTPATIENT
Start: 2021-12-24 | End: 2021-12-25 | Stop reason: HOSPADM

## 2021-12-24 RX ORDER — IPRATROPIUM BROMIDE AND ALBUTEROL SULFATE 2.5; .5 MG/3ML; MG/3ML
3 SOLUTION RESPIRATORY (INHALATION)
Status: DISCONTINUED | OUTPATIENT
Start: 2021-12-25 | End: 2021-12-25 | Stop reason: HOSPADM

## 2021-12-24 RX ORDER — METOCLOPRAMIDE 10 MG/1
10 TABLET ORAL
Status: DISCONTINUED | OUTPATIENT
Start: 2021-12-25 | End: 2021-12-25 | Stop reason: HOSPADM

## 2021-12-24 RX ORDER — IBUPROFEN 200 MG
1 TABLET ORAL EVERY 24 HOURS
Status: DISCONTINUED | OUTPATIENT
Start: 2021-12-24 | End: 2021-12-25 | Stop reason: HOSPADM

## 2021-12-24 RX ORDER — TRAZODONE HYDROCHLORIDE 50 MG/1
100 TABLET ORAL
Status: DISCONTINUED | OUTPATIENT
Start: 2021-12-24 | End: 2021-12-25 | Stop reason: HOSPADM

## 2021-12-24 RX ORDER — BUDESONIDE AND FORMOTEROL FUMARATE DIHYDRATE 160; 4.5 UG/1; UG/1
2 AEROSOL RESPIRATORY (INHALATION)
Status: DISCONTINUED | OUTPATIENT
Start: 2021-12-25 | End: 2021-12-25 | Stop reason: HOSPADM

## 2021-12-24 RX ORDER — MONTELUKAST SODIUM 4 MG/1
1 TABLET, CHEWABLE ORAL 2 TIMES DAILY
Status: DISCONTINUED | OUTPATIENT
Start: 2021-12-25 | End: 2021-12-25 | Stop reason: HOSPADM

## 2021-12-24 RX ORDER — OXYCODONE HYDROCHLORIDE 5 MG/1
5 TABLET ORAL
Status: COMPLETED | OUTPATIENT
Start: 2021-12-24 | End: 2021-12-24

## 2021-12-24 RX ORDER — SODIUM CHLORIDE 0.9 % (FLUSH) 0.9 %
5-10 SYRINGE (ML) INJECTION AS NEEDED
Status: DISCONTINUED | OUTPATIENT
Start: 2021-12-24 | End: 2021-12-25 | Stop reason: HOSPADM

## 2021-12-24 RX ADMIN — METHYLPREDNISOLONE SODIUM SUCCINATE 125 MG: 125 INJECTION, POWDER, FOR SOLUTION INTRAMUSCULAR; INTRAVENOUS at 20:57

## 2021-12-24 RX ADMIN — GUAIFENESIN 600 MG: 600 TABLET ORAL at 23:00

## 2021-12-24 RX ADMIN — FUROSEMIDE 40 MG: 10 INJECTION, SOLUTION INTRAVENOUS at 20:57

## 2021-12-24 RX ADMIN — IPRATROPIUM BROMIDE AND ALBUTEROL SULFATE 3 ML: .5; 3 SOLUTION RESPIRATORY (INHALATION) at 20:53

## 2021-12-24 RX ADMIN — BUSPIRONE HYDROCHLORIDE 15 MG: 10 TABLET ORAL at 23:53

## 2021-12-24 RX ADMIN — SENNOSIDES AND DOCUSATE SODIUM 1 TABLET: 8.6; 5 TABLET ORAL at 23:06

## 2021-12-24 RX ADMIN — TRAZODONE HYDROCHLORIDE 100 MG: 50 TABLET ORAL at 23:06

## 2021-12-24 RX ADMIN — TUBERCULIN PURIFIED PROTEIN DERIVATIVE 5 UNITS: 5 INJECTION, SOLUTION INTRADERMAL at 23:57

## 2021-12-24 RX ADMIN — OXYCODONE 5 MG: 5 TABLET ORAL at 19:28

## 2021-12-24 RX ADMIN — METHYLPREDNISOLONE SODIUM SUCCINATE 60 MG: 40 INJECTION, POWDER, FOR SOLUTION INTRAMUSCULAR; INTRAVENOUS at 23:53

## 2021-12-24 RX ADMIN — PRIMIDONE 100 MG: 50 TABLET ORAL at 23:53

## 2021-12-24 NOTE — ED TRIAGE NOTES
Pt arrived masked via EMS, per EMS, pt c/o chronic back pain worsening last day, productive cough, bilateral pedal edema and abd pain. HR 80's, RR 24-28, Temp 99.0, /70, A.fib.

## 2021-12-25 VITALS
BODY MASS INDEX: 18.07 KG/M2 | RESPIRATION RATE: 19 BRPM | DIASTOLIC BLOOD PRESSURE: 86 MMHG | HEIGHT: 69 IN | WEIGHT: 122 LBS | HEART RATE: 76 BPM | OXYGEN SATURATION: 95 % | SYSTOLIC BLOOD PRESSURE: 137 MMHG | TEMPERATURE: 97.9 F

## 2021-12-25 PROBLEM — J44.1 COPD EXACERBATION (HCC): Status: ACTIVE | Noted: 2021-12-25

## 2021-12-25 LAB
ALBUMIN SERPL-MCNC: 3.3 G/DL (ref 3.2–4.6)
ALBUMIN/GLOB SERPL: 1.2 {RATIO} (ref 1.2–3.5)
ALP SERPL-CCNC: 77 U/L (ref 50–136)
ALT SERPL-CCNC: 10 U/L (ref 12–65)
ANION GAP SERPL CALC-SCNC: 5 MMOL/L (ref 7–16)
AST SERPL-CCNC: 9 U/L (ref 15–37)
ATRIAL RATE: 0 BPM
BASOPHILS # BLD: 0 K/UL (ref 0–0.2)
BASOPHILS NFR BLD: 1 % (ref 0–2)
BILIRUB SERPL-MCNC: 0.3 MG/DL (ref 0.2–1.1)
BUN SERPL-MCNC: 21 MG/DL (ref 8–23)
CALCIUM SERPL-MCNC: 8.6 MG/DL (ref 8.3–10.4)
CALCULATED R AXIS, ECG10: -148 DEGREES
CALCULATED T AXIS, ECG11: 44 DEGREES
CHLORIDE SERPL-SCNC: 111 MMOL/L (ref 98–107)
CO2 SERPL-SCNC: 25 MMOL/L (ref 21–32)
CREAT SERPL-MCNC: 0.81 MG/DL (ref 0.6–1)
DIAGNOSIS, 93000: NORMAL
DIFFERENTIAL METHOD BLD: ABNORMAL
EOSINOPHIL # BLD: 0 K/UL (ref 0–0.8)
EOSINOPHIL NFR BLD: 0 % (ref 0.5–7.8)
ERYTHROCYTE [DISTWIDTH] IN BLOOD BY AUTOMATED COUNT: 15.5 % (ref 11.9–14.6)
FERRITIN SERPL-MCNC: 22 NG/ML (ref 8–388)
GLOBULIN SER CALC-MCNC: 2.7 G/DL (ref 2.3–3.5)
GLUCOSE SERPL-MCNC: 121 MG/DL (ref 65–100)
HCT VFR BLD AUTO: 33.3 % (ref 35.8–46.3)
HGB BLD-MCNC: 10.6 G/DL (ref 11.7–15.4)
IMM GRANULOCYTES # BLD AUTO: 0.1 K/UL (ref 0–0.5)
IMM GRANULOCYTES NFR BLD AUTO: 1 % (ref 0–5)
IRON SATN MFR SERPL: 11 %
IRON SERPL-MCNC: 31 UG/DL (ref 35–150)
LYMPHOCYTES # BLD: 0.6 K/UL (ref 0.5–4.6)
LYMPHOCYTES NFR BLD: 16 % (ref 13–44)
MCH RBC QN AUTO: 30.2 PG (ref 26.1–32.9)
MCHC RBC AUTO-ENTMCNC: 31.8 G/DL (ref 31.4–35)
MCV RBC AUTO: 94.9 FL (ref 79.6–97.8)
MONOCYTES # BLD: 0.1 K/UL (ref 0.1–1.3)
MONOCYTES NFR BLD: 2 % (ref 4–12)
NEUTS SEG # BLD: 3.2 K/UL (ref 1.7–8.2)
NEUTS SEG NFR BLD: 80 % (ref 43–78)
NRBC # BLD: 0 K/UL (ref 0–0.2)
PLATELET # BLD AUTO: 184 K/UL (ref 150–450)
PMV BLD AUTO: 10.4 FL (ref 9.4–12.3)
POTASSIUM SERPL-SCNC: 3.6 MMOL/L (ref 3.5–5.1)
PROT SERPL-MCNC: 6 G/DL (ref 6.3–8.2)
Q-T INTERVAL, ECG07: 399 MS
QRS DURATION, ECG06: 118 MS
QTC CALCULATION (BEZET), ECG08: 475 MS
RBC # BLD AUTO: 3.51 M/UL (ref 4.05–5.2)
SODIUM SERPL-SCNC: 141 MMOL/L (ref 136–145)
TIBC SERPL-MCNC: 273 UG/DL (ref 250–450)
TSH SERPL DL<=0.005 MIU/L-ACNC: 2.44 UIU/ML (ref 0.36–3.74)
VENTRICULAR RATE, ECG03: 85 BPM
WBC # BLD AUTO: 4 K/UL (ref 4.3–11.1)

## 2021-12-25 PROCEDURE — 74011000250 HC RX REV CODE- 250: Performed by: INTERNAL MEDICINE

## 2021-12-25 PROCEDURE — G0378 HOSPITAL OBSERVATION PER HR: HCPCS

## 2021-12-25 PROCEDURE — 94640 AIRWAY INHALATION TREATMENT: CPT

## 2021-12-25 PROCEDURE — 85025 COMPLETE CBC W/AUTO DIFF WBC: CPT

## 2021-12-25 PROCEDURE — 74011250636 HC RX REV CODE- 250/636: Performed by: INTERNAL MEDICINE

## 2021-12-25 PROCEDURE — 74011250637 HC RX REV CODE- 250/637: Performed by: INTERNAL MEDICINE

## 2021-12-25 PROCEDURE — 74011250637 HC RX REV CODE- 250/637: Performed by: NURSE PRACTITIONER

## 2021-12-25 PROCEDURE — 65270000029 HC RM PRIVATE

## 2021-12-25 PROCEDURE — 96376 TX/PRO/DX INJ SAME DRUG ADON: CPT

## 2021-12-25 PROCEDURE — 80053 COMPREHEN METABOLIC PANEL: CPT

## 2021-12-25 RX ORDER — AZITHROMYCIN 250 MG/1
500 TABLET, FILM COATED ORAL DAILY
Status: DISCONTINUED | OUTPATIENT
Start: 2021-12-25 | End: 2021-12-25 | Stop reason: HOSPADM

## 2021-12-25 RX ORDER — CLONAZEPAM 1 MG/1
0.5 TABLET ORAL
Status: DISCONTINUED | OUTPATIENT
Start: 2021-12-25 | End: 2021-12-25 | Stop reason: HOSPADM

## 2021-12-25 RX ORDER — HYDROCODONE BITARTRATE AND ACETAMINOPHEN 5; 325 MG/1; MG/1
1 TABLET ORAL
Status: DISCONTINUED | OUTPATIENT
Start: 2021-12-25 | End: 2021-12-25 | Stop reason: HOSPADM

## 2021-12-25 RX ADMIN — METOCLOPRAMIDE 10 MG: 10 TABLET ORAL at 15:19

## 2021-12-25 RX ADMIN — METHYLPREDNISOLONE SODIUM SUCCINATE 60 MG: 40 INJECTION, POWDER, FOR SOLUTION INTRAMUSCULAR; INTRAVENOUS at 08:15

## 2021-12-25 RX ADMIN — PRIMIDONE 100 MG: 50 TABLET ORAL at 15:19

## 2021-12-25 RX ADMIN — FUROSEMIDE 40 MG: 10 INJECTION, SOLUTION INTRAVENOUS at 08:15

## 2021-12-25 RX ADMIN — ASPIRIN 81 MG: 81 TABLET ORAL at 08:15

## 2021-12-25 RX ADMIN — HYDROCODONE BITARTRATE AND ACETAMINOPHEN 1 TABLET: 5; 325 TABLET ORAL at 09:12

## 2021-12-25 RX ADMIN — GUAIFENESIN 600 MG: 600 TABLET ORAL at 08:15

## 2021-12-25 RX ADMIN — METOCLOPRAMIDE 10 MG: 10 TABLET ORAL at 08:15

## 2021-12-25 RX ADMIN — BUSPIRONE HYDROCHLORIDE 15 MG: 10 TABLET ORAL at 08:59

## 2021-12-25 RX ADMIN — BUSPIRONE HYDROCHLORIDE 15 MG: 10 TABLET ORAL at 15:19

## 2021-12-25 RX ADMIN — METOCLOPRAMIDE 10 MG: 10 TABLET ORAL at 12:18

## 2021-12-25 RX ADMIN — ATORVASTATIN CALCIUM 80 MG: 40 TABLET, FILM COATED ORAL at 08:15

## 2021-12-25 RX ADMIN — CLONAZEPAM 0.5 MG: 1 TABLET ORAL at 15:47

## 2021-12-25 RX ADMIN — AZITHROMYCIN MONOHYDRATE 500 MG: 250 TABLET ORAL at 08:15

## 2021-12-25 RX ADMIN — Medication 10 ML: at 15:20

## 2021-12-25 RX ADMIN — PANTOPRAZOLE SODIUM 40 MG: 40 TABLET, DELAYED RELEASE ORAL at 08:15

## 2021-12-25 RX ADMIN — IPRATROPIUM BROMIDE AND ALBUTEROL SULFATE 3 ML: .5; 3 SOLUTION RESPIRATORY (INHALATION) at 08:52

## 2021-12-25 NOTE — PROGRESS NOTES
Hospitalist Progress Note   Admit Date:  2021  5:45 PM   Name:  Killian Trinh   Age:  64 y.o. Sex:  female  :  1945   MRN:  [id number]   Room:  ER14/14    Presenting Complaint: Back Pain    Reason(s) for Admission: COPD exacerbation [facility]) [J44.1]     Hospital Course & Interval History:    64 y.o. female with medical history of COPD on 3 L nasal cannula chronically, GERD, fibromyalgia, depression, tobacco abuse admitted  for LE edema and COPD exacerbation. CXR showed emphysema and likely atelectasis. Wears 3 liters oxygen at home. Subjective (21):  Patient alert and oriented x3, afebrile. sats mid 90s on 1 liter.      Assessment & Plan:     # LE edema  # Suspect acute CHF  - IV lasix,  Diuresing well today  - strict I/O's  - TTE pending  - monitor BMP  - TSH normal limits     # COPDe  - improving;  - continue IV steroids - wean as tolerated, weaned to 40 mg bid IV today  - scheduled Kim bro  - supplemental oxygen prn - wears 3L NC continuous at home, on 1 liter today with sats mid 90s  - empiric zithromax     # Chronic afib  - rate controlled  - not on [address] reportedly due to fall risk   - start ASA     # anxiety/depression  - continue home meds, buspar, zolft and trazodone  - stable     # Debility  - request PT/OT     # Tob abuse  - nicotine patch ordered        # Chronic anemia  - Hgb 9.8, at baseline  - check nutritional studies    #skin tear anterior right foot:  -Xeroform gauze daily          Dispo/Discharge Planning:   scds       Diet: No diet orders on file  VTE ppx: scds  Code status: Prior         Hospital Problems as of 2021 Date Reviewed: 2021          Codes Class Noted - Resolved POA    * (Principal) COPD exacerbation ([facility] ICD-10-CM: J44.1  ICD-9-CM: 491.21  2021 - Present Unknown        Severe protein-calorie malnutrition ([facility] ICD-10-CM: E43  ICD-9-CM: 541  2/15/2021 - Present Yes        Depression, major, in remission ([facility] ICD-10-CM: F32.5  ICD-9-CM: 296.25  1/31/2018 - Present Yes        Lymphocytic colitis ICD-10-CM: K52.832  ICD-9-CM: 558.9  12/7/2016 - Present Yes        Smoking 1/2 pack a day or less ICD-10-CM: F17.210  ICD-9-CM: 305.1  11/4/2015 - Present Yes        Microalbuminuria ICD-10-CM: R80.9  ICD-9-CM: 791.0  11/4/2015 - Present Yes        Fibromyalgia ICD-10-CM: M79.7  ICD-9-CM: 729.1  9/15/2015 - Present Yes        Vitamin D deficiency ICD-10-CM: E55.9  ICD-9-CM: 268.9  3/6/2013 - Present Yes        Hyperlipidemia ICD-10-CM: E78.5  ICD-9-CM: 272.4  10/25/2012 - Present Yes        COPD (chronic obstructive pulmonary disease) (HCC) (Chronic) ICD-10-CM: J44.9  ICD-9-CM: 589  12/30/2009 - Present Yes        GERD (gastroesophageal reflux disease) (Chronic) ICD-10-CM: K21.9  ICD-9-CM: 530.81  12/30/2009 - Present Yes              Objective:     Patient Vitals for the past 24 hrs:   Temp Pulse Resp BP SpO2   12/25/21 0900 -- 98 20 (!) 141/95 94 %   12/25/21 0852 -- -- -- -- 96 %   12/25/21 0845 -- 94 -- 132/79 --   12/25/21 0815 -- 91 -- (!) 134/91 --   12/25/21 0549 -- -- -- -- 97 %   12/25/21 0545 -- 89 -- 128/68 --   12/25/21 0435 -- -- -- -- 95 %   12/25/21 0430 -- 99 -- (!) 135/90 --   12/25/21 0415 -- 94 -- 122/73 --   12/25/21 0331 -- -- -- -- 95 %   12/25/21 0330 -- 94 -- 126/72 --   12/25/21 0215 -- 99 -- 123/75 --   12/25/21 0017 -- -- -- -- 94 %   12/25/21 0015 -- 97 -- 133/67 93 %   12/24/21 2235 -- -- -- -- 94 %   12/24/21 2230 -- 95 18 126/85 93 %   12/24/21 2057 -- 87 -- (!) 140/68 --   12/24/21 2053 -- -- -- -- 95 %   12/24/21 1945 -- 89 26 (!) 155/76 95 %   12/24/21 1748 98.5 °F (36.9 °C) 89 20 (!) 143/73 94 %     Oxygen Therapy  O2 Sat (%): 94 % (12/25/21 0900)  Pulse via Oximetry: 99 beats per minute (12/25/21 0852)  O2 Device: Nasal cannula (12/25/21 0900)  O2 Flow Rate (L/min): 2 l/min (12/25/21 0900)    Estimated body mass index is 18.02 kg/m² as calculated from the following:    Height as of this encounter: 5' 9\" (1.753 m). Weight as of this encounter: 55.3 kg (122 lb). Intake/Output Summary (Last 24 hours) at 12/25/2021 0916  Last data filed at 12/25/2021 0648  Gross per 24 hour   Intake 800 ml   Output 1200 ml   Net -400 ml         Physical Exam:   Blood pressure (!) 141/95, pulse 98, temperature 98.5 °F (36.9 °C), resp. rate 20, height 5' 9\" (1.753 m), weight 55.3 kg (122 lb), SpO2 94 %. General:          thin, frail, BMI 18.02. No overt distress  Head:               Normocephalic, atraumatic  Eyes:               Sclerae appear normal.  Pupils equally round. ENT:                Nares appear normal, no drainage. Moist oral mucosa  Neck:               No restricted ROM. Trachea midline   CV:      Irregularly irregular. No m/r/g. No jugular venous distension. Lungs:             CTAB. No wheezing, rhonchi, or rales. Respirations even, unlabored  Abdomen: Bowel sounds present. Soft, nontender, nondistended. Extremities:     No cyanosis or clubbing. +2 pitting edema  Skin:                No rashes and normal coloration. Warm and dry. Neuro:             CN II-XII grossly intact. Sensation intact. A&Ox3  Psych:             Normal mood and affect.            I have reviewed ordered lab tests and independently visualized imaging below:    Recent Labs:  Recent Results (from the past 48 hour(s))   CBC WITH AUTOMATED DIFF    Collection Time: 12/24/21  5:56 PM   Result Value Ref Range    WBC 6.0 4.3 - 11.1 K/uL    RBC 3.29 (L) 4.05 - 5.2 M/uL    HGB 9.8 (L) 11.7 - 15.4 g/dL    HCT 31.7 (L) 35.8 - 46.3 %    MCV 96.4 79.6 - 97.8 FL    MCH 29.8 26.1 - 32.9 PG    MCHC 30.9 (L) 31.4 - 35.0 g/dL    RDW 15.6 (H) 11.9 - 14.6 %    PLATELET 797 227 - 799 K/uL    MPV 10.3 9.4 - 12.3 FL    ABSOLUTE NRBC 0.00 0.0 - 0.2 K/uL    DF AUTOMATED      NEUTROPHILS 69 43 - 78 %    LYMPHOCYTES 21 13 - 44 %    MONOCYTES 7 4.0 - 12.0 %    EOSINOPHILS 1 0.5 - 7.8 %    BASOPHILS 1 0.0 - 2.0 %    IMMATURE GRANULOCYTES 1 0.0 - 5.0 % ABS. NEUTROPHILS 4.1 1.7 - 8.2 K/UL    ABS. LYMPHOCYTES 1.3 0.5 - 4.6 K/UL    ABS. MONOCYTES 0.4 0.1 - 1.3 K/UL    ABS. EOSINOPHILS 0.0 0.0 - 0.8 K/UL    ABS. BASOPHILS 0.1 0.0 - 0.2 K/UL    ABS. IMM. GRANS. 0.0 0.0 - 0.5 K/UL   METABOLIC PANEL, COMPREHENSIVE    Collection Time: 12/24/21  5:56 PM   Result Value Ref Range    Sodium 142 136 - 145 mmol/L    Potassium 4.2 3.5 - 5.1 mmol/L    Chloride 114 (H) 98 - 107 mmol/L    CO2 22 21 - 32 mmol/L    Anion gap 6 (L) 7 - 16 mmol/L    Glucose 88 65 - 100 mg/dL    BUN 22 8 - 23 MG/DL    Creatinine 0.87 0.6 - 1.0 MG/DL    GFR est AA >60 >60 ml/min/1.73m2    GFR est non-AA >60 >60 ml/min/1.73m2    Calcium 7.9 (L) 8.3 - 10.4 MG/DL    Bilirubin, total 0.2 0.2 - 1.1 MG/DL    ALT (SGPT) 12 12 - 65 U/L    AST (SGOT) 12 (L) 15 - 37 U/L    Alk.  phosphatase 77 50 - 136 U/L    Protein, total 5.5 (L) 6.3 - 8.2 g/dL    Albumin 2.9 (L) 3.2 - 4.6 g/dL    Globulin 2.6 2.3 - 3.5 g/dL    A-G Ratio 1.1 (L) 1.2 - 3.5     NT-PRO BNP    Collection Time: 12/24/21  5:56 PM   Result Value Ref Range    NT pro-BNP 5,130 (H) <450 PG/ML   MAGNESIUM    Collection Time: 12/24/21  5:56 PM   Result Value Ref Range    Magnesium 1.8 1.8 - 2.4 mg/dL   TSH 3RD GENERATION    Collection Time: 12/24/21  5:56 PM   Result Value Ref Range    TSH 2.440 0.358 - 3.740 uIU/mL   TRANSFERRIN SATURATION    Collection Time: 12/24/21  5:56 PM   Result Value Ref Range    Iron 31 (L) 35 - 150 ug/dL    TIBC 273 250 - 450 ug/dL    Transferrin Saturation 11 (L) >20 %   FERRITIN    Collection Time: 12/24/21  5:56 PM   Result Value Ref Range    Ferritin 22 8 - 388 NG/ML   EKG, 12 LEAD, INITIAL    Collection Time: 12/24/21  5:56 PM   Result Value Ref Range    Ventricular Rate 85 BPM    Atrial Rate 0 BPM    QRS Duration 118 ms    Q-T Interval 399 ms    QTC Calculation (Bezet) 475 ms    Calculated R Axis -148 degrees    Calculated T Axis 44 degrees    Diagnosis       Atrial fibrillation  Incomplete right bundle branch block  Confirmed by Jennifer Jesus ([id number]) on 12/25/2021 6:50:38 AM     CBC WITH AUTOMATED DIFF    Collection Time: 12/25/21  4:30 AM   Result Value Ref Range    WBC 4.0 (L) 4.3 - 11.1 K/uL    RBC 3.51 (L) 4.05 - 5.2 M/uL    HGB 10.6 (L) 11.7 - 15.4 g/dL    HCT 33.3 (L) 35.8 - 46.3 %    MCV 94.9 79.6 - 97.8 FL    MCH 30.2 26.1 - 32.9 PG    MCHC 31.8 31.4 - 35.0 g/dL    RDW 15.5 (H) 11.9 - 14.6 %    PLATELET 604 780 - 878 K/uL    MPV 10.4 9.4 - 12.3 FL    ABSOLUTE NRBC 0.00 0.0 - 0.2 K/uL    DF AUTOMATED      NEUTROPHILS 80 (H) 43 - 78 %    LYMPHOCYTES 16 13 - 44 %    MONOCYTES 2 (L) 4.0 - 12.0 %    EOSINOPHILS 0 (L) 0.5 - 7.8 %    BASOPHILS 1 0.0 - 2.0 %    IMMATURE GRANULOCYTES 1 0.0 - 5.0 %    ABS. NEUTROPHILS 3.2 1.7 - 8.2 K/UL    ABS. LYMPHOCYTES 0.6 0.5 - 4.6 K/UL    ABS. MONOCYTES 0.1 0.1 - 1.3 K/UL    ABS. EOSINOPHILS 0.0 0.0 - 0.8 K/UL    ABS. BASOPHILS 0.0 0.0 - 0.2 K/UL    ABS. IMM. GRANS. 0.1 0.0 - 0.5 K/UL   METABOLIC PANEL, COMPREHENSIVE    Collection Time: 12/25/21  4:30 AM   Result Value Ref Range    Sodium 141 136 - 145 mmol/L    Potassium 3.6 3.5 - 5.1 mmol/L    Chloride 111 (H) 98 - 107 mmol/L    CO2 25 21 - 32 mmol/L    Anion gap 5 (L) 7 - 16 mmol/L    Glucose 121 (H) 65 - 100 mg/dL    BUN 21 8 - 23 MG/DL    Creatinine 0.81 0.6 - 1.0 MG/DL    GFR est AA >60 >60 ml/min/1.73m2    GFR est non-AA >60 >60 ml/min/1.73m2    Calcium 8.6 8.3 - 10.4 MG/DL    Bilirubin, total 0.3 0.2 - 1.1 MG/DL    ALT (SGPT) 10 (L) 12 - 65 U/L    AST (SGOT) 9 (L) 15 - 37 U/L    Alk. phosphatase 77 50 - 136 U/L    Protein, total 6.0 (L) 6.3 - 8.2 g/dL    Albumin 3.3 3.2 - 4.6 g/dL    Globulin 2.7 2.3 - 3.5 g/dL    A-G Ratio 1.2 1.2 - 3.5         All Micro Results     None          Other Studies:  XR CHEST SNGL V    Result Date: 12/25/2021   Portable view of the chest COMPARISON: March 2021. CLINICAL HISTORY: Hypoxia. FINDINGS: There is emphysema. Mild bibasilar opacities, likely atelectasis.  No pneumothorax or evidence of pulmonary edema. No large pleural effusion. Heart is mildly enlarged. Mediastinal contour is within normal limits. Surrounding bones are intact. 1. Emphysema. 2. Mild bibasilar opacities, likely atelectasis.       Current Meds:  Current Facility-Administered Medications   Medication Dose Route Frequency    azithromycin (ZITHROMAX) tablet 500 mg  500 mg Oral DAILY    HYDROcodone-acetaminophen (NORCO) 5-325 mg per tablet 1 Tablet  1 Tablet Oral Q4H PRN    sodium chloride (NS) flush 5-10 mL  5-10 mL IntraVENous Q8H    sodium chloride (NS) flush 5-10 mL  5-10 mL IntraVENous PRN    nicotine (NICODERM CQ) 14 mg/24 hr patch 1 Patch  1 Patch TransDERmal Q24H    acetaminophen (TYLENOL) tablet 650 mg  650 mg Oral Q6H PRN    atorvastatin (LIPITOR) tablet 80 mg  80 mg Oral DAILY    budesonide (ENTOCORT EC) capsule 6 mg  6 mg Oral 7am    budesonide-formoteroL (SYMBICORT) 160-4.5 mcg/actuation HFA inhaler 2 Puff  2 Puff Inhalation BID RT    busPIRone (BUSPAR) tablet 15 mg  15 mg Oral TID    colestipoL (COLESTID) tablet 1 g- patient supplied (Patient Supplied)  1 g Oral BID    dicyclomine (BENTYL) capsule 10 mg  10 mg Oral TID PRN    guaiFENesin ER (MUCINEX) tablet 600 mg  600 mg Oral Q12H    metoclopramide HCl (REGLAN) tablet 10 mg  10 mg Oral AC&HS    pantoprazole (PROTONIX) tablet 40 mg  40 mg Oral BID    primidone (MYSOLINE) tablet 100 mg  100 mg Oral TID    senna-docusate (PERICOLACE) 8.6-50 mg per tablet 1 Tablet  1 Tablet Oral QHS    sertraline (ZOLOFT) tablet 200 mg  200 mg Oral QPM    topiramate (TOPAMAX) tablet 50 mg  50 mg Oral DAILY    traZODone (DESYREL) tablet 100 mg  100 mg Oral QHS    albuterol-ipratropium (DUO-NEB) 2.5 MG-0.5 MG/3 ML  3 mL Nebulization Q6H RT    methylPREDNISolone (PF) (SOLU-MEDROL) injection 60 mg  60 mg IntraVENous Q6H    albuterol (PROVENTIL VENTOLIN) nebulizer solution 2.5 mg  2.5 mg Nebulization Q4H PRN    furosemide (LASIX) injection 40 mg  40 mg IntraVENous DAILY  aspirin delayed-release tablet 81 mg  81 mg Oral DAILY    tuberculin injection 5 Units  5 Units IntraDERMal ONCE    lidocaine 4 % patch 1 Patch  1 Patch TransDERmal Q24H     Current Outpatient Medications   Medication Sig    clonazePAM (KlonoPIN) 1 mg tablet Take 1 Tablet by mouth three (3) times daily as needed for Anxiety (tremor). Max Daily Amount: 3 mg.  primidone (MYSOLINE) 50 mg tablet Take 2 Tablets by mouth three (3) times daily. Indications: essential tremor    traZODone (DESYREL) 100 mg tablet Take 100 mg by mouth At bedtime.  acetaminophen (TYLENOL) 325 mg tablet Take 2 Tabs by mouth every six (6) hours as needed for Pain. Indications: pain    senna-docusate (PERICOLACE) 8.6-50 mg per tablet Take 1 Tab by mouth nightly.  lidocaine 4 % patch Apply to lower back    guaiFENesin ER (MUCINEX) 600 mg ER tablet Take 1 Tab by mouth every twelve (12) hours.  naloxone (NARCAN) 4 mg/actuation nasal spray Use 1 spray intranasally, then discard. Repeat with new spray every 2 min as needed for opioid overdose symptoms, alternating nostrils.  metoclopramide HCl (REGLAN) 10 mg tablet Take 1 Tab by mouth Before breakfast, lunch, dinner and at bedtime. Indications: stomach muscle paralysis and decreased function from diabetes    dicyclomine (BENTYL) 10 mg capsule Take 1 Cap by mouth three (3) times daily as needed for Abdominal Cramps. Indications: irritable colon    linaCLOtide (Linzess) 72 mcg cap capsule Take 1 Cap by mouth Daily (before breakfast). Indications: irritable bowel syndrome with constipation    topiramate (TOPAMAX) 50 mg tablet Take 1 tablet with breakfast, 1 tablet with lunch, and 2 with dinner. Indications: migraine prevention    pantoprazole (PROTONIX) 40 mg tablet Take 1 Tab by mouth two (2) times a day. Indications: gastroesophageal reflux disease    ergocalciferol (ERGOCALCIFEROL) 1,250 mcg (50,000 unit) capsule Take 1 Cap by mouth every seven (7) days.     alendronate (FOSAMAX) 70 mg tablet Take 1 Tab by mouth every seven (7) days.  tiotropium (SPIRIVA) 18 mcg inhalation capsule Take 1 Cap by inhalation daily. Indications: bronchospasm prevention with COPD    atorvastatin (LIPITOR) 80 mg tablet Take 1 Tab by mouth daily. Indications: primary prevention of heart attack    budesonide-formoteroL (SYMBICORT) 160-4.5 mcg/actuation HFAA Take 2 Puffs by inhalation two (2) times a day.  albuterol (PROVENTIL VENTOLIN) 2.5 mg /3 mL (0.083 %) nebu 3 mL by Nebulization route every four (4) hours as needed for Wheezing or Shortness of Breath.  budesonide (ENTOCORT EC) 3 mg capsule Take 6 mg by mouth every morning.  ondansetron hcl (ZOFRAN) 4 mg tablet Take 4 mg by mouth every eight (8) hours as needed for Nausea or Vomiting.  sertraline (ZOLOFT) 100 mg tablet Take 2 tablets daily ( Dose increased from previously 100 mg daily )  Indications: anxiousness associated with depression    busPIRone (BUSPAR) 15 mg tablet Take 1 Tab by mouth three (3) times daily. Indications: repeated episodes of anxiety    artificial tears, dextran-hypromellose-glycerin, (TEARS NATURALE FORTE) 0.1-0.3-0.2 % ophthalmic solution Administer 1 Drop to both eyes every six (6) hours. Indications: dry eye    albuterol (PROVENTIL HFA, VENTOLIN HFA, PROAIR HFA) 90 mcg/actuation inhaler Take 2 Puffs by inhalation every four (4) hours as needed for Wheezing or Shortness of Breath.  colestipol (COLESTID) 1 gram tablet Take 1 g by mouth two (2) times a day.     Oxygen        Signed:  Leyda Diop NP

## 2021-12-25 NOTE — ED NOTES
TRANSFER - OUT REPORT:    Verbal report given to bobby pollard(name) on Owen Lewis  being transferred to [facility](unit) for routine progression of care       Report consisted of patients Situation, Background, Assessment and   Recommendations(SBAR). Information from the following report(s) SBAR was reviewed with the receiving nurse. Lines:   Peripheral IV 12/24/21 Left Forearm (Active)   Site Assessment Clean, dry, & intact 12/24/21 1757   Phlebitis Assessment 0 12/24/21 1757   Infiltration Assessment 0 12/24/21 1757        Opportunity for questions and clarification was provided.       Patient transported with:   O2 @ 2 liters  Tech

## 2021-12-25 NOTE — ED PROVIDER NOTES
64-year-old lady presents with concerns about bilateral lower leg swelling that has gotten to the point where it is preventing her from being up and walking around. She is chronically O2 dependent and she has a history of severe COPD. She says that because her feet and legs are so swollen it is hard for her to ambulate anymore. She notes that she also lives by herself and has no help at home. She denies any fevers or chills. She says she does continue to smoke despite being on oxygen. She had no nausea, vomiting, or diarrhea. No other associated symptoms. Elements of this note were created using speech recognition software. As such, errors of speech recognition may be present.            Past Medical History:   Diagnosis Date    Anxiety 3/6/2013    Arthritis     osteoarthritis    B12 deficiency 3/6/2013    Chronic back pain 3/6/2013    Chronic kidney disease (CKD) stage G3b/A1, moderately decreased glomerular filtration rate (GFR) between 30-44 mL/min/1.73 square meter and albuminuria creatinine ratio less than 30 mg/g ([facility]) 10/14/2016    Constipation 11/4/2015    COPD [address] [place] [address]/ patient on 2.5 liters O2 nightly at home    Decreased GFR 5/26/2016    Depression 10/25/2012    Depression 10/25/2012    Depression, major, in remission ([facility] 1/31/2018    diverticulitis     Encounter for long-term (current) use of other medications 3/6/2013    Environmental allergies 3/6/2013    Fecal incontinence 12/7/2016    Fibromyalgia 9/15/2015    GERD (gastroesophageal reflux disease) \"years\"    Headache(784.0) 2/12/2013    HTN (hypertension) 10/25/2012    HTN (hypertension) 10/25/2012    Hyperlipidemia 10/25/2012    Lymphocytic colitis 61/4/5878    Metabolic syndrome 5/5/8139    Microalbuminuria 11/4/2015    Osteoporosis 10/25/2012    polypectomy     PUD (peptic ulcer disease) \"years ago\"    Had peptic ulcers    thyroid mass     partial removal, benign    Unspecified adverse effect of anesthesia     PATIENT STATES SHE DOESN'T GET GENERAL ANESTHESIA DUE TO COPD    Unspecified sleep apnea     wears O2 at night.   no c-jeffrey    Vitamin D deficiency 3/6/2013       Past Surgical History:   Procedure Laterality Date    HX APPENDECTOMY  1970s    HX BREAST BIOPSY Right     excision of cyst    HX CATARACT REMOVAL Right 2016    right eye    HX CHOLECYSTECTOMY  1970s    HX GYN  1970s    TIA BSO    HX HERNIA REPAIR  unknown    hiatal hernia repair    HX ORTHOPAEDIC  [id number]    right knee replaced and then left the next year    HX PARTIAL THYROIDECTOMY  1990s    Nada Silk    IR KYPHOPLASTY LUMBAR  2/11/2021    MA BREAST SURGERY PROCEDURE UNLISTED  1980s    cyst removed from right breast/benign    MA COLOSTOMY  1990s    placed, then reversed         Family History:   Problem Relation Age of Onset    Kidney Disease Mother     Heart Disease Father     Heart Attack Brother        Social History     Socioeconomic History    Marital status:      Spouse name: Not on file    Number of children: Not on file    Years of education: Not on file    Highest education level: Not on file   Occupational History    Not on file   Tobacco Use    Smoking status: Current Every Day Smoker     Packs/day: 0.50     Years: 45.00     Pack years: 22.50     Types: Cigarettes    Smokeless tobacco: Never Used   Substance and Sexual Activity    Alcohol use: Yes     Comment: states no drinking beer in 2 months    Drug use: Yes     Types: Marijuana     Comment: smoked it last night    Sexual activity: Never   Other Topics Concern    Not on file   Social History Narrative    Not on file     Social Determinants of Health     Financial Resource Strain:     Difficulty of Paying Living Expenses: Not on file   Food Insecurity:     Worried About Running Out of Food in the Last Year: Not on file    Makenzie of Food in the Last Year: Not on file   Transportation Needs:     Lack of Transportation (Medical): Not on file    Lack of Transportation (Non-Medical): Not on file   Physical Activity:     Days of Exercise per Week: Not on file    Minutes of Exercise per Session: Not on file   Stress:     Feeling of Stress : Not on file   Social Connections:     Frequency of Communication with Friends and Family: Not on file    Frequency of Social Gatherings with Friends and Family: Not on file    Attends Hoahaoism Services: Not on file    Active Member of [address] [place] [address] [employer] or Organizations: Not on file    Attends Club or Organization Meetings: Not on file    Marital Status: Not on file   Intimate Partner Violence:     Fear of Current or Ex-Partner: Not on file    Emotionally Abused: Not on file    Physically Abused: Not on file    Sexually Abused: Not on file   Housing Stability:     Unable to Pay for Housing in the Last Year: Not on file    Number of [place] in the Last Year: Not on file    Unstable Housing in the Last Year: Not on file         ALLERGIES: Amlodipine, Lisinopril, and Niaspan [niacin]    Review of Systems   Constitutional: Positive for activity change and fatigue. Negative for chills and fever. Respiratory: Positive for shortness of breath and wheezing. Negative for cough. Cardiovascular: Positive for leg swelling. Negative for chest pain and palpitations. Gastrointestinal: Negative for diarrhea, nausea and vomiting. Genitourinary: Negative for dysuria and frequency. Musculoskeletal: Positive for arthralgias, gait problem, joint swelling and myalgias. Skin: Positive for color change and wound. Negative for rash. Neurological: Positive for dizziness. Negative for light-headedness. Psychiatric/Behavioral: Negative for confusion.        Vitals:    12/24/21 1748 12/24/21 1751 12/24/21 1945   BP: (!) 143/73  (!) 155/76   Pulse: 89  89   Resp: 20  26   Temp: 98.5 °F (36.9 °C)     SpO2: 94%  95%   Weight:  55.3 kg (122 lb)    Height:  5' 9\" (1.753 m)             Physical Exam  Vitals and nursing note reviewed. Constitutional:       Comments: Frail elderly lady who looks much older than stated age   HENT:      Head: Normocephalic and atraumatic. Nose: No congestion. Eyes:      General:         Right eye: No discharge. Left eye: No discharge. Cardiovascular:      Rate and Rhythm: Normal rate. Comments: Irregularly irregular rhythm  Pulmonary:      Comments: Diffuse wheezes bilaterally  Abdominal:      General: Bowel sounds are normal.      Palpations: Abdomen is soft. Musculoskeletal:      Right lower leg: Edema present. Left lower leg: Edema present. Comments: 3+ edema in feet and lower legs    Skin:     Comments: Chronic skin changes in both lower legs    She does have a small wound on the dorsal surface of her right foot   Neurological:      General: No focal deficit present. Mental Status: She is oriented to person, place, and time. MDM  Number of Diagnoses or Management Options  Diagnosis management comments: I will check her basic blood work including a BNP. I will get an EKG and give her a DuoNeb treatment and steroids for some COPD. Patient last saw cardiology in March of this year. At that time she was noted to be in a sinus rhythm with history of paroxysmal A. fib. Given her overall debility she was deemed a very high risk person for anticoagulation and no anticoagulation was started at that time. Tonight she is back in atrial fib although with good rate control. Her echocardiogram showed significant regurg but no obvious ejection fraction abnormalities. I think given her increased swelling, her overall debility, her inability to perform her own ADL I think she will likely need to be admitted for further evaluation. ED Course as of 12/24/21 2042   Fri Dec 24, 2021   1905 Patient's BUN is lower than typical. [AC]   2029 Patient's BNP is elevated over 5000. That is a little less then it was about 10 months ago.  [AC]   2042 EKG review by ER doctor:Atrial fibrillationNo acute ischemic ST segment changesNo QTC prolongationRate of: 85 [AC]      ED Course User Index  [AC] Miguel A Sanches MD       Procedures

## 2021-12-25 NOTE — PROGRESS NOTES
Reviewed notes for spiritual concerns past 24 hours:    Per notes:      Hx of copd    Depression    Alert x 3              Additional information:          Daughter -  Jefferson Garciaing    Lives locally                    Will continue to assess how we can best serve this family

## 2021-12-25 NOTE — PROGRESS NOTES
Pt states \" I am going home today, you all have done all you can do, I just want to go home\". States her relative is coming to pick her up. Sania Jarvis NP notified, to be signed out as AMA. 15:55- Pt signed out AMA, relative to pick her up, taken to d/c area by wheelchair, Pt belongings with patient, IV access removed.

## 2021-12-25 NOTE — ED NOTES
[facility] notified about diet order and confirming that it was ok to give PO medications at this time. Pt passed stand screen. MD ok'd to give medications despite diet order placement.

## 2021-12-25 NOTE — DISCHARGE SUMMARY
Hospitalist Discharge Summary   Admit Date:  2021  5:45 PM   DC Note date: 2021  Name:  Vania Huertas   Age:  64 y.o. Sex:  female  :  1945   MRN:  [id number]   Room:    PCP:  Russell Carmona MD    Presenting Complaint: Back Pain    Initial Admission Diagnosis: COPD exacerbation ([place] [address].) [J44.1]     Problem List for this Hospitalization:  Hospital Problems as of 2021 Date Reviewed: 2021          Codes Class Noted - Resolved POA    * (Principal) COPD exacerbation ([place] [address].) ICD-10-CM: J44.1  ICD-9-CM: 491.21  2021 - Present Unknown        Severe protein-calorie malnutrition ([place] [address].) ICD-10-CM: E43  ICD-9-CM: 262  2/15/2021 - Present Yes        Depression, major, in remission ([place] [address].) ICD-10-CM: F32.5  ICD-9-CM: 296.25  2018 - Present Yes        Lymphocytic colitis ICD-10-CM: T63.164  ICD-9-CM: 558.9  2016 - Present Yes        Smoking 1/2 pack a day or less ICD-10-CM: F17.210  ICD-9-CM: 305.1  2015 - Present Yes        Microalbuminuria ICD-10-CM: R80.9  ICD-9-CM: 791.0  2015 - Present Yes        Fibromyalgia ICD-10-CM: M79.7  ICD-9-CM: 729.1  9/15/2015 - Present Yes        Vitamin D deficiency ICD-10-CM: E55.9  ICD-9-CM: 268.9  3/6/2013 - Present Yes        Hyperlipidemia ICD-10-CM: E78.5  ICD-9-CM: 272.4  10/25/2012 - Present Yes        COPD (chronic obstructive pulmonary disease) ([facility] (Chronic) ICD-10-CM: J44.9  ICD-9-CM: 893  2009 - Present Yes        GERD (gastroesophageal reflux disease) (Chronic) ICD-10-CM: K21.9  ICD-9-CM: 530.81  2009 - Present Yes            Did Patient have Sepsis (YES OR NO): No    Hospital Course:   77 y. o. female with medical history of COPD on 3 L nasal cannula chronically, GERD, fibromyalgia, depression, tobacco abuse admitted  for LE edema and COPD exacerbation. CXR showed emphysema and likely atelectasis. Wears 3 liters oxygen at home. She was admitted with echo ordered and IV Lasix. BNP elevated with LE  Edema. She was also started on IV steroids and duonebs for COPD exacerbation. The patient elected to leave AMA today stating we had done everything and she was ready to go home. AMA papers signed. High risk for readmission. Disposition: Home or Self Care  Diet: ADULT DIET Regular; Low Fat/Low Chol/High Fiber/HELDER  Code Status: Prior    Follow Up Orders:  No orders of the defined types were placed in this encounter. Follow-up Information    None             Time spent in patient discharge and coordination 40 minutes. Plan was discussed with patient left AMA. All questions answered. Patient was stable at time of discharge. Instructions given to call a physician or return if any concerns. Discharge Info:   Discharge Medication List as of 12/25/2021  4:04 PM          Procedures done this admission:  * No surgery found *    Consults this admission:  None    Echocardiogram/EKG results:  No results found for this or any previous visit. EKG Results     Procedure 720 Value Units Date/Time    EKG [[id number]] Collected: 12/24/21 1756    Order Status: Completed Updated: 12/25/21 0650     Ventricular Rate 85 BPM      Atrial Rate 0 BPM      QRS Duration 118 ms      Q-T Interval 399 ms      QTC Calculation (Bezet) 475 ms      Calculated R Axis -148 degrees      Calculated T Axis 44 degrees      Diagnosis --     Atrial fibrillation  Incomplete right bundle branch block  Confirmed by Brandon Dent ([id number]) on 12/25/2021 6:50:38 AM            Diagnostic Imaging/Tests:   XR CHEST SNGL V    Result Date: 12/25/2021   Portable view of the chest COMPARISON: March 2021. CLINICAL HISTORY: Hypoxia. FINDINGS: There is emphysema. Mild bibasilar opacities, likely atelectasis. No pneumothorax or evidence of pulmonary edema. No large pleural effusion. Heart is mildly enlarged. Mediastinal contour is within normal limits. Surrounding bones are intact. 1. Emphysema.  2. Mild bibasilar opacities, likely atelectasis.       All Micro Results     None          Labs: Results:       BMP, Mg, Phos Recent Labs     12/25/21  0430 12/24/21  1756    142   K 3.6 4.2   * 114*   CO2 25 22   AGAP 5* 6*   BUN 21 22   CREA 0.81 0.87   CA 8.6 7.9*   * 88   MG  --  1.8      CBC Recent Labs     12/25/21  0430 12/24/21  1756   WBC 4.0* 6.0   RBC 3.51* 3.29*   HGB 10.6* 9.8*   HCT 33.3* 31.7*    203   GRANS 80* 69   LYMPH 16 21   EOS 0* 1   MONOS 2* 7   BASOS 1 1   IG 1 1   ANEU 3.2 4.1   ABL 0.6 1.3   DAMIEN 0.0 0.0   ABM 0.1 0.4   ABB 0.0 0.1   AIG 0.1 0.0      LFT Recent Labs     12/25/21  0430 12/24/21  1756   ALT 10* 12   AP 77 77   TP 6.0* 5.5*   ALB 3.3 2.9*   GLOB 2.7 2.6   AGRAT 1.2 1.1*      Cardiac Testing Lab Results   Component Value Date/Time     (H) 02/16/2020 04:06 AM    Troponin-I, Qt. 0.04 02/16/2020 04:06 AM      Coagulation Tests Lab Results   Component Value Date/Time    Prothrombin time 14.6 03/11/2021 10:36 PM    Prothrombin time 14.6 02/06/2021 01:43 PM    Prothrombin time 9.9 11/29/2015 09:48 AM    INR 1.1 03/11/2021 10:36 PM    INR 1.1 02/06/2021 01:43 PM    INR 0.9 11/29/2015 09:48 AM    aPTT 30.0 02/06/2021 01:43 PM    aPTT 27.1 12/07/2009 10:55 AM      A1c No results found for: HBA1C, [id number]   Lipid Panel Lab Results   Component Value Date/Time    Cholesterol, total 129 12/05/2018 02:51 PM    HDL Cholesterol 27 (L) 12/05/2018 02:51 PM    LDL, calculated 62 12/05/2018 02:51 PM    VLDL, calculated 40 12/05/2018 02:51 PM    Triglyceride 199 (H) 12/05/2018 02:51 PM      Thyroid Panel Lab Results   Component Value Date/Time    TSH 2.440 12/24/2021 05:56 PM    TSH 4.180 02/06/2021 04:13 PM    T4, Free 0.96 05/25/2016 11:44 AM    T4, Free 1.14 03/09/2016 03:15 PM        Most Recent UA Lab Results   Component Value Date/Time    Color YELLOW 10/31/2021 04:02 PM    Appearance CLOUDY 10/31/2021 04:02 PM    Specific gravity 1.008 03/12/2021 12:44 AM    pH (UA) 6.0 10/31/2021 04:02 PM Protein Negative 10/31/2021 04:02 PM    Glucose Negative 10/31/2021 04:02 PM    Ketone Negative 10/31/2021 04:02 PM    Bilirubin Negative 10/31/2021 04:02 PM    Blood MODERATE (A) 10/31/2021 04:02 PM    Urobilinogen 0.2 10/31/2021 04:02 PM    Nitrites Negative 10/31/2021 04:02 PM    Leukocyte Esterase LARGE (A) 10/31/2021 04:02 PM    WBC >100 (H) 10/31/2021 04:02 PM    RBC 5-10 10/31/2021 04:02 PM    Epithelial cells 0-3 10/31/2021 04:02 PM    Bacteria 3+ (H) 10/31/2021 04:02 PM    Casts 0-3 10/31/2021 04:02 PM    Mucus Present 05/23/2019 10:00 AM          All Labs from Last 24 Hrs:  Recent Results (from the past 24 hour(s))   CBC WITH AUTOMATED DIFF    Collection Time: 12/24/21  5:56 PM   Result Value Ref Range    WBC 6.0 4.3 - 11.1 K/uL    RBC 3.29 (L) 4.05 - 5.2 M/uL    HGB 9.8 (L) 11.7 - 15.4 g/dL    HCT 31.7 (L) 35.8 - 46.3 %    MCV 96.4 79.6 - 97.8 FL    MCH 29.8 26.1 - 32.9 PG    MCHC 30.9 (L) 31.4 - 35.0 g/dL    RDW 15.6 (H) 11.9 - 14.6 %    PLATELET 873 453 - 621 K/uL    MPV 10.3 9.4 - 12.3 FL    ABSOLUTE NRBC 0.00 0.0 - 0.2 K/uL    DF AUTOMATED      NEUTROPHILS 69 43 - 78 %    LYMPHOCYTES 21 13 - 44 %    MONOCYTES 7 4.0 - 12.0 %    EOSINOPHILS 1 0.5 - 7.8 %    BASOPHILS 1 0.0 - 2.0 %    IMMATURE GRANULOCYTES 1 0.0 - 5.0 %    ABS. NEUTROPHILS 4.1 1.7 - 8.2 K/UL    ABS. LYMPHOCYTES 1.3 0.5 - 4.6 K/UL    ABS. MONOCYTES 0.4 0.1 - 1.3 K/UL    ABS. EOSINOPHILS 0.0 0.0 - 0.8 K/UL    ABS. BASOPHILS 0.1 0.0 - 0.2 K/UL    ABS. IMM.  GRANS. 0.0 0.0 - 0.5 K/UL   METABOLIC PANEL, COMPREHENSIVE    Collection Time: 12/24/21  5:56 PM   Result Value Ref Range    Sodium 142 136 - 145 mmol/L    Potassium 4.2 3.5 - 5.1 mmol/L    Chloride 114 (H) 98 - 107 mmol/L    CO2 22 21 - 32 mmol/L    Anion gap 6 (L) 7 - 16 mmol/L    Glucose 88 65 - 100 mg/dL    BUN 22 8 - 23 MG/DL    Creatinine 0.87 0.6 - 1.0 MG/DL    GFR est AA >60 >60 ml/min/1.73m2    GFR est non-AA >60 >60 ml/min/1.73m2    Calcium 7.9 (L) 8.3 - 10.4 MG/DL    Bilirubin, total 0.2 0.2 - 1.1 MG/DL    ALT (SGPT) 12 12 - 65 U/L    AST (SGOT) 12 (L) 15 - 37 U/L    Alk. phosphatase 77 50 - 136 U/L    Protein, total 5.5 (L) 6.3 - 8.2 g/dL    Albumin 2.9 (L) 3.2 - 4.6 g/dL    Globulin 2.6 2.3 - 3.5 g/dL    A-G Ratio 1.1 (L) 1.2 - 3.5     NT-PRO BNP    Collection Time: 12/24/21  5:56 PM   Result Value Ref Range    NT pro-BNP 5,130 (H) <450 PG/ML   MAGNESIUM    Collection Time: 12/24/21  5:56 PM   Result Value Ref Range    Magnesium 1.8 1.8 - 2.4 mg/dL   TSH 3RD GENERATION    Collection Time: 12/24/21  5:56 PM   Result Value Ref Range    TSH 2.440 0.358 - 3.740 uIU/mL   TRANSFERRIN SATURATION    Collection Time: 12/24/21  5:56 PM   Result Value Ref Range    Iron 31 (L) 35 - 150 ug/dL    TIBC 273 250 - 450 ug/dL    Transferrin Saturation 11 (L) >20 %   FERRITIN    Collection Time: 12/24/21  5:56 PM   Result Value Ref Range    Ferritin 22 8 - 388 NG/ML   EKG, 12 LEAD, INITIAL    Collection Time: 12/24/21  5:56 PM   Result Value Ref Range    Ventricular Rate 85 BPM    Atrial Rate 0 BPM    QRS Duration 118 ms    Q-T Interval 399 ms    QTC Calculation (Bezet) 475 ms    Calculated R Axis -148 degrees    Calculated T Axis 44 degrees    Diagnosis       Atrial fibrillation  Incomplete right bundle branch block  Confirmed by Teddy Thompson ([id number]) on 12/25/2021 6:50:38 AM     CBC WITH AUTOMATED DIFF    Collection Time: 12/25/21  4:30 AM   Result Value Ref Range    WBC 4.0 (L) 4.3 - 11.1 K/uL    RBC 3.51 (L) 4.05 - 5.2 M/uL    HGB 10.6 (L) 11.7 - 15.4 g/dL    HCT 33.3 (L) 35.8 - 46.3 %    MCV 94.9 79.6 - 97.8 FL    MCH 30.2 26.1 - 32.9 PG    MCHC 31.8 31.4 - 35.0 g/dL    RDW 15.5 (H) 11.9 - 14.6 %    PLATELET 897 739 - 854 K/uL    MPV 10.4 9.4 - 12.3 FL    ABSOLUTE NRBC 0.00 0.0 - 0.2 K/uL    DF AUTOMATED      NEUTROPHILS 80 (H) 43 - 78 %    LYMPHOCYTES 16 13 - 44 %    MONOCYTES 2 (L) 4.0 - 12.0 %    EOSINOPHILS 0 (L) 0.5 - 7.8 %    BASOPHILS 1 0.0 - 2.0 %    IMMATURE GRANULOCYTES 1 0.0 - 5.0 %    ABS. NEUTROPHILS 3.2 1.7 - 8.2 K/UL    ABS. LYMPHOCYTES 0.6 0.5 - 4.6 K/UL    ABS. MONOCYTES 0.1 0.1 - 1.3 K/UL    ABS. EOSINOPHILS 0.0 0.0 - 0.8 K/UL    ABS. BASOPHILS 0.0 0.0 - 0.2 K/UL    ABS. IMM. GRANS. 0.1 0.0 - 0.5 K/UL   METABOLIC PANEL, COMPREHENSIVE    Collection Time: 12/25/21  4:30 AM   Result Value Ref Range    Sodium 141 136 - 145 mmol/L    Potassium 3.6 3.5 - 5.1 mmol/L    Chloride 111 (H) 98 - 107 mmol/L    CO2 25 21 - 32 mmol/L    Anion gap 5 (L) 7 - 16 mmol/L    Glucose 121 (H) 65 - 100 mg/dL    BUN 21 8 - 23 MG/DL    Creatinine 0.81 0.6 - 1.0 MG/DL    GFR est AA >60 >60 ml/min/1.73m2    GFR est non-AA >60 >60 ml/min/1.73m2    Calcium 8.6 8.3 - 10.4 MG/DL    Bilirubin, total 0.3 0.2 - 1.1 MG/DL    ALT (SGPT) 10 (L) 12 - 65 U/L    AST (SGOT) 9 (L) 15 - 37 U/L    Alk.  phosphatase 77 50 - 136 U/L    Protein, total 6.0 (L) 6.3 - 8.2 g/dL    Albumin 3.3 3.2 - 4.6 g/dL    Globulin 2.7 2.3 - 3.5 g/dL    A-G Ratio 1.2 1.2 - 3.5         Current Med List in Hospital:   Current Facility-Administered Medications   Medication Dose Route Frequency    azithromycin (ZITHROMAX) tablet 500 mg  500 mg Oral DAILY    HYDROcodone-acetaminophen (NORCO) 5-325 mg per tablet 1 Tablet  1 Tablet Oral Q4H PRN    clonazePAM (KlonoPIN) tablet 0.5 mg  0.5 mg Oral TID PRN    methylPREDNISolone (PF) (SOLU-MEDROL) injection 40 mg  40 mg IntraVENous Q12H    influenza vaccine 2021-22 (6 mos+)(PF) (FLUARIX/FLULAVAL/FLUZONE QUAD) injection 0.5 mL  1 Each IntraMUSCular PRIOR TO DISCHARGE    sodium chloride (NS) flush 5-10 mL  5-10 mL IntraVENous Q8H    sodium chloride (NS) flush 5-10 mL  5-10 mL IntraVENous PRN    nicotine (NICODERM CQ) 14 mg/24 hr patch 1 Patch  1 Patch TransDERmal Q24H    acetaminophen (TYLENOL) tablet 650 mg  650 mg Oral Q6H PRN    atorvastatin (LIPITOR) tablet 80 mg  80 mg Oral DAILY    budesonide (ENTOCORT EC) capsule 6 mg  6 mg Oral 7am    budesonide-formoteroL (SYMBICORT) 160-4.5 mcg/actuation HFA inhaler 2 Puff  2 Puff Inhalation BID RT    busPIRone (BUSPAR) tablet 15 mg  15 mg Oral TID    colestipoL (COLESTID) tablet 1 g- patient supplied (Patient Supplied)  1 g Oral BID    dicyclomine (BENTYL) capsule 10 mg  10 mg Oral TID PRN    guaiFENesin ER (MUCINEX) tablet 600 mg  600 mg Oral Q12H    metoclopramide HCl (REGLAN) tablet 10 mg  10 mg Oral AC&HS    pantoprazole (PROTONIX) tablet 40 mg  40 mg Oral BID    primidone (MYSOLINE) tablet 100 mg  100 mg Oral TID    senna-docusate (PERICOLACE) 8.6-50 mg per tablet 1 Tablet  1 Tablet Oral QHS    sertraline (ZOLOFT) tablet 200 mg  200 mg Oral QPM    topiramate (TOPAMAX) tablet 50 mg  50 mg Oral DAILY    traZODone (DESYREL) tablet 100 mg  100 mg Oral QHS    albuterol-ipratropium (DUO-NEB) 2.5 MG-0.5 MG/3 ML  3 mL Nebulization Q6H RT    albuterol (PROVENTIL VENTOLIN) nebulizer solution 2.5 mg  2.5 mg Nebulization Q4H PRN    furosemide (LASIX) injection 40 mg  40 mg IntraVENous DAILY    aspirin delayed-release tablet 81 mg  81 mg Oral DAILY    tuberculin injection 5 Units  5 Units IntraDERMal ONCE    lidocaine 4 % patch 1 Patch  1 Patch TransDERmal Q24H     Current Outpatient Medications   Medication Sig    clonazePAM (KlonoPIN) 1 mg tablet Take 1 Tablet by mouth three (3) times daily as needed for Anxiety (tremor). Max Daily Amount: 3 mg.  primidone (MYSOLINE) 50 mg tablet Take 2 Tablets by mouth three (3) times daily. Indications: essential tremor    traZODone (DESYREL) 100 mg tablet Take 100 mg by mouth At bedtime.  acetaminophen (TYLENOL) 325 mg tablet Take 2 Tabs by mouth every six (6) hours as needed for Pain. Indications: pain    senna-docusate (PERICOLACE) 8.6-50 mg per tablet Take 1 Tab by mouth nightly.  lidocaine 4 % patch Apply to lower back    guaiFENesin ER (MUCINEX) 600 mg ER tablet Take 1 Tab by mouth every twelve (12) hours.  naloxone (NARCAN) 4 mg/actuation nasal spray Use 1 spray intranasally, then discard.  Repeat with new spray every 2 min as needed for opioid overdose symptoms, alternating nostrils.  metoclopramide HCl (REGLAN) 10 mg tablet Take 1 Tab by mouth Before breakfast, lunch, dinner and at bedtime. Indications: stomach muscle paralysis and decreased function from diabetes    dicyclomine (BENTYL) 10 mg capsule Take 1 Cap by mouth three (3) times daily as needed for Abdominal Cramps. Indications: irritable colon    linaCLOtide (Linzess) 72 mcg cap capsule Take 1 Cap by mouth Daily (before breakfast). Indications: irritable bowel syndrome with constipation    topiramate (TOPAMAX) 50 mg tablet Take 1 tablet with breakfast, 1 tablet with lunch, and 2 with dinner. Indications: migraine prevention    pantoprazole (PROTONIX) 40 mg tablet Take 1 Tab by mouth two (2) times a day. Indications: gastroesophageal reflux disease    ergocalciferol (ERGOCALCIFEROL) 1,250 mcg (50,000 unit) capsule Take 1 Cap by mouth every seven (7) days.  alendronate (FOSAMAX) 70 mg tablet Take 1 Tab by mouth every seven (7) days.  tiotropium (SPIRIVA) 18 mcg inhalation capsule Take 1 Cap by inhalation daily. Indications: bronchospasm prevention with COPD    atorvastatin (LIPITOR) 80 mg tablet Take 1 Tab by mouth daily. Indications: primary prevention of heart attack    budesonide-formoteroL (SYMBICORT) 160-4.5 mcg/actuation HFAA Take 2 Puffs by inhalation two (2) times a day.  albuterol (PROVENTIL VENTOLIN) 2.5 mg /3 mL (0.083 %) nebu 3 mL by Nebulization route every four (4) hours as needed for Wheezing or Shortness of Breath.  budesonide (ENTOCORT EC) 3 mg capsule Take 6 mg by mouth every morning.  ondansetron hcl (ZOFRAN) 4 mg tablet Take 4 mg by mouth every eight (8) hours as needed for Nausea or Vomiting.     sertraline (ZOLOFT) 100 mg tablet Take 2 tablets daily ( Dose increased from previously 100 mg daily )  Indications: anxiousness associated with depression    busPIRone (BUSPAR) 15 mg tablet Take 1 Tab by mouth three (3) times daily. Indications: repeated episodes of anxiety    artificial tears, dextran-hypromellose-glycerin, (TEARS NATURALE FORTE) 0.1-0.3-0.2 % ophthalmic solution Administer 1 Drop to both eyes every six (6) hours. Indications: dry eye    albuterol (PROVENTIL HFA, VENTOLIN HFA, PROAIR HFA) 90 mcg/actuation inhaler Take 2 Puffs by inhalation every four (4) hours as needed for Wheezing or Shortness of Breath.  colestipol (COLESTID) 1 gram tablet Take 1 g by mouth two (2) times a day.  Oxygen        Allergies   Allergen Reactions    Amlodipine Hives    Lisinopril Diarrhea    Niaspan [Niacin] Rash     Patient was not taking it with  mg 30 min before. She wants to give it another try.   Pt takes this med and it does not cause a rash anymore       Immunization History   Administered Date(s) Administered    Influenza High Dose Vaccine PF 11/04/2015, 11/03/2017, 10/10/2018    Influenza Vaccine 10/17/2013, 10/13/2014    Influenza Vaccine (Quad) PF (>6 Mo Flulaval, Fluarix, and >3 Yrs Afluria, Fluzone [id number]) 10/12/2016    Influenza Vaccine (Tri) Adjuvanted (>65 Yrs FLUAD TRI [id number]) 10/10/2019    Pneumococcal Conjugate (PCV-13) 10/12/2016    Pneumococcal Polysaccharide (PPSV-23) 10/13/2014    TB Skin Test (PPD) 02/07/2021, 03/12/2021    TB Skin Test (PPD) Intradermal 02/07/2021, 03/12/2021, 12/24/2021    Td 02/14/2018    Tdap 04/25/2017, 02/13/2020       Recent Vital Data:  Patient Vitals for the past 24 hrs:   Temp Pulse Resp BP SpO2   12/25/21 1451 97.9 °F (36.6 °C) 76 19 137/86 95 %   12/25/21 1345 97.9 °F (36.6 °C) 96 19 128/71 97 %   12/25/21 0900 -- 98 20 (!) 141/95 94 %   12/25/21 0852 -- -- -- -- 96 %   12/25/21 0845 -- 94 -- 132/79 --   12/25/21 0815 -- 91 -- (!) 134/91 --   12/25/21 0549 -- -- -- -- 97 %   12/25/21 0545 -- 89 -- 128/68 --   12/25/21 0435 -- -- -- -- 95 %   12/25/21 0430 -- 99 -- (!) 135/90 --   12/25/21 0415 -- 94 -- 122/73 --   12/25/21 0331 -- -- -- -- 95 % 12/25/21 0330 -- 94 -- 126/72 --   12/25/21 0215 -- 99 -- 123/75 --   12/25/21 0017 -- -- -- -- 94 %   12/25/21 0015 -- 97 -- 133/67 93 %   12/24/21 2235 -- -- -- -- 94 %   12/24/21 2230 -- 95 18 126/85 93 %   12/24/21 2057 -- 87 -- (!) 140/68 --   12/24/21 2053 -- -- -- -- 95 %   12/24/21 1945 -- 89 26 (!) 155/76 95 %   12/24/21 1748 98.5 °F (36.9 °C) 89 20 (!) 143/73 94 %     Oxygen Therapy  O2 Sat (%): 95 % (12/25/21 1451)  Pulse via Oximetry: 99 beats per minute (12/25/21 0852)  O2 Device: Nasal cannula (12/25/21 0900)  O2 Flow Rate (L/min): 2 l/min (12/25/21 0900)    Estimated body mass index is 18.02 kg/m² as calculated from the following:    Height as of this encounter: 5' 9\" (1.753 m). Weight as of this encounter: 55.3 kg (122 lb). Intake/Output Summary (Last 24 hours) at 12/25/2021 1621  Last data filed at 12/25/2021 0648  Gross per 24 hour   Intake 800 ml   Output 1200 ml   Net -400 ml         Physical Exam:  General:          thin, frail, BMI 18. 02.  No overt distress  Head:               Normocephalic, atraumatic  Eyes:               Sclerae appear normal.  Pupils equally round. ENT:                Nares appear normal, no drainage.  Moist oral mucosa  Neck:               No restricted ROM.  Trachea midline   CV:      Irregularly irregular.  No m/r/g.  No jugular venous distension. Lungs:             CTAB.  No wheezing, rhonchi, or rales.  Respirations even, unlabored  Abdomen:        Bowel sounds present.  Soft, nontender, nondistended.   Extremities:     No cyanosis or clubbing. +2 pitting edema  Skin:                No rashes and normal coloration.   Warm and dry.    Neuro:             CN II-XII grossly intact.  Sensation intact.  A&Ox3  Psych:             Normal mood and affect.           Signed:  Kvng Smith NP

## 2021-12-25 NOTE — PROGRESS NOTES
TRANSFER - IN REPORT:    Verbal report received from Reba Montgomery RN(name) on [facility] [employer]  being received from ED(unit) for routine progression of care      Report consisted of patients Situation, Background, Assessment and   Recommendations(SBAR). Information from the following report(s) SBAR and ED Summary was reviewed with the receiving nurse. Opportunity for questions and clarification was provided. Assessment to be completed upon patients arrival to unit and care assumed.

## 2021-12-25 NOTE — H&P
Hospitalist History and Physical   Admit Date:  2021  5:45 PM   Name:  Melody Castro   Age:  64 y.o. Sex:  female  :  1945   MRN:  [id number]   Room:  ER14/14    Presenting Complaint: Back Pain    Reason(s) for Admission: No admission diagnoses are documented for this encounter. History of Present Illness:   Melody Castro is a 64 y.o. female with medical history of COPD on 3 L nasal cannula chronically, GERD, fibromyalgia, depression, tobacco abuse who presented with worsening bilateral lower extremity edema. Patient has longstanding history of lower extremity edema has gotten worse in the last couple of weeks. She is on able to ambulate in the home any longer. She also reports some increasing shortness of breath. She denies any chest pain, pressure, fever, chills, nausea, vomiting, diarrhea or change in urine or stool habits. ER work-up notable for WBC 6K, hemoglobin 9.8 (at baseline), platelets 942, sodium 142, calcium 4.2, chloride 114, bicarb 22, BUN 22, creatinine 0.8, pro BNP 5130    Checks x-ray is pending  Patient treated with IV Solu-Medrol, IV Lasix, nicotine patch, breathing treatments in the ER. Hospitalist asked admit for COPD exacerbation, likely acute CHF, severe debility    Review of Systems:  10 systems reviewed and negative except as noted in HPI.   Assessment & Plan:     # LE edema  # Suspect acute CHF  - IV lasix  - strict I/O's  - TTE  - monitor BMP  - check TSH    # COPDe  - increased dyspnea and wheezing on admission  - continue IV steroids - wean as tolerated  - scheduled Kim bro  - supplemental oxygen prn - wears 3L NC continuous at home  - empiric zithromax    # Chronic afib  - rate controlled  - [place] [facility] [place] risk   - start ASA    # anxiety/depression  - continue home meds, buspar, zolft and trazodone  - stable    # Debility  - request PT/OT    # Tob abuse  - nicotine patch ordered      # Chronic anemia  - Hgb 9.8, at baseline  - check nutritional studies        Dispo/Discharge Planning:   scds      Diet: No diet orders on file  VTE ppx: scds  Code status: Prior    Hospital Problems as of 12/24/2021 Date Reviewed: 11/16/2021    None          Past History:  Past Medical History:   Diagnosis Date    Anxiety 3/6/2013    Arthritis     osteoarthritis    B12 deficiency 3/6/2013    Chronic back pain 3/6/2013    Chronic kidney disease (CKD) stage G3b/A1, moderately decreased glomerular filtration rate (GFR) between 30-44 mL/min/1.73 square meter and albuminuria creatinine ratio less than 30 mg/g (HCC) 10/14/2016    Constipation 11/4/2015    COPD 2006    [facility]/ patient on 2.5 liters O2 nightly at home    Decreased GFR 5/26/2016    Depression 10/25/2012    Depression 10/25/2012    Depression, major, in remission ([facility] [address] 1/31/2018    diverticulitis     Encounter for long-term (current) use of other medications 3/6/2013    Environmental allergies 3/6/2013    Fecal incontinence 12/7/2016    Fibromyalgia 9/15/2015    GERD (gastroesophageal reflux disease) \"years\"    Headache(784.0) 2/12/2013    HTN (hypertension) 10/25/2012    HTN (hypertension) 10/25/2012    Hyperlipidemia 10/25/2012    Lymphocytic colitis 39/9/2803    Metabolic syndrome 9/2/9124    Microalbuminuria 11/4/2015    Osteoporosis 10/25/2012    polypectomy     PUD (peptic ulcer disease) \"years ago\"    Had peptic ulcers    thyroid mass     partial removal, benign    Unspecified adverse effect of anesthesia     PATIENT STATES SHE DOESN'T GET GENERAL ANESTHESIA DUE TO COPD    Unspecified sleep apnea     wears O2 at night.   no c-jeffrey    Vitamin D deficiency 3/6/2013     Past Surgical History:   Procedure Laterality Date    HX APPENDECTOMY  1970s    HX BREAST BIOPSY Right     excision of cyst    HX CATARACT REMOVAL Right 2016    right eye    HX CHOLECYSTECTOMY  1970s    HX GYN  1970s    TIA BSO    HX HERNIA REPAIR  unknown    hiatal hernia repair   Courtenay Spurling HX ORTHOPAEDIC  [id number]    right knee replaced and then left the next year    HX PARTIAL THYROIDECTOMY  1990s    Radha Lamp IR KYPHOPLASTY LUMBAR  2/11/2021    DE BREAST SURGERY PROCEDURE UNLISTED  1980s    cyst removed from right breast/benign    DE COLOSTOMY  1990s    placed, then reversed      Allergies   Allergen Reactions    Amlodipine Hives    Lisinopril Diarrhea    Niaspan [Niacin] Rash     Patient was not taking it with  mg 30 min before. She wants to give it another try. Pt takes this med and it does not cause a rash anymore        Social History     Tobacco Use    Smoking status: Current Every Day Smoker     Packs/day: 0.50     Years: 45.00     Pack years: 22.50     Types: Cigarettes    Smokeless tobacco: Never Used   Substance Use Topics    Alcohol use: Yes     Comment: states no drinking beer in 2 months      Family History   Problem Relation Age of Onset    Kidney Disease Mother     Heart Disease Father     Heart Attack Brother       Family history reviewed and negative except as otherwise noted.     Immunization History   Administered Date(s) Administered    Influenza High Dose Vaccine PF 11/04/2015, 11/03/2017, 10/10/2018    Influenza Vaccine 10/17/2013, 10/13/2014    Influenza Vaccine (Quad) PF (>6 Mo Flulaval, Fluarix, and >3 Yrs Afluria, Fluzone 44640) 10/12/2016    Influenza Vaccine (Tri) Adjuvanted (>65 Yrs FLUAD TRI 24441) 10/10/2019    Pneumococcal Conjugate (PCV-13) 10/12/2016    Pneumococcal Polysaccharide (PPSV-23) 10/13/2014    TB Skin Test (PPD) 02/07/2021, 03/12/2021    TB Skin Test (PPD) Intradermal 02/07/2021, 03/12/2021    Td 02/14/2018    Tdap 04/25/2017, 02/13/2020     Prior to Admit Medications:  Current Outpatient Medications   Medication Instructions    acetaminophen (TYLENOL) 650 mg, Oral, EVERY 6 HOURS AS NEEDED    albuterol (PROVENTIL HFA, VENTOLIN HFA, PROAIR HFA) 90 mcg/actuation inhaler 2 Puffs, Inhalation, EVERY 4 HOURS AS NEEDED    albuterol (PROVENTIL VENTOLIN) 2.5 mg, Nebulization, EVERY 4 HOURS AS NEEDED    alendronate (FOSAMAX) 70 mg, Oral, EVERY 7 DAYS    artificial tears, dextran-hypromellose-glycerin, (TEARS NATURALE FORTE) 0.1-0.3-0.2 % ophthalmic solution 1 Drop, Both Eyes, EVERY 6 HOURS    atorvastatin (LIPITOR) 80 mg, Oral, DAILY    budesonide (ENTOCORT EC) 6 mg, Oral, 7AM    budesonide-formoteroL (SYMBICORT) 160-4.5 mcg/actuation HFAA 2 Puffs, Inhalation, 2 TIMES DAILY    busPIRone (BUSPAR) 15 mg, Oral, 3 TIMES DAILY    clonazePAM (KLONOPIN) 1 mg, Oral, 3 TIMES DAILY AS NEEDED    colestipoL (COLESTID) 1 g, Oral, 2 TIMES DAILY    dicyclomine (BENTYL) 10 mg, Oral, 3 TIMES DAILY AS NEEDED    ergocalciferol (ERGOCALCIFEROL) 50,000 Units, Oral, EVERY 7 DAYS    guaiFENesin ER (MUCINEX) 600 mg, Oral, EVERY 12 HOURS    lidocaine 4 % patch Apply to lower back    linaCLOtide (LINZESS) 72 mcg, Oral, DAILY BEFORE BREAKFAST    metoclopramide HCl (REGLAN) 10 mg, Oral, 4 TIMES DAILY BEFORE MEALS & NIGHTLY    naloxone (NARCAN) 4 mg/actuation nasal spray Use 1 spray intranasally, then discard. Repeat with new spray every 2 min as needed for opioid overdose symptoms, alternating nostrils.  ondansetron hcl (ZOFRAN) 4 mg, Oral, EVERY 8 HOURS AS NEEDED    Oxygen No dose, route, or frequency recorded.  pantoprazole (PROTONIX) 40 mg, Oral, 2 TIMES DAILY    primidone (MYSOLINE) 100 mg, Oral, 3 TIMES DAILY    senna-docusate (PERICOLACE) 8.6-50 mg per tablet 1 Tablet, Oral, EVERY BEDTIME    sertraline (ZOLOFT) 100 mg tablet Take 2 tablets daily ( Dose increased from previously 100 mg daily )    tiotropium (SPIRIVA) 18 mcg, Inhalation, DAILY    topiramate (TOPAMAX) 50 mg tablet Take 1 tablet with breakfast, 1 tablet with lunch, and 2 with dinner.     traZODone (DESYREL) 100 mg, Oral, AT BEDTIME       Objective:     Patient Vitals for the past 24 hrs:   Temp Pulse Resp BP SpO2   12/24/21 2235 -- -- -- -- 94 %   12/24/21 2230 -- 95 18 126/85 93 %   12/24/21 2057 -- 87 -- (!) 140/68 --   12/24/21 2053 -- -- -- -- 95 %   12/24/21 1945 -- 89 26 (!) 155/76 95 %   12/24/21 1748 98.5 °F (36.9 °C) 89 20 (!) 143/73 94 %     Oxygen Therapy  O2 Sat (%): 94 % (12/24/21 2235)  Pulse via Oximetry: 85 beats per minute (12/24/21 2235)  O2 Device: Nasal cannula (12/24/21 2053)  O2 Flow Rate (L/min): 2 l/min (12/24/21 2053)    Estimated body mass index is 18.02 kg/m² as calculated from the following:    Height as of this encounter: 5' 9\" (1.753 m). Weight as of this encounter: 55.3 kg (122 lb). Intake/Output Summary (Last 24 hours) at 12/24/2021 2255  Last data filed at 12/24/2021 2106  Gross per 24 hour   Intake --   Output 700 ml   Net -700 ml         Physical Exam:    Blood pressure 126/85, pulse 95, temperature 98.5 °F (36.9 °C), resp. rate 18, height 5' 9\" (1.753 m), weight 55.3 kg (122 lb), SpO2 94 %. General:    Well nourished. No overt distress  Head:  Normocephalic, atraumatic  Eyes:  Sclerae appear normal.  Pupils equally round. ENT:  Nares appear normal, no drainage. Moist oral mucosa  Neck:  No restricted ROM. Trachea midline   CV:  Irregularly irregular. No m/r/g. No jugular venous distension. Lungs:   CTAB. No wheezing, rhonchi, or rales. Respirations even, unlabored  Abdomen: Bowel sounds present. Soft, nontender, nondistended. Extremities: No cyanosis or clubbing. +2 pitting edema  Skin:     No rashes and normal coloration. Warm and dry. Neuro:  CN II-XII grossly intact. Sensation intact. A&Ox3  Psych:  Normal mood and affect.        I have reviewed ordered lab tests and independently visualized imaging below:    Last 24hr Labs:  Recent Results (from the past 24 hour(s))   CBC WITH AUTOMATED DIFF    Collection Time: 12/24/21  5:56 PM   Result Value Ref Range    WBC 6.0 4.3 - 11.1 K/uL    RBC 3.29 (L) 4.05 - 5.2 M/uL    HGB 9.8 (L) 11.7 - 15.4 g/dL    HCT 31.7 (L) 35.8 - 46.3 %    MCV 96.4 79.6 - 97.8 FL    MCH 29.8 26.1 - 32.9 PG    MCHC 30.9 (L) 31.4 - 35.0 g/dL    RDW 15.6 (H) 11.9 - 14.6 %    PLATELET 168 844 - 885 K/uL    MPV 10.3 9.4 - 12.3 FL    ABSOLUTE NRBC 0.00 0.0 - 0.2 K/uL    DF AUTOMATED      NEUTROPHILS 69 43 - 78 %    LYMPHOCYTES 21 13 - 44 %    MONOCYTES 7 4.0 - 12.0 %    EOSINOPHILS 1 0.5 - 7.8 %    BASOPHILS 1 0.0 - 2.0 %    IMMATURE GRANULOCYTES 1 0.0 - 5.0 %    ABS. NEUTROPHILS 4.1 1.7 - 8.2 K/UL    ABS. LYMPHOCYTES 1.3 0.5 - 4.6 K/UL    ABS. MONOCYTES 0.4 0.1 - 1.3 K/UL    ABS. EOSINOPHILS 0.0 0.0 - 0.8 K/UL    ABS. BASOPHILS 0.1 0.0 - 0.2 K/UL    ABS. IMM. GRANS. 0.0 0.0 - 0.5 K/UL   METABOLIC PANEL, COMPREHENSIVE    Collection Time: 12/24/21  5:56 PM   Result Value Ref Range    Sodium 142 136 - 145 mmol/L    Potassium 4.2 3.5 - 5.1 mmol/L    Chloride 114 (H) 98 - 107 mmol/L    CO2 22 21 - 32 mmol/L    Anion gap 6 (L) 7 - 16 mmol/L    Glucose 88 65 - 100 mg/dL    BUN 22 8 - 23 MG/DL    Creatinine 0.87 0.6 - 1.0 MG/DL    GFR est AA >60 >60 ml/min/1.73m2    GFR est non-AA >60 >60 ml/min/1.73m2    Calcium 7.9 (L) 8.3 - 10.4 MG/DL    Bilirubin, total 0.2 0.2 - 1.1 MG/DL    ALT (SGPT) 12 12 - 65 U/L    AST (SGOT) 12 (L) 15 - 37 U/L    Alk.  phosphatase 77 50 - 136 U/L    Protein, total 5.5 (L) 6.3 - 8.2 g/dL    Albumin 2.9 (L) 3.2 - 4.6 g/dL    Globulin 2.6 2.3 - 3.5 g/dL    A-G Ratio 1.1 (L) 1.2 - 3.5     NT-PRO BNP    Collection Time: 12/24/21  5:56 PM   Result Value Ref Range    NT pro-BNP 5,130 (H) <450 PG/ML   MAGNESIUM    Collection Time: 12/24/21  5:56 PM   Result Value Ref Range    Magnesium 1.8 1.8 - 2.4 mg/dL   EKG, 12 LEAD, INITIAL    Collection Time: 12/24/21  5:56 PM   Result Value Ref Range    Ventricular Rate 85 BPM    Atrial Rate 0 BPM    QRS Duration 118 ms    Q-T Interval 399 ms    QTC Calculation (Bezet) 475 ms    Calculated R Axis -148 degrees    Calculated T Axis 44 degrees    Diagnosis       Atrial fibrillation  Incomplete right bundle branch block         All Micro Results     None Other Studies:  No results found. Medications Administered     albuterol-ipratropium (DUO-NEB) 2.5 MG-0.5 MG/3 ML     Admin Date  12/24/2021 Action  Given Dose  3 mL Route  Nebulization Administered By  RT Jarrett          furosemide (LASIX) injection 40 mg     Admin Date  12/24/2021 Action  Given Dose  40 mg Route  IntraVENous Administered By  Ritchie Dial RN          methylPREDNISolone (PF) (Solu-MEDROL) injection 125 mg     Admin Date  12/24/2021 Action  Given Dose  125 mg Route  IntraVENous Administered By  Ritchie Dial RN          nicotine (NICODERM CQ) 14 mg/24 hr patch 1 Patch     Admin Date  12/24/2021 Action  Apply Patch Dose  1 Patch Route  TransDERmal Administered By  Ritchie Dial RN          oxyCODONE IR (ROXICODONE) tablet 5 mg     Admin Date  12/24/2021 Action  Given Dose  5 mg Route  Oral Administered By  Ritchie Dial RN                Signed:  Kory Barton DO    Part of this note may have been written by using a voice dictation software. The note has been proof read but may still contain some grammatical/other typographical errors.

## 2021-12-26 LAB
BILIRUB UR QL: NEGATIVE
GLUCOSE UR QL STRIP.AUTO: NEGATIVE MG/DL
KETONES UR-MCNC: NEGATIVE MG/DL
LEUKOCYTE ESTERASE UR QL STRIP: NEGATIVE
NITRITE UR QL: NEGATIVE
PH UR: 5.5 [PH] (ref 5–9)
PROT UR QL: NEGATIVE MG/DL
RBC # UR STRIP: ABNORMAL /UL
SP GR UR: 1.01 (ref 1–1.02)
UROBILINOGEN UR QL: 0.2 EU/DL (ref 0.2–1)

## 2021-12-31 NOTE — PROGRESS NOTES
Physician Progress Note      Valerio Mccain  CSN #:                  [id number]  :                       1945  ADMIT DATE:       2021 5:45 PM  [address] [place] DATE:        2021 4:00 PM  RESPONDING  PROVIDER #:        Julia ODOM NP          QUERY TEXT:    Patient admitted with COPD exac. Noted documentation of severe protein calorie malnutrition in DC summary. In order to support the diagnosis of severe malnutrition, please include additional clinical indicators in your documentation. Or please document if the diagnosis of severe malnutrition has been ruled out after further study. The medical record reflects the following:  Risk Factors: poor appetite  Clinical Indicators: BMI 18.02, documentation of frail, thin in dc summary, albumin 2.6  Treatment: No supplements, no dietary consult  Thanks, CECILLE Cardona  Options provided:  -- severe protein calorie malnutrition present as evidenced by, Please document evidence.   -- Severe protein calorie malnutrition was ruled out  -- Other - I will add my own diagnosis  -- Disagree - Not applicable / Not valid  -- Disagree - Clinically unable to determine / Unknown  -- Refer to Clinical Documentation Reviewer    PROVIDER RESPONSE TEXT:    Severe protein calorie malnutrition is present as evidenced by    Query created by: Devante Al on 2021 1:13 PM      Electronically signed by:  Julia ODOM NP 2021 7:21 AM

## 2022-01-06 NOTE — PROGRESS NOTES
Physician Progress Note      Yana Manriquez  CSN #:                  [id number]  :                       1945  ADMIT DATE:       2021 5:45 PM  [address] [place] DATE:        2021 4:00 PM  RESPONDING  PROVIDER #:        Benny ODOM NP          QUERY TEXT:    Pt admitted with Copd Exac and has CHF documented. If possible, please document in progress notes and discharge summary further specificity regarding the type and acuity of CHF:    The medical record reflects the following:  Risk Factors: elevated BNP, anselmo LE edema  Clinical Indicators: BNP 5130, 3plus bilateral edema of LE and feet, no echo,Likely CHF documented in history and physical and progress note on . Treatment: IV Lasix, left AMA  Natacha Doran BSN  [phone]  Options provided:  -- Acute on Chronic Systolic CHF/HFrEF  -- Acute on Chronic Diastolic CHF/HFpEF  -- Acute on Chronic Systolic and Diastolic CHF  -- Acute Systolic CHF/HFrEF  -- Acute Diastolic CHF/HFpEF  -- Acute Systolic and Diastolic CHF  -- Chronic Systolic CHF/HFrEF  -- Chronic Diastolic CHF/HFpEF  -- Chronic Systolic and Diastolic CHF  -- Other - I will add my own diagnosis  -- Disagree - Not applicable / Not valid  -- Disagree - Clinically unable to determine / Unknown  -- Refer to Clinical Documentation Reviewer    PROVIDER RESPONSE TEXT:    This patient is in acute on chronic systolic CHF/HFrEF. Query created by: Serina Burks on 2021 12:55 PM      QUERY TEXT:    Pt admitted with COPD exac. Pt noted to have chronic O2 dependence documented in H&P. If possible, please document in the progress notes and discharge summary if you are evaluating and/or treating any of the following:     The medical record reflects the following:  Risk Factors: severe COPD  Clinical Indicators: uses chronic O2 at all time  Treatment: O2 3L continuous  Natacha Doran, CECILLE  [phone]  Options provided:  -- Chronic respiratory failure with hypoxia  -- Chronic respiratory failure with hypercapnia  -- Chronic respiratory failure with hypoxia and hypercapnia  -- Chronic Respiratory Failure with oxygen dependence  -- Other - I will add my own diagnosis  -- Disagree - Not applicable / Not valid  -- Disagree - Clinically unable to determine / Unknown  -- Refer to Clinical Documentation Reviewer    PROVIDER RESPONSE TEXT:    This patient has chronic respiratory failure with hypoxia.     Query created by: Alicia Moura on 12/29/2021 1:01 PM      Electronically signed by:  Jose Raul ODOM NP 1/6/2022 9:17 AM

## 2022-02-28 ENCOUNTER — HOSPITAL ENCOUNTER (OUTPATIENT)
Dept: LAB | Age: 77
Discharge: HOME | End: 2022-02-28
Payer: MEDICARE

## 2022-02-28 DIAGNOSIS — I48.0 PAROXYSMAL A-FIB (HCC): ICD-10-CM

## 2022-02-28 LAB
ALBUMIN SERPL-MCNC: 3.5 G/DL (ref 3.2–4.6)
ALBUMIN/GLOB SERPL: 1.2 {RATIO} (ref 1.2–3.5)
ALP SERPL-CCNC: 85 U/L (ref 50–136)
ALT SERPL-CCNC: 15 U/L (ref 12–65)
ANION GAP SERPL CALC-SCNC: 4 MMOL/L (ref 7–16)
AST SERPL-CCNC: 10 U/L (ref 15–37)
BILIRUB SERPL-MCNC: 0.3 MG/DL (ref 0.2–1.1)
BUN SERPL-MCNC: 31 MG/DL (ref 8–23)
CALCIUM SERPL-MCNC: 8.6 MG/DL (ref 8.3–10.4)
CHLORIDE SERPL-SCNC: 105 MMOL/L (ref 98–107)
CO2 SERPL-SCNC: 27 MMOL/L (ref 21–32)
CREAT SERPL-MCNC: 1 MG/DL (ref 0.6–1)
GLOBULIN SER CALC-MCNC: 3 G/DL (ref 2.3–3.5)
GLUCOSE SERPL-MCNC: 102 MG/DL (ref 65–100)
MAGNESIUM SERPL-MCNC: 2.1 MG/DL (ref 1.8–2.4)
POTASSIUM SERPL-SCNC: 3.8 MMOL/L (ref 3.5–5.1)
PROT SERPL-MCNC: 6.5 G/DL (ref 6.3–8.2)
SODIUM SERPL-SCNC: 136 MMOL/L (ref 136–145)

## 2022-02-28 PROCEDURE — 83735 ASSAY OF MAGNESIUM: CPT

## 2022-02-28 PROCEDURE — 36415 COLL VENOUS BLD VENIPUNCTURE: CPT

## 2022-02-28 PROCEDURE — 80053 COMPREHEN METABOLIC PANEL: CPT

## 2022-03-18 PROBLEM — L98.9 SKIN LESION: Status: ACTIVE | Noted: 2019-11-10

## 2022-03-18 PROBLEM — I95.9 HYPOTENSION: Status: ACTIVE | Noted: 2021-03-12

## 2022-03-18 PROBLEM — R29.6 FALLS FREQUENTLY: Status: ACTIVE | Noted: 2021-02-06

## 2022-03-18 PROBLEM — D53.9 MACROCYTIC ANEMIA: Status: ACTIVE | Noted: 2021-03-12
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
## 2022-03-19 PROBLEM — R79.89 CREATININE ELEVATION: Status: ACTIVE | Noted: 2019-11-10

## 2022-03-19 PROBLEM — E43 SEVERE PROTEIN-CALORIE MALNUTRITION (HCC): Status: ACTIVE | Noted: 2021-02-15

## 2022-03-19 PROBLEM — Z51.5 HOSPICE CARE PATIENT: Status: ACTIVE | Noted: 2021-11-16

## 2022-03-19 PROBLEM — E87.6 HYPOKALEMIA: Status: ACTIVE | Noted: 2021-02-06

## 2022-03-19 PROBLEM — J44.1 COPD EXACERBATION (HCC): Status: ACTIVE | Noted: 2021-12-25

## 2022-03-19 PROBLEM — K52.9 CHRONIC DIARRHEA: Status: ACTIVE | Noted: 2019-08-19

## 2022-03-19 PROBLEM — F32.5 DEPRESSION, MAJOR, IN REMISSION (HCC): Status: ACTIVE | Noted: 2018-01-31

## 2022-03-20 PROBLEM — I48.91 ATRIAL FIBRILLATION (HCC): Status: ACTIVE | Noted: 2021-02-06

## 2022-03-20 PROBLEM — G89.29 OTHER CHRONIC PAIN: Status: ACTIVE | Noted: 2021-11-16

## 2022-03-20 PROBLEM — R16.1 SPLENOMEGALY: Status: ACTIVE | Noted: 2019-06-14

## 2022-04-09 ENCOUNTER — APPOINTMENT (OUTPATIENT)
Dept: GENERAL RADIOLOGY | Age: 77
DRG: 291 | End: 2022-04-09
Attending: EMERGENCY MEDICINE
Payer: MEDICARE

## 2022-04-09 ENCOUNTER — HOSPITAL ENCOUNTER (INPATIENT)
Age: 77
LOS: 3 days | Discharge: HOME HEALTH | DRG: 291 | End: 2022-04-12
Attending: EMERGENCY MEDICINE | Admitting: FAMILY MEDICINE
Payer: MEDICARE

## 2022-04-09 DIAGNOSIS — I34.0 MITRAL VALVE INSUFFICIENCY, UNSPECIFIED ETIOLOGY: ICD-10-CM

## 2022-04-09 DIAGNOSIS — J96.11 CHRONIC RESPIRATORY FAILURE WITH HYPOXIA (HCC): ICD-10-CM

## 2022-04-09 DIAGNOSIS — I27.23 PULMONARY HYPERTENSION DUE TO CHRONIC OBSTRUCTIVE PULMONARY DISEASE (HCC): Chronic | ICD-10-CM

## 2022-04-09 DIAGNOSIS — R06.02 SHORTNESS OF BREATH: ICD-10-CM

## 2022-04-09 DIAGNOSIS — Z71.89 ACP (ADVANCE CARE PLANNING): ICD-10-CM

## 2022-04-09 DIAGNOSIS — I50.9 ACUTE ON CHRONIC CONGESTIVE HEART FAILURE, UNSPECIFIED HEART FAILURE TYPE (HCC): Primary | ICD-10-CM

## 2022-04-09 DIAGNOSIS — J44.9 CHRONIC OBSTRUCTIVE PULMONARY DISEASE, UNSPECIFIED COPD TYPE (HCC): Chronic | ICD-10-CM

## 2022-04-09 DIAGNOSIS — R51.9 ACUTE NONINTRACTABLE HEADACHE, UNSPECIFIED HEADACHE TYPE: ICD-10-CM

## 2022-04-09 DIAGNOSIS — I48.91 ATRIAL FIBRILLATION, UNSPECIFIED TYPE (HCC): ICD-10-CM

## 2022-04-09 DIAGNOSIS — J44.9 PULMONARY HYPERTENSION DUE TO CHRONIC OBSTRUCTIVE PULMONARY DISEASE (HCC): Chronic | ICD-10-CM

## 2022-04-09 DIAGNOSIS — Z51.5 ENCOUNTER FOR PALLIATIVE CARE: ICD-10-CM

## 2022-04-09 DIAGNOSIS — J44.1 COPD EXACERBATION (HCC): ICD-10-CM

## 2022-04-09 PROBLEM — J81.1 PULMONARY EDEMA: Status: ACTIVE | Noted: 2022-04-09

## 2022-04-09 LAB
ALBUMIN SERPL-MCNC: 3 G/DL (ref 3.2–4.6)
ALBUMIN/GLOB SERPL: 1.3 {RATIO} (ref 1.2–3.5)
ALP SERPL-CCNC: 100 U/L (ref 50–136)
ALT SERPL-CCNC: 10 U/L (ref 12–65)
ANION GAP SERPL CALC-SCNC: 6 MMOL/L (ref 7–16)
AST SERPL-CCNC: 6 U/L (ref 15–37)
ATRIAL RATE: 102 BPM
BASOPHILS # BLD: 0.1 K/UL (ref 0–0.2)
BASOPHILS NFR BLD: 1 % (ref 0–2)
BILIRUB SERPL-MCNC: 0.2 MG/DL (ref 0.2–1.1)
BNP SERPL-MCNC: 5118 PG/ML
BUN SERPL-MCNC: 20 MG/DL (ref 8–23)
CALCIUM SERPL-MCNC: 8.5 MG/DL (ref 8.3–10.4)
CALCULATED R AXIS, ECG10: -145 DEGREES
CALCULATED T AXIS, ECG11: 45 DEGREES
CHLORIDE SERPL-SCNC: 107 MMOL/L (ref 98–107)
CO2 SERPL-SCNC: 26 MMOL/L (ref 21–32)
CREAT SERPL-MCNC: 0.87 MG/DL (ref 0.6–1)
DIAGNOSIS, 93000: NORMAL
DIFFERENTIAL METHOD BLD: ABNORMAL
EOSINOPHIL # BLD: 0 K/UL (ref 0–0.8)
EOSINOPHIL NFR BLD: 0 % (ref 0.5–7.8)
ERYTHROCYTE [DISTWIDTH] IN BLOOD BY AUTOMATED COUNT: 14.6 % (ref 11.9–14.6)
GLOBULIN SER CALC-MCNC: 2.4 G/DL (ref 2.3–3.5)
GLUCOSE SERPL-MCNC: 108 MG/DL (ref 65–100)
HCT VFR BLD AUTO: 34.3 % (ref 35.8–46.3)
HGB BLD-MCNC: 10.2 G/DL (ref 11.7–15.4)
IMM GRANULOCYTES # BLD AUTO: 0.1 K/UL (ref 0–0.5)
IMM GRANULOCYTES NFR BLD AUTO: 1 % (ref 0–5)
LYMPHOCYTES # BLD: 1.4 K/UL (ref 0.5–4.6)
LYMPHOCYTES NFR BLD: 20 % (ref 13–44)
MAGNESIUM SERPL-MCNC: 1.9 MG/DL (ref 1.8–2.4)
MCH RBC QN AUTO: 29.3 PG (ref 26.1–32.9)
MCHC RBC AUTO-ENTMCNC: 29.7 G/DL (ref 31.4–35)
MCV RBC AUTO: 98.6 FL (ref 79.6–97.8)
MONOCYTES # BLD: 0.4 K/UL (ref 0.1–1.3)
MONOCYTES NFR BLD: 6 % (ref 4–12)
NEUTS SEG # BLD: 5.1 K/UL (ref 1.7–8.2)
NEUTS SEG NFR BLD: 72 % (ref 43–78)
NRBC # BLD: 0 K/UL (ref 0–0.2)
PLATELET # BLD AUTO: 197 K/UL (ref 150–450)
PMV BLD AUTO: 9.5 FL (ref 9.4–12.3)
POTASSIUM SERPL-SCNC: 4 MMOL/L (ref 3.5–5.1)
PROT SERPL-MCNC: 5.4 G/DL (ref 6.3–8.2)
Q-T INTERVAL, ECG07: 402 MS
QRS DURATION, ECG06: 116 MS
QTC CALCULATION (BEZET), ECG08: 475 MS
RBC # BLD AUTO: 3.48 M/UL (ref 4.05–5.2)
SODIUM SERPL-SCNC: 139 MMOL/L (ref 136–145)
TROPONIN-HIGH SENSITIVITY: 23 PG/ML (ref 0–14)
TROPONIN-HIGH SENSITIVITY: 23.9 PG/ML (ref 0–14)
VENTRICULAR RATE, ECG03: 84 BPM
WBC # BLD AUTO: 7 K/UL (ref 4.3–11.1)

## 2022-04-09 PROCEDURE — 74011250636 HC RX REV CODE- 250/636: Performed by: EMERGENCY MEDICINE

## 2022-04-09 PROCEDURE — 83880 ASSAY OF NATRIURETIC PEPTIDE: CPT

## 2022-04-09 PROCEDURE — 65270000029 HC RM PRIVATE

## 2022-04-09 PROCEDURE — 94760 N-INVAS EAR/PLS OXIMETRY 1: CPT

## 2022-04-09 PROCEDURE — 83735 ASSAY OF MAGNESIUM: CPT

## 2022-04-09 PROCEDURE — 99285 EMERGENCY DEPT VISIT HI MDM: CPT

## 2022-04-09 PROCEDURE — 74011250636 HC RX REV CODE- 250/636: Performed by: FAMILY MEDICINE

## 2022-04-09 PROCEDURE — 94640 AIRWAY INHALATION TREATMENT: CPT

## 2022-04-09 PROCEDURE — 96374 THER/PROPH/DIAG INJ IV PUSH: CPT

## 2022-04-09 PROCEDURE — 94761 N-INVAS EAR/PLS OXIMETRY MLT: CPT

## 2022-04-09 PROCEDURE — 84484 ASSAY OF TROPONIN QUANT: CPT

## 2022-04-09 PROCEDURE — 77030012341 HC CHMB SPCR OPTC MDI VYRM -A

## 2022-04-09 PROCEDURE — 74011250637 HC RX REV CODE- 250/637: Performed by: FAMILY MEDICINE

## 2022-04-09 PROCEDURE — 80053 COMPREHEN METABOLIC PANEL: CPT

## 2022-04-09 PROCEDURE — 71045 X-RAY EXAM CHEST 1 VIEW: CPT

## 2022-04-09 PROCEDURE — 85025 COMPLETE CBC W/AUTO DIFF WBC: CPT

## 2022-04-09 PROCEDURE — 77030021668 HC NEB PREFIL KT VYRM -A

## 2022-04-09 PROCEDURE — 93005 ELECTROCARDIOGRAM TRACING: CPT | Performed by: EMERGENCY MEDICINE

## 2022-04-09 PROCEDURE — 77010033678 HC OXYGEN DAILY

## 2022-04-09 PROCEDURE — 74011000250 HC RX REV CODE- 250: Performed by: FAMILY MEDICINE

## 2022-04-09 RX ORDER — ENOXAPARIN SODIUM 100 MG/ML
30 INJECTION SUBCUTANEOUS EVERY 24 HOURS
Status: DISCONTINUED | OUTPATIENT
Start: 2022-04-09 | End: 2022-04-12 | Stop reason: HOSPADM

## 2022-04-09 RX ORDER — ACETAMINOPHEN 325 MG/1
650 TABLET ORAL
Status: DISCONTINUED | OUTPATIENT
Start: 2022-04-09 | End: 2022-04-12 | Stop reason: HOSPADM

## 2022-04-09 RX ORDER — FUROSEMIDE 10 MG/ML
40 INJECTION INTRAMUSCULAR; INTRAVENOUS 2 TIMES DAILY
Status: DISCONTINUED | OUTPATIENT
Start: 2022-04-10 | End: 2022-04-12 | Stop reason: HOSPADM

## 2022-04-09 RX ORDER — ONDANSETRON 4 MG/1
4 TABLET, ORALLY DISINTEGRATING ORAL
Status: DISCONTINUED | OUTPATIENT
Start: 2022-04-09 | End: 2022-04-12 | Stop reason: HOSPADM

## 2022-04-09 RX ORDER — SODIUM CHLORIDE 0.9 % (FLUSH) 0.9 %
5-10 SYRINGE (ML) INJECTION AS NEEDED
Status: DISCONTINUED | OUTPATIENT
Start: 2022-04-09 | End: 2022-04-09 | Stop reason: SDUPTHER

## 2022-04-09 RX ORDER — SUCRALFATE 1 G/1
1 TABLET ORAL
Status: DISCONTINUED | OUTPATIENT
Start: 2022-04-10 | End: 2022-04-12 | Stop reason: HOSPADM

## 2022-04-09 RX ORDER — CELECOXIB 100 MG/1
200 CAPSULE ORAL
Status: DISCONTINUED | OUTPATIENT
Start: 2022-04-10 | End: 2022-04-12 | Stop reason: HOSPADM

## 2022-04-09 RX ORDER — ATORVASTATIN CALCIUM 40 MG/1
80 TABLET, FILM COATED ORAL
Status: DISCONTINUED | OUTPATIENT
Start: 2022-04-09 | End: 2022-04-12 | Stop reason: HOSPADM

## 2022-04-09 RX ORDER — PANTOPRAZOLE SODIUM 40 MG/1
40 TABLET, DELAYED RELEASE ORAL
Status: DISCONTINUED | OUTPATIENT
Start: 2022-04-10 | End: 2022-04-12 | Stop reason: HOSPADM

## 2022-04-09 RX ORDER — LORAZEPAM 1 MG/1
2 TABLET ORAL
Status: DISCONTINUED | OUTPATIENT
Start: 2022-04-09 | End: 2022-04-12 | Stop reason: HOSPADM

## 2022-04-09 RX ORDER — ONDANSETRON 2 MG/ML
4 INJECTION INTRAMUSCULAR; INTRAVENOUS
Status: DISCONTINUED | OUTPATIENT
Start: 2022-04-09 | End: 2022-04-12 | Stop reason: HOSPADM

## 2022-04-09 RX ORDER — ACETAMINOPHEN 650 MG/1
650 SUPPOSITORY RECTAL
Status: DISCONTINUED | OUTPATIENT
Start: 2022-04-09 | End: 2022-04-12 | Stop reason: HOSPADM

## 2022-04-09 RX ORDER — GABAPENTIN 100 MG/1
100 CAPSULE ORAL 3 TIMES DAILY
Status: DISCONTINUED | OUTPATIENT
Start: 2022-04-09 | End: 2022-04-12 | Stop reason: HOSPADM

## 2022-04-09 RX ORDER — SODIUM CHLORIDE 0.9 % (FLUSH) 0.9 %
5-10 SYRINGE (ML) INJECTION EVERY 8 HOURS
Status: DISCONTINUED | OUTPATIENT
Start: 2022-04-09 | End: 2022-04-09 | Stop reason: SDUPTHER

## 2022-04-09 RX ORDER — POLYETHYLENE GLYCOL 3350 17 G/17G
17 POWDER, FOR SOLUTION ORAL DAILY PRN
Status: DISCONTINUED | OUTPATIENT
Start: 2022-04-09 | End: 2022-04-12 | Stop reason: HOSPADM

## 2022-04-09 RX ORDER — BUSPIRONE HYDROCHLORIDE 5 MG/1
15 TABLET ORAL 3 TIMES DAILY
Status: DISCONTINUED | OUTPATIENT
Start: 2022-04-09 | End: 2022-04-10

## 2022-04-09 RX ORDER — AMOXICILLIN 250 MG
1 CAPSULE ORAL
Status: DISCONTINUED | OUTPATIENT
Start: 2022-04-09 | End: 2022-04-12 | Stop reason: HOSPADM

## 2022-04-09 RX ORDER — TRAZODONE HYDROCHLORIDE 50 MG/1
100 TABLET ORAL
Status: DISCONTINUED | OUTPATIENT
Start: 2022-04-09 | End: 2022-04-12 | Stop reason: HOSPADM

## 2022-04-09 RX ORDER — FUROSEMIDE 10 MG/ML
40 INJECTION INTRAMUSCULAR; INTRAVENOUS
Status: COMPLETED | OUTPATIENT
Start: 2022-04-09 | End: 2022-04-09

## 2022-04-09 RX ORDER — METOCLOPRAMIDE 10 MG/1
5 TABLET ORAL
Status: DISCONTINUED | OUTPATIENT
Start: 2022-04-10 | End: 2022-04-12 | Stop reason: HOSPADM

## 2022-04-09 RX ORDER — SODIUM CHLORIDE 0.9 % (FLUSH) 0.9 %
5-40 SYRINGE (ML) INJECTION EVERY 8 HOURS
Status: DISCONTINUED | OUTPATIENT
Start: 2022-04-09 | End: 2022-04-12 | Stop reason: HOSPADM

## 2022-04-09 RX ORDER — PRIMIDONE 50 MG/1
100 TABLET ORAL 3 TIMES DAILY
Status: DISCONTINUED | OUTPATIENT
Start: 2022-04-09 | End: 2022-04-12 | Stop reason: HOSPADM

## 2022-04-09 RX ORDER — ALBUTEROL SULFATE 0.83 MG/ML
2.5 SOLUTION RESPIRATORY (INHALATION)
Status: DISCONTINUED | OUTPATIENT
Start: 2022-04-09 | End: 2022-04-10

## 2022-04-09 RX ORDER — CETIRIZINE HCL 10 MG
10 TABLET ORAL DAILY
Status: DISCONTINUED | OUTPATIENT
Start: 2022-04-10 | End: 2022-04-12 | Stop reason: HOSPADM

## 2022-04-09 RX ORDER — HYDROCODONE BITARTRATE AND ACETAMINOPHEN 10; 325 MG/1; MG/1
1 TABLET ORAL
Status: DISCONTINUED | OUTPATIENT
Start: 2022-04-09 | End: 2022-04-12 | Stop reason: HOSPADM

## 2022-04-09 RX ORDER — SODIUM CHLORIDE 0.9 % (FLUSH) 0.9 %
5-40 SYRINGE (ML) INJECTION AS NEEDED
Status: DISCONTINUED | OUTPATIENT
Start: 2022-04-09 | End: 2022-04-12 | Stop reason: HOSPADM

## 2022-04-09 RX ORDER — BUDESONIDE 3 MG/1
6 CAPSULE, COATED PELLETS ORAL
Status: DISCONTINUED | OUTPATIENT
Start: 2022-04-10 | End: 2022-04-12 | Stop reason: HOSPADM

## 2022-04-09 RX ORDER — TOPIRAMATE 100 MG/1
100 TABLET, FILM COATED ORAL 2 TIMES DAILY
Status: DISCONTINUED | OUTPATIENT
Start: 2022-04-09 | End: 2022-04-12 | Stop reason: HOSPADM

## 2022-04-09 RX ORDER — DILTIAZEM HYDROCHLORIDE 120 MG/1
120 CAPSULE, COATED, EXTENDED RELEASE ORAL DAILY
Status: DISCONTINUED | OUTPATIENT
Start: 2022-04-10 | End: 2022-04-12 | Stop reason: HOSPADM

## 2022-04-09 RX ORDER — CROMOLYN SODIUM 40 MG/ML
1 SOLUTION/ DROPS OPHTHALMIC 3 TIMES DAILY
Status: DISCONTINUED | OUTPATIENT
Start: 2022-04-10 | End: 2022-04-12 | Stop reason: HOSPADM

## 2022-04-09 RX ORDER — MONTELUKAST SODIUM 4 MG/1
1 TABLET, CHEWABLE ORAL 2 TIMES DAILY
Status: DISCONTINUED | OUTPATIENT
Start: 2022-04-10 | End: 2022-04-12 | Stop reason: HOSPADM

## 2022-04-09 RX ORDER — SERTRALINE HYDROCHLORIDE 100 MG/1
100 TABLET, FILM COATED ORAL 2 TIMES DAILY
Status: DISCONTINUED | OUTPATIENT
Start: 2022-04-09 | End: 2022-04-12 | Stop reason: HOSPADM

## 2022-04-09 RX ADMIN — TOPIRAMATE 100 MG: 100 TABLET, FILM COATED ORAL at 21:57

## 2022-04-09 RX ADMIN — DOCUSATE SODIUM 50MG AND SENNOSIDES 8.6MG 1 TABLET: 8.6; 5 TABLET, FILM COATED ORAL at 21:57

## 2022-04-09 RX ADMIN — FUROSEMIDE 40 MG: 10 INJECTION, SOLUTION INTRAMUSCULAR; INTRAVENOUS at 17:38

## 2022-04-09 RX ADMIN — ATORVASTATIN CALCIUM 80 MG: 40 TABLET, FILM COATED ORAL at 21:57

## 2022-04-09 RX ADMIN — MOMETASONE FUROATE AND FORMOTEROL FUMARATE DIHYDRATE 2 PUFF: 200; 5 AEROSOL RESPIRATORY (INHALATION) at 23:16

## 2022-04-09 RX ADMIN — PRIMIDONE 100 MG: 50 TABLET ORAL at 22:56

## 2022-04-09 RX ADMIN — ENOXAPARIN SODIUM 30 MG: 100 INJECTION SUBCUTANEOUS at 21:57

## 2022-04-09 RX ADMIN — SERTRALINE 100 MG: 100 TABLET, FILM COATED ORAL at 21:57

## 2022-04-09 RX ADMIN — GABAPENTIN 100 MG: 100 CAPSULE ORAL at 21:57

## 2022-04-09 RX ADMIN — SODIUM CHLORIDE, PRESERVATIVE FREE 10 ML: 5 INJECTION INTRAVENOUS at 21:50

## 2022-04-09 RX ADMIN — TRAZODONE HYDROCHLORIDE 100 MG: 50 TABLET ORAL at 22:55

## 2022-04-09 NOTE — ED PROVIDER NOTES
75-year-old  female with history of COPD, CKD, HTN, GERD, fibromyalgia, arthritis, B12 deficiency, and anxiety presents the emergency department via EMS for increased shortness of breath. According the patient she has had some increased swelling over the last 2 weeks and progressively worsening shortness of breath. Today when the caretaker came to visit (as they do every day), patient appeared to be in more respiratory distress and EMS was called. Patient was found to be wheezing and hypoxic on arrival of EMS despite 4 L per nasal cannula, and was given albuterol treatment in route with CPAP with moderate improvement. On arrival, the patient was comfortable on 3-1/2 L per nasal cannula without the CPAP, and BiPAP was not instituted on presentation. The history is provided by the patient. Shortness of Breath  This is a recurrent problem. The average episode lasts 2 weeks. The problem occurs continuously. The current episode started more than 1 week ago. The problem has been rapidly worsening. Associated symptoms include wheezing, orthopnea and leg swelling. Pertinent negatives include no fever, no headaches, no coryza, no rhinorrhea, no sore throat, no swollen glands, no ear pain, no neck pain, no cough, no sputum production, no hemoptysis, no PND, no chest pain, no syncope, no vomiting, no abdominal pain, no rash, no leg pain and no claudication. It is unknown what precipitated the problem. Treatments tried: Albuterol and CPAP per EMS. The treatment provided moderate relief. She has had prior hospitalizations. She has had prior ED visits. She has had no prior ICU admissions. Associated medical issues include COPD, chronic lung disease, CAD and heart failure. Associated medical issues do not include asthma, pneumonia, PE, past MI, DVT or recent surgery.         Past Medical History:   Diagnosis Date    Anxiety 3/6/2013    Arthritis     osteoarthritis    B12 deficiency 3/6/2013    Chronic back pain 3/6/2013    Chronic kidney disease (CKD) stage G3b/A1, moderately decreased glomerular filtration rate (GFR) between 30-44 mL/min/1.73 square meter and albuminuria creatinine ratio less than 30 mg/g ([facility]) 10/14/2016    Constipation 11/4/2015    COPD 2006    [facility]/ patient on 2.5 liters O2 nightly at home    Decreased GFR 5/26/2016    Depression 10/25/2012    Depression 10/25/2012    [facility]    diverticulitis     Encounter for long-term (current) use of other medications 3/6/2013    Environmental allergies 3/6/2013    Fecal incontinence 12/7/2016    Fibromyalgia 9/15/2015    GERD (gastroesophageal reflux disease) \"years\"    Headache(784.0) 2/12/2013    HTN (hypertension) 10/25/2012    HTN (hypertension) 10/25/2012    Hyperlipidemia 10/25/2012    Lymphocytic colitis 69/2/2671    Metabolic syndrome 1/0/1389    Microalbuminuria 11/4/2015    Osteoporosis 10/25/2012    polypectomy     PUD (peptic ulcer disease) \"years ago\"    Had peptic ulcers    thyroid mass     partial removal, benign    Unspecified adverse effect of anesthesia     PATIENT STATES SHE DOESN'T GET GENERAL ANESTHESIA DUE TO COPD    Unspecified sleep apnea     wears O2 at night.   no c-jeffrey    Vitamin D deficiency 3/6/2013       Past Surgical History:   Procedure Laterality Date    HX APPENDECTOMY  1970s    HX BREAST BIOPSY Right     excision of cyst    HX CATARACT REMOVAL Right 2016    right eye    HX CHOLECYSTECTOMY  1970s    HX GYN  1970s    TIA BSO    HX HERNIA REPAIR  unknown    hiatal hernia repair    HX ORTHOPAEDIC  [id number]    right knee replaced and then left the next year    HX PARTIAL THYROIDECTOMY  1990s    Frank Mirza    IR KYPHOPLASTY LUMBAR  2/11/2021    [place] BREAST SURGERY PROCEDURE UNLISTED  1980s    cyst removed from right breast/benign    [place] COLOSTOMY  1990s    placed, then reversed         Family History:   Problem Relation Age of Onset    Kidney Disease Mother     Heart Disease Father     Heart Attack Brother        Social History     Socioeconomic History    Marital status:      Spouse name: Not on file    Number of children: Not on file    Years of education: Not on file    Highest education level: Not on file   Occupational History    Not on file   Tobacco Use    Smoking status: Current Every Day Smoker     Packs/day: 0.50     Years: 45.00     Pack years: 22.50     Types: Cigarettes    Smokeless tobacco: Never Used   Substance and Sexual Activity    Alcohol use: Yes     Comment: states no drinking beer in 2 months    Drug use: Yes     Types: Marijuana     Comment: smoked it last night    Sexual activity: Never   Other Topics Concern    Not on file   Social History Narrative    Not on file     Social Determinants of Health     Financial Resource Strain:     Difficulty of Paying Living Expenses: Not on file   Food Insecurity:     Worried About Running Out of Food in the Last Year: Not on file    Makenzie of Food in the Last Year: Not on file   Transportation Needs:     Lack of Transportation (Medical): Not on file    Lack of Transportation (Non-Medical):  Not on file   Physical Activity:     Days of Exercise per Week: Not on file    Minutes of Exercise per Session: Not on file   Stress:     Feeling of Stress : Not on file   Social Connections:     Frequency of Communication with Friends and Family: Not on file    Frequency of Social Gatherings with Friends and Family: Not on file    Attends Mosque Services: Not on file    Active Member of Clubs or Organizations: Not on file    Attends Club or Organization Meetings: Not on file    Marital Status: Not on file   Intimate Partner Violence:     Fear of Current or Ex-Partner: Not on file    Emotionally Abused: Not on file    Physically Abused: Not on file    Sexually Abused: Not on file   Housing Stability:     Unable to Pay for Housing in the Last Year: Not on file    Number of Places Lived in the Last Year: Not on file    Unstable Housing in the Last Year: Not on file         ALLERGIES: Amlodipine, Lisinopril, and Niaspan [niacin]    Review of Systems   Constitutional: Negative for chills and fever. HENT: Negative for congestion, ear pain, rhinorrhea and sore throat. Respiratory: Positive for shortness of breath and wheezing. Negative for cough, hemoptysis and sputum production. Cardiovascular: Positive for orthopnea and leg swelling. Negative for chest pain, palpitations, claudication, syncope and PND. Gastrointestinal: Negative for abdominal pain, constipation, diarrhea, nausea and vomiting. Musculoskeletal: Negative for neck pain. Skin: Negative for rash. Neurological: Negative for headaches. All other systems reviewed and are negative. Vitals:    04/09/22 1634 04/09/22 1637   BP: (!) 145/65    Pulse: 83    Resp: 22    Temp:  98.2 °F (36.8 °C)   SpO2: 100%    Weight: 47.6 kg (105 lb)    Height: 5' 7\" (1.702 m)             Physical Exam  Vitals and nursing note reviewed. Constitutional:       General: She is in acute distress. Appearance: She is well-developed. She is cachectic. HENT:      Head: Normocephalic and atraumatic. Right Ear: External ear normal.      Left Ear: External ear normal.   Eyes:      Extraocular Movements: Extraocular movements intact. Conjunctiva/sclera: Conjunctivae normal.      Pupils: Pupils are equal, round, and reactive to light. Cardiovascular:      Rate and Rhythm: Normal rate and regular rhythm. Heart sounds: Normal heart sounds. Pulmonary:      Effort: Pulmonary effort is normal.      Breath sounds: Examination of the right-lower field reveals decreased breath sounds and rales. Examination of the left-lower field reveals rales. Decreased breath sounds and rales present. No wheezing or rhonchi. Abdominal:      General: Bowel sounds are normal.      Palpations: Abdomen is soft.       Tenderness: There is no abdominal tenderness. Musculoskeletal:         General: Normal range of motion. Cervical back: Normal range of motion and neck supple. Right lower leg: Edema (1-2+ bilateral lower extremities. ) present. Left lower leg: Edema present. Skin:     General: Skin is warm and dry. Capillary Refill: Capillary refill takes less than 2 seconds. Neurological:      General: No focal deficit present. Mental Status: She is alert and oriented to person, place, and time. Psychiatric:         Mood and Affect: Mood normal.         Behavior: Behavior normal.          MDM  Number of Diagnoses or Management Options  Acute on chronic congestive heart failure, unspecified heart failure type [facility]): new and requires workup  Atrial fibrillation, unspecified type [facility]): new and requires workup     Amount and/or Complexity of Data Reviewed  Clinical lab tests: ordered and reviewed  Tests in the radiology section of CPT®: ordered and reviewed  Tests in the medicine section of CPT®: ordered and reviewed  Review and summarize past medical records: yes (ECG interpretation for ECG dated 9 April 2022 at 4:35 PM: ECG reveals atrial fibrillation controlled rate of 84 bpm, pulmonary disease pattern, right bundle branch block, with no evidence of acute ischemic change. Abnormal ECG. Sharee Thomas MD  )  Discuss the patient with other providers: yes    Risk of Complications, Morbidity, and/or Mortality  Presenting problems: high  Diagnostic procedures: moderate  Management options: moderate    Patient Progress  Patient progress: improved         Procedures    The patient was observed in the ED. review of the old records reveals prior history of paroxysmal atrial fibrillation, the patient was given IV Lasix for her congestive failure 40 mg IV with good urinary output and 500 mL of urine and significant provement in her respiratory status.   Patient currently lives alone, and is very afraid of going home and suffering more respiratory distress than she did earlier today. Considering her new onset A. fib as well as CHF on x-ray and mild increased requirement of oxygen, case was discussed with the hospitalist who will admit. Results Reviewed:      Recent Results (from the past 24 hour(s))   EKG, 12 LEAD, INITIAL    Collection Time: 04/09/22  4:35 PM   Result Value Ref Range    Ventricular Rate 84 BPM    Atrial Rate 102 BPM    QRS Duration 116 ms    Q-T Interval 402 ms    QTC Calculation (Bezet) 475 ms    Calculated R Axis -145 degrees    Calculated T Axis 45 degrees    Diagnosis       Atrial fibrillation  Pulmonary disease pattern  Right bundle branch block  Abnormal ECG  When compared with ECG of 24-DEC-2021 17:56,  PREVIOUS ECG IS PRESENT     CBC WITH AUTOMATED DIFF    Collection Time: 04/09/22  4:40 PM   Result Value Ref Range    WBC 7.0 4.3 - 11.1 K/uL    RBC 3.48 (L) 4.05 - 5.2 M/uL    HGB 10.2 (L) 11.7 - 15.4 g/dL    HCT 34.3 (L) 35.8 - 46.3 %    MCV 98.6 (H) 79.6 - 97.8 FL    MCH 29.3 26.1 - 32.9 PG    MCHC 29.7 (L) 31.4 - 35.0 g/dL    RDW 14.6 11.9 - 14.6 %    PLATELET 761 049 - 138 K/uL    MPV 9.5 9.4 - 12.3 FL    ABSOLUTE NRBC 0.00 0.0 - 0.2 K/uL    DF AUTOMATED      NEUTROPHILS 72 43 - 78 %    LYMPHOCYTES 20 13 - 44 %    MONOCYTES 6 4.0 - 12.0 %    EOSINOPHILS 0 (L) 0.5 - 7.8 %    BASOPHILS 1 0.0 - 2.0 %    IMMATURE GRANULOCYTES 1 0.0 - 5.0 %    ABS. NEUTROPHILS 5.1 1.7 - 8.2 K/UL    ABS. LYMPHOCYTES 1.4 0.5 - 4.6 K/UL    ABS. MONOCYTES 0.4 0.1 - 1.3 K/UL    ABS. EOSINOPHILS 0.0 0.0 - 0.8 K/UL    ABS. BASOPHILS 0.1 0.0 - 0.2 K/UL    ABS. IMM.  GRANS. 0.1 0.0 - 0.5 K/UL   METABOLIC PANEL, COMPREHENSIVE    Collection Time: 04/09/22  4:40 PM   Result Value Ref Range    Sodium 139 136 - 145 mmol/L    Potassium 4.0 3.5 - 5.1 mmol/L    Chloride 107 98 - 107 mmol/L    CO2 26 21 - 32 mmol/L    Anion gap 6 (L) 7 - 16 mmol/L    Glucose 108 (H) 65 - 100 mg/dL    BUN 20 8 - 23 MG/DL    Creatinine 0.87 0.6 - 1.0 MG/DL GFR est AA >60 >60 ml/min/1.73m2    GFR est non-AA >60 >60 ml/min/1.73m2    Calcium 8.5 8.3 - 10.4 MG/DL    Bilirubin, total 0.2 0.2 - 1.1 MG/DL    ALT (SGPT) 10 (L) 12 - 65 U/L    AST (SGOT) 6 (L) 15 - 37 U/L    Alk. phosphatase 100 50 - 136 U/L    Protein, total 5.4 (L) 6.3 - 8.2 g/dL    Albumin 3.0 (L) 3.2 - 4.6 g/dL    Globulin 2.4 2.3 - 3.5 g/dL    A-G Ratio 1.3 1.2 - 3.5     MAGNESIUM    Collection Time: 04/09/22  4:40 PM   Result Value Ref Range    Magnesium 1.9 1.8 - 2.4 mg/dL   TROPONIN-HIGH SENSITIVITY    Collection Time: 04/09/22  4:40 PM   Result Value Ref Range    Troponin-High Sensitivity 23.9 (H) 0 - 14 pg/mL   NT-PRO BNP    Collection Time: 04/09/22  4:40 PM   Result Value Ref Range    NT pro-BNP 5,118 (H) <450 PG/ML       XR CHEST PORT   Final Result   EMPHYSEMA WITH MILD CARDIOMEGALY AND FINDINGS MOST CONSISTENT WITH   CONGESTIVE HEART FAILURE. CRITICAL CARE Documentation: This patient is critically ill and there is a high probability of of imminent or life threatening deterioration in the patient's condition without immediate management. The nature of the patient's clinical problem is: Acute respiratory distress with combination of CHF and COPD exacerbations, new onset atrial fibrillation    I have spent 45 minutes in direct patient care, documentation, review of labs/xrays/old records, discussion with Staff, Colleague, Nursing . The time involved in the performance of separately reportable procedures was not counted toward critical care time.      Kaylee Washington MD; 4/9/2022 @7:45 PM

## 2022-04-09 NOTE — ED TRIAGE NOTES
Pt from home via EMS where she was found to be wheezing bilateral and O2 sat of lower 80s even on 4L NC at home. Pt was given an albuterol at noon and PTA via EMS.

## 2022-04-09 NOTE — H&P
Hospitalist History and Physical   Admit Date:  2022  4:28 PM   Name:  Kamron Cisneros   Age:  64 y.o. Sex:  female  :  1945   MRN:  [id number]     Presenting Complaint: SOB  Reason(s) for Admission: Pulmonary edema [J81.1]     History of Present Illness:   Kamron Cisneros is a 64 y.o. female with medical history of COPD on 3lpm chronically, GERD, afib not on anticoagulation, depression, fibromyalgia who presented to ED with shortness of breath. Symptoms have been worsening over the past week or so. States that she noticed her lower legs swelling as well. Her caretaker came to visit and noticed that she appeared to be in respiratory distress and called EMS. Upon ER evaluation, patient was on CPAP, she had received albuterol nebulizer en route to ER. Patient was placed on 3.5lpm supplemental O2 in ER. CXR shows evidence of pulmonary edema and small pleural effusions. pBNP is elevated at 5,118. Given concern for CHF exacerbation, Hospitalist asked to admit. Review of Systems:  10 systems reviewed and negative except as noted in HPI. Assessment & Plan:   * Pulmonary edema  - Requiring slightly more O2 than baseline (3.5lpm compared to 3lpm chronically)  - pBNP is elevated at 5,118  - CXR shows pulmonary vascular congestion, pulmonary edema, and small pleural effusions  - I do not see a specific diagnosis of CHF in chart, but patient does take Lasix at home  - Last echo from 21 showed normal systolic function with EF of 55-60%  - Will start IV lasix 40mg BID  - Low sodium diet  - Repeat echo  - Strict I/Os  - Pending workup, may need f/u with Cardiology for new onset CHF    Atrial fibrillation ([facility] [address]  - Noted to be in afib with RVR during hospitalization in 2021, she was started on cardizem at that time.   Due to h/o frequent falls, debility, low BMI, GERD, the patient was found to be at high risk of bleeding and was not started on chronic anticoagulation at that time  - Currently rate-controlled  - Continue cardizem    Depression, major, in remission ([facility] [address]  - Home meds    Chronic kidney disease (CKD) stage G3b/A1, moderately decreased glomerular filtration rate (GFR) between 30-44 mL/min/1.73 square meter and albuminuria creatinine ratio less than 30 mg/g ([facility])  - Stable  - Continue to monitor renal function    Sleep apnea  - CPAP QHS    COPD (chronic obstructive pulmonary disease) (HCC)  - Home meds/nebulizers  - Not currently wheezing, but likely component in pt's SOB       Disposition: inpatient    Diet: cardiac  VTE ppx: lovenox  Code status: full      Past medical history reviewed.     Past Medical History:   Diagnosis Date    Anxiety 3/6/2013    Arthritis     osteoarthritis    B12 deficiency 3/6/2013    Chronic back pain 3/6/2013    Chronic kidney disease (CKD) stage G3b/A1, moderately decreased glomerular filtration rate (GFR) between 30-44 mL/min/1.73 square meter and albuminuria creatinine ratio less than 30 mg/g ([facility]) 10/14/2016    Constipation 11/4/2015    COPD 2006    [facility]/ patient on 2.5 liters O2 nightly at home    Decreased GFR 5/26/2016    Depression 10/25/2012    Depression 10/25/2012    Depression, major, in remission ([facility] [address] 1/31/2018    diverticulitis     Encounter for long-term (current) use of other medications 3/6/2013    Environmental allergies 3/6/2013    Fecal incontinence 12/7/2016    Fibromyalgia 9/15/2015    GERD (gastroesophageal reflux disease) \"years\"    Headache(784.0) 2/12/2013    HTN (hypertension) 10/25/2012    HTN (hypertension) 10/25/2012    Hyperlipidemia 10/25/2012    Lymphocytic colitis 01/9/1467    Metabolic syndrome 1/9/2753    Microalbuminuria 11/4/2015    Osteoporosis 10/25/2012    polypectomy     PUD (peptic ulcer disease) \"years ago\"    Had peptic ulcers    thyroid mass     partial removal, benign    Unspecified adverse effect of anesthesia     PATIENT STATES SHE DOESN'T GET GENERAL ANESTHESIA DUE TO COPD  Unspecified sleep apnea     wears O2 at night. no c-jeffrey    Vitamin D deficiency 3/6/2013     Past surgical history reviewed. Past Surgical History:   Procedure Laterality Date    HX APPENDECTOMY  80s    HX BREAST BIOPSY Right     excision of cyst    HX CATARACT REMOVAL Right 2016    right eye    HX CHOLECYSTECTOMY  1970s    HX GYN  1970s    TIA BSO    HX HERNIA REPAIR  unknown    hiatal hernia repair    HX ORTHOPAEDIC  [id number]    right knee replaced and then left the next year    HX PARTIAL THYROIDECTOMY  1990s    Jojo Beth    IR KYPHOPLASTY LUMBAR  2/11/2021    GA BREAST SURGERY PROCEDURE UNLISTED  1980s    cyst removed from right breast/benign    GA COLOSTOMY  1990s    placed, then reversed      Allergies   Allergen Reactions    Amlodipine Hives    Lisinopril Diarrhea    Niaspan [Niacin] Rash     Patient was not taking it with  mg 30 min before. She wants to give it another try. Pt takes this med and it does not cause a rash anymore        Social History     Tobacco Use    Smoking status: Current Every Day Smoker     Packs/day: 0.50     Years: 45.00     Pack years: 22.50     Types: Cigarettes    Smokeless tobacco: Never Used   Substance Use Topics    Alcohol use: Yes     Comment: states no drinking beer in 2 months      Family History   Problem Relation Age of Onset    Kidney Disease Mother     Heart Disease Father     Heart Attack Brother       Family history reviewed and noncontributory to patient's acute condition; no relevant family history unless otherwise noted above.   Immunization History   Administered Date(s) Administered    Influenza High Dose Vaccine PF 11/04/2015, 11/03/2017, 10/10/2018    Influenza Vaccine 10/17/2013, 10/13/2014    Influenza Vaccine (Quad) PF (>6 Mo Flulaval, Fluarix, and >3 Yrs Afluria, Fluzone 91123) 10/12/2016    Influenza Vaccine (Tri) Adjuvanted (>65 Yrs FLUAD TRI 92230) 10/10/2019    Pneumococcal Conjugate (PCV-13) 10/12/2016    Pneumococcal Polysaccharide (PPSV-23) 10/13/2014    TB Skin Test (PPD) 02/07/2021, 03/12/2021    TB Skin Test (PPD) Intradermal 02/07/2021, 03/12/2021, 12/24/2021    Td 02/14/2018    Tdap 04/25/2017, 02/13/2020     PTA Medications:  Current Outpatient Medications   Medication Instructions    acetaminophen (TYLENOL) 650 mg, Oral, EVERY 6 HOURS AS NEEDED    albuterol (PROVENTIL HFA, VENTOLIN HFA, PROAIR HFA) 90 mcg/actuation inhaler 2 Puffs, Inhalation, EVERY 4 HOURS AS NEEDED    albuterol (PROVENTIL VENTOLIN) 2.5 mg, Nebulization, EVERY 4 HOURS AS NEEDED    alendronate (FOSAMAX) 70 mg, Oral, EVERY 7 DAYS    artificial tears, dextran-hypromellose-glycerin, (TEARS NATURALE FORTE) 0.1-0.3-0.2 % ophthalmic solution 1 Drop, Both Eyes, EVERY 6 HOURS    atorvastatin (LIPITOR) 80 mg, Oral, DAILY    budesonide (ENTOCORT EC) 6 mg, Oral, 7AM    budesonide-formoteroL (SYMBICORT) 160-4.5 mcg/actuation HFAA 2 Puffs, Inhalation, 2 TIMES DAILY    busPIRone (BUSPAR) 15 mg, Oral, 3 TIMES DAILY    celecoxib (CELEBREX) 200 mg capsule Oral, 2 TIMES DAILY    cetirizine (ZYRTEC) 10 mg tablet Oral    colestipoL (COLESTID) 1 g, Oral, 2 TIMES DAILY    cromolyn (OPTICROM) 4 % ophthalmic solution 1 Drop, Both Eyes, 4 TIMES DAILY, Use in affected eye(s)     dicyclomine (BENTYL) 10 mg, Oral, 3 TIMES DAILY AS NEEDED    dilTIAZem ER (CARDIZEM CD) 120 mg capsule No dose, route, or frequency recorded.  ergocalciferol (ERGOCALCIFEROL) 50,000 Units, Oral, EVERY 7 DAYS    furosemide (LASIX) 20 mg tablet No dose, route, or frequency recorded.     gabapentin (NEURONTIN) 100 mg, Oral, 3 TIMES DAILY    guaiFENesin ER (MUCINEX) 600 mg, Oral, EVERY 12 HOURS    HYDROcodone-acetaminophen (NORCO)  mg tablet TAKE 1 TABLET BY MOUTH EVERY 6 HOURS AS NEEDED FOR SEVERE    lidocaine 4 % patch Apply to lower back    linaCLOtide (LINZESS) 72 mcg, Oral, DAILY BEFORE BREAKFAST    LORazepam (ATIVAN) 2 mg, Oral, EVERY 8 HOURS AS NEEDED    lubiPROStone (Amitiza) 8 mcg capsule Oral    metoclopramide HCl (REGLAN) 10 mg, Oral, 4 TIMES DAILY BEFORE MEALS & NIGHTLY    naloxone (NARCAN) 4 mg/actuation nasal spray Use 1 spray intranasally, then discard. Repeat with new spray every 2 min as needed for opioid overdose symptoms, alternating nostrils.  omeprazole (PRILOSEC) 40 mg, Oral, DAILY    ondansetron hcl (ZOFRAN) 4 mg, Oral, EVERY 8 HOURS AS NEEDED    Oxygen No dose, route, or frequency recorded.  primidone (MYSOLINE) 100 mg, Oral, 3 TIMES DAILY    senna-docusate (PERICOLACE) 8.6-50 mg per tablet 1 Tablet, Oral, EVERY BEDTIME    sertraline (ZOLOFT) 100 mg tablet Take 2 tablets daily ( Dose increased from previously 100 mg daily )    sucralfate (CARAFATE) 1 gram tablet No dose, route, or frequency recorded.  tiotropium (SPIRIVA) 18 mcg, Inhalation, DAILY    topiramate (TOPAMAX) 100 mg tablet Take 1 tablet in the morning with breakfast and 1 tablet at night.  traZODone (DESYREL) 100 mg, Oral, AT BEDTIME       Objective:     Patient Vitals for the past 24 hrs:   Temp Pulse Resp BP SpO2   04/09/22 2111 97.8 °F (36.6 °C) 77 18 130/76 100 %   04/1945    (!) 142/69 100 %   04/09/22 1637 98.2 °F (36.8 °C)       04/09/22 1634  83 22 (!) 145/65 100 %     Oxygen Therapy  O2 Sat (%): 100 % (04/09/22 2111)  Pulse via Oximetry: 76 beats per minute (04/1945)  O2 Device: Nasal cannula (04/09/22 2111)    Estimated body mass index is 16.45 kg/m² as calculated from the following:    Height as of this encounter: 5' 7\" (1.702 m). Weight as of this encounter: 47.6 kg (105 lb). Intake/Output Summary (Last 24 hours) at 4/9/2022 2156  Last data filed at 4/9/2022 2047  Gross per 24 hour   Intake    Output 725 ml   Net -725 ml         Physical Exam:  General:    Well nourished. No overt distress  Head:  Normocephalic, atraumatic  Eyes:  Sclerae appear normal.  Pupils equally round. HENT:  Nares appear normal, no drainage.   Moist mucous membranes  Neck:  No restricted ROM. Trachea midline  CV:   RRR. S1/S2 auscultated  Lungs:   Diminished, course lung sounds bilaterally. No current wheezing  Abdomen: Bowel sounds present. Soft, nontender, nondistended. Extremities: Warm and dry. No cyanosis or clubbing. 2+ BLE edema. Skin:     No rashes. Normal turgor. Normal coloration  Neuro:  Cranial nerves II-XII grossly intact. Sensation intact  Psych:  Normal mood and affect. Alert and oriented x3    Data Ordered and Personally Reviewed:    Last 24hr Labs:  Recent Results (from the past 24 hour(s))   EKG, 12 LEAD, INITIAL    Collection Time: 04/09/22  4:35 PM   Result Value Ref Range    Ventricular Rate 84 BPM    Atrial Rate 102 BPM    QRS Duration 116 ms    Q-T Interval 402 ms    QTC Calculation (Bezet) 475 ms    Calculated R Axis -145 degrees    Calculated T Axis 45 degrees    Diagnosis       Atrial fibrillation  Incomplete right bundle branch block  Abnormal ECG  When compared with ECG of 24-DEC-2021 17:56,  No significant change was found  Confirmed by Eliane Silva ([id number]) on 4/9/2022 8:28:00 PM     CBC WITH AUTOMATED DIFF    Collection Time: 04/09/22  4:40 PM   Result Value Ref Range    WBC 7.0 4.3 - 11.1 K/uL    RBC 3.48 (L) 4.05 - 5.2 M/uL    HGB 10.2 (L) 11.7 - 15.4 g/dL    HCT 34.3 (L) 35.8 - 46.3 %    MCV 98.6 (H) 79.6 - 97.8 FL    MCH 29.3 26.1 - 32.9 PG    MCHC 29.7 (L) 31.4 - 35.0 g/dL    RDW 14.6 11.9 - 14.6 %    PLATELET 899 596 - 996 K/uL    MPV 9.5 9.4 - 12.3 FL    ABSOLUTE NRBC 0.00 0.0 - 0.2 K/uL    DF AUTOMATED      NEUTROPHILS 72 43 - 78 %    LYMPHOCYTES 20 13 - 44 %    MONOCYTES 6 4.0 - 12.0 %    EOSINOPHILS 0 (L) 0.5 - 7.8 %    BASOPHILS 1 0.0 - 2.0 %    IMMATURE GRANULOCYTES 1 0.0 - 5.0 %    ABS. NEUTROPHILS 5.1 1.7 - 8.2 K/UL    ABS. LYMPHOCYTES 1.4 0.5 - 4.6 K/UL    ABS. MONOCYTES 0.4 0.1 - 1.3 K/UL    ABS. EOSINOPHILS 0.0 0.0 - 0.8 K/UL    ABS. BASOPHILS 0.1 0.0 - 0.2 K/UL    ABS. IMM.  GRANS. 0.1 0.0 - 0.5 K/UL   METABOLIC PANEL, COMPREHENSIVE    Collection Time: 04/09/22  4:40 PM   Result Value Ref Range    Sodium 139 136 - 145 mmol/L    Potassium 4.0 3.5 - 5.1 mmol/L    Chloride 107 98 - 107 mmol/L    CO2 26 21 - 32 mmol/L    Anion gap 6 (L) 7 - 16 mmol/L    Glucose 108 (H) 65 - 100 mg/dL    BUN 20 8 - 23 MG/DL    Creatinine 0.87 0.6 - 1.0 MG/DL    GFR est AA >60 >60 ml/min/1.73m2    GFR est non-AA >60 >60 ml/min/1.73m2    Calcium 8.5 8.3 - 10.4 MG/DL    Bilirubin, total 0.2 0.2 - 1.1 MG/DL    ALT (SGPT) 10 (L) 12 - 65 U/L    AST (SGOT) 6 (L) 15 - 37 U/L    Alk. phosphatase 100 50 - 136 U/L    Protein, total 5.4 (L) 6.3 - 8.2 g/dL    Albumin 3.0 (L) 3.2 - 4.6 g/dL    Globulin 2.4 2.3 - 3.5 g/dL    A-G Ratio 1.3 1.2 - 3.5     MAGNESIUM    Collection Time: 04/09/22  4:40 PM   Result Value Ref Range    Magnesium 1.9 1.8 - 2.4 mg/dL   TROPONIN-HIGH SENSITIVITY    Collection Time: 04/09/22  4:40 PM   Result Value Ref Range    Troponin-High Sensitivity 23.9 (H) 0 - 14 pg/mL   NT-PRO BNP    Collection Time: 04/09/22  4:40 PM   Result Value Ref Range    NT pro-BNP 5,118 (H) <450 PG/ML   TROPONIN-HIGH SENSITIVITY    Collection Time: 04/09/22  7:44 PM   Result Value Ref Range    Troponin-High Sensitivity 23.0 (H) 0 - 14 pg/mL       All Micro Results     None          Other Studies:  XR CHEST PORT    Result Date: 4/9/2022  PORTABLE CHEST, April 9, 2022 at 1705 hours CLINICAL HISTORY:  Bilateral wheezing and hypoxemia. COMPARISON:  December 24, 2021. FINDINGS:  AP erect image demonstrates hyperinflation with mild cardiomegaly, pulmonary venous congestion, edema, small pleural effusions in a pattern most consistent with congestive heart failure. No definite pneumothorax. The bony thorax appears intact on this view. There are overlying radiopaque support devices. EMPHYSEMA WITH MILD CARDIOMEGALY AND FINDINGS MOST CONSISTENT WITH CONGESTIVE HEART FAILURE.         Medications Administered     furosemide (LASIX) injection 40 mg     Admin Date  04/09/2022 Action  Given Dose  40 mg Route  IntraVENous Administered By  Jeevan Tobias RN                  Signed:  Pavan Callaway MD

## 2022-04-10 ENCOUNTER — APPOINTMENT (OUTPATIENT)
Dept: NON INVASIVE DIAGNOSTICS | Age: 77
DRG: 291 | End: 2022-04-10
Attending: FAMILY MEDICINE
Payer: MEDICARE

## 2022-04-10 PROBLEM — J44.9 PULMONARY HYPERTENSION DUE TO CHRONIC OBSTRUCTIVE PULMONARY DISEASE (HCC): Chronic | Status: ACTIVE | Noted: 2022-04-09

## 2022-04-10 PROBLEM — I27.23 PULMONARY HYPERTENSION DUE TO CHRONIC OBSTRUCTIVE PULMONARY DISEASE (HCC): Chronic | Status: ACTIVE | Noted: 2022-04-09

## 2022-04-10 PROBLEM — Z71.89 DO NOT RESUSCITATE DISCUSSION: Status: ACTIVE | Noted: 2022-04-10

## 2022-04-10 PROBLEM — D69.2 SENILE PURPURA (HCC): Chronic | Status: ACTIVE | Noted: 2022-04-10

## 2022-04-10 PROBLEM — Z66 DO NOT RESUSCITATE STATUS: Chronic | Status: ACTIVE | Noted: 2022-04-10

## 2022-04-10 PROBLEM — I27.23 PULMONARY HYPERTENSION DUE TO CHRONIC OBSTRUCTIVE PULMONARY DISEASE (HCC): Status: ACTIVE | Noted: 2022-04-09

## 2022-04-10 PROBLEM — J44.9 PULMONARY HYPERTENSION DUE TO CHRONIC OBSTRUCTIVE PULMONARY DISEASE (HCC): Status: ACTIVE | Noted: 2022-04-09

## 2022-04-10 LAB
ANION GAP SERPL CALC-SCNC: 6 MMOL/L (ref 7–16)
BUN SERPL-MCNC: 19 MG/DL (ref 8–23)
CALCIUM SERPL-MCNC: 7.8 MG/DL (ref 8.3–10.4)
CHLORIDE SERPL-SCNC: 109 MMOL/L (ref 98–107)
CO2 SERPL-SCNC: 28 MMOL/L (ref 21–32)
CREAT SERPL-MCNC: 0.79 MG/DL (ref 0.6–1)
ECHO AO ASC DIAM: 2.4 CM
ECHO AO ASCENDING AORTA INDEX: 1.56 CM/M2
ECHO AO ROOT DIAM: 3.2 CM
ECHO AO ROOT INDEX: 2.08 CM/M2
ECHO AV AREA PEAK VELOCITY: 2.1 CM2
ECHO AV AREA VTI: 2 CM2
ECHO AV AREA/BSA PEAK VELOCITY: 1.4 CM2/M2
ECHO AV AREA/BSA VTI: 1.3 CM2/M2
ECHO AV MEAN GRADIENT: 3 MMHG
ECHO AV MEAN VELOCITY: 0.8 M/S
ECHO AV PEAK GRADIENT: 7 MMHG
ECHO AV PEAK VELOCITY: 1.4 M/S
ECHO AV VELOCITY RATIO: 0.64
ECHO AV VTI: 25.3 CM
ECHO EST RA PRESSURE: 3 MMHG
ECHO IVC EXP: 1.2 CM
ECHO LA AREA 2C: 27 CM2
ECHO LA AREA 4C: 26 CM2
ECHO LA DIAMETER INDEX: 3.18 CM/M2
ECHO LA DIAMETER: 4.9 CM
ECHO LA MAJOR AXIS: 6.4 CM
ECHO LA MINOR AXIS: 6.7 CM
ECHO LA TO AORTIC ROOT RATIO: 1.53
ECHO LA VOL BP: 87 ML (ref 22–52)
ECHO LA VOL/BSA BIPLANE: 56 ML/M2 (ref 16–34)
ECHO LV E' LATERAL VELOCITY: 11 CM/S
ECHO LV E' SEPTAL VELOCITY: 9 CM/S
ECHO LV EDV A2C: 88 ML
ECHO LV EDV A4C: 79 ML
ECHO LV EDV INDEX A4C: 51 ML/M2
ECHO LV EDV NDEX A2C: 57 ML/M2
ECHO LV EJECTION FRACTION A2C: 59 %
ECHO LV EJECTION FRACTION A4C: 60 %
ECHO LV EJECTION FRACTION BIPLANE: 59 % (ref 55–100)
ECHO LV ESV A2C: 36 ML
ECHO LV ESV A4C: 31 ML
ECHO LV ESV INDEX A2C: 23 ML/M2
ECHO LV ESV INDEX A4C: 20 ML/M2
ECHO LV FRACTIONAL SHORTENING: 33 % (ref 28–44)
ECHO LV INTERNAL DIMENSION DIASTOLE INDEX: 3.12 CM/M2
ECHO LV INTERNAL DIMENSION DIASTOLIC: 4.8 CM (ref 3.9–5.3)
ECHO LV INTERNAL DIMENSION SYSTOLIC INDEX: 2.08 CM/M2
ECHO LV INTERNAL DIMENSION SYSTOLIC: 3.2 CM
ECHO LV IVSD: 0.9 CM (ref 0.6–0.9)
ECHO LV MASS 2D: 170.2 G (ref 67–162)
ECHO LV MASS INDEX 2D: 110.5 G/M2 (ref 43–95)
ECHO LV POSTERIOR WALL DIASTOLIC: 1.1 CM (ref 0.6–0.9)
ECHO LV RELATIVE WALL THICKNESS RATIO: 0.46
ECHO LVOT AREA: 3.1 CM2
ECHO LVOT AV VTI INDEX: 0.62
ECHO LVOT DIAM: 2 CM
ECHO LVOT MEAN GRADIENT: 1 MMHG
ECHO LVOT PEAK GRADIENT: 3 MMHG
ECHO LVOT PEAK VELOCITY: 0.9 M/S
ECHO LVOT STROKE VOLUME INDEX: 32.2 ML/M2
ECHO LVOT SV: 49.6 ML
ECHO LVOT VTI: 15.8 CM
ECHO MV E DECELERATION TIME (DT): 147 MS
ECHO MV E VELOCITY: 1.15 M/S
ECHO MV E/E' LATERAL: 10.45
ECHO MV E/E' RATIO (AVERAGED): 11.62
ECHO MV E/E' SEPTAL: 12.78
ECHO MV REGURGITANT PEAK GRADIENT: 117 MMHG
ECHO MV REGURGITANT PEAK VELOCITY: 5.4 M/S
ECHO MV REGURGITANT VTIA: 183 CM
ECHO PV ACCELERATION TIME (AT): 87 MS
ECHO PV MAX VELOCITY: 1.1 M/S
ECHO PV PEAK GRADIENT: 4 MMHG
ECHO RIGHT VENTRICULAR SYSTOLIC PRESSURE (RVSP): 57 MMHG
ECHO RV BASAL DIMENSION: 3.3 CM
ECHO RV INTERNAL DIMENSION: 3 CM
ECHO RV TAPSE: 1.8 CM (ref 1.7–?)
ECHO TV REGURGITANT MAX VELOCITY: 3.69 M/S
ECHO TV REGURGITANT PEAK GRADIENT: 54 MMHG
ERYTHROCYTE [DISTWIDTH] IN BLOOD BY AUTOMATED COUNT: 14.5 % (ref 11.9–14.6)
GLUCOSE SERPL-MCNC: 83 MG/DL (ref 65–100)
HCT VFR BLD AUTO: 31.7 % (ref 35.8–46.3)
HGB BLD-MCNC: 9.6 G/DL (ref 11.7–15.4)
MAGNESIUM SERPL-MCNC: 1.8 MG/DL (ref 1.8–2.4)
MCH RBC QN AUTO: 29.7 PG (ref 26.1–32.9)
MCHC RBC AUTO-ENTMCNC: 30.3 G/DL (ref 31.4–35)
MCV RBC AUTO: 98.1 FL (ref 79.6–97.8)
NRBC # BLD: 0 K/UL (ref 0–0.2)
PLATELET # BLD AUTO: 171 K/UL (ref 150–450)
PMV BLD AUTO: 10.2 FL (ref 9.4–12.3)
POTASSIUM SERPL-SCNC: 3.3 MMOL/L (ref 3.5–5.1)
RBC # BLD AUTO: 3.23 M/UL (ref 4.05–5.2)
SODIUM SERPL-SCNC: 143 MMOL/L (ref 136–145)
WBC # BLD AUTO: 4.5 K/UL (ref 4.3–11.1)

## 2022-04-10 PROCEDURE — 36415 COLL VENOUS BLD VENIPUNCTURE: CPT

## 2022-04-10 PROCEDURE — 74011000258 HC RX REV CODE- 258: Performed by: FAMILY MEDICINE

## 2022-04-10 PROCEDURE — 65270000029 HC RM PRIVATE

## 2022-04-10 PROCEDURE — 94760 N-INVAS EAR/PLS OXIMETRY 1: CPT

## 2022-04-10 PROCEDURE — 74011250637 HC RX REV CODE- 250/637: Performed by: FAMILY MEDICINE

## 2022-04-10 PROCEDURE — 77010033678 HC OXYGEN DAILY

## 2022-04-10 PROCEDURE — 74011636637 HC RX REV CODE- 636/637: Performed by: FAMILY MEDICINE

## 2022-04-10 PROCEDURE — 74011250636 HC RX REV CODE- 250/636: Performed by: FAMILY MEDICINE

## 2022-04-10 PROCEDURE — 74011000250 HC RX REV CODE- 250: Performed by: FAMILY MEDICINE

## 2022-04-10 PROCEDURE — 94640 AIRWAY INHALATION TREATMENT: CPT

## 2022-04-10 PROCEDURE — 83735 ASSAY OF MAGNESIUM: CPT

## 2022-04-10 PROCEDURE — 2709999900 HC NON-CHARGEABLE SUPPLY

## 2022-04-10 PROCEDURE — 80048 BASIC METABOLIC PNL TOTAL CA: CPT

## 2022-04-10 PROCEDURE — 85027 COMPLETE CBC AUTOMATED: CPT

## 2022-04-10 PROCEDURE — 93306 TTE W/DOPPLER COMPLETE: CPT

## 2022-04-10 RX ORDER — PREDNISONE 20 MG/1
40 TABLET ORAL
Status: DISCONTINUED | OUTPATIENT
Start: 2022-04-10 | End: 2022-04-11

## 2022-04-10 RX ORDER — NICOTINE 7MG/24HR
1 PATCH, TRANSDERMAL 24 HOURS TRANSDERMAL DAILY
Status: DISCONTINUED | OUTPATIENT
Start: 2022-04-10 | End: 2022-04-12 | Stop reason: HOSPADM

## 2022-04-10 RX ORDER — ALBUTEROL SULFATE 0.83 MG/ML
2.5 SOLUTION RESPIRATORY (INHALATION)
Status: DISCONTINUED | OUTPATIENT
Start: 2022-04-10 | End: 2022-04-12 | Stop reason: HOSPADM

## 2022-04-10 RX ORDER — POTASSIUM CHLORIDE 20 MEQ/1
40 TABLET, EXTENDED RELEASE ORAL 2 TIMES DAILY
Status: COMPLETED | OUTPATIENT
Start: 2022-04-10 | End: 2022-04-10

## 2022-04-10 RX ORDER — BUSPIRONE HYDROCHLORIDE 5 MG/1
7.5 TABLET ORAL 3 TIMES DAILY
Status: DISCONTINUED | OUTPATIENT
Start: 2022-04-10 | End: 2022-04-12 | Stop reason: HOSPADM

## 2022-04-10 RX ADMIN — POTASSIUM CHLORIDE 40 MEQ: 20 TABLET, EXTENDED RELEASE ORAL at 09:31

## 2022-04-10 RX ADMIN — TOPIRAMATE 100 MG: 100 TABLET, FILM COATED ORAL at 21:17

## 2022-04-10 RX ADMIN — ALBUTEROL SULFATE 2.5 MG: 2.5 SOLUTION RESPIRATORY (INHALATION) at 20:24

## 2022-04-10 RX ADMIN — ENOXAPARIN SODIUM 30 MG: 100 INJECTION SUBCUTANEOUS at 21:16

## 2022-04-10 RX ADMIN — BUSPIRONE HYDROCHLORIDE 7.5 MG: 5 TABLET ORAL at 15:50

## 2022-04-10 RX ADMIN — TOPIRAMATE 100 MG: 100 TABLET, FILM COATED ORAL at 08:31

## 2022-04-10 RX ADMIN — MOMETASONE FUROATE AND FORMOTEROL FUMARATE DIHYDRATE 2 PUFF: 200; 5 AEROSOL RESPIRATORY (INHALATION) at 09:19

## 2022-04-10 RX ADMIN — PRIMIDONE 100 MG: 50 TABLET ORAL at 09:30

## 2022-04-10 RX ADMIN — PRIMIDONE 100 MG: 50 TABLET ORAL at 21:17

## 2022-04-10 RX ADMIN — CROMOLYN SODIUM 1 DROP: 40 SOLUTION/ DROPS OPHTHALMIC at 22:00

## 2022-04-10 RX ADMIN — FUROSEMIDE 40 MG: 10 INJECTION, SOLUTION INTRAMUSCULAR; INTRAVENOUS at 08:31

## 2022-04-10 RX ADMIN — PRIMIDONE 100 MG: 50 TABLET ORAL at 15:50

## 2022-04-10 RX ADMIN — POTASSIUM CHLORIDE 40 MEQ: 20 TABLET, EXTENDED RELEASE ORAL at 18:00

## 2022-04-10 RX ADMIN — MONTELUKAST SODIUM 1 G: 4 TABLET, CHEWABLE ORAL at 18:00

## 2022-04-10 RX ADMIN — DOCUSATE SODIUM 50MG AND SENNOSIDES 8.6MG 1 TABLET: 8.6; 5 TABLET, FILM COATED ORAL at 21:17

## 2022-04-10 RX ADMIN — GABAPENTIN 100 MG: 100 CAPSULE ORAL at 21:16

## 2022-04-10 RX ADMIN — SUCRALFATE 1 G: 1 TABLET ORAL at 21:16

## 2022-04-10 RX ADMIN — SODIUM CHLORIDE, PRESERVATIVE FREE 10 ML: 5 INJECTION INTRAVENOUS at 08:41

## 2022-04-10 RX ADMIN — PANTOPRAZOLE SODIUM 40 MG: 40 TABLET, DELAYED RELEASE ORAL at 06:06

## 2022-04-10 RX ADMIN — CETIRIZINE HYDROCHLORIDE 10 MG: 10 TABLET, FILM COATED ORAL at 08:31

## 2022-04-10 RX ADMIN — HYDROCODONE BITARTRATE AND ACETAMINOPHEN 1 TABLET: 10; 325 TABLET ORAL at 06:10

## 2022-04-10 RX ADMIN — GABAPENTIN 100 MG: 100 CAPSULE ORAL at 08:31

## 2022-04-10 RX ADMIN — SUCRALFATE 1 G: 1 TABLET ORAL at 15:49

## 2022-04-10 RX ADMIN — TIOTROPIUM BROMIDE INHALATION SPRAY 2 PUFF: 3.12 SPRAY, METERED RESPIRATORY (INHALATION) at 09:20

## 2022-04-10 RX ADMIN — PREDNISONE 40 MG: 20 TABLET ORAL at 15:19

## 2022-04-10 RX ADMIN — HYDROCODONE BITARTRATE AND ACETAMINOPHEN 1 TABLET: 10; 325 TABLET ORAL at 12:51

## 2022-04-10 RX ADMIN — DILTIAZEM HYDROCHLORIDE 120 MG: 120 CAPSULE, COATED, EXTENDED RELEASE ORAL at 08:31

## 2022-04-10 RX ADMIN — FUROSEMIDE 40 MG: 10 INJECTION, SOLUTION INTRAMUSCULAR; INTRAVENOUS at 17:59

## 2022-04-10 RX ADMIN — SODIUM CHLORIDE, PRESERVATIVE FREE 10 ML: 5 INJECTION INTRAVENOUS at 21:22

## 2022-04-10 RX ADMIN — SODIUM CHLORIDE, PRESERVATIVE FREE 10 ML: 5 INJECTION INTRAVENOUS at 15:21

## 2022-04-10 RX ADMIN — GABAPENTIN 100 MG: 100 CAPSULE ORAL at 15:50

## 2022-04-10 RX ADMIN — CEFEPIME HYDROCHLORIDE 2 G: 2 INJECTION, POWDER, FOR SOLUTION INTRAVENOUS at 15:18

## 2022-04-10 RX ADMIN — HYDROCODONE BITARTRATE AND ACETAMINOPHEN 1 TABLET: 10; 325 TABLET ORAL at 20:05

## 2022-04-10 RX ADMIN — BUDESONIDE 6 MG: 3 CAPSULE, GELATIN COATED ORAL at 06:06

## 2022-04-10 RX ADMIN — SERTRALINE 100 MG: 100 TABLET, FILM COATED ORAL at 08:31

## 2022-04-10 RX ADMIN — CROMOLYN SODIUM 1 DROP: 40 SOLUTION/ DROPS OPHTHALMIC at 15:51

## 2022-04-10 RX ADMIN — ATORVASTATIN CALCIUM 80 MG: 40 TABLET, FILM COATED ORAL at 21:17

## 2022-04-10 RX ADMIN — MONTELUKAST SODIUM 1 G: 4 TABLET, CHEWABLE ORAL at 12:43

## 2022-04-10 RX ADMIN — BUSPIRONE HYDROCHLORIDE 7.5 MG: 5 TABLET ORAL at 21:17

## 2022-04-10 RX ADMIN — ALBUTEROL SULFATE 2.5 MG: 2.5 SOLUTION RESPIRATORY (INHALATION) at 16:11

## 2022-04-10 RX ADMIN — SUCRALFATE 1 G: 1 TABLET ORAL at 12:43

## 2022-04-10 RX ADMIN — SERTRALINE 100 MG: 100 TABLET, FILM COATED ORAL at 21:17

## 2022-04-10 NOTE — PROGRESS NOTES
Hospitalist Progress Note   Admit Date:  2022  4:28 PM   Name:  Neil Wilkerson   Age:  64 y.o. Sex:  female  :  1945   MRN:  [id number]   Room:  [facility]/    Presenting Complaint: Shortness of Breath    Reason(s) for Admission: Pulmonary edema [J81.1]     Hospital Course & Interval History:   64 y.o. female with medical history of COPD on 3lpm chronically, GERD, afib not on anticoagulation, depression, fibromyalgia who presented to ED with shortness of breath. Symptoms have been worsening over the past week or so. States that she noticed her lower legs swelling as well. Her caretaker came to visit and noticed that she appeared to be in respiratory distress and called EMS. Upon ER evaluation, patient was on CPAP, she had received albuterol nebulizer en route to ER. Patient was placed on 3.5lpm supplemental O2 in ER. CXR shows evidence of pulmonary edema and small pleural effusions. pBNP is elevated at 5,118. Given concern for CHF exacerbation, Hospitalist asked to admit. Subjective/24hr Events (04/10/22): Feeling short of breath. Stable urine output and renal function. Left ventricular function intact with severe pulmonary hypertension as likely cause of pulmonary edema. ROS:  10 systems reviewed and negative except as noted above. Assessment & Plan:   * Pulmonary hypertension due to chronic obstructive pulmonary disease ([address]  Requiring slightly more O2 than baseline (3.5lpm compared to 3lpm chronically). Echocardiogram shows RVSP of 57. pBNP is elevated at 5,118.  CXR shows pulmonary vascular congestion, pulmonary edema, and small pleural effusions  - Lasix 40mg BID  - Strict I/Os  - Consult pulmonology    COPD exacerbation ([address]  Wheezing in the emergency department, unable to locate pulmonary function tests in chart including at [facility], history suggestive of severe COPD  -Prednisone x5d  -Cefepime  -Scheduled albuterol    Body mass index (BMI) less than 16.5  -Consult nutrition services    Atrial fibrillation ([facility]  - Noted to be in afib with RVR during hospitalization in February 2021, she was started on cardizem at that time. Due to h/o frequent falls, debility, low BMI, GERD, the patient was found to be at high risk of bleeding and was not started on chronic anticoagulation at that time  - Currently rate-controlled  - Continue cardizem    Do not resuscitate discussion  Confirmed this admission, see ACP document 4/10    Depression, major, in remission ([facility]  - Home meds    Chronic kidney disease (CKD) stage G3b/A1, moderately decreased glomerular filtration rate (GFR) between 30-44 mL/min/1.73 square meter and albuminuria creatinine ratio less than 30 mg/g (HCC)  - Stable  - Continue to monitor renal function    Smokers' cough ([facility])  -Nicotine patch    Sleep apnea  - CPAP QHS    COPD (chronic obstructive pulmonary disease) ([facility]  - Home meds/nebulizers        Discharge Planning:    Pending clinical improvement    Diet:  ADULT DIET Regular;  Low Sodium (2 gm)  DVT PPx: Enoxaparin  Code status: DNR    Hospital Problems as of 4/10/2022 Date Reviewed: 2/28/2022          Codes Class Noted - Resolved POA    * (Principal) Pulmonary hypertension due to chronic obstructive pulmonary disease (HCC) (Chronic) ICD-10-CM: I27.23, J44.9  ICD-9-CM: 416.8, 496  4/9/2022 - Present Yes        COPD exacerbation ([facility] ICD-10-CM: J44.1  ICD-9-CM: 491.21  12/25/2021 - Present Yes        Body mass index (BMI) less than 16.5 (Chronic) ICD-10-CM: Z68.1  ICD-9-CM: V85.0  4/10/2022 - Present Yes        Atrial fibrillation (HCC) ICD-10-CM: I48.91  ICD-9-CM: 427.31  2/6/2021 - Present Yes        Do not resuscitate discussion ICD-10-CM: Z71.89  ICD-9-CM: V65.49  4/10/2022 - Present Yes        Do not resuscitate status (Chronic) ICD-10-CM: Z66  ICD-9-CM: V49.86  4/10/2022 - Present Yes        Senile purpura ([facility] (Chronic) ICD-10-CM: L89.8  ICD-9-CM: 287.2  4/10/2022 - Present Yes        Severe protein-calorie malnutrition [facility]) ICD-10-CM: I53  ICD-9-CM: 262  2/15/2021 - Present Yes        Depression, major, in remission [facility]) ICD-10-CM: F32.5  ICD-9-CM: 296.25  1/31/2018 - Present Yes        Chronic kidney disease (CKD) stage G3b/A1, moderately decreased glomerular filtration rate (GFR) between 30-44 mL/min/1.73 square meter and albuminuria creatinine ratio less than 30 mg/g [facility]) ICD-10-CM: G42.32  ICD-9-CM: 585.3  10/14/2016 - Present Yes        Smokers' cough ([address] (Chronic) ICD-10-CM: J41.0  ICD-9-CM: 491.0  11/4/2015 - Present Yes        COPD (chronic obstructive pulmonary disease) (HCC) (Chronic) ICD-10-CM: J44.9  ICD-9-CM: 496  12/30/2009 - Present Yes        Sleep apnea (Chronic) ICD-10-CM: G47.30  ICD-9-CM: 780.57  12/30/2009 - Present Yes              Objective:     Patient Vitals for the past 24 hrs:   Temp Pulse Resp BP SpO2   04/10/22 1217 98.2 °F (36.8 °C) 93 20 106/61 100 %   04/10/22 1002 97.8 °F (36.6 °C) 95 20 124/71 98 %   04/10/22 0921     97 %   04/10/22 0730  76      04/10/22 0322 97.7 °F (36.5 °C) 91 16 117/82 98 %   04/09/22 2318     99 %   04/09/22 2316  79      04/09/22 2311 97 °F (36.1 °C) 70 18 131/80 99 %   04/09/22 2111 97.8 °F (36.6 °C) 77 18 130/76 100 %   04/1945    (!) 142/69 100 %   04/09/22 1637 98.2 °F (36.8 °C)       04/09/22 1634  83 22 (!) 145/65 100 %     Oxygen Therapy  O2 Sat (%): 100 % (04/10/22 1217)  Pulse via Oximetry: 74 beats per minute (04/10/22 0921)  O2 Device: None (Room air) (04/10/22 1217)  O2 Flow Rate (L/min): 3 l/min (04/10/22 0921)  FIO2 (%): 32 % (04/09/22 2318)    Estimated body mass index is 16.45 kg/m² as calculated from the following:    Height as of this encounter: 5' 7\" (1.702 m). Weight as of this encounter: 47.6 kg (105 lb).     Intake/Output Summary (Last 24 hours) at 4/10/2022 1410  Last data filed at 4/10/2022 1047  Gross per 24 hour   Intake    Output 2400 ml   Net -2400 ml       Blood pressure 106/61, pulse 93, temperature 98.2 °F (36.8 °C), resp. rate 20, height 5' 7\" (1.702 m), weight 47.6 kg (105 lb), SpO2 100 %. Physical Exam  Vitals and nursing note reviewed. Constitutional:       General: She is in acute distress (mild). Appearance: Normal appearance. She is cachectic. She is ill-appearing. Interventions: Nasal cannula in place. HENT:      Head: Normocephalic. Comments: Temporal wasting  Eyes:      Extraocular Movements: Extraocular movements intact. Cardiovascular:      Rate and Rhythm: Normal rate. Rhythm irregular. Pulses:           Radial pulses are 2+ on the left side. Heart sounds: No murmur heard. No gallop. Pulmonary:      Effort: Respiratory distress present. Breath sounds: Wheezing present. Abdominal:      Palpations: Abdomen is soft. Tenderness: There is no abdominal tenderness. Musculoskeletal:         General: No deformity. Cervical back: No rigidity. Right lower leg: No edema. Left lower leg: No edema. Comments: Thenar atrophy   Skin:     General: Skin is warm and dry. Neurological:      General: No focal deficit present. Mental Status: She is alert and oriented to person, place, and time. Psychiatric:         Mood and Affect: Mood normal.         Behavior: Behavior normal. Behavior is cooperative. Cognition and Memory: Cognition normal. Memory is impaired.          I have reviewed ordered lab tests and independently visualized imaging below:    Recent Labs:  Recent Results (from the past 48 hour(s))   EKG, 12 LEAD, INITIAL    Collection Time: 04/09/22  4:35 PM   Result Value Ref Range    Ventricular Rate 84 BPM    Atrial Rate 102 BPM    QRS Duration 116 ms    Q-T Interval 402 ms    QTC Calculation (Bezet) 475 ms    Calculated R Axis -145 degrees    Calculated T Axis 45 degrees    Diagnosis       Atrial fibrillation  Incomplete right bundle branch block  Abnormal ECG  When compared with ECG of 24-DEC-2021 17:56,  No significant change was found  Confirmed by Angela Stewart ([id number]) on 4/9/2022 8:28:00 PM     CBC WITH AUTOMATED DIFF    Collection Time: 04/09/22  4:40 PM   Result Value Ref Range    WBC 7.0 4.3 - 11.1 K/uL    RBC 3.48 (L) 4.05 - 5.2 M/uL    HGB 10.2 (L) 11.7 - 15.4 g/dL    HCT 34.3 (L) 35.8 - 46.3 %    MCV 98.6 (H) 79.6 - 97.8 FL    MCH 29.3 26.1 - 32.9 PG    MCHC 29.7 (L) 31.4 - 35.0 g/dL    RDW 14.6 11.9 - 14.6 %    PLATELET 612 479 - 791 K/uL    MPV 9.5 9.4 - 12.3 FL    ABSOLUTE NRBC 0.00 0.0 - 0.2 K/uL    DF AUTOMATED      NEUTROPHILS 72 43 - 78 %    LYMPHOCYTES 20 13 - 44 %    MONOCYTES 6 4.0 - 12.0 %    EOSINOPHILS 0 (L) 0.5 - 7.8 %    BASOPHILS 1 0.0 - 2.0 %    IMMATURE GRANULOCYTES 1 0.0 - 5.0 %    ABS. NEUTROPHILS 5.1 1.7 - 8.2 K/UL    ABS. LYMPHOCYTES 1.4 0.5 - 4.6 K/UL    ABS. MONOCYTES 0.4 0.1 - 1.3 K/UL    ABS. EOSINOPHILS 0.0 0.0 - 0.8 K/UL    ABS. BASOPHILS 0.1 0.0 - 0.2 K/UL    ABS. IMM. GRANS. 0.1 0.0 - 0.5 K/UL   METABOLIC PANEL, COMPREHENSIVE    Collection Time: 04/09/22  4:40 PM   Result Value Ref Range    Sodium 139 136 - 145 mmol/L    Potassium 4.0 3.5 - 5.1 mmol/L    Chloride 107 98 - 107 mmol/L    CO2 26 21 - 32 mmol/L    Anion gap 6 (L) 7 - 16 mmol/L    Glucose 108 (H) 65 - 100 mg/dL    BUN 20 8 - 23 MG/DL    Creatinine 0.87 0.6 - 1.0 MG/DL    GFR est AA >60 >60 ml/min/1.73m2    GFR est non-AA >60 >60 ml/min/1.73m2    Calcium 8.5 8.3 - 10.4 MG/DL    Bilirubin, total 0.2 0.2 - 1.1 MG/DL    ALT (SGPT) 10 (L) 12 - 65 U/L    AST (SGOT) 6 (L) 15 - 37 U/L    Alk.  phosphatase 100 50 - 136 U/L    Protein, total 5.4 (L) 6.3 - 8.2 g/dL    Albumin 3.0 (L) 3.2 - 4.6 g/dL    Globulin 2.4 2.3 - 3.5 g/dL    A-G Ratio 1.3 1.2 - 3.5     MAGNESIUM    Collection Time: 04/09/22  4:40 PM   Result Value Ref Range    Magnesium 1.9 1.8 - 2.4 mg/dL   TROPONIN-HIGH SENSITIVITY    Collection Time: 04/09/22  4:40 PM   Result Value Ref Range    Troponin-High Sensitivity 23.9 (H) 0 - 14 pg/mL   NT-PRO BNP    Collection Time: 04/09/22  4:40 PM   Result Value Ref Range    NT pro-BNP 5,118 (H) <450 PG/ML   TROPONIN-HIGH SENSITIVITY    Collection Time: 04/09/22  7:44 PM   Result Value Ref Range    Troponin-High Sensitivity 23.0 (H) 0 - 14 pg/mL   METABOLIC PANEL, BASIC    Collection Time: 04/10/22  4:55 AM   Result Value Ref Range    Sodium 143 136 - 145 mmol/L    Potassium 3.3 (L) 3.5 - 5.1 mmol/L    Chloride 109 (H) 98 - 107 mmol/L    CO2 28 21 - 32 mmol/L    Anion gap 6 (L) 7 - 16 mmol/L    Glucose 83 65 - 100 mg/dL    BUN 19 8 - 23 MG/DL    Creatinine 0.79 0.6 - 1.0 MG/DL    GFR est AA >60 >60 ml/min/1.73m2    GFR est non-AA >60 >60 ml/min/1.73m2    Calcium 7.8 (L) 8.3 - 10.4 MG/DL   CBC W/O DIFF    Collection Time: 04/10/22  4:55 AM   Result Value Ref Range    WBC 4.5 4.3 - 11.1 K/uL    RBC 3.23 (L) 4.05 - 5.2 M/uL    HGB 9.6 (L) 11.7 - 15.4 g/dL    HCT 31.7 (L) 35.8 - 46.3 %    MCV 98.1 (H) 79.6 - 97.8 FL    MCH 29.7 26.1 - 32.9 PG    MCHC 30.3 (L) 31.4 - 35.0 g/dL    RDW 14.5 11.9 - 14.6 %    PLATELET 771 611 - 274 K/uL    MPV 10.2 9.4 - 12.3 FL    ABSOLUTE NRBC 0.00 0.0 - 0.2 K/uL   MAGNESIUM    Collection Time: 04/10/22  4:55 AM   Result Value Ref Range    Magnesium 1.8 1.8 - 2.4 mg/dL   ECHO ADULT COMPLETE    Collection Time: 04/10/22  9:28 AM   Result Value Ref Range    LV EDV A2C 88 mL    LV EDV A4C 79 mL    LV ESV A2C 36 mL    LV ESV A4C 31 mL    IVSd 0.9 0.6 - 0.9 cm    LVIDd 4.8 3.9 - 5.3 cm    LVIDs 3.2 cm    LVOT Diameter 2.0 cm    LVOT Mean Gradient 1 mmHg    LVOT VTI 15.8 cm    LVOT Peak Velocity 0.9 m/s    LVOT Peak Gradient 3 mmHg    LVPWd 1.1 (A) 0.6 - 0.9 cm    LV E' Lateral Velocity 11 cm/s    LV E' Septal Velocity 9 cm/s    LV Ejection Fraction A2C 59 %    LV Ejection Fraction A4C 60 %    EF BP 59 55 - 100 %    LVOT Area 3.1 cm2    LVOT SV 49.6 ml    LA Minor Axis 6.7 cm    LA Major Axis 6.4 cm    LA Area 2C 27.0 cm2    LA Area 4C 26.0 cm2    LA Volume BP 87 (A) 22 - 52 mL    LA Diameter 4.9 cm    AV Mean Gradient 3 mmHg    AV VTI 25.3 cm    AV Mean Velocity 0.8 m/s    AV Peak Velocity 1.4 m/s    AV Peak Gradient 7 mmHg    AV Area by VTI 2.0 cm2    AV Area by Peak Velocity 2.1 cm2    Aortic Root 3.2 cm    Ascending Aorta 2.4 cm    IVC Expiration 1.2 cm    MR .0 cm    MR Peak Velocity 5.4 m/s    MR Peak Gradient 117 mmHg    MV E Wave Deceleration Time 147.0 ms    MV E Velocity 1.15 m/s    PV AT 87.0 ms    PV Max Velocity 1.1 m/s    PV Peak Gradient 4 mmHg    RVIDd 3.0 cm    RV Basal Dimension 3.3 cm    TAPSE 1.8 1.7 cm    TR Max Velocity 3.69 m/s    TR Peak Gradient 54 mmHg    Fractional Shortening 2D 33 28 - 44 %    LV ESV Index A4C 20 mL/m2    LV EDV Index A4C 51 mL/m2    LV ESV Index A2C 23 mL/m2    LV EDV Index A2C 57 mL/m2    LVIDd Index 3.12 cm/m2    LVIDs Index 2.08 cm/m2    LV RWT Ratio 0.46     LV Mass 2D 170.2 (A) 67 - 162 g    LV Mass 2D Index 110.5 (A) 43 - 95 g/m2    E/E' Ratio (Averaged) 11.62     E/E' Lateral 10.45     E/E' Septal 12.78     LA Volume Index BP 56 (A) 16 - 34 ml/m2    LVOT Stroke Volume Index 32.2 mL/m2    LA Size Index 3.18 cm/m2    LA/AO Root Ratio 1.53     Ao Root Index 2.08 cm/m2    Ascending Aorta Index 1.56 cm/m2    AV Velocity Ratio 0.64     LVOT:AV VTI Index 0.62     DAYDAY/BSA VTI 1.3 cm2/m2    DAYDAY/BSA Peak Velocity 1.4 cm2/m2    Est. RA Pressure 3 mmHg    RVSP 57 mmHg       All Micro Results     None          Other Studies:  XR CHEST PORT    Result Date: 4/9/2022  PORTABLE CHEST, April 9, 2022 at 1705 hours CLINICAL HISTORY:  Bilateral wheezing and hypoxemia. COMPARISON:  December 24, 2021. FINDINGS:  AP erect image demonstrates hyperinflation with mild cardiomegaly, pulmonary venous congestion, edema, small pleural effusions in a pattern most consistent with congestive heart failure. No definite pneumothorax. The bony thorax appears intact on this view. There are overlying radiopaque support devices.      EMPHYSEMA WITH MILD CARDIOMEGALY AND FINDINGS MOST CONSISTENT WITH CONGESTIVE HEART FAILURE.    ECHO ADULT COMPLETE    Result Date: 4/10/2022    Left Ventricle: Left ventricle size is normal. Mildly increased wall thickness. Normal wall motion. Normal left ventricular systolic function with a visually estimated EF of 60 - 65%. Normal diastolic function.   Aortic Valve: Mild sclerosis of the aortic valve cusp. Mild regurgitation. No stenosis.   Mitral Valve: Mildly calcified leaflet, at the posterior leaflet. Moderate transvalvular regurgitation with an eccentrically directed jet and may underestimate severity.   Left Atrium: Left atrium is moderately dilated.   Right Atrium: Right atrium is moderately dilated.        Current Meds:  Current Facility-Administered Medications   Medication Dose Route Frequency    potassium chloride (K-DUR, KLOR-CON M20) SR tablet 40 mEq  40 mEq Oral BID    busPIRone (BUSPAR) tablet 7.5 mg  7.5 mg Oral TID    nicotine (NICODERM CQ) 7 mg/24 hr patch 1 Patch  1 Patch TransDERmal DAILY    predniSONE (DELTASONE) tablet 40 mg  40 mg Oral DAILY WITH BREAKFAST    cefepime (MAXIPIME) 2 g in 0.9% sodium chloride (MBP/ADV) 100 mL MBP  2 g IntraVENous Q24H    albuterol (PROVENTIL VENTOLIN) nebulizer solution 2.5 mg  2.5 mg Nebulization Q6H RT    colestipoL (COLESTID) tablet 1 g (Patient Supplied)  1 g Oral BID    sertraline (ZOLOFT) tablet 100 mg  100 mg Oral BID    budesonide (ENTOCORT EC) capsule 6 mg  6 mg Oral 7am    mometasone-formoterol (DULERA) 200mcg-5mcg/puff  2 Puff Inhalation BID RT    tiotropium bromide (SPIRIVA RESPIMAT) 2.5 mcg /actuation  2 Puff Inhalation DAILY    atorvastatin (LIPITOR) tablet 80 mg  80 mg Oral QHS    metoclopramide HCl (REGLAN) tablet 5 mg  5 mg Oral TID PRN    senna-docusate (PERICOLACE) 8.6-50 mg per tablet 1 Tablet  1 Tablet Oral QHS    traZODone (DESYREL) tablet 100 mg  100 mg Oral QHS PRN    topiramate (TOPAMAX) tablet 100 mg  100 mg Oral BID    primidone (MYSOLINE) tablet 100 mg  100 mg Oral TID   Urszula Sloan celecoxib (CELEBREX) capsule 200 mg  200 mg Oral DAILY PRN    cetirizine (ZYRTEC) tablet 10 mg  10 mg Oral DAILY    dilTIAZem ER (CARDIZEM CD) capsule 120 mg  120 mg Oral DAILY    gabapentin (NEURONTIN) capsule 100 mg  100 mg Oral TID    HYDROcodone-acetaminophen (NORCO)  mg tablet 1 Tablet  1 Tablet Oral Q6H PRN    sucralfate (CARAFATE) tablet 1 g  1 g Oral AC&HS    pantoprazole (PROTONIX) tablet 40 mg  40 mg Oral ACB    cromolyn (OPTICROM) 4 % ophthalmic solution 1 Drop (Patient Supplied)  1 Drop Both Eyes TID    [Held by provider] LORazepam (ATIVAN) tablet 2 mg  2 mg Oral Q8H PRN    sodium chloride (NS) flush 5-40 mL  5-40 mL IntraVENous Q8H    sodium chloride (NS) flush 5-40 mL  5-40 mL IntraVENous PRN    acetaminophen (TYLENOL) tablet 650 mg  650 mg Oral Q6H PRN    Or    acetaminophen (TYLENOL) suppository 650 mg  650 mg Rectal Q6H PRN    polyethylene glycol (MIRALAX) packet 17 g  17 g Oral DAILY PRN    ondansetron (ZOFRAN ODT) tablet 4 mg  4 mg Oral Q8H PRN    Or    ondansetron (ZOFRAN) injection 4 mg  4 mg IntraVENous Q6H PRN    enoxaparin (LOVENOX) injection 30 mg  30 mg SubCUTAneous Q24H    furosemide (LASIX) injection 40 mg  40 mg IntraVENous BID       Signed:  Shamika Cordova MD

## 2022-04-10 NOTE — PROGRESS NOTES
TRANSFER - IN REPORT:    Verbal report received from Negro RN(name) on Lei Saliva  being received from ED(unit) for routine progression of care      Report consisted of patients Situation, Background, Assessment and   Recommendations(SBAR). Information from the following report(s) SBAR, Kardex, ED Summary, MAR, Accordion and Recent Results was reviewed with the receiving nurse. Opportunity for questions and clarification was provided. Assessment completed upon patients arrival to unit and care assumed.

## 2022-04-10 NOTE — PROGRESS NOTES
Per pharmacy, patient needs to have someone bring in Colestid, Cromolyn eye drops and Amitiza. Patient aware and verbalized understanding.

## 2022-04-10 NOTE — ASSESSMENT & PLAN NOTE
Wheezing in the emergency department, unable to locate pulmonary function tests in chart including at [facility], history suggestive of severe COPD  -Prednisone x5d  -Cefepime  -Scheduled albuterol    4/11: Stable but not yet returned to baseline, continue treatment

## 2022-04-10 NOTE — ACP (ADVANCE CARE PLANNING)
[facility] Hospitalist Service  At the heart of better care     Advance Care Planning   Admit Date:  2022  4:28 PM   Name:  Daniel Walden   Age:  64 y.o. Sex:  female  :  1945   MRN:  [id number]   Room:  [facility]    Daniel Walden is able to make her own decisions: Yes    If pt unable to make decisions, POA/surrogate decision maker:  Daughter    Other members present in the meeting:   None    Patient / surrogate decision-maker directed:  Code Status: DO NOT RESUSCITATE, DO NOT INTUBATE -okay for other aggressive medical and surgical intervention    Other ACP topics discussed, if applicable:   Prior hospice status, dissatisfied with care  Will meet with family to call daily  End-stage lung disease, poor prognosis    Patient or surrogate consented to discussion of the current conditions, workup, management plans, prognosis, and understand the risk for further deterioration. Time spent: 22 minutes in direct discussion (face to face and/or over phone).     Signed:  Zuleima To MD

## 2022-04-10 NOTE — PROGRESS NOTES
SW met with patient who confirmed demographic information, states that she lives alone and has local family that provides support. Patient sees Dr. Anurag Flores for primary care and is current. Patient requiring supplemental O2, states that she is on 3L at baseline (does not know the name of her DME company). Patient uses a walker to ambulate, endorses a hx of falling, and requires ADL assistance. Patient has a private caregiver that comes seven days a week twice a day to assist her. Patient states that she had [place] services but could not participate in therapy due to experiencing significant exertion with exercise. Anticipate that patient would benefit from [place] RN, [address] Aleksey Grajeda, and potentially PT and OT at discharge. Will order consults and follow for recommendations. ACP completed by provider.      Vania Martinez LMSW    [facility]    * [email or web address]

## 2022-04-10 NOTE — PROGRESS NOTES
04/09/22 2112   Dual Skin Pressure Injury Assessment   Dual Skin Pressure Injury Assessment X   Second Care Provider (Based on [address] [place] [address]) Rafiq CUEVAS   Skin Integumentary   Skin Integumentary (WDL) X    Pressure  Injury Documentation No Pressure Injury Noted-Pressure Ulcer Prevention Initiated   Skin Color Ecchymosis (comment)   Skin Condition/Temp Dry;Warm;Fragile;Flaky   Skin Integrity Intact

## 2022-04-10 NOTE — PROGRESS NOTES
Unable to reconcile home meds. Patient to have care aide to bring med list on 4/10/22. Pharmacy aware.

## 2022-04-10 NOTE — ED NOTES
TRANSFER - OUT REPORT:    Verbal report given to Alma(name) on Allison Smith  being transferred to 345(unit) for routine progression of care       Report consisted of patients Situation, Background, Assessment and   Recommendations(SBAR). Information from the following report(s) SBAR, ED Summary and MAR was reviewed with the receiving nurse. Lines:   Peripheral IV 13/13/01 Right Cephalic (Active)        Opportunity for questions and clarification was provided.       Patient transported with:   O2 @ 4 liters

## 2022-04-10 NOTE — PROGRESS NOTES
Trazodone 100 mg given per patients request for sleep. Bed alarm on, call light in reach. Instructed patient to call should needs arise.

## 2022-04-10 NOTE — ASSESSMENT & PLAN NOTE
Noted to be in afib with RVR during hospitalization in February 2021, she was started on cardizem at that time.   Due to h/o frequent falls, debility, low BMI, GERD, the patient was found to be at high risk of bleeding and was not started on chronic anticoagulation at that time  - Currently rate-controlled  - Continue cardizem    4/11: Stable continue management

## 2022-04-10 NOTE — PROGRESS NOTES
Admission assessment complete. Patient alert, oriented x 4. Patient hard of hearing, hearing aids with patient. Respirations even and unlabored at this time. Oxygen in place at 3 LPM via NC. Patient denies shortness of breath. Abdomen soft, bowel sounds active in all 4 quadrants. 3+ pitting edema to BLE. Gripper socks placed. Patient oriented to room and surroundings. All questions answered. Bed alarm on, call light in reach, side rails up x 2. Instructed patient to call should needs arise.

## 2022-04-10 NOTE — ASSESSMENT & PLAN NOTE
Requiring slightly more O2 than baseline (3.5lpm compared to 3lpm chronically). Echocardiogram shows RVSP of 57. pBNP is elevated at 5,118.  CXR shows pulmonary vascular congestion, pulmonary edema, and small pleural effusions  - Lasix 40mg BID  - Strict I/Os  - Consult pulmonology    4/11: Planning for palliative care at home

## 2022-04-10 NOTE — PROGRESS NOTES
Problem: Breathing Pattern - Ineffective  Goal: *Absence of hypoxia  Outcome: Progressing Towards Goal  Goal: *Use of effective breathing techniques  Outcome: Progressing Towards Goal  Goal: *PALLIATIVE CARE:  Alleviation of Dyspnea  Outcome: Progressing Towards Goal     Problem: Patient Education: Go to Patient Education Activity  Goal: Patient/Family Education  Outcome: Progressing Towards Goal     Problem: Falls - Risk of  Goal: *Absence of Falls  Description: Document Tenzin Fall Risk and appropriate interventions in the flowsheet.   Outcome: Progressing Towards Goal  Note: Fall Risk Interventions:  Mobility Interventions: Bed/chair exit alarm         Medication Interventions: Patient to call before getting OOB    Elimination Interventions: Bed/chair exit alarm    History of Falls Interventions: Door open when patient unattended,Bed/chair exit alarm         Problem: Patient Education: Go to Patient Education Activity  Goal: Patient/Family Education  Outcome: Progressing Towards Goal

## 2022-04-10 NOTE — PROGRESS NOTES
SW reviewed patient's chart and conducted a baseline assessment. Discharge plan at this time is as follows:     Care Management Interventions  PCP Verified by CM: Yes  Mode of Transport at Discharge: Self  Transition of Care Consult (CM Consult): Discharge Planning,Home Health  Physical Therapy Consult: Yes  Occupational Therapy Consult: Yes  Support Systems: Child(ravinder),Caregiver/Home Care Staff  Confirm Follow Up Transport: Family  Discharge Location  Patient Expects to be Discharged to[de-identified] Unable to determine at this time      *Please note that discharge plans can change throughout an inpatient admission.  Ensure that you are referring to the most recent social work/nurse case management note for current discharge plan*     Yahaira Fernandes, [address] Work   [facility]    * [email or web address]

## 2022-04-10 NOTE — PROGRESS NOTES
04/09/22 2112   Dual Skin Pressure Injury Assessment   Dual Skin Pressure Injury Assessment X   Second Care Provider (Based on [address] [place] [address]) Rafiq CUEVAS   Skin Integumentary   Skin Integumentary (WDL) X    Pressure  Injury Documentation No Pressure Injury Noted-Pressure Ulcer Prevention Initiated   Skin Color Ecchymosis (comment)   Skin Condition/Temp Dry;Warm;Fragile;Flaky   Skin Integrity Intact

## 2022-04-11 ENCOUNTER — HOSPICE ADMISSION (OUTPATIENT)
Dept: HOSPICE | Facility: HOSPICE | Age: 77
End: 2022-04-11

## 2022-04-11 PROBLEM — I27.23 PULMONARY HYPERTENSION DUE TO CHRONIC OBSTRUCTIVE PULMONARY DISEASE (HCC): Status: ACTIVE | Noted: 2022-04-09

## 2022-04-11 PROBLEM — J96.11 CHRONIC RESPIRATORY FAILURE WITH HYPOXIA (HCC): Status: ACTIVE | Noted: 2022-04-11

## 2022-04-11 PROBLEM — Z71.89 DO NOT RESUSCITATE DISCUSSION: Status: ACTIVE | Noted: 2022-04-10

## 2022-04-11 PROBLEM — J44.9 PULMONARY HYPERTENSION DUE TO CHRONIC OBSTRUCTIVE PULMONARY DISEASE (HCC): Status: ACTIVE | Noted: 2022-04-09

## 2022-04-11 PROBLEM — I34.0 MITRAL REGURGITATION: Status: ACTIVE | Noted: 2022-04-11

## 2022-04-11 PROBLEM — J81.1 PULMONARY EDEMA: Status: ACTIVE | Noted: 2022-04-09

## 2022-04-11 PROBLEM — D69.2 SENILE PURPURA (HCC): Status: ACTIVE | Noted: 2022-04-10

## 2022-04-11 PROBLEM — Z66 DO NOT RESUSCITATE STATUS: Status: ACTIVE | Noted: 2022-04-10

## 2022-04-11 LAB
ANION GAP SERPL CALC-SCNC: 4 MMOL/L (ref 7–16)
BUN SERPL-MCNC: 25 MG/DL (ref 8–23)
CALCIUM SERPL-MCNC: 8.6 MG/DL (ref 8.3–10.4)
CHLORIDE SERPL-SCNC: 106 MMOL/L (ref 98–107)
CO2 SERPL-SCNC: 30 MMOL/L (ref 21–32)
CREAT SERPL-MCNC: 0.96 MG/DL (ref 0.6–1)
ERYTHROCYTE [DISTWIDTH] IN BLOOD BY AUTOMATED COUNT: 14.6 % (ref 11.9–14.6)
GLUCOSE SERPL-MCNC: 103 MG/DL (ref 65–100)
HCT VFR BLD AUTO: 36.1 % (ref 35.8–46.3)
HGB BLD-MCNC: 11.2 G/DL (ref 11.7–15.4)
MCH RBC QN AUTO: 29.9 PG (ref 26.1–32.9)
MCHC RBC AUTO-ENTMCNC: 31 G/DL (ref 31.4–35)
MCV RBC AUTO: 96.5 FL (ref 79.6–97.8)
NRBC # BLD: 0 K/UL (ref 0–0.2)
PLATELET # BLD AUTO: 181 K/UL (ref 150–450)
PMV BLD AUTO: 10.5 FL (ref 9.4–12.3)
POTASSIUM SERPL-SCNC: 4 MMOL/L (ref 3.5–5.1)
RBC # BLD AUTO: 3.74 M/UL (ref 4.05–5.2)
SODIUM SERPL-SCNC: 140 MMOL/L (ref 136–145)
WBC # BLD AUTO: 6.5 K/UL (ref 4.3–11.1)

## 2022-04-11 PROCEDURE — 99223 1ST HOSP IP/OBS HIGH 75: CPT | Performed by: INTERNAL MEDICINE

## 2022-04-11 PROCEDURE — 74011250637 HC RX REV CODE- 250/637: Performed by: FAMILY MEDICINE

## 2022-04-11 PROCEDURE — 80048 BASIC METABOLIC PNL TOTAL CA: CPT

## 2022-04-11 PROCEDURE — 74011250636 HC RX REV CODE- 250/636: Performed by: FAMILY MEDICINE

## 2022-04-11 PROCEDURE — 74011000250 HC RX REV CODE- 250: Performed by: FAMILY MEDICINE

## 2022-04-11 PROCEDURE — 76450000000

## 2022-04-11 PROCEDURE — 85027 COMPLETE CBC AUTOMATED: CPT

## 2022-04-11 PROCEDURE — 74011636637 HC RX REV CODE- 636/637: Performed by: FAMILY MEDICINE

## 2022-04-11 PROCEDURE — 94760 N-INVAS EAR/PLS OXIMETRY 1: CPT

## 2022-04-11 PROCEDURE — 36415 COLL VENOUS BLD VENIPUNCTURE: CPT

## 2022-04-11 PROCEDURE — 74011000258 HC RX REV CODE- 258: Performed by: FAMILY MEDICINE

## 2022-04-11 PROCEDURE — 99223 1ST HOSP IP/OBS HIGH 75: CPT | Performed by: NURSE PRACTITIONER

## 2022-04-11 PROCEDURE — 94640 AIRWAY INHALATION TREATMENT: CPT

## 2022-04-11 PROCEDURE — 77010033678 HC OXYGEN DAILY

## 2022-04-11 PROCEDURE — 65270000029 HC RM PRIVATE

## 2022-04-11 RX ADMIN — HYDROCODONE BITARTRATE AND ACETAMINOPHEN 1 TABLET: 10; 325 TABLET ORAL at 02:49

## 2022-04-11 RX ADMIN — SODIUM CHLORIDE, PRESERVATIVE FREE 10 ML: 5 INJECTION INTRAVENOUS at 13:30

## 2022-04-11 RX ADMIN — CROMOLYN SODIUM 1 DROP: 40 SOLUTION/ DROPS OPHTHALMIC at 08:47

## 2022-04-11 RX ADMIN — GABAPENTIN 100 MG: 100 CAPSULE ORAL at 21:37

## 2022-04-11 RX ADMIN — CROMOLYN SODIUM 1 DROP: 40 SOLUTION/ DROPS OPHTHALMIC at 15:39

## 2022-04-11 RX ADMIN — HYDROCODONE BITARTRATE AND ACETAMINOPHEN 1 TABLET: 10; 325 TABLET ORAL at 20:14

## 2022-04-11 RX ADMIN — SUCRALFATE 1 G: 1 TABLET ORAL at 21:37

## 2022-04-11 RX ADMIN — ACETAMINOPHEN 650 MG: 325 TABLET, FILM COATED ORAL at 13:24

## 2022-04-11 RX ADMIN — FUROSEMIDE 40 MG: 10 INJECTION, SOLUTION INTRAMUSCULAR; INTRAVENOUS at 08:45

## 2022-04-11 RX ADMIN — SUCRALFATE 1 G: 1 TABLET ORAL at 11:26

## 2022-04-11 RX ADMIN — CEFEPIME HYDROCHLORIDE 2 G: 2 INJECTION, POWDER, FOR SOLUTION INTRAVENOUS at 13:24

## 2022-04-11 RX ADMIN — PREDNISONE 40 MG: 20 TABLET ORAL at 08:44

## 2022-04-11 RX ADMIN — TOPIRAMATE 100 MG: 100 TABLET, FILM COATED ORAL at 08:45

## 2022-04-11 RX ADMIN — BUSPIRONE HYDROCHLORIDE 7.5 MG: 5 TABLET ORAL at 15:39

## 2022-04-11 RX ADMIN — PRIMIDONE 100 MG: 50 TABLET ORAL at 15:39

## 2022-04-11 RX ADMIN — SERTRALINE 100 MG: 100 TABLET, FILM COATED ORAL at 08:45

## 2022-04-11 RX ADMIN — MONTELUKAST SODIUM 1 G: 4 TABLET, CHEWABLE ORAL at 08:47

## 2022-04-11 RX ADMIN — DILTIAZEM HYDROCHLORIDE 120 MG: 120 CAPSULE, COATED, EXTENDED RELEASE ORAL at 08:45

## 2022-04-11 RX ADMIN — SODIUM CHLORIDE, PRESERVATIVE FREE 10 ML: 5 INJECTION INTRAVENOUS at 21:37

## 2022-04-11 RX ADMIN — BUSPIRONE HYDROCHLORIDE 7.5 MG: 5 TABLET ORAL at 21:37

## 2022-04-11 RX ADMIN — SODIUM CHLORIDE, PRESERVATIVE FREE 10 ML: 5 INJECTION INTRAVENOUS at 06:03

## 2022-04-11 RX ADMIN — GABAPENTIN 100 MG: 100 CAPSULE ORAL at 15:39

## 2022-04-11 RX ADMIN — TOPIRAMATE 100 MG: 100 TABLET, FILM COATED ORAL at 21:37

## 2022-04-11 RX ADMIN — CROMOLYN SODIUM 1 DROP: 40 SOLUTION/ DROPS OPHTHALMIC at 22:00

## 2022-04-11 RX ADMIN — BUSPIRONE HYDROCHLORIDE 7.5 MG: 5 TABLET ORAL at 08:44

## 2022-04-11 RX ADMIN — HYDROCODONE BITARTRATE AND ACETAMINOPHEN 1 TABLET: 10; 325 TABLET ORAL at 11:26

## 2022-04-11 RX ADMIN — MONTELUKAST SODIUM 1 G: 4 TABLET, CHEWABLE ORAL at 18:00

## 2022-04-11 RX ADMIN — ATORVASTATIN CALCIUM 80 MG: 40 TABLET, FILM COATED ORAL at 21:37

## 2022-04-11 RX ADMIN — BUDESONIDE 6 MG: 3 CAPSULE, GELATIN COATED ORAL at 08:44

## 2022-04-11 RX ADMIN — PANTOPRAZOLE SODIUM 40 MG: 40 TABLET, DELAYED RELEASE ORAL at 06:02

## 2022-04-11 RX ADMIN — SERTRALINE 100 MG: 100 TABLET, FILM COATED ORAL at 21:37

## 2022-04-11 RX ADMIN — ENOXAPARIN SODIUM 30 MG: 100 INJECTION SUBCUTANEOUS at 21:39

## 2022-04-11 RX ADMIN — PRIMIDONE 100 MG: 50 TABLET ORAL at 21:37

## 2022-04-11 RX ADMIN — GABAPENTIN 100 MG: 100 CAPSULE ORAL at 08:45

## 2022-04-11 RX ADMIN — CETIRIZINE HYDROCHLORIDE 10 MG: 10 TABLET, FILM COATED ORAL at 08:45

## 2022-04-11 RX ADMIN — DOCUSATE SODIUM 50MG AND SENNOSIDES 8.6MG 1 TABLET: 8.6; 5 TABLET, FILM COATED ORAL at 21:37

## 2022-04-11 RX ADMIN — SUCRALFATE 1 G: 1 TABLET ORAL at 15:39

## 2022-04-11 RX ADMIN — PRIMIDONE 100 MG: 50 TABLET ORAL at 08:44

## 2022-04-11 RX ADMIN — FUROSEMIDE 40 MG: 10 INJECTION, SOLUTION INTRAMUSCULAR; INTRAVENOUS at 18:00

## 2022-04-11 NOTE — CONSULTS
Palliative Care    Patient: Kit Barfield MRN: [id number]  SSN: [id number]    YOB: 1945  Age: 64 y.o. Sex: female       Date of Request: 4/11/22  Date of Consult:  4/11/2022  Reason for Consult:  advanced care plan planning/end of life  Requesting Physician: Dr Hemal Hyde     Assessment/Plan:     Principal Diagnosis:    Dyspnea  R06.00    Additional Diagnoses:   · Advance Care Planning Counseling Z71.89  · Headache  R51  · Encounter for Palliative Care  Z51.5    Palliative Performance Scale (PPS):       Medical Decision Making:   Reviewed and summarized notes from admission to present. Discussed case with appropriate providers: ROLANDO Bah, Dr Danette Wahl laboratory and x-ray data from admission to present. Pt resting in bed in darkened room, no visitors present. Ms Velvet Claude said that she couldn't talk much because it made her out of breath, but that she was willing to listen. She is on 4 L O2 via nasal cannula. She endorsed a headache, and asked for 2 Tylenol, which the nurse brought. She is receiving topiramate 100 mg BID for migraine prevention. She denied N/V or other pain. She is frail and debilitated. Spoke on phone with the pt's DIL, Azeem Dalal. Alina's  Isaias Dominguez is the pt's only living child, as her other son has passed. I reviewed with [facility] that ROLANDO Saldivar suggests palliative services, in particular those offered by [facility]. This will provide the pt with medical support services and ongoing goals discussions at home, which will complement the [facility] services the pt receives 7 days a week. Alina and I discussed that, once the combination of [facility]TC aide and palliative services are no longer adequate, [facility] will help [facility] weigh home hospice services vs residential hospice care. [facility] expressed her satisfaction, and Ted Saldivar will now make a referral to Terrie Hickman. Ted Saldivar will ask Terrie Hickman to call [facility] in order to describe their services to her.     Will discuss findings with members of the interdisciplinary team.      Thank you for this referral.          .    Subjective:     History obtained from:  Family, Care Provider and Chart    Chief Complaint: Dyspnea    History of Present Illness:  Kamron Cisneros is a 64 y.o. female with medical history of COPD on 3lpm chronically, GERD, afib not on anticoagulation, depression, fibromyalgia, and other conditions listed below who presented to ED with shortness of breath. Symptoms have been worsening over the past week or so. She states that she noticed her lower legs swelling as well. She has severe protein-calorie malnutrition. Advance Directive: No       Code Status:  DNR            Health Care Power of : No - Patient does not have a [address]. Her only child is her health care surrogate, and he is assisted in this role by his wife, Vida Ng.     Past Medical History:   Diagnosis Date    Anxiety 3/6/2013    Arthritis     osteoarthritis    B12 deficiency 3/6/2013    Chronic back pain 3/6/2013    Chronic kidney disease (CKD) stage G3b/A1, moderately decreased glomerular filtration rate (GFR) between 30-44 mL/min/1.73 square meter and albuminuria creatinine ratio less than 30 mg/g ([facility]) 10/14/2016    Constipation 11/4/2015    COPD 2006    [facility]/ patient on 2.5 liters O2 nightly at home    Decreased GFR 5/26/2016    Depression 10/25/2012    Depression 10/25/2012    Depression, major, in remission ([facility] [address] 1/31/2018    diverticulitis     Encounter for long-term (current) use of other medications 3/6/2013    Environmental allergies 3/6/2013    Fecal incontinence 12/7/2016    Fibromyalgia 9/15/2015    GERD (gastroesophageal reflux disease) \"years\"    Headache(784.0) 2/12/2013    HTN (hypertension) 10/25/2012    HTN (hypertension) 10/25/2012    Hyperlipidemia 10/25/2012    Lymphocytic colitis 68/4/6616    Metabolic syndrome 9/4/8973    Microalbuminuria 11/4/2015    Osteoporosis 10/25/2012    polypectomy     PUD (peptic ulcer disease) \"years ago\"    Had peptic ulcers    thyroid mass     partial removal, benign    Unspecified adverse effect of anesthesia     PATIENT STATES SHE DOESN'T GET GENERAL ANESTHESIA DUE TO COPD    Unspecified sleep apnea     wears O2 at night. no c-jeffrey    Vitamin D deficiency 3/6/2013      Past Surgical History:   Procedure Laterality Date    HX APPENDECTOMY  1970s    HX BREAST BIOPSY Right     excision of cyst    HX CATARACT REMOVAL Right 2016    right eye    HX CHOLECYSTECTOMY  1970s    HX GYN  1970s    TIA BSO    HX HERNIA REPAIR  unknown    hiatal hernia repair    HX ORTHOPAEDIC  [id number]    right knee replaced and then left the next year    HX PARTIAL THYROIDECTOMY  1990s    Cherrie Mortimer    IR KYPHOPLASTY LUMBAR  2/11/2021    CT BREAST SURGERY PROCEDURE UNLISTED  1980s    cyst removed from right breast/benign    CT COLOSTOMY  1990s    placed, then reversed     Family History   Problem Relation Age of Onset    Kidney Disease Mother     Heart Disease Father     Heart Attack Brother       Social History     Tobacco Use    Smoking status: Current Every Day Smoker     Packs/day: 0.50     Years: 45.00     Pack years: 22.50     Types: Cigarettes    Smokeless tobacco: Never Used   Substance Use Topics    Alcohol use: Yes     Comment: states no drinking beer in 2 months     Prior to Admission medications    Medication Sig Start Date End Date Taking? Authorizing Provider   LORazepam (ATIVAN) 2 mg tablet Take 1 Tablet by mouth every eight (8) hours as needed (tremor, anxiety). Max Daily Amount: 6 mg. 3/22/22  Yes Arabella Montanez MD   lubiPROStone (Amitiza) 8 mcg capsule Take  by mouth. Yes Provider, Historical   celecoxib (CELEBREX) 200 mg capsule Take  by mouth daily as needed. Pain scale 1-4   Yes Provider, Historical   cetirizine (ZYRTEC) 10 mg tablet Take  by mouth.    Yes Provider, Historical   dilTIAZem ER (CARDIZEM CD) 120 mg capsule  11/30/21  Yes Provider, Historical   furosemide (LASIX) 20 mg tablet 40 mg two (2) times a day. 12/29/21  Yes Provider, Historical   gabapentin (NEURONTIN) 100 mg capsule Take 100 mg by mouth three (3) times daily. 2/17/22  Yes Provider, Historical   HYDROcodone-acetaminophen (NORCO)  mg tablet TAKE 1 TABLET BY MOUTH EVERY 6 HOURS AS NEEDED FOR SEVERE 2/1/22  Yes Provider, Historical   sucralfate (CARAFATE) 1 gram tablet 1 g Before breakfast, lunch, dinner and at bedtime. 12/30/21  Yes Provider, Historical   omeprazole (PRILOSEC) 40 mg capsule Take 40 mg by mouth daily. 1/13/22  Yes Provider, Historical   cromolyn (OPTICROM) 4 % ophthalmic solution Administer 1 Drop to both eyes three (3) times daily. Use in affected eye(s)    Yes Provider, Historical   topiramate (TOPAMAX) 100 mg tablet Take 1 tablet in the morning with breakfast and 1 tablet at night. Indications: migraine prevention 1/31/22  Yes Belgica Martinez MD   primidone (MYSOLINE) 50 mg tablet Take 2 Tablets by mouth three (3) times daily. Indications: essential tremor 1/31/22  Yes Belgica Martinez MD   traZODone (DESYREL) 100 mg tablet Take 100 mg by mouth At bedtime. 11/5/20  Yes Provider, Historical   acetaminophen (TYLENOL) 325 mg tablet Take 2 Tabs by mouth every six (6) hours as needed for Pain. Indications: pain 2/15/21  Yes Elena Jordan MD   senna-docusate (PERICOLACE) 8.6-50 mg per tablet Take 1 Tab by mouth nightly. 2/15/21  Yes Elena Jordan MD   lidocaine 4 % patch Apply to lower back 2/15/21  Yes Elena Jordan MD   metoclopramide HCl (REGLAN) 10 mg tablet Take 1 Tab by mouth Before breakfast, lunch, dinner and at bedtime. Indications: stomach muscle paralysis and decreased function from diabetes  Patient taking differently: Take 5 mg by mouth three (3) times daily as needed for Nausea.  Indications: stomach muscle paralysis and decreased function from diabetes 8/10/20  Yes Isaac Rivera MD   alendronate [facility]) 70 mg tablet Take 1 Tab by mouth every seven (7) days. 3/2/20  Yes Ashley Gomez MD   tiotropium [facility]) 18 mcg inhalation capsule Take 1 Cap by inhalation daily. Indications: bronchospasm prevention with COPD 3/2/20  Yes Ashley Gomez MD   atorvastatin (LIPITOR) 80 mg tablet Take 1 Tab by mouth daily. Indications: primary prevention of heart attack 3/2/20  Yes Ashley Gomez MD   budesonide-formoteroL [facility]) 160-4.5 mcg/actuation HFAA Take 2 Puffs by inhalation two (2) times a day. 2/27/20  Yes Ashley Gomez MD   albuterol (PROVENTIL VENTOLIN) 2.5 mg /3 mL (0.083 %) nebu 3 mL by Nebulization route every four (4) hours as needed for Wheezing or Shortness of Breath. 2/16/20  Yes Michel Peck MD   budesonide (ENTOCORT EC) 3 mg capsule Take 6 mg by mouth every morning. Yes Provider, Historical   ondansetron hcl (ZOFRAN) 4 mg tablet Take 4 mg by mouth every eight (8) hours as needed for Nausea or Vomiting. Yes Provider, Historical   sertraline (ZOLOFT) 100 mg tablet Take 2 tablets daily ( Dose increased from previously 100 mg daily )  Indications: anxiousness associated with depression  Patient taking differently: 100 mg two (2) times a day. Take 2 tablets daily ( Dose increased from previously 100 mg daily )  Indications: anxiousness associated with depression 1/30/20  Yes Ashley Gomez MD   busPIRone (BUSPAR) 15 mg tablet Take 1 Tab by mouth three (3) times daily. Indications: repeated episodes of anxiety  Patient taking differently: Take 7.5 mg by mouth three (3) times daily. Indications: repeated episodes of anxiety 1/30/20  Yes Ashley Gomez MD   albuterol (PROVENTIL HFA, VENTOLIN HFA, PROAIR HFA) 90 mcg/actuation inhaler Take 2 Puffs by inhalation every four (4) hours as needed for Wheezing or Shortness of Breath. 5/23/19  Yes Ashley Gomez MD   colestipol (COLESTID) 1 gram tablet Take 1 g by mouth two (2) times a day.    Yes Provider, Historical   Oxygen    Yes Provider, Historical       Allergies   Allergen Reactions    Amlodipine Hives    Lisinopril Diarrhea    Niaspan [Niacin] Rash     Patient was not taking it with  mg 30 min before. She wants to give it another try. Pt takes this med and it does not cause a rash anymore          Review of Systems:  A comprehensive review of systems was negative except as noted above. Objective:     Visit Vitals  /69 (BP 1 Location: Right upper arm, BP Patient Position: Supine)   Pulse 89   Temp 98.5 °F (36.9 °C)   Resp 19   Ht 5' 7\" (1.702 m)   Wt 105 lb (47.6 kg)   SpO2 98%   BMI 16.45 kg/m²        Physical Exam:    General:  Cooperative. SOB when talking   Eyes:  Conjunctivae/corneas clear    Nose: Nares normal. Septum midline. Neck: Supple, symmetrical, trachea midline, no JVD   Lungs:   Some crackles bilaterally, unlabored on 4L when not talking   Heart:  Regular rate and rhythm, no murmur    Abdomen:   Soft, non-tender, non-distended   Extremities: Normal, atraumatic, no cyanosis or edema   Skin: Skin color, texture, turgor normal. No rash or lesions.    Neurologic: Nonfocal   Psych: Alert and oriented      Assessment:     Hospital Problems  Date Reviewed: 2/28/2022          Codes Class Noted POA    Mitral regurgitation ICD-10-CM: I34.0  ICD-9-CM: 424.0  4/11/2022 Unknown        Chronic respiratory failure with hypoxia [facility]) ICD-10-CM: J96.11  ICD-9-CM: 518.83, 799.02  4/11/2022 Unknown        Do not resuscitate status (Chronic) ICD-10-CM: Z66  ICD-9-CM: V49.86  4/10/2022 Yes        Do not resuscitate discussion ICD-10-CM: Z71.89  ICD-9-CM: V65.49  4/10/2022 Yes        Body mass index (BMI) less than 16.5 (Chronic) ICD-10-CM: Z68.1  ICD-9-CM: V85.0  4/10/2022 Yes        Senile purpura ([address] (Chronic) ICD-10-CM: F60.8  ICD-9-CM: 287.2  4/10/2022 Yes        * (Principal) Pulmonary hypertension due to chronic obstructive pulmonary disease (HCC) (Chronic) ICD-10-CM: I27.23, J44.9  ICD-9-CM: 416.8, 496  4/9/2022 Yes COPD exacerbation ([facility] ICD-10-CM: J44.1  ICD-9-CM: 491.21  12/25/2021 Yes        Severe protein-calorie malnutrition ([facility] ICD-10-CM: E43  ICD-9-CM: 262  2/15/2021 Yes        Atrial fibrillation ([facility] ICD-10-CM: I48.91  ICD-9-CM: 427.31  2/6/2021 Yes        Depression, major, in remission [facility]) ICD-10-CM: F32.5  ICD-9-CM: 296.25  1/31/2018 Yes        Chronic kidney disease (CKD) stage G3b/A1, moderately decreased glomerular filtration rate (GFR) between 30-44 mL/min/1.73 square meter and albuminuria creatinine ratio less than 30 mg/g (HCC) ICD-10-CM: V69.37  ICD-9-CM: 585.3  10/14/2016 Yes        Smokers' cough ([facility] (Chronic) ICD-10-CM: J41.0  ICD-9-CM: 491.0  11/4/2015 Yes        COPD (chronic obstructive pulmonary disease) (HCC) (Chronic) ICD-10-CM: J44.9  ICD-9-CM: 496  12/30/2009 Yes        Sleep apnea (Chronic) ICD-10-CM: G47.30  ICD-9-CM: 780.57  12/30/2009 Yes              Signed By: John Calderon NP     April 11, 2022

## 2022-04-11 NOTE — PROGRESS NOTES
Problem: Breathing Pattern - Ineffective  Goal: *Absence of hypoxia  Outcome: Progressing Towards Goal  Goal: *Use of effective breathing techniques  Outcome: Progressing Towards Goal  Goal: *PALLIATIVE CARE:  Alleviation of Dyspnea  Outcome: Progressing Towards Goal     Problem: Patient Education: Go to Patient Education Activity  Goal: Patient/Family Education  Outcome: Progressing Towards Goal     Problem: Falls - Risk of  Goal: *Absence of Falls  Description: Document Tenzin Fall Risk and appropriate interventions in the flowsheet. Outcome: Progressing Towards Goal  Note: Fall Risk Interventions:  Mobility Interventions: Patient to call before getting OOB         Medication Interventions: Patient to call before getting OOB    Elimination Interventions: Call light in reach,Bed/chair exit alarm    History of Falls Interventions: Bed/chair exit alarm,Evaluate medications/consider consulting pharmacy         Problem: Patient Education: Go to Patient Education Activity  Goal: Patient/Family Education  Outcome: Progressing Towards Goal     Problem: Pressure Injury - Risk of  Goal: *Prevention of pressure injury  Description: Document Gen Scale and appropriate interventions in the flowsheet.   Outcome: Progressing Towards Goal  Note: Pressure Injury Interventions:       Moisture Interventions: Absorbent underpads,Apply protective barrier, creams and emollients    Activity Interventions: Increase time out of bed    Mobility Interventions: Pressure redistribution bed/mattress (bed type)    Nutrition Interventions: Document food/fluid/supplement intake                     Problem: Patient Education: Go to Patient Education Activity  Goal: Patient/Family Education  Outcome: Progressing Towards Goal

## 2022-04-11 NOTE — PROGRESS NOTES
BYRON met with MD who states patient is interested in hospice services. BYRON spoke with the patient, her son, daughter in law Sangeetha Potts ([phone]), and granddaughter. Patient receptive to a hospice referral, states that her preference is for [facility] [place].      Sienna Molina BYRON    . [address] [place] [address]    * [email or web address] .

## 2022-04-11 NOTE — PROGRESS NOTES
OA advised SW that hospice services is considered duplicate services for hospice and the patient's Medicaid would no longer cover caregivers if she decided to go home with hospice. Patient and family do not wish to discontinue caregiver resources and would like to speak with someone regarding palliative care. Discussed with MD who is in agreement with palliative care consult. Order submitted. 2:00pm: Patient met with palliative care with her daughter-in-law on the phone Mary Alice Lockwood). Patient and family receptive to [facility] in home palliative care. Referral faxed, requested admission to services today and provided Alina's contact information for follow up.      Vania Martinez, BYRON    [facility]    * [email or web address]

## 2022-04-11 NOTE — PROGRESS NOTES
Hospitalist Progress Note   Admit Date:  2022  4:28 PM   Name:  Kit Barfield   Age:  64 y.o. Sex:  female  :  1945   MRN:  [id number]   Room:  [facility]    Presenting Complaint: Shortness of Breath    Reason(s) for Admission: Pulmonary edema [J81.1]     Hospital Course & Interval History:   64 y.o. female with medical history of COPD on 3lpm chronically, GERD, afib not on anticoagulation, depression, fibromyalgia who presented to ED with shortness of breath. Symptoms have been worsening over the past week or so. States that she noticed her lower legs swelling as well. Her caretaker came to visit and noticed that she appeared to be in respiratory distress and called EMS. Upon ER evaluation, patient was on CPAP, she had received albuterol nebulizer en route to ER. Patient was placed on 3.5lpm supplemental O2 in ER. CXR shows evidence of pulmonary edema and small pleural effusions. pBNP is elevated at 5,118. Given concern for CHF exacerbation, Hospitalist asked to admit. Subjective/24hr Events (22): Declines intervention by nutrition. Respiratory status still tenuous. Still volume overloaded. Good urine output. ROS:  10 systems reviewed and negative except as noted above. Assessment & Plan:   * Pulmonary hypertension due to chronic obstructive pulmonary disease ([address]  Requiring slightly more O2 than baseline (3.5lpm compared to 3lpm chronically). Echocardiogram shows RVSP of 57. pBNP is elevated at 5,118.  CXR shows pulmonary vascular congestion, pulmonary edema, and small pleural effusions  - Lasix 40mg BID  - Strict I/Os  - Consult pulmonology    : Planning for palliative care at home    COPD exacerbation [facility])  Wheezing in the emergency department, unable to locate pulmonary function tests in chart including at [facility], history suggestive of severe COPD  -Prednisone x5d  -Cefepime  -Scheduled albuterol    : Stable but not yet returned to baseline, continue treatment    Body mass index (BMI) less than 16.5  -Consult nutrition services    Atrial fibrillation ([facility]  Noted to be in afib with RVR during hospitalization in February 2021, she was started on cardizem at that time. Due to h/o frequent falls, debility, low BMI, GERD, the patient was found to be at high risk of bleeding and was not started on chronic anticoagulation at that time  - Currently rate-controlled  - Continue cardizem    4/11: Stable continue management    Do not resuscitate discussion  Confirmed this admission, see ACP document 4/10    Depression, major, in remission ([facility]  - Home meds    Chronic kidney disease (CKD) stage G3b/A1, moderately decreased glomerular filtration rate (GFR) between 30-44 mL/min/1.73 square meter and albuminuria creatinine ratio less than 30 mg/g ([facility])  - Stable  - Continue to monitor renal function    Smokers' cough ([facility])  -Nicotine patch    Sleep apnea  - CPAP QHS    COPD (chronic obstructive pulmonary disease) ([facility]  - Home meds/nebulizers        Discharge Planning:    Pending clinical improvement    Diet:  ADULT ORAL NUTRITION SUPPLEMENT Lunch, Dinner; Frozen Supplement  ADULT DIET Regular; Low Sodium (2 gm);  Likes and may have pancakes or [place] toast and cheese eggs at breakfast  DVT PPx: Enoxaparin  Code status: DNR    Hospital Problems as of 4/11/2022 Date Reviewed: 2/28/2022          Codes Class Noted - Resolved POA    * (Principal) Pulmonary hypertension due to chronic obstructive pulmonary disease (HCC) (Chronic) ICD-10-CM: I27.23, J44.9  ICD-9-CM: 416.8, 496  4/9/2022 - Present Yes        COPD exacerbation ([facility] ICD-10-CM: J44.1  ICD-9-CM: 491.21  12/25/2021 - Present Yes        Body mass index (BMI) less than 16.5 (Chronic) ICD-10-CM: Z68.1  ICD-9-CM: V85.0  4/10/2022 - Present Yes        Atrial fibrillation (HCC) ICD-10-CM: I48.91  ICD-9-CM: 427.31  2/6/2021 - Present Yes        Do not resuscitate discussion ICD-10-CM: Z71.89  ICD-9-CM: V65.49  4/10/2022 - Present Yes        Mitral regurgitation ICD-10-CM: I34.0  ICD-9-CM: 424.0  4/11/2022 - Present Yes        Chronic respiratory failure with hypoxia [facility]) ICD-10-CM: J96.11  ICD-9-CM: 518.83, 799.02  4/11/2022 - Present Yes        Do not resuscitate status (Chronic) ICD-10-CM: Z66  ICD-9-CM: V49.86  4/10/2022 - Present Yes        Senile purpura (HCC) (Chronic) ICD-10-CM: T39.6  ICD-9-CM: 287.2  4/10/2022 - Present Yes        Severe protein-calorie malnutrition ([facility] ICD-10-CM: E43  ICD-9-CM: 269  2/15/2021 - Present Yes        Depression, major, in remission [facility]) ICD-10-CM: F32.5  ICD-9-CM: 296.25  1/31/2018 - Present Yes        Chronic kidney disease (CKD) stage G3b/A1, moderately decreased glomerular filtration rate (GFR) between 30-44 mL/min/1.73 square meter and albuminuria creatinine ratio less than 30 mg/g (HCC) ICD-10-CM: S40.71  ICD-9-CM: 585.3  10/14/2016 - Present Yes        Smokers' cough ([facility] (Chronic) ICD-10-CM: J41.0  ICD-9-CM: 491.0  11/4/2015 - Present Yes        COPD (chronic obstructive pulmonary disease) (HCC) (Chronic) ICD-10-CM: J44.9  ICD-9-CM: 496  12/30/2009 - Present Yes        Sleep apnea (Chronic) ICD-10-CM: G47.30  ICD-9-CM: 780.57  12/30/2009 - Present Yes              Objective:     Patient Vitals for the past 24 hrs:   Temp Pulse Resp BP SpO2   04/11/22 1449     96 %   04/11/22 1200  89      04/11/22 1120 98.5 °F (36.9 °C) 95 19 120/69 98 %   04/11/22 0823     100 %   04/11/22 0801 98.4 °F (36.9 °C) 93 16 139/69 100 %   04/11/22 0313 97.9 °F (36.6 °C) 91 20 (!) 143/88 100 %   04/10/22 2315 98.2 °F (36.8 °C) (!) 106 20 133/84 99 %   04/10/22 2028     98 %   04/10/22 1939 98.2 °F (36.8 °C) (!) 113 20 135/80 97 %     Oxygen Therapy  O2 Sat (%): 96 % (04/11/22 1449)  Pulse via Oximetry: 87 beats per minute (04/11/22 1449)  O2 Device: Nasal cannula (04/11/22 1449)  Skin Assessment: Clean, dry, & intact (04/10/22 1935)  Skin Protection for O2 Device: No (04/10/22 2113)  O2 Flow Rate (L/min): 4 l/min (04/11/22 1449)  FIO2 (%): 32 % (04/09/22 2318)    Estimated body mass index is 16.45 kg/m² as calculated from the following:    Height as of this encounter: 5' 7\" (1.702 m). Weight as of this encounter: 47.6 kg (105 lb). Intake/Output Summary (Last 24 hours) at 4/11/2022 1604  Last data filed at 4/11/2022 0901  Gross per 24 hour   Intake 250 ml   Output 450 ml   Net -200 ml       Blood pressure 120/69, pulse 89, temperature 98.5 °F (36.9 °C), resp. rate 19, height 5' 7\" (1.702 m), weight 47.6 kg (105 lb), SpO2 96 %. Physical Exam  Vitals and nursing note reviewed. Constitutional:       General: She is in acute distress (mild). Appearance: Normal appearance. She is cachectic. She is ill-appearing. Interventions: Nasal cannula in place. HENT:      Head: Normocephalic. Comments: Temporal wasting  Eyes:      Extraocular Movements: Extraocular movements intact. Cardiovascular:      Rate and Rhythm: Normal rate. Rhythm irregular. Pulses:           Radial pulses are 2+ on the left side. Heart sounds: No murmur heard. No gallop. Pulmonary:      Effort: Respiratory distress present. Breath sounds: Wheezing present. Abdominal:      Palpations: Abdomen is soft. Tenderness: There is no abdominal tenderness. Musculoskeletal:         General: No deformity. Cervical back: No rigidity. Right lower leg: No edema. Left lower leg: No edema. Comments: Thenar atrophy   Skin:     General: Skin is warm and dry. Neurological:      General: No focal deficit present. Mental Status: She is alert and oriented to person, place, and time. Psychiatric:         Mood and Affect: Mood normal.         Behavior: Behavior normal. Behavior is cooperative. Cognition and Memory: Cognition normal. Memory is impaired.          I have reviewed ordered lab tests and independently visualized imaging below:    Recent Labs:  Recent Results (from the past 48 hour(s))   EKG, 12 LEAD, INITIAL    Collection Time: 04/09/22  4:35 PM   Result Value Ref Range    Ventricular Rate 84 BPM    Atrial Rate 102 BPM    QRS Duration 116 ms    Q-T Interval 402 ms    QTC Calculation (Bezet) 475 ms    Calculated R Axis -145 degrees    Calculated T Axis 45 degrees    Diagnosis       Atrial fibrillation  Incomplete right bundle branch block  Abnormal ECG  When compared with ECG of 24-DEC-2021 17:56,  No significant change was found  Confirmed by Angela Stewart ([id number]) on 4/9/2022 8:28:00 PM     CBC WITH AUTOMATED DIFF    Collection Time: 04/09/22  4:40 PM   Result Value Ref Range    WBC 7.0 4.3 - 11.1 K/uL    RBC 3.48 (L) 4.05 - 5.2 M/uL    HGB 10.2 (L) 11.7 - 15.4 g/dL    HCT 34.3 (L) 35.8 - 46.3 %    MCV 98.6 (H) 79.6 - 97.8 FL    MCH 29.3 26.1 - 32.9 PG    MCHC 29.7 (L) 31.4 - 35.0 g/dL    RDW 14.6 11.9 - 14.6 %    PLATELET 783 245 - 500 K/uL    MPV 9.5 9.4 - 12.3 FL    ABSOLUTE NRBC 0.00 0.0 - 0.2 K/uL    DF AUTOMATED      NEUTROPHILS 72 43 - 78 %    LYMPHOCYTES 20 13 - 44 %    MONOCYTES 6 4.0 - 12.0 %    EOSINOPHILS 0 (L) 0.5 - 7.8 %    BASOPHILS 1 0.0 - 2.0 %    IMMATURE GRANULOCYTES 1 0.0 - 5.0 %    ABS. NEUTROPHILS 5.1 1.7 - 8.2 K/UL    ABS. LYMPHOCYTES 1.4 0.5 - 4.6 K/UL    ABS. MONOCYTES 0.4 0.1 - 1.3 K/UL    ABS. EOSINOPHILS 0.0 0.0 - 0.8 K/UL    ABS. BASOPHILS 0.1 0.0 - 0.2 K/UL    ABS. IMM.  GRANS. 0.1 0.0 - 0.5 K/UL   METABOLIC PANEL, COMPREHENSIVE    Collection Time: 04/09/22  4:40 PM   Result Value Ref Range    Sodium 139 136 - 145 mmol/L    Potassium 4.0 3.5 - 5.1 mmol/L    Chloride 107 98 - 107 mmol/L    CO2 26 21 - 32 mmol/L    Anion gap 6 (L) 7 - 16 mmol/L    Glucose 108 (H) 65 - 100 mg/dL    BUN 20 8 - 23 MG/DL    Creatinine 0.87 0.6 - 1.0 MG/DL    GFR est AA >60 >60 ml/min/1.73m2    GFR est non-AA >60 >60 ml/min/1.73m2    Calcium 8.5 8.3 - 10.4 MG/DL    Bilirubin, total 0.2 0.2 - 1.1 MG/DL    ALT (SGPT) 10 (L) 12 - 65 U/L    AST (SGOT) 6 (L) 15 - 37 U/L    Alk.  phosphatase 100 50 - 136 U/L    Protein, total 5.4 (L) 6.3 - 8.2 g/dL    Albumin 3.0 (L) 3.2 - 4.6 g/dL    Globulin 2.4 2.3 - 3.5 g/dL    A-G Ratio 1.3 1.2 - 3.5     MAGNESIUM    Collection Time: 04/09/22  4:40 PM   Result Value Ref Range    Magnesium 1.9 1.8 - 2.4 mg/dL   TROPONIN-HIGH SENSITIVITY    Collection Time: 04/09/22  4:40 PM   Result Value Ref Range    Troponin-High Sensitivity 23.9 (H) 0 - 14 pg/mL   NT-PRO BNP    Collection Time: 04/09/22  4:40 PM   Result Value Ref Range    NT pro-BNP 5,118 (H) <450 PG/ML   TROPONIN-HIGH SENSITIVITY    Collection Time: 04/09/22  7:44 PM   Result Value Ref Range    Troponin-High Sensitivity 23.0 (H) 0 - 14 pg/mL   METABOLIC PANEL, BASIC    Collection Time: 04/10/22  4:55 AM   Result Value Ref Range    Sodium 143 136 - 145 mmol/L    Potassium 3.3 (L) 3.5 - 5.1 mmol/L    Chloride 109 (H) 98 - 107 mmol/L    CO2 28 21 - 32 mmol/L    Anion gap 6 (L) 7 - 16 mmol/L    Glucose 83 65 - 100 mg/dL    BUN 19 8 - 23 MG/DL    Creatinine 0.79 0.6 - 1.0 MG/DL    GFR est AA >60 >60 ml/min/1.73m2    GFR est non-AA >60 >60 ml/min/1.73m2    Calcium 7.8 (L) 8.3 - 10.4 MG/DL   CBC W/O DIFF    Collection Time: 04/10/22  4:55 AM   Result Value Ref Range    WBC 4.5 4.3 - 11.1 K/uL    RBC 3.23 (L) 4.05 - 5.2 M/uL    HGB 9.6 (L) 11.7 - 15.4 g/dL    HCT 31.7 (L) 35.8 - 46.3 %    MCV 98.1 (H) 79.6 - 97.8 FL    MCH 29.7 26.1 - 32.9 PG    MCHC 30.3 (L) 31.4 - 35.0 g/dL    RDW 14.5 11.9 - 14.6 %    PLATELET 623 011 - 132 K/uL    MPV 10.2 9.4 - 12.3 FL    ABSOLUTE NRBC 0.00 0.0 - 0.2 K/uL   MAGNESIUM    Collection Time: 04/10/22  4:55 AM   Result Value Ref Range    Magnesium 1.8 1.8 - 2.4 mg/dL   ECHO ADULT COMPLETE    Collection Time: 04/10/22  9:28 AM   Result Value Ref Range    LV EDV A2C 88 mL    LV EDV A4C 79 mL    LV ESV A2C 36 mL    LV ESV A4C 31 mL    IVSd 0.9 0.6 - 0.9 cm    LVIDd 4.8 3.9 - 5.3 cm    LVIDs 3.2 cm    LVOT Diameter 2.0 cm    LVOT Mean Gradient 1 mmHg    LVOT VTI 15.8 cm    LVOT Peak Velocity 0.9 m/s    LVOT Peak Gradient 3 mmHg    LVPWd 1.1 (A) 0.6 - 0.9 cm    LV E' Lateral Velocity 11 cm/s    LV E' Septal Velocity 9 cm/s    LV Ejection Fraction A2C 59 %    LV Ejection Fraction A4C 60 %    EF BP 59 55 - 100 %    LVOT Area 3.1 cm2    LVOT SV 49.6 ml    LA Minor Axis 6.7 cm    LA Major Axis 6.4 cm    LA Area 2C 27.0 cm2    LA Area 4C 26.0 cm2    LA Volume BP 87 (A) 22 - 52 mL    LA Diameter 4.9 cm    AV Mean Gradient 3 mmHg    AV VTI 25.3 cm    AV Mean Velocity 0.8 m/s    AV Peak Velocity 1.4 m/s    AV Peak Gradient 7 mmHg    AV Area by VTI 2.0 cm2    AV Area by Peak Velocity 2.1 cm2    Aortic Root 3.2 cm    Ascending Aorta 2.4 cm    IVC Expiration 1.2 cm    MR .0 cm    MR Peak Velocity 5.4 m/s    MR Peak Gradient 117 mmHg    MV E Wave Deceleration Time 147.0 ms    MV E Velocity 1.15 m/s    PV AT 87.0 ms    PV Max Velocity 1.1 m/s    PV Peak Gradient 4 mmHg    RVIDd 3.0 cm    RV Basal Dimension 3.3 cm    TAPSE 1.8 1.7 cm    TR Max Velocity 3.69 m/s    TR Peak Gradient 54 mmHg    Fractional Shortening 2D 33 28 - 44 %    LV ESV Index A4C 20 mL/m2    LV EDV Index A4C 51 mL/m2    LV ESV Index A2C 23 mL/m2    LV EDV Index A2C 57 mL/m2    LVIDd Index 3.12 cm/m2    LVIDs Index 2.08 cm/m2    LV RWT Ratio 0.46     LV Mass 2D 170.2 (A) 67 - 162 g    LV Mass 2D Index 110.5 (A) 43 - 95 g/m2    E/E' Ratio (Averaged) 11.62     E/E' Lateral 10.45     E/E' Septal 12.78     LA Volume Index BP 56 (A) 16 - 34 ml/m2    LVOT Stroke Volume Index 32.2 mL/m2    LA Size Index 3.18 cm/m2    LA/AO Root Ratio 1.53     Ao Root Index 2.08 cm/m2    Ascending Aorta Index 1.56 cm/m2    AV Velocity Ratio 0.64     LVOT:AV VTI Index 0.62     DAYDAY/BSA VTI 1.3 cm2/m2    DAYDAY/BSA Peak Velocity 1.4 cm2/m2    Est. RA Pressure 3 mmHg    RVSP 57 mmHg   METABOLIC PANEL, BASIC    Collection Time: 04/11/22  5:39 AM   Result Value Ref Range    Sodium 140 136 - 145 mmol/L    Potassium 4.0 3.5 - 5.1 mmol/L    Chloride 106 98 - 107 mmol/L    CO2 30 21 - 32 mmol/L    Anion gap 4 (L) 7 - 16 mmol/L    Glucose 103 (H) 65 - 100 mg/dL    BUN 25 (H) 8 - 23 MG/DL    Creatinine 0.96 0.6 - 1.0 MG/DL    GFR est AA >60 >60 ml/min/1.73m2    GFR est non-AA 60 (L) >60 ml/min/1.73m2    Calcium 8.6 8.3 - 10.4 MG/DL   CBC W/O DIFF    Collection Time: 04/11/22  5:39 AM   Result Value Ref Range    WBC 6.5 4.3 - 11.1 K/uL    RBC 3.74 (L) 4.05 - 5.2 M/uL    HGB 11.2 (L) 11.7 - 15.4 g/dL    HCT 36.1 35.8 - 46.3 %    MCV 96.5 79.6 - 97.8 FL    MCH 29.9 26.1 - 32.9 PG    MCHC 31.0 (L) 31.4 - 35.0 g/dL    RDW 14.6 11.9 - 14.6 %    PLATELET 566 198 - 681 K/uL    MPV 10.5 9.4 - 12.3 FL    ABSOLUTE NRBC 0.00 0.0 - 0.2 K/uL       All Micro Results     None          Other Studies:  No results found.     Current Meds:  Current Facility-Administered Medications   Medication Dose Route Frequency    busPIRone (BUSPAR) tablet 7.5 mg  7.5 mg Oral TID    nicotine (NICODERM CQ) 7 mg/24 hr patch 1 Patch  1 Patch TransDERmal DAILY    predniSONE (DELTASONE) tablet 40 mg  40 mg Oral DAILY WITH BREAKFAST    cefepime (MAXIPIME) 2 g in 0.9% sodium chloride (MBP/ADV) 100 mL MBP  2 g IntraVENous Q24H    albuterol (PROVENTIL VENTOLIN) nebulizer solution 2.5 mg  2.5 mg Nebulization Q6H RT    colestipoL (COLESTID) tablet 1 g (Patient Supplied)  1 g Oral BID    sertraline (ZOLOFT) tablet 100 mg  100 mg Oral BID    budesonide (ENTOCORT EC) capsule 6 mg  6 mg Oral 7am    mometasone-formoterol (DULERA) 200mcg-5mcg/puff  2 Puff Inhalation BID RT    tiotropium bromide (SPIRIVA RESPIMAT) 2.5 mcg /actuation  2 Puff Inhalation DAILY    atorvastatin (LIPITOR) tablet 80 mg  80 mg Oral QHS    metoclopramide HCl (REGLAN) tablet 5 mg  5 mg Oral TID PRN    senna-docusate (PERICOLACE) 8.6-50 mg per tablet 1 Tablet  1 Tablet Oral QHS    traZODone (DESYREL) tablet 100 mg  100 mg Oral QHS PRN    topiramate (TOPAMAX) tablet 100 mg  100 mg Oral BID    primidone (MYSOLINE) tablet 100 mg  100 mg Oral TID    celecoxib (CELEBREX) capsule 200 mg  200 mg Oral DAILY PRN    cetirizine (ZYRTEC) tablet 10 mg  10 mg Oral DAILY    dilTIAZem ER (CARDIZEM CD) capsule 120 mg  120 mg Oral DAILY    gabapentin (NEURONTIN) capsule 100 mg  100 mg Oral TID    HYDROcodone-acetaminophen (NORCO)  mg tablet 1 Tablet  1 Tablet Oral Q6H PRN    sucralfate (CARAFATE) tablet 1 g  1 g Oral AC&HS    pantoprazole (PROTONIX) tablet 40 mg  40 mg Oral ACB    cromolyn (OPTICROM) 4 % ophthalmic solution 1 Drop (Patient Supplied)  1 Drop Both Eyes TID    [Held by provider] LORazepam (ATIVAN) tablet 2 mg  2 mg Oral Q8H PRN    sodium chloride (NS) flush 5-40 mL  5-40 mL IntraVENous Q8H    sodium chloride (NS) flush 5-40 mL  5-40 mL IntraVENous PRN    acetaminophen (TYLENOL) tablet 650 mg  650 mg Oral Q6H PRN    Or    acetaminophen (TYLENOL) suppository 650 mg  650 mg Rectal Q6H PRN    polyethylene glycol (MIRALAX) packet 17 g  17 g Oral DAILY PRN    ondansetron (ZOFRAN ODT) tablet 4 mg  4 mg Oral Q8H PRN    Or    ondansetron (ZOFRAN) injection 4 mg  4 mg IntraVENous Q6H PRN    enoxaparin (LOVENOX) injection 30 mg  30 mg SubCUTAneous Q24H    furosemide (LASIX) injection 40 mg  40 mg IntraVENous BID       Signed:  Magnolia Velasquez MD

## 2022-04-11 NOTE — PROGRESS NOTES
Problem: Breathing Pattern - Ineffective  Goal: *Absence of hypoxia  Outcome: Progressing Towards Goal  Goal: *Use of effective breathing techniques  Outcome: Progressing Towards Goal  Goal: *PALLIATIVE CARE:  Alleviation of Dyspnea  Outcome: Progressing Towards Goal     Problem: Patient Education: Go to Patient Education Activity  Goal: Patient/Family Education  Outcome: Progressing Towards Goal     Problem: Falls - Risk of  Goal: *Absence of Falls  Description: Document Tenzin Fall Risk and appropriate interventions in the flowsheet.   Outcome: Progressing Towards Goal  Note: Fall Risk Interventions:  Mobility Interventions: Patient to call before getting OOB         Medication Interventions: Patient to call before getting OOB,Teach patient to arise slowly    Elimination Interventions: Call light in reach,Patient to call for help with toileting needs    History of Falls Interventions: Door open when patient unattended         Problem: Patient Education: Go to Patient Education Activity  Goal: Patient/Family Education  Outcome: Progressing Towards Goal

## 2022-04-11 NOTE — CONSULTS
History and Physical Initial Visit NOTE           4/11/2022    Ese Hood                        Date of Admission:  4/9/2022    The patient's chart is reviewed and the patient is discussed with the staff. Subjective:     Patient is a 64 y.o.  female seen and evaluated at the request of Dr. Colleen Vázquez. The patient carries diagnoses of COPD (no PFT's on file but CT scan 3/12/21 showed moderate centrilobular emphysema) with chronic hypoxic respiratory failure on 3L, GERD, a fib. She was admitted 4/9 with increasing SOB. She states that she has been swelling in her legs for the past couple of weeks but it got really bad over the last few days. This was associated with increased shortness of breath. She denies any fever or chills, wheeze, and what sounds like close to baseline cough. Workup in the ER showed BNP of 5118 and CXR with pulmonary edema. She was started on lasix for CHF. TTE has been performed with preserved EF but moderate MR with eccentric jet with possible higher severity than suggested on the echo as well as an RVSP of 57. She has been net negative 1.3L in the last 24 hours. She has been satting well on her home 3L of O2. She states she has been seen by pulmonary as outpatient here but there are no records of that. She is also hard of hearing and does not have her hearing aides in with some difficulty with communication as a result. Review of Systems  A comprehensive review of systems was negative except for that written in the HPI. Prior to Admission Medications   Prescriptions Last Dose Informant Patient Reported? Taking? HYDROcodone-acetaminophen (NORCO)  mg tablet 4/10/2022  Yes Yes   Sig: TAKE 1 TABLET BY MOUTH EVERY 6 HOURS AS NEEDED FOR SEVERE   LORazepam (ATIVAN) 2 mg tablet 4/10/2022  No Yes   Sig: Take 1 Tablet by mouth every eight (8) hours as needed (tremor, anxiety). Max Daily Amount: 6 mg.    Oxygen 4/10/2022  Yes Yes   acetaminophen (TYLENOL) 325 mg tablet 4/10/2022  No Yes   Sig: Take 2 Tabs by mouth every six (6) hours as needed for Pain. Indications: pain   albuterol (PROVENTIL HFA, VENTOLIN HFA, PROAIR HFA) 90 mcg/actuation inhaler 4/10/2022  No Yes   Sig: Take 2 Puffs by inhalation every four (4) hours as needed for Wheezing or Shortness of Breath. albuterol (PROVENTIL VENTOLIN) 2.5 mg /3 mL (0.083 %) nebu 4/10/2022  No Yes   Sig: 3 mL by Nebulization route every four (4) hours as needed for Wheezing or Shortness of Breath. alendronate (FOSAMAX) 70 mg tablet 4/10/2022  No Yes   Sig: Take 1 Tab by mouth every seven (7) days. atorvastatin (LIPITOR) 80 mg tablet 4/10/2022  No Yes   Sig: Take 1 Tab by mouth daily. Indications: primary prevention of heart attack   budesonide (ENTOCORT EC) 3 mg capsule 4/10/2022  Yes Yes   Sig: Take 6 mg by mouth every morning. budesonide-formoteroL (SYMBICORT) 160-4.5 mcg/actuation HFAA 4/10/2022  No Yes   Sig: Take 2 Puffs by inhalation two (2) times a day. busPIRone (BUSPAR) 15 mg tablet 4/10/2022  No Yes   Sig: Take 1 Tab by mouth three (3) times daily. Indications: repeated episodes of anxiety   Patient taking differently: Take 7.5 mg by mouth three (3) times daily. Indications: repeated episodes of anxiety   celecoxib (CELEBREX) 200 mg capsule 4/10/2022  Yes Yes   Sig: Take  by mouth daily as needed. Pain scale 1-4   cetirizine (ZYRTEC) 10 mg tablet 4/10/2022  Yes Yes   Sig: Take  by mouth.   colestipol (COLESTID) 1 gram tablet 4/10/2022  Yes Yes   Sig: Take 1 g by mouth two (2) times a day. cromolyn (OPTICROM) 4 % ophthalmic solution 4/10/2022  Yes Yes   Sig: Administer 1 Drop to both eyes three (3) times daily. Use in affected eye(s)    dilTIAZem ER (CARDIZEM CD) 120 mg capsule 4/10/2022  Yes Yes   furosemide (LASIX) 20 mg tablet 4/10/2022  Yes Yes   Si mg two (2) times a day.   gabapentin (NEURONTIN) 100 mg capsule 4/10/2022  Yes Yes   Sig: Take 100 mg by mouth three (3) times daily. lidocaine 4 % patch 4/10/2022  No Yes   Sig: Apply to lower back   lubiPROStone (Amitiza) 8 mcg capsule 4/10/2022  Yes Yes   Sig: Take  by mouth.   metoclopramide HCl (REGLAN) 10 mg tablet 4/10/2022  No Yes   Sig: Take 1 Tab by mouth Before breakfast, lunch, dinner and at bedtime. Indications: stomach muscle paralysis and decreased function from diabetes   Patient taking differently: Take 5 mg by mouth three (3) times daily as needed for Nausea. Indications: stomach muscle paralysis and decreased function from diabetes   omeprazole (PRILOSEC) 40 mg capsule 4/10/2022  Yes Yes   Sig: Take 40 mg by mouth daily. ondansetron hcl (ZOFRAN) 4 mg tablet 4/10/2022  Yes Yes   Sig: Take 4 mg by mouth every eight (8) hours as needed for Nausea or Vomiting. primidone (MYSOLINE) 50 mg tablet 4/10/2022  No Yes   Sig: Take 2 Tablets by mouth three (3) times daily. Indications: essential tremor   senna-docusate (PERICOLACE) 8.6-50 mg per tablet 4/10/2022  No Yes   Sig: Take 1 Tab by mouth nightly. sertraline (ZOLOFT) 100 mg tablet 4/10/2022  No Yes   Sig: Take 2 tablets daily ( Dose increased from previously 100 mg daily )  Indications: anxiousness associated with depression   Patient taking differently: 100 mg two (2) times a day. Take 2 tablets daily ( Dose increased from previously 100 mg daily )  Indications: anxiousness associated with depression   sucralfate (CARAFATE) 1 gram tablet 4/10/2022  Yes Yes   Si g Before breakfast, lunch, dinner and at bedtime. tiotropium (SPIRIVA) 18 mcg inhalation capsule 4/10/2022  No Yes   Sig: Take 1 Cap by inhalation daily. Indications: bronchospasm prevention with COPD   topiramate (TOPAMAX) 100 mg tablet 4/10/2022  No Yes   Sig: Take 1 tablet in the morning with breakfast and 1 tablet at night. Indications: migraine prevention   traZODone (DESYREL) 100 mg tablet 4/10/2022  Yes Yes   Sig: Take 100 mg by mouth At bedtime.       Facility-Administered Medications: None     Past Medical History:   Diagnosis Date    Anxiety 3/6/2013    Arthritis     osteoarthritis    B12 deficiency 3/6/2013    Chronic back pain 3/6/2013    Chronic kidney disease (CKD) stage G3b/A1, moderately decreased glomerular filtration rate (GFR) between 30-44 mL/min/1.73 square meter and albuminuria creatinine ratio less than 30 mg/g ([facility]) 10/14/2016    Constipation 11/4/2015    COPD 2006    [facility]/ patient on 2.5 liters O2 nightly at home    Decreased GFR 5/26/2016    Depression 10/25/2012    Depression 10/25/2012    Depression, major, in remission ([facility] [address] 1/31/2018    diverticulitis     Encounter for long-term (current) use of other medications 3/6/2013    Environmental allergies 3/6/2013    Fecal incontinence 12/7/2016    Fibromyalgia 9/15/2015    GERD (gastroesophageal reflux disease) \"years\"    Headache(784.0) 2/12/2013    HTN (hypertension) 10/25/2012    HTN (hypertension) 10/25/2012    Hyperlipidemia 10/25/2012    Lymphocytic colitis 90/4/0391    Metabolic syndrome 0/0/5529    Microalbuminuria 11/4/2015    Osteoporosis 10/25/2012    polypectomy     PUD (peptic ulcer disease) \"years ago\"    Had peptic ulcers    thyroid mass     partial removal, benign    Unspecified adverse effect of anesthesia     PATIENT STATES SHE DOESN'T GET GENERAL ANESTHESIA DUE TO COPD    Unspecified sleep apnea     wears O2 at night.   no c-jeffrey    Vitamin D deficiency 3/6/2013     Past Surgical History:   Procedure Laterality Date    HX APPENDECTOMY  1970s    HX BREAST BIOPSY Right     excision of cyst    HX CATARACT REMOVAL Right 2016    right eye    HX CHOLECYSTECTOMY  1970s    HX GYN  1970s    TIA BSO    HX HERNIA REPAIR  unknown    hiatal hernia repair    HX ORTHOPAEDIC  [id number]    right knee replaced and then left the next year    HX PARTIAL THYROIDECTOMY  1990s    [place]    IR KYPHOPLASTY LUMBAR  2/11/2021    FL BREAST SURGERY PROCEDURE UNLISTED  1980s    cyst removed from right breast/benign    IL COLOSTOMY  1990s    placed, then reversed     Social History     Socioeconomic History    Marital status:      Spouse name: Not on file    Number of children: Not on file    Years of education: Not on file    Highest education level: Not on file   Occupational History    Not on file   Tobacco Use    Smoking status: Current Every Day Smoker     Packs/day: 0.50     Years: 45.00     Pack years: 22.50     Types: Cigarettes    Smokeless tobacco: Never Used   Substance and Sexual Activity    Alcohol use: Yes     Comment: states no drinking beer in 2 months    Drug use: Yes     Types: Marijuana     Comment: smoked it last night    Sexual activity: Never   Other Topics Concern    Not on file   Social History Narrative    Not on file     Social Determinants of Health     Financial Resource Strain:     Difficulty of Paying Living Expenses: Not on file   Food Insecurity:     Worried About Running Out of Food in the Last Year: Not on file    Makenzie of Food in the Last Year: Not on file   Transportation Needs:     Lack of Transportation (Medical): Not on file    Lack of Transportation (Non-Medical):  Not on file   Physical Activity:     Days of Exercise per Week: Not on file    Minutes of Exercise per Session: Not on file   Stress:     Feeling of Stress : Not on file   Social Connections:     Frequency of Communication with Friends and Family: Not on file    Frequency of Social Gatherings with Friends and Family: Not on file    Attends Anglican Services: Not on file    Active Member of Clubs or Organizations: Not on file    Attends Club or Organization Meetings: Not on file    Marital Status: Not on file   Intimate Partner Violence:     Fear of Current or Ex-Partner: Not on file    Emotionally Abused: Not on file    Physically Abused: Not on file    Sexually Abused: Not on file   Housing Stability:     Unable to Pay for Housing in the Last Year: Not on file    Number of Places Lived in the Last Year: Not on file    Unstable Housing in the Last Year: Not on file     Family History   Problem Relation Age of Onset    Kidney Disease Mother     Heart Disease Father     Heart Attack Brother      Allergies   Allergen Reactions    Amlodipine Hives    Lisinopril Diarrhea    Niaspan [Niacin] Rash     Patient was not taking it with  mg 30 min before. She wants to give it another try.   Pt takes this med and it does not cause a rash anymore       Current Facility-Administered Medications   Medication Dose Route Frequency    busPIRone (BUSPAR) tablet 7.5 mg  7.5 mg Oral TID    nicotine (NICODERM CQ) 7 mg/24 hr patch 1 Patch  1 Patch TransDERmal DAILY    predniSONE (DELTASONE) tablet 40 mg  40 mg Oral DAILY WITH BREAKFAST    cefepime (MAXIPIME) 2 g in 0.9% sodium chloride (MBP/ADV) 100 mL MBP  2 g IntraVENous Q24H    albuterol (PROVENTIL VENTOLIN) nebulizer solution 2.5 mg  2.5 mg Nebulization Q6H RT    colestipoL (COLESTID) tablet 1 g (Patient Supplied)  1 g Oral BID    sertraline (ZOLOFT) tablet 100 mg  100 mg Oral BID    budesonide (ENTOCORT EC) capsule 6 mg  6 mg Oral 7am    mometasone-formoterol (DULERA) 200mcg-5mcg/puff  2 Puff Inhalation BID RT    tiotropium bromide (SPIRIVA RESPIMAT) 2.5 mcg /actuation  2 Puff Inhalation DAILY    atorvastatin (LIPITOR) tablet 80 mg  80 mg Oral QHS    metoclopramide HCl (REGLAN) tablet 5 mg  5 mg Oral TID PRN    senna-docusate (PERICOLACE) 8.6-50 mg per tablet 1 Tablet  1 Tablet Oral QHS    traZODone (DESYREL) tablet 100 mg  100 mg Oral QHS PRN    topiramate (TOPAMAX) tablet 100 mg  100 mg Oral BID    primidone (MYSOLINE) tablet 100 mg  100 mg Oral TID    celecoxib (CELEBREX) capsule 200 mg  200 mg Oral DAILY PRN    cetirizine (ZYRTEC) tablet 10 mg  10 mg Oral DAILY    dilTIAZem ER (CARDIZEM CD) capsule 120 mg  120 mg Oral DAILY    gabapentin (NEURONTIN) capsule 100 mg  100 mg Oral TID    HYDROcodone-acetaminophen (NORCO)  mg tablet 1 Tablet  1 Tablet Oral Q6H PRN    sucralfate (CARAFATE) tablet 1 g  1 g Oral AC&HS    pantoprazole (PROTONIX) tablet 40 mg  40 mg Oral ACB    cromolyn (OPTICROM) 4 % ophthalmic solution 1 Drop (Patient Supplied)  1 Drop Both Eyes TID    [Held by provider] LORazepam (ATIVAN) tablet 2 mg  2 mg Oral Q8H PRN    sodium chloride (NS) flush 5-40 mL  5-40 mL IntraVENous Q8H    sodium chloride (NS) flush 5-40 mL  5-40 mL IntraVENous PRN    acetaminophen (TYLENOL) tablet 650 mg  650 mg Oral Q6H PRN    Or    acetaminophen (TYLENOL) suppository 650 mg  650 mg Rectal Q6H PRN    polyethylene glycol (MIRALAX) packet 17 g  17 g Oral DAILY PRN    ondansetron (ZOFRAN ODT) tablet 4 mg  4 mg Oral Q8H PRN    Or    ondansetron (ZOFRAN) injection 4 mg  4 mg IntraVENous Q6H PRN    enoxaparin (LOVENOX) injection 30 mg  30 mg SubCUTAneous Q24H    furosemide (LASIX) injection 40 mg  40 mg IntraVENous BID     Objective:   Blood pressure (!) 143/88, pulse 91, temperature 97.9 °F (36.6 °C), resp. rate 20, height 5' 7\" (1.702 m), weight 105 lb (47.6 kg), SpO2 100 %. Intake/Output Summary (Last 24 hours) at 4/11/2022 0722  Last data filed at 4/11/2022 0117  Gross per 24 hour   Intake 250 ml   Output 1550 ml   Net -1300 ml     PHYSICAL EXAM   Constitutional:  the patient is well developed and in no acute distress  EENMT:  Sclera clear, pupils equal, oral mucosa moist  Respiratory: Mild bibasilar crackles  Cardiovascular:  RRR without M,G,R  Gastrointestinal: soft and non-tender; with positive bowel sounds. Musculoskeletal: warm without cyanosis. There is trace B lower extremity edema.   Skin:  no jaundice or rashes, no wounds   Neurologic: no gross neuro deficits     Psychiatric:  alert and oriented x ppt    CXR:    4/9/22      CT Chest: 3/12/21      Recent Labs     04/11/22  0539 04/10/22  0455 04/09/22  1944 04/09/22  1640   WBC 6.5 4.5  --  7.0   HGB 11.2* 9.6*  --  10.2*   HCT 36.1 31.7*  --  34.3*  171  --  197    143  --  139   K 4.0 3.3*  --  4.0    109*  --  107   CO2 30 28  --  26   * 83  --  108*   BUN 25* 19  --  20   CREA 0.96 0.79  --  0.87   MG  --  1.8  --  1.9   CA 8.6 7.8*  --  8.5   ALB  --   --   --  3.0*   AST  --   --   --  6*   ALT  --   --   --  10*   AP  --   --   --  100   TROPHS  --   --  23.0* 23.9*   BNPNT  --   --   --  5,118*     ECHO: Results from [place] encounter on 04/09/22    ECHO ADULT COMPLETE    Interpretation Summary    Left Ventricle: Left ventricle size is normal. Mildly increased wall thickness. Normal wall motion. Normal left ventricular systolic function with a visually estimated EF of 60 - 65%. Normal diastolic function.   Aortic Valve: Mild sclerosis of the aortic valve cusp. Mild regurgitation. No stenosis.   Mitral Valve: Mildly calcified leaflet, at the posterior leaflet. Moderate transvalvular regurgitation with an eccentrically directed jet and may underestimate severity.   Left Atrium: Left atrium is moderately dilated.   Right Atrium: Right atrium is moderately dilated. Results     ** No results found for the last 336 hours. **        Inpat Anti-Infectives (From admission, onward)     Start     Ordered Stop    04/10/22 1400  cefepime (MAXIPIME) 2 g in 0.9% sodium chloride (MBP/ADV) 100 mL MBP  2 g,   IntraVENous,   EVERY 24 HOURS         04/10/22 1341 04/15/22 1359              Assessment and Plan:  (Medical Decision Making)   Principal Problem:    Pulmonary hypertension ([address] (4/9/2022)    Listed as being due to her COPD which is possible, but with the TTE also suggesting at least moderate MR if not worse (with eccentric jet present) workup and management of valvular heart disease also needs to be undertaken. If there is question as to the source of the [facility] then right heart cath will be needed. Would consult cardiology.    In the meantime would continue to diurese until she appears euvolemic, ongoing mild LE edema. Active Problems:    COPD (chronic obstructive pulmonary disease) ([facility] [address] (12/30/2009)    No PFT's on file. On spiriva and symbicort 160 at home. Continue albuterol, dulera, spiriva. She will need to have outpatient pulmonary evaluatation including cPFT's to determine the severity of her obstructive lung disease. Chronic respiratory failure with hypoxia -   At rest on her home amount of O2. Would have her check walking o2 sats prior to discharge as well. Sleep apnea (12/30/2009)          Smokers' cough ([address] (11/4/2015)          Chronic kidney disease (CKD) stage G3b/A1, moderately decreased glomerular filtration rate (GFR) between 30-44 mL/min/1.73 square meter and albuminuria creatinine ratio less than 30 mg/g ([facility]) (10/14/2016)          Depression, major, in remission ([address] (1/31/2018)          Atrial fibrillation ([facility] [address] (2/6/2021)          Severe protein-calorie malnutrition ([address] (2/15/2021)          COPD exacerbation ([facility] [address] (12/25/2021)    No signs of this on exam today. No wheeze. Getting cefepime and prednisone and both of these can likely be stopped. Do not resuscitate status (4/10/2022)          Do not resuscitate discussion (4/10/2022)          Body mass index (BMI) less than 16.5 (4/10/2022)          Senile purpura ([address] (4/10/2022)         DNR    More than 50% of the time documented was spent in face-to-face contact with the patient and in the care of the patient on the floor/unit where the patient is located. Thank you very much for this referral.  We appreciate the opportunity to participate in this patient's care. Will follow along with above stated plan.     Will Yung MD

## 2022-04-11 NOTE — CONSULTS
Comprehensive Nutrition Assessment    Type and Reason for Visit: Initial,Consult  Oral nutrition supplements (Hospitalist)    Nutrition Recommendations/Plan:   Meals and Snacks:  Continue current diet. Nutrition Supplement Therapy:   Medical food supplement therapy:  Initiate Magic Cup twice per day (this provides 290 kcal and 9 grams protein per serving)     Malnutrition Assessment:  Malnutrition Status: Severe malnutrition  Context: Chronic illness  Findings of clinical characteristics of malnutrition:   Energy Intake:  7 - 75% or less est energy requirements for 1 month or longer (<50% of estimated needs for > 1 year)  Weight Loss:   (11% since 7/221, 31% over 2 years)     Body Fat Loss:  7 - Severe body fat loss, Triceps,Fat overlying ribs   Muscle Mass Loss:  7 - Severe muscle mass loss, Clavicles (pectoralis &deltoids)  Fluid Accumulation:  Unable to assess,     Strength:  Not performed       Nutrition Assessment:   Nutrition History:    4/11: Pt has had poor appetite and intake for at least 1 year and weight loss has occurred over the past 2 years. At home she eats a large pancake and scrambled cheese eggs at [facility] or 1 PM that her caretaker prepares, she drinks coffee in the morning and sometimes will eat < 25% of a home delivered meal in the evening. She usually eat an ice cream sandwich daily and alsos drinks whole milk. She does not like Ensure pr Ensure clear as she burps it up. She got a new set of dentures but even after 2 adjustments she is nit able to wear them. Nutrition Background: H/O: COPD, CKD, HTN, GERD, fibromyalgia, arthritis, B12 deficiency, and anxiety. P/W increased shortness of breath. Nutrition Interval:  Pt seen in company of daughter, son and granddaughter. Pt reports continued poor appetite with limited po intake. She is receptive to supplemental ice cream here but says she does not see the point in other nutrition interventions since she is considering hospice.  Daughter estimated current weight is <100#. Hospice consulted this morning. Current Nutrition Therapies:  ADULT DIET Regular; Low Sodium (2 gm)    Current Intake:   Average Meal Intake: 1-25%        Anthropometric Measures:  Height: 5' 7\" (170.2 cm)  Current Body Wt: 47.6 kg (105 lb) (4/9), Weight source: Stated  BMI: 16.4, Underweight (BMI less than 22) age over 72  Admission Body Weight: 105 lb (stated)  Ideal Body Weight (lbs) (Calculated): 135 lbs (61 kg),    Usual Body Wt:  Per EMR OV  110# 3/2021  114# 6/2021  116# 7/2021  103# 2/2022        Edema: LLE: 2+; Pitting (4/11/2022  8:23 AM)  RLE: 2+; Pitting (4/11/2022  8:23 AM)    Estimated Daily Nutrient Needs:  Energy (kcal/day): 3979-1221 (Kcal/kg (30-35), Weight Used: Admission (47.6 kg stated))  Protein (g/day): 71-83  ( (20% of kcal))  Fluid (ml/day):   (1 ml/kcal)    Nutrition Diagnosis:   · Severe malnutrition,In context of chronic illness related to inadequate protein-energy intake as evidenced by  (as above)    Nutrition Interventions:   Food and/or Nutrient Delivery: Continue current diet,Start oral nutrition supplement     Coordination of Nutrition Care: Interdisciplinary rounds      Goals: Active Goal: Best po possible as c/w C    Nutrition Monitoring and Evaluation:      Food/Nutrient Intake Outcomes: Food and nutrient intake,Supplement intake       Discharge Planning:     Too soon to determine    Jane Dias, [address] [place] [address], [facility] [address] [place] [address], [address] [place] [address]

## 2022-04-11 NOTE — HOSPICE
[facility]    Referral received from [address] [place] [address] for home with hospice. Patient has daily caregiver at home through [facility].  Unfortunately this is considered a duplication of services (even though hospice would bill Medicare) and Medicaid would discontinue CLTC aid if patient elects hospice. Spoke with patient's daughter in law ORLÉANS by phone and she confirmed they do not want to lose the [facility] assistance but they are torn because patient does not want to come back to the hospital either. Home based palliative care may be able to help patient stay comfortable at home and would allow her to keep her CLTC benefit through Medicaid. ORANS is open to this idea and would like to talk to them and her mother in law before making a decision about going home and with what level of support. Provided ORDIANE with my contact information if I can help, will gladly meet with her and patient at the hospital to discuss Medicare hospice benefit if needed. Updated ROLANDO Wright with above info.     Thank you for this referral,  Ten Dunn RN, BSN  [address] liaison  [phone]

## 2022-04-11 NOTE — PROGRESS NOTES
SW reviewed patient's chart and conducted a baseline assessment. Discharge plan at this time is as follows:     Care Management Interventions  PCP Verified by CM: Yes  Mode of Transport at Discharge: Self  Transition of Care Consult (CM Consult): Olga: Yes  Physical Therapy Consult: Yes  Occupational Therapy Consult: Yes  Support Systems: Child(ravinder),Other Family Member(s)  Confirm Follow Up Transport: Family  The Plan for Transition of Care is Related to the Following Treatment Goals : Hopsice  The Patient and/or Patient Representative was Provided with a Choice of Provider and Agrees with the Discharge Plan?: Yes  Name of the Patient Representative Who was Provided with a Choice of Provider and Agrees with the Discharge Plan: Linda Elias  [place] of Choice List was Provided with Basic Dialogue that Supports the Patient's Individualized Plan of Care/Goals, Treatment Preferences and Shares the Quality Data Associated with the Providers?: Yes  Discharge Location  Patient Expects to be Discharged to[de-identified] Home with hospice      *Please note that discharge plans can change throughout an inpatient admission.  Ensure that you are referring to the most recent social work/nurse case management note for current discharge plan*     Brianne Mena, [address] [place] [address] Work   [facility]    * [email or web address]

## 2022-04-12 ENCOUNTER — HOME HEALTH ADMISSION (OUTPATIENT)
Dept: HOME HEALTH SERVICES | Facility: HOME HEALTH | Age: 77
End: 2022-04-12

## 2022-04-12 VITALS
OXYGEN SATURATION: 100 % | DIASTOLIC BLOOD PRESSURE: 71 MMHG | HEIGHT: 67 IN | SYSTOLIC BLOOD PRESSURE: 134 MMHG | WEIGHT: 103.4 LBS | HEART RATE: 89 BPM | TEMPERATURE: 98.2 F | RESPIRATION RATE: 16 BRPM | BODY MASS INDEX: 16.23 KG/M2

## 2022-04-12 LAB
ANION GAP SERPL CALC-SCNC: 4 MMOL/L (ref 7–16)
BUN SERPL-MCNC: 26 MG/DL (ref 8–23)
CALCIUM SERPL-MCNC: 8.5 MG/DL (ref 8.3–10.4)
CHLORIDE SERPL-SCNC: 104 MMOL/L (ref 98–107)
CO2 SERPL-SCNC: 32 MMOL/L (ref 21–32)
CREAT SERPL-MCNC: 0.85 MG/DL (ref 0.6–1)
ERYTHROCYTE [DISTWIDTH] IN BLOOD BY AUTOMATED COUNT: 14.6 % (ref 11.9–14.6)
GLUCOSE SERPL-MCNC: 89 MG/DL (ref 65–100)
HCT VFR BLD AUTO: 37.3 % (ref 35.8–46.3)
HGB BLD-MCNC: 11.6 G/DL (ref 11.7–15.4)
MCH RBC QN AUTO: 29.7 PG (ref 26.1–32.9)
MCHC RBC AUTO-ENTMCNC: 31.1 G/DL (ref 31.4–35)
MCV RBC AUTO: 95.4 FL (ref 79.6–97.8)
NRBC # BLD: 0 K/UL (ref 0–0.2)
PLATELET # BLD AUTO: 178 K/UL (ref 150–450)
PMV BLD AUTO: 10.1 FL (ref 9.4–12.3)
POTASSIUM SERPL-SCNC: 3.6 MMOL/L (ref 3.5–5.1)
RBC # BLD AUTO: 3.91 M/UL (ref 4.05–5.2)
SODIUM SERPL-SCNC: 140 MMOL/L (ref 136–145)
WBC # BLD AUTO: 7.8 K/UL (ref 4.3–11.1)

## 2022-04-12 PROCEDURE — 74011250636 HC RX REV CODE- 250/636: Performed by: FAMILY MEDICINE

## 2022-04-12 PROCEDURE — 99232 SBSQ HOSP IP/OBS MODERATE 35: CPT | Performed by: INTERNAL MEDICINE

## 2022-04-12 PROCEDURE — 85027 COMPLETE CBC AUTOMATED: CPT

## 2022-04-12 PROCEDURE — 77010033678 HC OXYGEN DAILY

## 2022-04-12 PROCEDURE — 94760 N-INVAS EAR/PLS OXIMETRY 1: CPT

## 2022-04-12 PROCEDURE — 36415 COLL VENOUS BLD VENIPUNCTURE: CPT

## 2022-04-12 PROCEDURE — 74011000250 HC RX REV CODE- 250: Performed by: FAMILY MEDICINE

## 2022-04-12 PROCEDURE — 74011250637 HC RX REV CODE- 250/637: Performed by: FAMILY MEDICINE

## 2022-04-12 PROCEDURE — 94761 N-INVAS EAR/PLS OXIMETRY MLT: CPT

## 2022-04-12 PROCEDURE — 80048 BASIC METABOLIC PNL TOTAL CA: CPT

## 2022-04-12 RX ORDER — NICOTINE 7MG/24HR
1 PATCH, TRANSDERMAL 24 HOURS TRANSDERMAL DAILY
Qty: 30 PATCH | Refills: 0 | Status: SHIPPED | OUTPATIENT
Start: 2022-04-13 | End: 2022-05-13

## 2022-04-12 RX ADMIN — GABAPENTIN 100 MG: 100 CAPSULE ORAL at 08:48

## 2022-04-12 RX ADMIN — MONTELUKAST SODIUM 1 G: 4 TABLET, CHEWABLE ORAL at 08:47

## 2022-04-12 RX ADMIN — HYDROCODONE BITARTRATE AND ACETAMINOPHEN 1 TABLET: 10; 325 TABLET ORAL at 02:10

## 2022-04-12 RX ADMIN — SODIUM CHLORIDE, PRESERVATIVE FREE 10 ML: 5 INJECTION INTRAVENOUS at 05:06

## 2022-04-12 RX ADMIN — BUDESONIDE 6 MG: 3 CAPSULE, GELATIN COATED ORAL at 08:47

## 2022-04-12 RX ADMIN — SERTRALINE 100 MG: 100 TABLET, FILM COATED ORAL at 08:47

## 2022-04-12 RX ADMIN — PANTOPRAZOLE SODIUM 40 MG: 40 TABLET, DELAYED RELEASE ORAL at 05:06

## 2022-04-12 RX ADMIN — PRIMIDONE 100 MG: 50 TABLET ORAL at 08:48

## 2022-04-12 RX ADMIN — TOPIRAMATE 100 MG: 100 TABLET, FILM COATED ORAL at 08:48

## 2022-04-12 RX ADMIN — FUROSEMIDE 40 MG: 10 INJECTION, SOLUTION INTRAMUSCULAR; INTRAVENOUS at 08:47

## 2022-04-12 RX ADMIN — DILTIAZEM HYDROCHLORIDE 120 MG: 120 CAPSULE, COATED, EXTENDED RELEASE ORAL at 08:47

## 2022-04-12 RX ADMIN — ACETAMINOPHEN 650 MG: 325 TABLET, FILM COATED ORAL at 11:30

## 2022-04-12 RX ADMIN — CROMOLYN SODIUM 1 DROP: 40 SOLUTION/ DROPS OPHTHALMIC at 08:47

## 2022-04-12 RX ADMIN — BUSPIRONE HYDROCHLORIDE 7.5 MG: 5 TABLET ORAL at 08:47

## 2022-04-12 RX ADMIN — CETIRIZINE HYDROCHLORIDE 10 MG: 10 TABLET, FILM COATED ORAL at 08:48

## 2022-04-12 NOTE — PROGRESS NOTES
Ese Hood  Admission Date: 4/9/2022         Daily Progress Note: 4/12/2022    The patient's chart is reviewed and the patient is discussed with the staff. Background: Patient is a 64 y.o.  female seen and evaluated at the request of Dr. Colleen Vázquez. The patient carries diagnoses of COPD (no PFT's on file but CT scan 3/12/21 showed moderate centrilobular emphysema) with chronic hypoxic respiratory failure on 3L, GERD, a fib. She was admitted 4/9 with increasing SOB.      She states that she has been swelling in her legs for the past couple of weeks but it got really bad over the last few days. This was associated with increased shortness of breath. She denies any fever or chills, wheeze, and what sounds like close to baseline cough.     Workup in the ER showed BNP of 5118 and CXR with pulmonary edema. She was started on lasix for CHF. TTE has been performed with preserved EF but moderate MR with eccentric jet with possible higher severity than suggested on the echo as well as an RVSP of 57. She has been net negative 1.3L in the last 24 hours. She has been satting well on her home 3L of O2.      She states she has been seen by pulmonary as outpatient here but there are no records of that. She is also hard of hearing and does not have her hearing aides in with some difficulty with communication as a result. Subjective:     Awake this morning. [place], but responsive and alert. On 4L NC. Diuresed 2L yesterday. They are discussing palliative care and hospice at discharge. She is asking about going home.      Current Facility-Administered Medications   Medication Dose Route Frequency    busPIRone (BUSPAR) tablet 7.5 mg  7.5 mg Oral TID    nicotine (NICODERM CQ) 7 mg/24 hr patch 1 Patch  1 Patch TransDERmal DAILY    albuterol (PROVENTIL VENTOLIN) nebulizer solution 2.5 mg  2.5 mg Nebulization Q6H RT    colestipoL (COLESTID) tablet 1 g (Patient Supplied)  1 g Oral BID    sertraline (ZOLOFT) tablet 100 mg  100 mg Oral BID    budesonide (ENTOCORT EC) capsule 6 mg  6 mg Oral 7am    mometasone-formoterol (DULERA) 200mcg-5mcg/puff  2 Puff Inhalation BID RT    tiotropium bromide (SPIRIVA RESPIMAT) 2.5 mcg /actuation  2 Puff Inhalation DAILY    atorvastatin (LIPITOR) tablet 80 mg  80 mg Oral QHS    metoclopramide HCl (REGLAN) tablet 5 mg  5 mg Oral TID PRN    senna-docusate (PERICOLACE) 8.6-50 mg per tablet 1 Tablet  1 Tablet Oral QHS    traZODone (DESYREL) tablet 100 mg  100 mg Oral QHS PRN    topiramate (TOPAMAX) tablet 100 mg  100 mg Oral BID    primidone (MYSOLINE) tablet 100 mg  100 mg Oral TID    celecoxib (CELEBREX) capsule 200 mg  200 mg Oral DAILY PRN    cetirizine (ZYRTEC) tablet 10 mg  10 mg Oral DAILY    dilTIAZem ER (CARDIZEM CD) capsule 120 mg  120 mg Oral DAILY    gabapentin (NEURONTIN) capsule 100 mg  100 mg Oral TID    HYDROcodone-acetaminophen (NORCO)  mg tablet 1 Tablet  1 Tablet Oral Q6H PRN    sucralfate (CARAFATE) tablet 1 g  1 g Oral AC&HS    pantoprazole (PROTONIX) tablet 40 mg  40 mg Oral ACB    cromolyn (OPTICROM) 4 % ophthalmic solution 1 Drop (Patient Supplied)  1 Drop Both Eyes TID    [Held by provider] LORazepam (ATIVAN) tablet 2 mg  2 mg Oral Q8H PRN    sodium chloride (NS) flush 5-40 mL  5-40 mL IntraVENous Q8H    sodium chloride (NS) flush 5-40 mL  5-40 mL IntraVENous PRN    acetaminophen (TYLENOL) tablet 650 mg  650 mg Oral Q6H PRN    Or    acetaminophen (TYLENOL) suppository 650 mg  650 mg Rectal Q6H PRN    polyethylene glycol (MIRALAX) packet 17 g  17 g Oral DAILY PRN    ondansetron (ZOFRAN ODT) tablet 4 mg  4 mg Oral Q8H PRN    Or    ondansetron (ZOFRAN) injection 4 mg  4 mg IntraVENous Q6H PRN    enoxaparin (LOVENOX) injection 30 mg  30 mg SubCUTAneous Q24H    furosemide (LASIX) injection 40 mg  40 mg IntraVENous BID     Review of Systems  Constitutional: negative for fever, chills, sweats  Cardiovascular: negative for chest pain, palpitations, syncope, edema  Gastrointestinal:  negative for dysphagia, reflux, vomiting, diarrhea, abdominal pain, or melena  Neurologic:  negative for focal weakness, numbness, headache  Objective:     Vitals:    04/11/22 2359 04/12/22 0301 04/12/22 0644 04/12/22 0739   BP: 124/70 118/70  (!) 142/83   Pulse: 76 60  84   Resp: 16 20  16   Temp: 98.8 °F (37.1 °C) 98.6 °F (37 °C)  98.2 °F (36.8 °C)   SpO2: 98% 96%  100%   Weight:   103 lb 6.4 oz (46.9 kg)    Height:           Intake/Output Summary (Last 24 hours) at 4/12/2022 0746  Last data filed at 4/12/2022 6648  Gross per 24 hour   Intake 200 ml   Output 2200 ml   Net -2000 ml     Physical Exam:   Constitution:  the patient is well developed and in no acute distress  HEENT:  Sclera clear, pupils equal, oral mucosa moist  Respiratory: mild rales, 4L   Cardiovascular:  RRR without M,G,R  Gastrointestinal: soft and non-tender; with positive bowel sounds. Musculoskeletal: warm without cyanosis. There is flank edema while laying in bed; no lower extremity edema. Skin:  no jaundice or rashes, no wounds   Neurologic: no gross neuro deficits     Psychiatric:  alert and oriented x 4    CXR:  4/9/22       CT Chest: 3/12/21      LAB:  Recent Labs     04/12/22  0620 04/11/22  0539 04/10/22  0455   WBC 7.8 6.5 4.5   HGB 11.6* 11.2* 9.6*   HCT 37.3 36.1 31.7*    181 171     Recent Labs     04/12/22  0620 04/11/22  0539 04/10/22  0455 04/09/22  1640 04/09/22  1640    140 143   < > 139   K 3.6 4.0 3.3*   < > 4.0    106 109*   < > 107   CO2 32 30 28   < > 26   GLU 89 103* 83   < > 108*   BUN 26* 25* 19   < > 20   CREA 0.85 0.96 0.79   < > 0.87   MG  --   --  1.8  --  1.9   CA 8.5 8.6 7.8*   < > 8.5   ALB  --   --   --   --  3.0*   AST  --   --   --   --  6*   ALT  --   --   --   --  10*   AP  --   --   --   --  100    < > = values in this interval not displayed.      Recent Labs     04/09/22 1944 04/09/22  1640   TROPHS 23.0* 23.9*   BNPNT  --  5,118*     No results for input(s): PH, PCO2, PO2, HCO3, PHI, PCO2I, PO2I, HCO3I in the last 72 hours. No results for input(s): SDES, CULT in the last 72 hours. Assessment and Plan:  (Medical Decision Making)   Principal Problem:    Pulmonary hypertension due to chronic obstructive pulmonary disease ([facility] (4/9/2022)    She has pulmonary edema thus this PH is much more likely to be CHF related to valvular heart disease. There is also no intervention for COPD related PH so would focus on cardiology input and diuresis. Active Problems:    COPD (chronic obstructive pulmonary disease) ([facility] (12/30/2009)    No PFT's on file. On spiriva and symbicort 160 at home. Continue albuterol, dulera, spiriva. She will need to have outpatient pulmonary evaluatation including cPFT's to determine the severity of her obstructive lung disease. Mitral regurgitation (4/11/2022)    Medical management; consider cardiology consult. Doubt she is a surgical candidate and is considering hospice/palliative care options. Chronic respiratory failure with hypoxia ([facility] (4/11/2022)  At rest on her home amount of O2. Would have her check walking o2 sats prior to discharge as well. More than 50% of the time documented was spent in face-to-face contact with the patient and in the care of the patient on the floor/unit where the patient is located.     Chato Chappell MD

## 2022-04-12 NOTE — PROGRESS NOTES
Comprehensive Nutrition Assessment    Type and Reason for Visit: Reassess      Nutrition Recommendations/Plan:   Meals and Snacks:  Continue current diet. Nutrition Supplement Therapy:   Medical food supplement therapy:  Discontinue Magic Cup twice per day (this provides 290 kcal and 9 grams protein per serving)     Malnutrition Assessment:  Malnutrition Status: Severe malnutrition  Context: Chronic illness  Findings of clinical characteristics of malnutrition:   Energy Intake:  7 - 75% or less est energy requirements for 1 month or longer (<50% of estimated needs for > 1 year)  Weight Loss:   (11% since 7/221, 31% over 2 years)     Body Fat Loss:  7 - Severe body fat loss, Triceps,Fat overlying ribs   Muscle Mass Loss:  7 - Severe muscle mass loss, Clavicles (pectoralis &deltoids)  Fluid Accumulation:  Unable to assess,     Strength:  Not performed       Nutrition Assessment:   Nutrition History:    4/11: Pt has had poor appetite and intake for at least 1 year and weight loss has occurred over the past 2 years. At home she eats a large pancake and scrambled cheese eggs at [facility] or 1 PM that her caretaker prepares, she drinks coffee in the morning and sometimes will eat < 25% of a home delivered meal in the evening. She usually eat an ice cream sandwich daily and alsos drinks whole milk. She does not like Ensure pr Ensure clear as she burps it up. She got a new set of dentures but even after 2 adjustments she is nit able to wear them. Nutrition Background: H/O: COPD, CKD, HTN, GERD, fibromyalgia, arthritis, B12 deficiency, and anxiety. P/W increased shortness of breath. Nutrition Interval:  Pt seen in company of daughter in law. Pt receptive to try alternate ONS or tips for increasing kcal and protein. She says she plans to quit smoking and hopes that will be replaced with eating more. Pt did not like the magic cup and prefers regular ice cream.  Plan is for discharge today with home palliative care. Current Nutrition Therapies:  ADULT ORAL NUTRITION SUPPLEMENT Lunch, Dinner; Frozen Supplement  ADULT DIET Regular; Low Sodium (2 gm); Likes and may have pancakes or [place] toast and cheese eggs at breakfast    Current Intake:   Average Meal Intake: 1-25% Average Supplement Intake: 0%      Anthropometric Measures:  Height: 5' 7\" (170.2 cm)  Current Body Wt: 46.9 kg (103 lb 6.3 oz) (4/12), Weight source: Bed scale  BMI: 16.2, Underweight (BMI less than 22) age over 70  Admission Body Weight: 105 lb (stated)  Ideal Body Weight (lbs) (Calculated): 135 lbs (61 kg),    Usual Body Wt:  Per EMR OV  110# 3/2021  114# 6/2021  116# 7/2021  103# 2/2022        Edema: LLE: 2+; Pitting (4/12/2022  7:39 AM)  RLE: 2+; Pitting (4/12/2022  7:39 AM)    Estimated Daily Nutrient Needs:  Energy (kcal/day): 4368-6691 (Kcal/kg (30-35), Weight Used: Admission (47.6 kg stated))  Protein (g/day): 71-83  ( (20% of kcal))  Fluid (ml/day):   (1 ml/kcal)    Nutrition Diagnosis:   · Severe malnutrition,In context of chronic illness related to inadequate protein-energy intake as evidenced by  (as above)    Nutrition Interventions:   Food and/or Nutrient Delivery: Discontinue oral nutrition supplement,Continue current diet  Nutrition Education/Counseling: Education initiated. Handouts provided:  High protein breakfast and lunch ideas, high protein high calorie snacks, high protein high calorie hints,mini meal ideas, milkshake recipes. Suggested replacement of milk with cream in pancakes and addition of powdered milk or unflavored protein powder to pancakes, encouraged addition fat to foos and high kcal/protein snacks, also discussed alternatives to Ensure like Boost or CIB. Suggested handout be shared with [address] [facility] that do meal preparation.     Coordination of Nutrition Care: Continue to monitor while inpatient,Interdisciplinary rounds      Goals:   Previous Goal Met: No progress toward goal(s)  Active Goal: Best po possible as c/w [address]    Nutrition Monitoring and Evaluation:      Food/Nutrient Intake Outcomes: Food and nutrient intake,Supplement intake  Physical Signs/Symptoms Outcomes: Weight    Discharge Planning:    Continue oral nutrition supplement,Continue current diet    Josh Dailey, [address] [place] [address], [place], [facility] [phone]

## 2022-04-12 NOTE — DISCHARGE SUMMARY
Hospitalist Discharge Summary   Admit Date:  2022  4:28 PM   DC Note date: 2022  Name:  Harriet Bolanos   Age:  64 y.o.   Sex:  female  :  1945   MRN:  [id number]   Room:  [facility] [place]  PCP:  Lynnette Austin MD    Presenting Complaint: Shortness of Breath    Initial Admission Diagnosis: Pulmonary edema [J81.1]     Problem List for this Hospitalization:  Hospital Problems as of 2022 Date Reviewed: 2022          Codes Class Noted - Resolved POA    * (Principal) Pulmonary hypertension due to chronic obstructive pulmonary disease (HCC) (Chronic) ICD-10-CM: I27.23, J44.9  ICD-9-CM: 416.8, 496  2022 - Present Yes        COPD exacerbation ([facility] ICD-10-CM: J44.1  ICD-9-CM: 491.21  2021 - Present Yes        Body mass index (BMI) less than 16.5 (Chronic) ICD-10-CM: Z68.1  ICD-9-CM: V85.0  4/10/2022 - Present Yes        Atrial fibrillation (HCC) ICD-10-CM: I48.91  ICD-9-CM: 427.31  2021 - Present Yes        Do not resuscitate discussion ICD-10-CM: Z71.89  ICD-9-CM: V65.49  4/10/2022 - Present Yes        Mitral regurgitation ICD-10-CM: I34.0  ICD-9-CM: 424.0  2022 - Present Yes        Chronic respiratory failure with hypoxia ([facility] ICD-10-CM: J96.11  ICD-9-CM: 518.83, 799.02  2022 - Present Yes        Do not resuscitate status (Chronic) ICD-10-CM: Z66  ICD-9-CM: V49.86  4/10/2022 - Present Yes        Senile purpura ([facility] (Chronic) ICD-10-CM: A32.9  ICD-9-CM: 287.2  4/10/2022 - Present Yes        Severe protein-calorie malnutrition ([facility] ICD-10-CM: E43  ICD-9-CM: 105  2/15/2021 - Present Yes        Depression, major, in remission [facility]) ICD-10-CM: F32.5  ICD-9-CM: 296.25  2018 - Present Yes        Chronic kidney disease (CKD) stage G3b/A1, moderately decreased glomerular filtration rate (GFR) between 30-44 mL/min/1.73 square meter and albuminuria creatinine ratio less than 30 mg/g (HCC) ICD-10-CM: A61.50  ICD-9-CM: 585.3  10/14/2016 - Present Yes        Smokers' cough ([facility] (Chronic) ICD-10-CM: J41.0  ICD-9-CM: 491.0  11/4/2015 - Present Yes        Anxiety ICD-10-CM: F41.9  ICD-9-CM: 300.00  3/6/2013 - Present Yes        End stage chronic obstructive pulmonary disease ([facility] ICD-10-CM: J44.9  ICD-9-CM: 405  12/30/2009 - Present Yes        GERD (gastroesophageal reflux disease) (Chronic) ICD-10-CM: K21.9  ICD-9-CM: 530.81  12/30/2009 - Present Yes        Sleep apnea (Chronic) ICD-10-CM: G47.30  ICD-9-CM: 780.57  12/30/2009 - Present Yes            Did Patient have Sepsis (YES OR NO): No    Hospital Course:  76F PMHx COPD on 3L, GERD, afib not on anticoagulation, depression, fibromyalgia who presented to ED with shortness of breath.  Symptoms have been worsening over the past week or so.  States that she noticed her lower legs swelling as well.  Her caretaker came to visit and noticed that she appeared to be in respiratory distress and called EMS.  Upon ER evaluation, patient was on CPAP, she had received albuterol nebulizer en route to ER.  Patient was placed on 3.5lpm supplemental O2 in ER. Barbara Augustine shows evidence of pulmonary edema and small pleural effusions.  pBNP is elevated at [address] [facility] concern for CHF exacerbation, COPD exacerbation, Hospitalist asked to admit. Pulmonology was consulted. Echocardiogram showed moderate mitral valve regurgitation and pulmonary hypertension. Cardiology was consulted. She was diuresed. She requested consultation with palliative care and hospice. She adamantly and repeatedly expressed desire for discharge as soon as possible so that she could go home. On 4/12 she was discharged with additional home health services and a plan to follow-up with hospice house if her care needs escalated. Disposition: Home Health Care Svc  Diet: ADULT ORAL NUTRITION SUPPLEMENT Lunch, Dinner; Frozen Supplement  ADULT DIET Regular; Low Sodium (2 gm);  Likes and may have pancakes or Jamaican toast and cheese eggs at breakfast  Code Status: DNR    Follow Up Orders: Follow-up Appointments   Procedures    FOLLOW UP VISIT Appointment in: One Week Follow up with hospice as needed     Follow up with hospice as needed     Standing Status:   Standing     Number of Occurrences:   1     Order Specific Question:   Appointment in     Answer: One Week       Follow-up Information     Follow up With Specialties Details Why Contact Info    Geovani Hidalgo MD Family Medicine   [id number] [facility], located within [facility]  [phone]          Time spent in patient discharge and coordination 57 minutes. Plan was discussed with patient, nurse, case management, palliative care, home palliative care, daughter. All questions answered. Patient was stable at time of discharge. Instructions given to call a physician or return if any concerns. Discharge Info:   Current Discharge Medication List      START taking these medications    Details   nicotine (NICODERM CQ) 7 mg/24 hr 1 Patch by TransDERmal route daily for 30 days. Qty: 30 Patch, Refills: 0  Start date: 4/13/2022, End date: 5/13/2022         CONTINUE these medications which have NOT CHANGED    Details   LORazepam (ATIVAN) 2 mg tablet Take 1 Tablet by mouth every eight (8) hours as needed (tremor, anxiety). Max Daily Amount: 6 mg. Qty: 90 Tablet, Refills: 2    Associated Diagnoses: Essential tremor; Anxiety      lubiPROStone (Amitiza) 8 mcg capsule Take  by mouth. celecoxib (CELEBREX) 200 mg capsule Take  by mouth daily as needed. Pain scale 1-4      cetirizine (ZYRTEC) 10 mg tablet Take  by mouth. dilTIAZem ER (CARDIZEM CD) 120 mg capsule       furosemide (LASIX) 20 mg tablet 40 mg two (2) times a day.      gabapentin (NEURONTIN) 100 mg capsule Take 100 mg by mouth three (3) times daily. HYDROcodone-acetaminophen (NORCO)  mg tablet TAKE 1 TABLET BY MOUTH EVERY 6 HOURS AS NEEDED FOR SEVERE      sucralfate (CARAFATE) 1 gram tablet 1 g Before breakfast, lunch, dinner and at bedtime.       omeprazole (PRILOSEC) 40 mg capsule Take 40 mg by mouth daily. cromolyn (OPTICROM) 4 % ophthalmic solution Administer 1 Drop to both eyes three (3) times daily. Use in affected eye(s)       topiramate (TOPAMAX) 100 mg tablet Take 1 tablet in the morning with breakfast and 1 tablet at night. Indications: migraine prevention  Qty: 60 Tablet, Refills: 5    Associated Diagnoses: Migraine with aura and without status migrainosus, not intractable      primidone (MYSOLINE) 50 mg tablet Take 2 Tablets by mouth three (3) times daily. Indications: essential tremor  Qty: 270 Tablet, Refills: 3    Associated Diagnoses: Essential tremor      traZODone (DESYREL) 100 mg tablet Take 100 mg by mouth At bedtime. acetaminophen (TYLENOL) 325 mg tablet Take 2 Tabs by mouth every six (6) hours as needed for Pain. Indications: pain  Qty: 30 Tab, Refills: 0      senna-docusate (PERICOLACE) 8.6-50 mg per tablet Take 1 Tab by mouth nightly. Qty: 30 Tab, Refills: 0      lidocaine 4 % patch Apply to lower back  Qty: 30 Patch, Refills: 0      metoclopramide HCl (REGLAN) 10 mg tablet Take 1 Tab by mouth Before breakfast, lunch, dinner and at bedtime. Indications: stomach muscle paralysis and decreased function from diabetes  Qty: 120 Tab, Refills: 5    Associated Diagnoses: Insomnia, unspecified type      alendronate (FOSAMAX) 70 mg tablet Take 1 Tab by mouth every seven (7) days. Qty: 12 Tab, Refills: 3      tiotropium (SPIRIVA) 18 mcg inhalation capsule Take 1 Cap by inhalation daily. Indications: bronchospasm prevention with COPD  Qty: 30 Cap, Refills: 11    Associated Diagnoses: Chronic obstructive pulmonary disease, unspecified COPD type ([address]      atorvastatin (LIPITOR) 80 mg tablet Take 1 Tab by mouth daily. Indications: primary prevention of heart attack  Qty: 90 Tab, Refills: 3    Associated Diagnoses: Mixed hyperlipidemia      budesonide-formoteroL (SYMBICORT) 160-4.5 mcg/actuation HFAA Take 2 Puffs by inhalation two (2) times a day.   Qty: 3 Inhaler, Refills: 3      albuterol (PROVENTIL VENTOLIN) 2.5 mg /3 mL (0.083 %) nebu 3 mL by Nebulization route every four (4) hours as needed for Wheezing or Shortness of Breath. Qty: 24 Nebule, Refills: 0    Associated Diagnoses: Gastroesophageal reflux disease without esophagitis      budesonide (ENTOCORT EC) 3 mg capsule Take 6 mg by mouth every morning. ondansetron hcl (ZOFRAN) 4 mg tablet Take 4 mg by mouth every eight (8) hours as needed for Nausea or Vomiting.      sertraline (ZOLOFT) 100 mg tablet Take 2 tablets daily ( Dose increased from previously 100 mg daily )  Indications: anxiousness associated with depression  Qty: 180 Tab, Refills: 3    Associated Diagnoses: Recurrent major depressive disorder, in partial remission (HCC)      busPIRone (BUSPAR) 15 mg tablet Take 1 Tab by mouth three (3) times daily. Indications: repeated episodes of anxiety  Qty: 270 Tab, Refills: 3    Associated Diagnoses: Depression, unspecified depression type      albuterol (PROVENTIL HFA, VENTOLIN HFA, PROAIR HFA) 90 mcg/actuation inhaler Take 2 Puffs by inhalation every four (4) hours as needed for Wheezing or Shortness of Breath. Qty: 3 Inhaler, Refills: 5    Associated Diagnoses: Chronic obstructive pulmonary disease, unspecified COPD type ([facility])      colestipol (COLESTID) 1 gram tablet Take 1 g by mouth two (2) times a day. Oxygen              Procedures done this admission:  * No surgery found *    Consults this admission:  IP CONSULT TO PULMONOLOGY  IP CONSULT TO PALLIATIVE CARE - PROVIDER    Echocardiogram/EKG results:  Results from Hospital Encounter encounter on 04/09/22    ECHO ADULT COMPLETE    Interpretation Summary    Left Ventricle: Left ventricle size is normal. Mildly increased wall thickness. Normal wall motion. Normal left ventricular systolic function with a visually estimated EF of 60 - 65%. Normal diastolic function.   Aortic Valve: Mild sclerosis of the aortic valve cusp. Mild regurgitation. No stenosis.   Mitral Valve: Mildly calcified leaflet, at the posterior leaflet. Moderate transvalvular regurgitation with an eccentrically directed jet and may underestimate severity.   Left Atrium: Left atrium is moderately dilated.   Right Atrium: Right atrium is moderately dilated. EKG Results     Procedure 720 Value Units Date/Time    EKG [[id number]] Collected: 04/09/22 1635    Order Status: Completed Updated: 04/09/22 2028     Ventricular Rate 84 BPM      Atrial Rate 102 BPM      QRS Duration 116 ms      Q-T Interval 402 ms      QTC Calculation (Bezet) 475 ms      Calculated R Axis -145 degrees      Calculated T Axis 45 degrees      Diagnosis --     Atrial fibrillation  Incomplete right bundle branch block  Abnormal ECG  When compared with ECG of 24-DEC-2021 17:56,  No significant change was found  Confirmed by Shahana Walsh ([id number]) on 4/9/2022 8:28:00 PM            Diagnostic Imaging/Tests:   XR CHEST PORT    Result Date: 4/9/2022  PORTABLE CHEST, April 9, 2022 at 1705 hours CLINICAL HISTORY:  Bilateral wheezing and hypoxemia. COMPARISON:  December 24, 2021. FINDINGS:  AP erect image demonstrates hyperinflation with mild cardiomegaly, pulmonary venous congestion, edema, small pleural effusions in a pattern most consistent with congestive heart failure. No definite pneumothorax. The bony thorax appears intact on this view. There are overlying radiopaque support devices. EMPHYSEMA WITH MILD CARDIOMEGALY AND FINDINGS MOST CONSISTENT WITH CONGESTIVE HEART FAILURE.    ECHO ADULT COMPLETE    Result Date: 4/10/2022    Left Ventricle: Left ventricle size is normal. Mildly increased wall thickness. Normal wall motion. Normal left ventricular systolic function with a visually estimated EF of 60 - 65%. Normal diastolic function.   Aortic Valve: Mild sclerosis of the aortic valve cusp. Mild regurgitation. No stenosis.   Mitral Valve: Mildly calcified leaflet, at the posterior leaflet.  Moderate transvalvular regurgitation with an eccentrically directed jet and may underestimate severity.   Left Atrium: Left atrium is moderately dilated.   Right Atrium: Right atrium is moderately dilated. All Micro Results     None          Labs: Results:       BMP, Mg, Phos Recent Labs     04/12/22 0620 04/11/22 0539 04/10/22  0455 04/09/22  1640 04/09/22  1640    140 143   < > 139   K 3.6 4.0 3.3*   < > 4.0    106 109*   < > 107   CO2 32 30 28   < > 26   AGAP 4* 4* 6*   < > 6*   BUN 26* 25* 19   < > 20   CREA 0.85 0.96 0.79   < > 0.87   CA 8.5 8.6 7.8*   < > 8.5   GLU 89 103* 83   < > 108*   MG  --   --  1.8  --  1.9    < > = values in this interval not displayed. CBC Recent Labs     04/12/22  0620 04/11/22  0539 04/10/22  0455 04/09/22  [facility] 04/09/22  [facility]   WBC 7.8 6.5 4.5   < > 7.0   RBC 3.91* 3.74* 3.23*   < > 3.48*   HGB 11.6* 11.2* 9.6*   < > 10.2*   HCT 37.3 36.1 31.7*   < > 34.3*    181 171   < > 197   GRANS  --   --   --   --  72   LYMPH  --   --   --   --  20   EOS  --   --   --   --  0*   MONOS  --   --   --   --  6   BASOS  --   --   --   --  1   IG  --   --   --   --  1   ANEU  --   --   --   --  5.1   ABL  --   --   --   --  1.4   DAMIEN  --   --   --   --  0.0   ABM  --   --   --   --  0.4   ABB  --   --   --   --  0.1   AIG  --   --   --   --  0.1    < > = values in this interval not displayed.       LFT Recent Labs     04/09/22  1640   ALT 10*      TP 5.4*   ALB 3.0*   GLOB 2.4   AGRAT 1.3      Cardiac Testing Lab Results   Component Value Date/Time     (H) 02/16/2020 04:06 AM    Troponin-I, Qt. 0.04 02/16/2020 04:06 AM      Coagulation Tests Lab Results   Component Value Date/Time    Prothrombin time 14.6 03/11/2021 10:36 PM    Prothrombin time 14.6 02/06/2021 01:43 PM    Prothrombin time 9.9 11/29/2015 09:48 AM    INR 1.1 03/11/2021 10:36 PM    INR 1.1 02/06/2021 01:43 PM    INR 0.9 11/29/2015 09:48 AM    aPTT 30.0 02/06/2021 01:43 PM    aPTT 27.1 12/07/2009 10:55 AM      A1c No results found for: HBA1C, [id number]   Lipid Panel Lab Results   Component Value Date/Time    Cholesterol, total 129 12/05/2018 02:51 PM    HDL Cholesterol 27 (L) 12/05/2018 02:51 PM    LDL, calculated 62 12/05/2018 02:51 PM    VLDL, calculated 40 12/05/2018 02:51 PM    Triglyceride 199 (H) 12/05/2018 02:51 PM      Thyroid Panel Lab Results   Component Value Date/Time    TSH 2.440 12/24/2021 05:56 PM    TSH 4.180 02/06/2021 04:13 PM    T4, Free 0.96 05/25/2016 11:44 AM    T4, Free 1.14 03/09/2016 03:15 PM        Most Recent UA Lab Results   Component Value Date/Time    Color YELLOW 10/31/2021 04:02 PM    Appearance CLOUDY 10/31/2021 04:02 PM    Specific gravity 1.008 03/12/2021 12:44 AM    pH (UA) 6.0 10/31/2021 04:02 PM    Protein Negative 10/31/2021 04:02 PM    Glucose Negative 10/31/2021 04:02 PM    Ketone Negative 10/31/2021 04:02 PM    Bilirubin Negative 10/31/2021 04:02 PM    Blood MODERATE (A) 10/31/2021 04:02 PM    Urobilinogen 0.2 10/31/2021 04:02 PM    Nitrites Negative 10/31/2021 04:02 PM    Leukocyte Esterase LARGE (A) 10/31/2021 04:02 PM    WBC >100 (H) 10/31/2021 04:02 PM    RBC 5-10 10/31/2021 04:02 PM    Epithelial cells 0-3 10/31/2021 04:02 PM    Bacteria 3+ (H) 10/31/2021 04:02 PM    Casts 0-3 10/31/2021 04:02 PM    Mucus Present 05/23/2019 10:00 AM          All Labs from Last 24 Hrs:  Recent Results (from the past 24 hour(s))   CBC W/O DIFF    Collection Time: 04/12/22  6:20 AM   Result Value Ref Range    WBC 7.8 4.3 - 11.1 K/uL    RBC 3.91 (L) 4.05 - 5.2 M/uL    HGB 11.6 (L) 11.7 - 15.4 g/dL    HCT 37.3 35.8 - 46.3 %    MCV 95.4 79.6 - 97.8 FL    MCH 29.7 26.1 - 32.9 PG    MCHC 31.1 (L) 31.4 - 35.0 g/dL    RDW 14.6 11.9 - 14.6 %    PLATELET 130 328 - 758 K/uL    MPV 10.1 9.4 - 12.3 FL    ABSOLUTE NRBC 0.00 0.0 - 0.2 K/uL   METABOLIC PANEL, BASIC    Collection Time: 04/12/22  6:20 AM   Result Value Ref Range    Sodium 140 136 - 145 mmol/L    Potassium 3.6 3.5 - 5.1 mmol/L Chloride 104 98 - 107 mmol/L    CO2 32 21 - 32 mmol/L    Anion gap 4 (L) 7 - 16 mmol/L    Glucose 89 65 - 100 mg/dL    BUN 26 (H) 8 - 23 MG/DL    Creatinine 0.85 0.6 - 1.0 MG/DL    GFR est AA >60 >60 ml/min/1.73m2    GFR est non-AA >60 >60 ml/min/1.73m2    Calcium 8.5 8.3 - 10.4 MG/DL       Current Med List in Hospital:   Current Facility-Administered Medications   Medication Dose Route Frequency    busPIRone (BUSPAR) tablet 7.5 mg  7.5 mg Oral TID    nicotine (NICODERM CQ) 7 mg/24 hr patch 1 Patch  1 Patch TransDERmal DAILY    albuterol (PROVENTIL VENTOLIN) nebulizer solution 2.5 mg  2.5 mg Nebulization Q6H RT    colestipoL (COLESTID) tablet 1 g (Patient Supplied)  1 g Oral BID    sertraline (ZOLOFT) tablet 100 mg  100 mg Oral BID    budesonide (ENTOCORT EC) capsule 6 mg  6 mg Oral 7am    mometasone-formoterol (DULERA) 200mcg-5mcg/puff  2 Puff Inhalation BID RT    tiotropium bromide (SPIRIVA RESPIMAT) 2.5 mcg /actuation  2 Puff Inhalation DAILY    atorvastatin (LIPITOR) tablet 80 mg  80 mg Oral QHS    metoclopramide HCl (REGLAN) tablet 5 mg  5 mg Oral TID PRN    senna-docusate (PERICOLACE) 8.6-50 mg per tablet 1 Tablet  1 Tablet Oral QHS    traZODone (DESYREL) tablet 100 mg  100 mg Oral QHS PRN    topiramate (TOPAMAX) tablet 100 mg  100 mg Oral BID    primidone (MYSOLINE) tablet 100 mg  100 mg Oral TID    celecoxib (CELEBREX) capsule 200 mg  200 mg Oral DAILY PRN    cetirizine (ZYRTEC) tablet 10 mg  10 mg Oral DAILY    dilTIAZem ER (CARDIZEM CD) capsule 120 mg  120 mg Oral DAILY    gabapentin (NEURONTIN) capsule 100 mg  100 mg Oral TID    HYDROcodone-acetaminophen (NORCO)  mg tablet 1 Tablet  1 Tablet Oral Q6H PRN    sucralfate (CARAFATE) tablet 1 g  1 g Oral AC&HS    pantoprazole (PROTONIX) tablet 40 mg  40 mg Oral ACB    cromolyn (OPTICROM) 4 % ophthalmic solution 1 Drop (Patient Supplied)  1 Drop Both Eyes TID    [Held by provider] LORazepam (ATIVAN) tablet 2 mg  2 mg Oral Q8H PRN  sodium chloride (NS) flush 5-40 mL  5-40 mL IntraVENous Q8H    sodium chloride (NS) flush 5-40 mL  5-40 mL IntraVENous PRN    acetaminophen (TYLENOL) tablet 650 mg  650 mg Oral Q6H PRN    Or    acetaminophen (TYLENOL) suppository 650 mg  650 mg Rectal Q6H PRN    polyethylene glycol (MIRALAX) packet 17 g  17 g Oral DAILY PRN    ondansetron (ZOFRAN ODT) tablet 4 mg  4 mg Oral Q8H PRN    Or    ondansetron (ZOFRAN) injection 4 mg  4 mg IntraVENous Q6H PRN    enoxaparin (LOVENOX) injection 30 mg  30 mg SubCUTAneous Q24H    furosemide (LASIX) injection 40 mg  40 mg IntraVENous BID       Allergies   Allergen Reactions    Amlodipine Hives    Lisinopril Diarrhea    Niaspan [Niacin] Rash     Patient was not taking it with  mg 30 min before. She wants to give it another try.   Pt takes this med and it does not cause a rash anymore       Immunization History   Administered Date(s) Administered    Influenza High Dose Vaccine PF 11/04/2015, 11/03/2017, 10/10/2018    Influenza Vaccine 10/17/2013, 10/13/2014    Influenza Vaccine (Quad) PF (>6 Mo Flulaval, Fluarix, and >3 Yrs Afluria, Fluzone 54520) 10/12/2016    Influenza Vaccine (Tri) Adjuvanted (>65 Yrs FLUAD TRI 87819) 10/10/2019    Pneumococcal Conjugate (PCV-13) 10/12/2016    Pneumococcal Polysaccharide (PPSV-23) 10/13/2014    TB Skin Test (PPD) 02/07/2021, 03/12/2021    TB Skin Test (PPD) Intradermal 02/07/2021, 03/12/2021, 12/24/2021    Td 02/14/2018    Tdap 04/25/2017, 02/13/2020       Recent Vital Data:  Patient Vitals for the past 24 hrs:   Temp Pulse Resp BP SpO2   04/12/22 1226     100 %   04/12/22 1157 98.2 °F (36.8 °C) 89  134/71 99 %   04/12/22 0905     100 %   04/12/22 0739 98.2 °F (36.8 °C) 84 16 (!) 142/83 100 %   04/12/22 0301 98.6 °F (37 °C) 60 20 118/70 96 %   04/11/22 2359 98.8 °F (37.1 °C) 76 16 124/70 98 %   04/11/22 2040     99 %   04/11/22 1940 98.5 °F (36.9 °C) 80 16 120/72 98 %   04/11/22 1616 98.4 °F (36.9 °C) 93 17 114/69 99 %   04/11/22 1600  92      04/11/22 1449     96 %     Oxygen Therapy  O2 Sat (%): 100 % (04/12/22 1226)  Pulse via Oximetry: 65 beats per minute (04/12/22 1226)  O2 Device: Nasal cannula (04/12/22 1226)  Skin Assessment: Clean, dry, & intact (04/10/22 1935)  Skin Protection for O2 Device: No (04/10/22 1713)  O2 Flow Rate (L/min): 4 l/min (04/12/22 1226)  FIO2 (%): 32 % (04/09/22 2318)    Estimated body mass index is 16.19 kg/m² as calculated from the following:    Height as of this encounter: 5' 7\" (1.702 m). Weight as of this encounter: 46.9 kg (103 lb 6.4 oz). Intake/Output Summary (Last 24 hours) at 4/12/2022 1234  Last data filed at 4/12/2022 1111  Gross per 24 hour   Intake 200 ml   Output 3100 ml   Net -2900 ml       Physical Exam  Vitals and nursing note reviewed. Constitutional:       General: She is in acute distress (mild). Appearance: Normal appearance. She is cachectic. She is ill-appearing. Interventions: Nasal cannula in place. HENT:      Head: Normocephalic. Comments: Temporal wasting  Eyes:      Extraocular Movements: Extraocular movements intact. Cardiovascular:      Rate and Rhythm: Normal rate. Rhythm irregular. Pulses:           Radial pulses are 2+ on the left side. Heart sounds: No murmur heard. No gallop. Pulmonary:      Effort: Respiratory distress present. Breath sounds: Wheezing present. Abdominal:      Palpations: Abdomen is soft. Tenderness: There is no abdominal tenderness. Musculoskeletal:         General: No deformity. Cervical back: No rigidity. Right lower leg: No edema. Left lower leg: No edema. Comments: Thenar atrophy   Skin:     General: Skin is warm and dry. Neurological:      General: No focal deficit present. Mental Status: She is alert and oriented to person, place, and time.    Psychiatric:         Mood and Affect: Mood normal.         Behavior: Behavior normal. Behavior is cooperative. Cognition and Memory: Cognition normal. Memory is impaired.          Signed:  Alexis Cisneros MD

## 2022-04-12 NOTE — PROGRESS NOTES
04/12/22 1305   Resting (Room Air)   SpO2 94      Resting (On O2)   SpO2 100   HR 97   O2 Device Nasal cannula   O2 Flow Rate (l/min) 2 l/min   During Walk (Room Air)   SpO2 74      Walk/Assistance Device Walker   Rate of Dyspnea 2   Symptoms Dizziness; Fatigue;Leg pain; Shortness of breath   During Walk (On O2)   SpO2 84      O2 Device Nasal cannula   O2 Flow Rate (l/min) 2 l/min   Need Additional O2 Flow Rate Rows Yes   O2 Flow Rate (l/min) 3 l/min   O2 Saturation 93   Walk/Assistance Device Walker   Rate of Dyspnea 2   Symptoms Dizziness; Fatigue;Leg pain; Shortness of breath   After Walk   SpO2 95      Rate of Dyspnea 2   Does the Patient Qualify for Home O2 Yes   Liter Flow at Rest 0   Liter Flow on Exertion 3   Does the Patient Need Portable Oxygen Tanks Yes

## 2022-04-12 NOTE — ACP (ADVANCE CARE PLANNING)
[facility] Hospitalist Service  At the heart of better care     Advance Care Planning   Admit Date:  2022  4:28 PM   Name:  Harriet Bolanos   Age:  64 y.o. Sex:  female  :  1945   MRN:  [id number]   Room:  [facility]    Harriet Bolanos is able to make her own decisions: Yes    If pt unable to make decisions, POA/surrogate decision maker:  Asad Diop    Other members present in the meeting:   , palliative care provider, home palliative care provider, daughter    Patient / surrogate decision-maker directed:  Code Status: DNR/DNI, hospice, comfort focus care to maximize patient's quality of life    Other ACP topics discussed, if applicable:   Discharge planning including additional at home services by current service provider, home support services, hospice referral, at home palliative care    Patient or surrogate consented to discussion of the current conditions, workup, management plans, prognosis, and understand the risk for further deterioration. Time spent: 28 minutes in direct discussion (face to face and/or over phone).     Signed:  Gabriele Cruz MD

## 2022-04-12 NOTE — PROGRESS NOTES
Patient is current with [address] [place] [address] unable to service as already receiving services.  CM sent referral to [facility] and confirmation received of referral.

## 2022-04-12 NOTE — PROGRESS NOTES
CM met with patient and daughter in law Jarrett Apley [phone] in the room for d/c planning. CM had left message with Antionette Zhong from [facility] to see when she was coming to speak with patient and family. Per daughter in law patient has Oxygen at home does not know the name of the company but did have the phone number [phone] and CM called and was for [facility] and spoke with Angelina and she stated that patient is current with them and has a 5 liter concentrator at home and was on 3 liters NC. Patient currently on 4 liters NC and CM asked if they needed updated information and they will need order for change in liter flow and to send referral to [phone]. CM will f/u with Dr. Hayder Sosa about oxygen requirements for d/c. Patient has 25 hours of CLTC help at home and CM contacted [facility] about increase hours and she stated she could increase the hours to 30 hours. Patient request ambulance transport when d/c today. CM received call from [facility] that she is here and will see patient and daughter in law. Patient insists on going home and CM discussed with patient and daughter in law need for someone to be with her and daughter in law states they live close by to help and check in on her patient states does not need anyone at night time. CM following.

## 2022-04-12 NOTE — DISCHARGE INSTR - DIET
Good nutrition is important when healing from an illness, injury, or surgery. Follow any nutrition recommendations given to you during your hospital stay. If you were given an oral nutrition supplement while in the hospital, continue to take this supplement at home. You can take it with meals, in-between meals, and/or before bedtime. These supplements can be purchased at most local grocery stores, pharmacies, and chain super-stores. If you have any questions about your diet or nutrition, call the hospital and ask for the dietitian. Discharge Nutrition Plan: Continue Oral Nutrition Supplement (ONS) at discharge. Recommend [employer] or a comparable/similar product Twice daily. Homemade shakes with ice cream, milk and flavorings would not have the vitamin flavor and may no make you burp. You could try replacing milk with cream when making pancakes and adding dry milk powder or protein powder to pancakes.   Delanna Osgood, [address] [place] [address], [address] [place] [address], [address] [place] [address]

## 2022-04-12 NOTE — PROGRESS NOTES
Care Management Interventions  PCP Verified by CM: Yes  Mode of Transport at Discharge: Other (see comment) [facility] ambulance)  Transition of Care Consult (CM Consult): [address] [place] [address] Drive (Home with [address] aides and Palliative care from [address])  [address] [place]: No  Discharge Durable Medical Equipment: No (has Bedside commode, Hospital bed and rolling walker)  Physical Therapy Consult: Yes  Occupational Therapy Consult: Yes  Speech Therapy Consult: No  Support Systems: Caregiver/Home Care Staff,Other Family Member(s)  Confirm Follow Up Transport: Other (see comment) (ambulance)  The Plan for Transition of Care is Related to the Following Treatment Goals : Home with [address] care givers and  The Patient and/or Patient Representative was Provided with a Choice of Provider and Agrees with the Discharge Plan?: Yes  Name of the Patient Representative Who was Provided with a Choice of Provider and Agrees with the Discharge Plan: Linda Elias  [place] of Choice List was Provided with Basic Dialogue that Supports the Patient's Individualized Plan of Care/Goals, Treatment Preferences and Shares the Quality Data Associated with the Providers?: Yes  Discharge Location  Patient Expects to be Discharged to[de-identified] Home with home health ([facility] home health and [address] palliative care)  Patient and daughter in law met with [address] liaison Melvin Diehl and discussed plan of care. Patient and Daughter in law in agreement with d/c today. They have decided on home health PT and provided with a list of providers and they chose [facility] home health for PT and [address] for palliative care f/u. Referral and orders obtained for home health. Patient had RT do oximetry and results sent to [facility] patient current provider for oxygen with confirmation received. CM set up transport with [facility] ambulance at 3 pm to patient home(called at 12:45 pm to arrange transport).  Patient and daughter in law aware of transport time and also updated about additional hours for CLTC from 25 to 30 hours per [place]. Family states will be able to have someone stay with patient in the evening.  Patient to d/c home with Palliative care outpatient f/u and [facility] home health referral for PT.

## 2022-04-12 NOTE — PROGRESS NOTES
Problem: Breathing Pattern - Ineffective  Goal: *Absence of hypoxia  Outcome: Progressing Towards Goal  Goal: *Use of effective breathing techniques  Outcome: Progressing Towards Goal  Goal: *PALLIATIVE CARE:  Alleviation of Dyspnea  Outcome: Progressing Towards Goal     Problem: Patient Education: Go to Patient Education Activity  Goal: Patient/Family Education  Outcome: Progressing Towards Goal     Problem: Falls - Risk of  Goal: *Absence of Falls  Description: Document Tenzin Fall Risk and appropriate interventions in the flowsheet. Outcome: Progressing Towards Goal  Note: Fall Risk Interventions:  Mobility Interventions: Bed/chair exit alarm         Medication Interventions: Bed/chair exit alarm    Elimination Interventions: Bed/chair exit alarm,Call light in reach,Patient to call for help with toileting needs    History of Falls Interventions: Bed/chair exit alarm         Problem: Patient Education: Go to Patient Education Activity  Goal: Patient/Family Education  Outcome: Progressing Towards Goal     Problem: Pressure Injury - Risk of  Goal: *Prevention of pressure injury  Description: Document Gen Scale and appropriate interventions in the flowsheet.   Outcome: Progressing Towards Goal  Note: Pressure Injury Interventions:       Moisture Interventions: Absorbent underpads    Activity Interventions: Pressure redistribution bed/mattress(bed type)    Mobility Interventions: Pressure redistribution bed/mattress (bed type)    Nutrition Interventions: Document food/fluid/supplement intake                     Problem: Patient Education: Go to Patient Education Activity  Goal: Patient/Family Education  Outcome: Progressing Towards Goal

## 2022-04-12 NOTE — PROGRESS NOTES
Problem: Breathing Pattern - Ineffective  Goal: *Absence of hypoxia  Outcome: Progressing Towards Goal  Goal: *Use of effective breathing techniques  Outcome: Progressing Towards Goal  Goal: *PALLIATIVE CARE:  Alleviation of Dyspnea  Outcome: Progressing Towards Goal     Problem: Breathing Pattern - Ineffective  Goal: *Use of effective breathing techniques  Outcome: Progressing Towards Goal     Problem: Breathing Pattern - Ineffective  Goal: *PALLIATIVE CARE:  Alleviation of Dyspnea  Outcome: Progressing Towards Goal     Problem: Patient Education: Go to Patient Education Activity  Goal: Patient/Family Education  Outcome: Progressing Towards Goal

## 2022-04-15 NOTE — PROGRESS NOTES
4/14/22 @ 2100    Home medications left in home med lock box at nursing station at time of discharge. Patient's [address] nurse Dawson Galileo picked medications at this time up per patient request.  Two small pill reminder containers with various unlabeled medications, two bottles of Melatonin and an inhaler returned to the home health nurse.

## 2022-04-19 NOTE — PROGRESS NOTES
Problem: Breathing Pattern - Ineffective  Goal: *Absence of hypoxia  Outcome: Progressing Towards Goal     Problem: Falls - Risk of  Goal: *Absence of Falls  Description: Document Tenzin Fall Risk and appropriate interventions in the flowsheet.   Outcome: Progressing Towards Goal  Note: Fall Risk Interventions:            Medication Interventions: Bed/chair exit alarm    Elimination Interventions: Bed/chair exit alarm [employer]

## 2022-05-10 ENCOUNTER — HOSPICE ADMISSION (OUTPATIENT)
Dept: HOSPICE | Facility: HOSPICE | Age: 77
End: 2022-05-10

## 2022-05-11 ENCOUNTER — HOME CARE VISIT (OUTPATIENT)
Dept: HOSPICE | Facility: HOSPICE | Age: 77
End: 2022-05-11

## 2022-05-16 NOTE — PROGRESS NOTES
BYRON MARSHALL called to make sure that pt was still okay with BYRON MARSHALL coming out to make a home visit. However, BYRON MARSHALL got pt's voice mail. BYRON MARSHALL left a message letting pt know that BYRON MARSHALL was available to make home visit that pt needed to call BYRON MARSHALL back to schedule a time. PLAN:  BYRON MARSHALL will follow back up with pt in a week if BYRON MARSHALL doesn't hear from pt before then     This note will not be viewable in [address] relaxation

## 2022-05-27 ENCOUNTER — HOSPICE ADMISSION (OUTPATIENT)
Dept: HOSPICE | Facility: HOSPICE | Age: 77
End: 2022-05-27

## 2022-05-29 ENCOUNTER — HOSPITAL ENCOUNTER (EMERGENCY)
Dept: GENERAL RADIOLOGY | Age: 77
Discharge: HOME | DRG: 811 | End: 2022-06-01
Payer: MEDICARE

## 2022-05-29 ENCOUNTER — HOSPITAL ENCOUNTER (INPATIENT)
Age: 77
LOS: 5 days | Discharge: HOSPICE | DRG: 811 | End: 2022-06-03
Attending: EMERGENCY MEDICINE | Admitting: INTERNAL MEDICINE
Payer: MEDICARE

## 2022-05-29 ENCOUNTER — HOSPITAL ENCOUNTER (EMERGENCY)
Dept: CT IMAGING | Age: 77
Discharge: HOME | DRG: 811 | End: 2022-06-01
Payer: MEDICARE

## 2022-05-29 DIAGNOSIS — F41.9 ANXIETY: ICD-10-CM

## 2022-05-29 DIAGNOSIS — G89.4 CHRONIC PAIN SYNDROME: ICD-10-CM

## 2022-05-29 DIAGNOSIS — R55 SYNCOPE AND COLLAPSE: Primary | ICD-10-CM

## 2022-05-29 DIAGNOSIS — R10.13 EPIGASTRIC PAIN: ICD-10-CM

## 2022-05-29 DIAGNOSIS — G89.29 OTHER CHRONIC PAIN: ICD-10-CM

## 2022-05-29 DIAGNOSIS — E86.9 VOLUME DEPLETION: ICD-10-CM

## 2022-05-29 DIAGNOSIS — I50.9 CHRONIC CONGESTIVE HEART FAILURE, UNSPECIFIED HEART FAILURE TYPE (HCC): ICD-10-CM

## 2022-05-29 DIAGNOSIS — D64.9 SYMPTOMATIC ANEMIA: ICD-10-CM

## 2022-05-29 DIAGNOSIS — F33.2 SEVERE EPISODE OF RECURRENT MAJOR DEPRESSIVE DISORDER, WITHOUT PSYCHOTIC FEATURES (HCC): ICD-10-CM

## 2022-05-29 PROBLEM — E87.6 HYPOKALEMIA: Status: ACTIVE | Noted: 2021-02-06

## 2022-05-29 PROBLEM — J44.9 PULMONARY HYPERTENSION DUE TO CHRONIC OBSTRUCTIVE PULMONARY DISEASE (HCC): Status: ACTIVE | Noted: 2022-04-09

## 2022-05-29 PROBLEM — F32.5 DEPRESSION, MAJOR, IN REMISSION (HCC): Status: ACTIVE | Noted: 2018-01-31

## 2022-05-29 PROBLEM — E43 SEVERE PROTEIN-CALORIE MALNUTRITION (HCC): Status: ACTIVE | Noted: 2021-02-15

## 2022-05-29 PROBLEM — J96.11 CHRONIC RESPIRATORY FAILURE WITH HYPOXIA (HCC): Status: ACTIVE | Noted: 2022-04-11

## 2022-05-29 PROBLEM — D53.9 MACROCYTIC ANEMIA: Status: ACTIVE | Noted: 2021-03-12

## 2022-05-29 PROBLEM — I27.23 PULMONARY HYPERTENSION DUE TO CHRONIC OBSTRUCTIVE PULMONARY DISEASE (HCC): Status: ACTIVE | Noted: 2022-04-09

## 2022-05-29 PROBLEM — I48.91 ATRIAL FIBRILLATION (HCC): Status: ACTIVE | Noted: 2021-02-06

## 2022-05-29 LAB
ALBUMIN SERPL-MCNC: 2.1 G/DL (ref 3.2–4.6)
ALBUMIN/GLOB SERPL: 0.8 {RATIO} (ref 1.2–3.5)
ALP SERPL-CCNC: 108 U/L (ref 50–130)
ALT SERPL-CCNC: 15 U/L (ref 12–65)
ANION GAP SERPL CALC-SCNC: 9 MMOL/L (ref 7–16)
AST SERPL-CCNC: 27 U/L (ref 15–37)
BASOPHILS # BLD: 0 K/UL (ref 0–0.2)
BASOPHILS NFR BLD: 0 % (ref 0–2)
BILIRUB SERPL-MCNC: 0.4 MG/DL (ref 0.2–1.1)
BUN SERPL-MCNC: 40 MG/DL (ref 8–23)
CALCIUM SERPL-MCNC: 7.8 MG/DL (ref 8.3–10.4)
CHLORIDE SERPL-SCNC: 102 MMOL/L (ref 98–107)
CO2 SERPL-SCNC: 29 MMOL/L (ref 21–32)
CREAT SERPL-MCNC: 1.15 MG/DL (ref 0.6–1)
DIFFERENTIAL METHOD BLD: ABNORMAL
EKG ATRIAL RATE: 78 BPM
EKG DIAGNOSIS: ABNORMAL
EKG Q-T INTERVAL: 400 MS
EKG QRS DURATION: 112 MS
EKG QTC CALCULATION (BAZETT): 494 MS
EKG R AXIS: 151 DEGREES
EKG T AXIS: 61 DEGREES
EKG VENTRICULAR RATE: 92 BPM
EOSINOPHIL # BLD: 0.1 K/UL (ref 0–0.8)
EOSINOPHIL NFR BLD: 1 % (ref 0.5–7.8)
ERYTHROCYTE [DISTWIDTH] IN BLOOD BY AUTOMATED COUNT: 14.6 % (ref 11.9–14.6)
FERRITIN SERPL-MCNC: 88 NG/ML (ref 8–388)
FOLATE SERPL-MCNC: 4.4 NG/ML (ref 3.1–17.5)
GLOBULIN SER CALC-MCNC: 2.8 G/DL (ref 2.3–3.5)
GLUCOSE SERPL-MCNC: 81 MG/DL (ref 65–100)
HCT VFR BLD AUTO: 23.2 % (ref 35.8–46.3)
HCT VFR BLD AUTO: 30.7 % (ref 35.8–46.3)
HGB BLD-MCNC: 6.9 G/DL (ref 11.7–15.4)
HGB BLD-MCNC: 9.2 G/DL (ref 11.7–15.4)
HISTORY CHECK: NORMAL
IMM GRANULOCYTES # BLD AUTO: 0.1 K/UL (ref 0–0.5)
IMM GRANULOCYTES NFR BLD AUTO: 1 % (ref 0–5)
IRON SATN MFR SERPL: 5 %
IRON SERPL-MCNC: 7 UG/DL (ref 35–150)
LYMPHOCYTES # BLD: 0.9 K/UL (ref 0.5–4.6)
LYMPHOCYTES NFR BLD: 10 % (ref 13–44)
MAGNESIUM SERPL-MCNC: 2.3 MG/DL (ref 1.8–2.4)
MCH RBC QN AUTO: 28.2 PG (ref 26.1–32.9)
MCHC RBC AUTO-ENTMCNC: 29.7 G/DL (ref 31.4–35)
MCV RBC AUTO: 94.7 FL (ref 79.6–97.8)
MONOCYTES # BLD: 0.5 K/UL (ref 0.1–1.3)
MONOCYTES NFR BLD: 6 % (ref 4–12)
NEUTS SEG # BLD: 7.2 K/UL (ref 1.7–8.2)
NEUTS SEG NFR BLD: 82 % (ref 43–78)
NRBC # BLD: 0 K/UL (ref 0–0.2)
PLATELET # BLD AUTO: 186 K/UL (ref 150–450)
PMV BLD AUTO: 11 FL (ref 9.4–12.3)
POTASSIUM SERPL-SCNC: 3.2 MMOL/L (ref 3.5–5.1)
PROT SERPL-MCNC: 4.9 G/DL (ref 6.3–8.2)
RBC # BLD AUTO: 2.45 M/UL (ref 4.05–5.2)
SODIUM SERPL-SCNC: 140 MMOL/L (ref 136–145)
TIBC SERPL-MCNC: 134 UG/DL (ref 250–450)
TSH W FREE THYROID IF ABNORMAL: 1.48 UIU/ML
VIT B12 SERPL-MCNC: 624 PG/ML (ref 193–986)
WBC # BLD AUTO: 8.7 K/UL (ref 4.3–11.1)

## 2022-05-29 PROCEDURE — 84443 ASSAY THYROID STIM HORMONE: CPT

## 2022-05-29 PROCEDURE — 2580000003 HC RX 258: Performed by: EMERGENCY MEDICINE

## 2022-05-29 PROCEDURE — 94762 N-INVAS EAR/PLS OXIMTRY CONT: CPT

## 2022-05-29 PROCEDURE — 70450 CT HEAD/BRAIN W/O DYE: CPT

## 2022-05-29 PROCEDURE — 2580000003 HC RX 258: Performed by: INTERNAL MEDICINE

## 2022-05-29 PROCEDURE — 94760 N-INVAS EAR/PLS OXIMETRY 1: CPT

## 2022-05-29 PROCEDURE — 82746 ASSAY OF FOLIC ACID SERUM: CPT

## 2022-05-29 PROCEDURE — 30233N1 TRANSFUSION OF NONAUTOLOGOUS RED BLOOD CELLS INTO PERIPHERAL VEIN, PERCUTANEOUS APPROACH: ICD-10-PCS | Performed by: INTERNAL MEDICINE

## 2022-05-29 PROCEDURE — 83540 ASSAY OF IRON: CPT

## 2022-05-29 PROCEDURE — 1100000000 HC RM PRIVATE

## 2022-05-29 PROCEDURE — 82728 ASSAY OF FERRITIN: CPT

## 2022-05-29 PROCEDURE — 80053 COMPREHEN METABOLIC PANEL: CPT

## 2022-05-29 PROCEDURE — 93005 ELECTROCARDIOGRAM TRACING: CPT | Performed by: EMERGENCY MEDICINE

## 2022-05-29 PROCEDURE — 96360 HYDRATION IV INFUSION INIT: CPT

## 2022-05-29 PROCEDURE — 71045 X-RAY EXAM CHEST 1 VIEW: CPT

## 2022-05-29 PROCEDURE — 85025 COMPLETE CBC W/AUTO DIFF WBC: CPT

## 2022-05-29 PROCEDURE — G0378 HOSPITAL OBSERVATION PER HR: HCPCS

## 2022-05-29 PROCEDURE — 86923 COMPATIBILITY TEST ELECTRIC: CPT

## 2022-05-29 PROCEDURE — 6370000000 HC RX 637 (ALT 250 FOR IP): Performed by: INTERNAL MEDICINE

## 2022-05-29 PROCEDURE — 72170 X-RAY EXAM OF PELVIS: CPT

## 2022-05-29 PROCEDURE — 36415 COLL VENOUS BLD VENIPUNCTURE: CPT

## 2022-05-29 PROCEDURE — P9016 RBC LEUKOCYTES REDUCED: HCPCS

## 2022-05-29 PROCEDURE — 2700000000 HC OXYGEN THERAPY PER DAY

## 2022-05-29 PROCEDURE — 99285 EMERGENCY DEPT VISIT HI MDM: CPT

## 2022-05-29 PROCEDURE — 36430 TRANSFUSION BLD/BLD COMPNT: CPT

## 2022-05-29 PROCEDURE — 82607 VITAMIN B-12: CPT

## 2022-05-29 PROCEDURE — 83735 ASSAY OF MAGNESIUM: CPT

## 2022-05-29 PROCEDURE — 86901 BLOOD TYPING SEROLOGIC RH(D): CPT

## 2022-05-29 PROCEDURE — 85014 HEMATOCRIT: CPT

## 2022-05-29 RX ORDER — ACETAMINOPHEN 325 MG/1
650 TABLET ORAL EVERY 6 HOURS PRN
Status: DISCONTINUED | OUTPATIENT
Start: 2022-05-29 | End: 2022-06-03 | Stop reason: HOSPADM

## 2022-05-29 RX ORDER — ONDANSETRON 2 MG/ML
4 INJECTION INTRAMUSCULAR; INTRAVENOUS EVERY 6 HOURS PRN
Status: DISCONTINUED | OUTPATIENT
Start: 2022-05-29 | End: 2022-06-03 | Stop reason: HOSPADM

## 2022-05-29 RX ORDER — SERTRALINE HYDROCHLORIDE 100 MG/1
200 TABLET, FILM COATED ORAL DAILY
Status: DISCONTINUED | OUTPATIENT
Start: 2022-05-29 | End: 2022-06-03 | Stop reason: HOSPADM

## 2022-05-29 RX ORDER — MAGNESIUM HYDROXIDE/ALUMINUM HYDROXICE/SIMETHICONE 120; 1200; 1200 MG/30ML; MG/30ML; MG/30ML
30 SUSPENSION ORAL EVERY 6 HOURS PRN
Status: DISCONTINUED | OUTPATIENT
Start: 2022-05-29 | End: 2022-06-03 | Stop reason: HOSPADM

## 2022-05-29 RX ORDER — HYDROCODONE BITARTRATE AND ACETAMINOPHEN 10; 325 MG/1; MG/1
1 TABLET ORAL EVERY 6 HOURS PRN
Status: DISCONTINUED | OUTPATIENT
Start: 2022-05-29 | End: 2022-06-03 | Stop reason: HOSPADM

## 2022-05-29 RX ORDER — POLYETHYLENE GLYCOL 3350 17 G/17G
17 POWDER, FOR SOLUTION ORAL DAILY PRN
Status: DISCONTINUED | OUTPATIENT
Start: 2022-05-29 | End: 2022-06-02

## 2022-05-29 RX ORDER — SODIUM CHLORIDE 9 MG/ML
INJECTION, SOLUTION INTRAVENOUS PRN
Status: DISCONTINUED | OUTPATIENT
Start: 2022-05-29 | End: 2022-05-30

## 2022-05-29 RX ORDER — TRAZODONE HYDROCHLORIDE 50 MG/1
100 TABLET ORAL NIGHTLY
Status: DISCONTINUED | OUTPATIENT
Start: 2022-05-29 | End: 2022-06-03 | Stop reason: HOSPADM

## 2022-05-29 RX ORDER — SODIUM CHLORIDE 9 MG/ML
INJECTION, SOLUTION INTRAVENOUS PRN
Status: DISCONTINUED | OUTPATIENT
Start: 2022-05-29 | End: 2022-06-03 | Stop reason: HOSPADM

## 2022-05-29 RX ORDER — LORAZEPAM 1 MG/1
1 TABLET ORAL EVERY 8 HOURS PRN
Status: DISCONTINUED | OUTPATIENT
Start: 2022-05-29 | End: 2022-06-03 | Stop reason: HOSPADM

## 2022-05-29 RX ORDER — ACETAMINOPHEN 650 MG/1
650 SUPPOSITORY RECTAL EVERY 6 HOURS PRN
Status: DISCONTINUED | OUTPATIENT
Start: 2022-05-29 | End: 2022-06-03 | Stop reason: HOSPADM

## 2022-05-29 RX ORDER — DILTIAZEM HYDROCHLORIDE 120 MG/1
120 CAPSULE, EXTENDED RELEASE ORAL 2 TIMES DAILY
Status: ON HOLD | COMMUNITY
End: 2022-06-03 | Stop reason: HOSPADM

## 2022-05-29 RX ORDER — ATORVASTATIN CALCIUM 40 MG/1
80 TABLET, FILM COATED ORAL DAILY
Status: DISCONTINUED | OUTPATIENT
Start: 2022-05-29 | End: 2022-06-03 | Stop reason: HOSPADM

## 2022-05-29 RX ORDER — ALBUTEROL SULFATE 2.5 MG/3ML
2.5 SOLUTION RESPIRATORY (INHALATION) EVERY 4 HOURS PRN
Status: DISCONTINUED | OUTPATIENT
Start: 2022-05-29 | End: 2022-06-03 | Stop reason: HOSPADM

## 2022-05-29 RX ORDER — GABAPENTIN 100 MG/1
100 CAPSULE ORAL 3 TIMES DAILY
Status: DISCONTINUED | OUTPATIENT
Start: 2022-05-29 | End: 2022-06-03 | Stop reason: HOSPADM

## 2022-05-29 RX ORDER — SODIUM CHLORIDE 0.9 % (FLUSH) 0.9 %
5-40 SYRINGE (ML) INJECTION PRN
Status: DISCONTINUED | OUTPATIENT
Start: 2022-05-29 | End: 2022-06-03 | Stop reason: HOSPADM

## 2022-05-29 RX ORDER — SODIUM CHLORIDE, SODIUM LACTATE, POTASSIUM CHLORIDE, AND CALCIUM CHLORIDE .6; .31; .03; .02 G/100ML; G/100ML; G/100ML; G/100ML
1000 INJECTION, SOLUTION INTRAVENOUS ONCE
Status: COMPLETED | OUTPATIENT
Start: 2022-05-29 | End: 2022-05-29

## 2022-05-29 RX ORDER — BISACODYL 10 MG
10 SUPPOSITORY, RECTAL RECTAL DAILY PRN
Status: DISCONTINUED | OUTPATIENT
Start: 2022-05-29 | End: 2022-06-03 | Stop reason: HOSPADM

## 2022-05-29 RX ORDER — CHOLESTYRAMINE LIGHT 4 G/5.7G
4 POWDER, FOR SUSPENSION ORAL DAILY
Status: DISCONTINUED | OUTPATIENT
Start: 2022-05-29 | End: 2022-06-03 | Stop reason: HOSPADM

## 2022-05-29 RX ORDER — CETIRIZINE HYDROCHLORIDE 5 MG/1
5 TABLET ORAL DAILY
Status: DISCONTINUED | OUTPATIENT
Start: 2022-05-29 | End: 2022-06-03 | Stop reason: HOSPADM

## 2022-05-29 RX ORDER — PRIMIDONE 50 MG/1
100 TABLET ORAL 3 TIMES DAILY
Status: DISCONTINUED | OUTPATIENT
Start: 2022-05-29 | End: 2022-06-03 | Stop reason: HOSPADM

## 2022-05-29 RX ORDER — PANTOPRAZOLE SODIUM 40 MG/1
40 TABLET, DELAYED RELEASE ORAL
Status: DISCONTINUED | OUTPATIENT
Start: 2022-05-30 | End: 2022-06-03 | Stop reason: HOSPADM

## 2022-05-29 RX ORDER — SODIUM CHLORIDE 0.9 % (FLUSH) 0.9 %
5-40 SYRINGE (ML) INJECTION EVERY 12 HOURS SCHEDULED
Status: DISCONTINUED | OUTPATIENT
Start: 2022-05-29 | End: 2022-06-03 | Stop reason: HOSPADM

## 2022-05-29 RX ORDER — FAMOTIDINE 20 MG/1
10 TABLET, FILM COATED ORAL 2 TIMES DAILY PRN
Status: DISCONTINUED | OUTPATIENT
Start: 2022-05-29 | End: 2022-06-03 | Stop reason: HOSPADM

## 2022-05-29 RX ORDER — ONDANSETRON 4 MG/1
4 TABLET, ORALLY DISINTEGRATING ORAL EVERY 8 HOURS PRN
Status: DISCONTINUED | OUTPATIENT
Start: 2022-05-29 | End: 2022-06-03 | Stop reason: HOSPADM

## 2022-05-29 RX ORDER — BUSPIRONE HYDROCHLORIDE 5 MG/1
15 TABLET ORAL 3 TIMES DAILY
Status: DISCONTINUED | OUTPATIENT
Start: 2022-05-29 | End: 2022-06-03 | Stop reason: HOSPADM

## 2022-05-29 RX ORDER — BUDESONIDE 3 MG/1
6 CAPSULE, COATED PELLETS ORAL DAILY
Status: DISCONTINUED | OUTPATIENT
Start: 2022-05-29 | End: 2022-06-03 | Stop reason: HOSPADM

## 2022-05-29 RX ADMIN — BUSPIRONE HYDROCHLORIDE 15 MG: 5 TABLET ORAL at 23:36

## 2022-05-29 RX ADMIN — SODIUM CHLORIDE, PRESERVATIVE FREE 10 ML: 5 INJECTION INTRAVENOUS at 23:36

## 2022-05-29 RX ADMIN — GABAPENTIN 100 MG: 100 CAPSULE ORAL at 23:36

## 2022-05-29 RX ADMIN — TRAZODONE HYDROCHLORIDE 100 MG: 50 TABLET ORAL at 23:36

## 2022-05-29 RX ADMIN — PRIMIDONE 100 MG: 50 TABLET ORAL at 23:36

## 2022-05-29 RX ADMIN — SODIUM CHLORIDE, POTASSIUM CHLORIDE, SODIUM LACTATE AND CALCIUM CHLORIDE 1000 ML: 600; 310; 30; 20 INJECTION, SOLUTION INTRAVENOUS at 12:56

## 2022-05-29 ASSESSMENT — ENCOUNTER SYMPTOMS
NAUSEA: 1
COUGH: 1
VOMITING: 0
DIARRHEA: 0
BACK PAIN: 1
TROUBLE SWALLOWING: 0
ABDOMINAL PAIN: 0
WHEEZING: 0
EYE REDNESS: 0
SHORTNESS OF BREATH: 0
CONSTIPATION: 0
EYE ITCHING: 0
EYE PAIN: 0
SINUS PAIN: 0
VISUAL CHANGE: 0

## 2022-05-29 ASSESSMENT — PAIN DESCRIPTION - PAIN TYPE: TYPE: ACUTE PAIN

## 2022-05-29 ASSESSMENT — PAIN DESCRIPTION - DESCRIPTORS: DESCRIPTORS: SHARP

## 2022-05-29 ASSESSMENT — PAIN - FUNCTIONAL ASSESSMENT: PAIN_FUNCTIONAL_ASSESSMENT: 0-10

## 2022-05-29 ASSESSMENT — PAIN DESCRIPTION - LOCATION: LOCATION: BACK

## 2022-05-29 ASSESSMENT — PAIN SCALES - GENERAL: PAINLEVEL_OUTOF10: 5

## 2022-05-29 ASSESSMENT — PAIN DESCRIPTION - FREQUENCY: FREQUENCY: CONTINUOUS

## 2022-05-29 NOTE — ED NOTES
TRANSFER - OUT REPORT:    Verbal report given to Sofía Young on Osie Le Flore  being transferred to [facility] for routine progression of patient care       Report consisted of patient's Situation, Background, Assessment and   Recommendations(SBAR). Information from the following report(s) Nurse Handoff Report, ED Encounter Summary, ED SBAR and [facility] was reviewed with the receiving nurse. Lines:   Peripheral IV 05/29/22 Left Forearm (Active)   Site Assessment Clean, dry & intact 05/29/22 1152   Line Status Blood return noted 05/29/22 1152   Phlebitis Assessment No symptoms 05/29/22 1152   Infiltration Assessment 0 05/29/22 1152        Opportunity for questions and clarification was provided.       Patient transported with:  Registered Nurse     Namrata Baeza RN  05/29/22 9859

## 2022-05-29 NOTE — CARE COORDINATION
05/29/22 1639   Service Assessment   Primary Caregiver Private caregiver   Support Systems Family Members;Home Care Staff   PCP Verified by CM Yes   Can patient return to prior living arrangement No   Financial Resources Medicare;Medicaid   Community Resources ECF/Home Care   Social/Functional History   Lives With Alone;Home care staff   Receives Help From Family;Personal care attendant;Home health   Occupation Retired   Discharge Planning   Type of [address]   Patient expects to be discharged to: Skilled nursing facility   History of falls? 1   Services At/After Discharge   Transition of Care Consult (CM Consult) SNF;Long Term Care;Discharge Planning   Condition of Participation: Discharge Planning   The Plan for Transition of Care is related to the following treatment goals: STR to LTC   The Patient and/or Patient Representative was provided with a Choice of Provider? Patient   The Patient and/Or Patient Representative agree with the Discharge Plan? Yes   Freedom of Choice list was provided with basic dialogue that supports the patient's individualized plan of care/goals, treatment preferences, and shares the quality data associated with the providers?   Yes       Yosef Bear LMSW    [facility]

## 2022-05-29 NOTE — CARE COORDINATION
Patient currently in the ER and SW familiar with her from previous admissions. She has CLTC caregivers paid for by Medicaid for 30 hours a week. Patient also has [facility]. The patient has not elected to move forward with hospice services as Medicaid would discontinue her caregivers. Patient wants to be admitted to [facility]. However, she does not meet GIP criteria so her [address] has reportedly been working with the patient and her family to get her placed in LTC at a SNF. Patient to be admitted. MD will order PT/OT evaluations and a PPD to see if the patient would qualify for STR to LTC. SW continuing to follow. Update: SW met with patient and provided a choice list. Patient does not have a preference for a SNF and asked SW to refer her to any facility that may have STR to LTC. Referrals to be sent once the patient is evaluated by therapy and notes are available for referrals.      Carmel Coreas LMSW    [facility]

## 2022-05-29 NOTE — ED PROVIDER NOTES
[employer] Emergency Department Provider Note                   PCP:                Megan Fuentes MD               Age: 64 y.o. Sex: female       ICD-10-CM    1. Syncope and collapse  R55    2. Symptomatic anemia  D64.9    3. Volume depletion  E86.9    4. Chronic pain syndrome  G89.4    5. Chronic congestive heart failure, unspecified heart failure type (HCC)  I50.9    6. Severe episode of recurrent major depressive disorder, without psychotic features ([facility] [address]  F33.2        DISPOSITION Decision To Admit 05/29/2022 01:32:35 PM       New Prescriptions    No medications on file       Orders Placed This Encounter   Procedures    XR CHEST PORTABLE    CT HEAD WO CONTRAST    XR PELVIS (1-2 VIEWS)    CBC with Auto Differential    Comprehensive Metabolic Panel    Magnesium    TSH with Reflex    Hemoglobin and Hematocrit    Cardiac Monitor - ED Only    Continuous Pulse Oximetry    POCT Urine Dipstick    Verify hospital blood product consent form has been signed and witnessed    Vital Signs For Blood Product Transfusion    Transfusion Reaction Management    EKG 12 Lead    TYPE AND SCREEN    PREPARE RBC (CROSSMATCH), 1 Units    Saline lock IV        MDM  Number of Diagnoses or Management Options  Chronic congestive heart failure, unspecified heart failure type ([facility] [address]): established, worsening  Chronic pain syndrome: established, worsening  Severe episode of recurrent major depressive disorder, without psychotic features ([facility] [address]): established, worsening  Symptomatic anemia: new, needed workup  Syncope and collapse: new, needed workup  Volume depletion: new, needed workup  Diagnosis management comments: Patient's controlled substance prescription records reviewed @ the [facility] website. Information will be utilized for accurate and appropriate patient care.   05/16/2022 05/16/2022   1  Morphine Sulfate Ir 15 Mg Tab   30.00  10  [facility]  [id number]  Pop ([id number])  0  45.00 M[place] 05/04/2022 05/04/2022   1  Morphine Sulfate Ir 15 Mg Tab   21.00  7  [place]  [id number]  [place] ([id number])  0  45.00 M[place]    04/28/2022 04/28/2022   1  Hydrocodone-Acetamin  Mg   120.00  30  Ma Fri  [id number]  Wal ([id number])  0  40.00 M[place]    03/31/2022 03/30/2022   1  Hydrocodone-Acetamin  Mg   120.00  30  [place]  [id number]  [place] ([id number])  0  40.00 M[place]    03/22/2022 03/22/2022   1  Lorazepam 2 Mg Tablet   90.00  30  [place]  [id number]  Wal ([id number])  0  6.00 LME  [place]    03/02/2022 12/30/2021   1  Hydrocodone-Acetamin  Mg   120.00  30  Ma Fri  [id number]  Wal ([id number])  0  40.00 MME  [place]    02/01/2022 12/30/2021   1  Hydrocodone-Acetamin  Mg   120.00  30  Ma Fri  [id number]  Wal ([id number])  0  40.00 [place]    01/03/2022 12/30/2021   1  Hydrocodone-Acetamin  Mg   120.00  30  Ma Fri  [id number]  Wal ([id number])  0  40.00 MME  [place]    12/02/2021 09/27/2021   1  Hydrocodone-Acetamin  Mg   120.00  30  Ma Fri  [id number]  Wal ([id number])  0  40.00 M[place]    11/16/2021 11/16/2021   1  Hydrocodone-Acetamin  Mg   60.00  15  [place]  [id number]  Wal ([id number])  0  40.00 MME  [place]    10/30/2021  09/27/2021   1  Hydrocodone-Acetamin  Mg   120.00  30  Ma Fri  [id number]  Wal ([id number])  0  40.00 MME  [place]    10/27/2021  10/27/2021   1  Clonazepam 1 Mg Tablet   90.00  30  [place]  [id number]  Wal ([id number])  0  6.00 LME  [place]    09/30/2021 09/27/2021   1  Hydrocodone-Acetamin  Mg   120.00  30  Ma Fri  [id number]  Wal (3196)  0  40.00 MME  [place]    08/31/2021 08/31/2021   1  Hydrocodone-Acetamin  Mg   120.00  30  Ma Fri  [id number]  Wal (2135)  0  40.00 M[place]    08/01/2021  06/15/2021   1  Hydrocodone-Acetamin  Mg   120.00  30  Ma Fri  [id number]  Wal (5069)  0  40.00 MME       1509 PM  73-year-old female patient here with complaints of a fall/possible syncopal episode.   Incident likely occurred some around 4 AM at home. She has had a history of falls in the past.  She is hypotensive today. My initial exam reveals the patient to be very dehydrated as well    Lab reports that her hemoglobin is in the 6.9 other labs still pending. Patient's last hemoglobin in the system was 11 about 1 month ago    Will discuss need for transfusion with patient  Will check stool for occult blood    1:33 PM  Hemoccult is positive for blood. Phone conference with patient's hospice representative,Samreen  After reviewing findings the patient is amenable to receiving blood and being admitted to the hospital  The patient remains tearful and depressed. States she does not want to be alone anymore  Also voiced several times that she wanted hospitalist to put her to sleep permanently  I do not feel that she has the means or motivation for self-harm but she is very depressed and would benefit from support in this area as well    We will admit the patient to the hospital here. Start blood transfusion  Will need case management to work with patient's outside facilitators in order to get the patient placed into a long-term facility       Amount and/or Complexity of Data Reviewed  Clinical lab tests: ordered and reviewed  Tests in the radiology section of CPT®: ordered and reviewed  Tests in the medicine section of CPT®: ordered and reviewed  Decide to obtain previous medical records or to obtain history from someone other than the patient: yes  Review and summarize past medical records: yes  Independent visualization of images, tracings, or specimens: yes    Risk of Complications, Morbidity, and/or Mortality  Presenting problems: high  Diagnostic procedures: high  Management options: moderate  General comments: Elements of this note have been dictated via voice recognition software. Text and phrases may be limited by the accuracy of the software. The chart has been reviewed, but errors may still be present. Jacob Ballard is a 64 y.o. female who presents to the Emergency Department with chief complaint of    Chief Complaint   Patient presents with   Sana Stabs Fall      64-year-old female patient with a litany of chronic medical problems and long-term debility presents to the ER with concerns over a fall. Patient states she got up in the middle the night to go to the bathroom and fell. She does seem to remember the fall but her history is very vague and unclear whether she may have been syncopal or not. She has a history of hypotensive episodes and has had falls related to this in the past  Patient is also under the care of home health as well as had evaluations for hospice care. Again unfortunately, the patient is very unclear about her status with hospice. Nursing staff reports that the patient was able to stand and transfer to use the bedside commode  She is noted to be hypotensive today as well. And has a history of chronic pain as well. [facility] website reviewed    The history is provided by the patient and the EMS personnel. Fall  The accident occurred 6 to 12 hours ago. The fall occurred while standing. Distance fallen: Ground level. She landed on carpet. There was no blood loss. The pain is moderate. She was not ambulatory at the scene. There was no alcohol use involved in the accident. Associated symptoms include nausea. Pertinent negatives include no visual change, no fever, no abdominal pain, no vomiting and no hematuria. The symptoms are aggravated by ambulation, activity and standing. She has tried nothing for the symptoms. All other systems reviewed and are negative. Review of Systems   Constitutional: Positive for activity change, appetite change and fatigue. Negative for chills and fever. HENT: Negative for ear pain, hearing loss, sinus pain, sneezing and trouble swallowing. Eyes: Negative for pain, redness and itching. Respiratory: Positive for cough. Negative for shortness of breath and wheezing. Cardiovascular: Positive for palpitations. Negative for chest pain. Gastrointestinal: Positive for nausea. Negative for abdominal pain, constipation, diarrhea and vomiting. Endocrine: Negative for polydipsia, polyphagia and polyuria. Genitourinary: Negative for dysuria, flank pain, frequency and hematuria. Musculoskeletal: Positive for arthralgias, back pain and myalgias. Skin: Negative for rash and wound. Allergic/Immunologic: Negative for food allergies and immunocompromised state. Neurological: Negative for dizziness, syncope, speech difficulty and light-headedness. Hematological: Negative for adenopathy. Does not bruise/bleed easily. Psychiatric/Behavioral: Positive for confusion and dysphoric mood. Negative for self-injury. The patient is nervous/anxious. All other systems reviewed and are negative.       Past Medical History:   Diagnosis Date    Anxiety 3/6/2013    Arthritis     osteoarthritis    B12 deficiency 3/6/2013    Chronic back pain 3/6/2013    Chronic kidney disease (CKD) stage G3b/A1, moderately decreased glomerular filtration rate (GFR) between 30-44 mL/min/1.73 square meter and albuminuria creatinine ratio less than 30 mg/g ([facility]) 10/14/2016    Constipation 11/4/2015    Decreased GFR 5/26/2016    Depression 10/25/2012    Depression 10/25/2012    Depression, major, in remission ([facility] [address] 1/31/2018    Encounter for long-term (current) use of other medications 3/6/2013    Endocrine disease     partial removal, benign    Environmental allergies 3/6/2013    Fecal incontinence 12/7/2016    Fibromyalgia 9/15/2015    Gastrointestinal disorder     GERD (gastroesophageal reflux disease) \"years\"    Headache(784.0) 2/12/2013    HTN (hypertension) 10/25/2012    HTN (hypertension) 10/25/2012    Hyperlipidemia 10/25/2012    Lymphocytic colitis 14/7/0597    Metabolic syndrome 5/3/6808    Microalbuminuria 11/4/2015    Osteoporosis 10/25/2012    Other ill-defined conditions(799.89)     PUD (peptic ulcer disease) \"years ago\"    Had peptic ulcers    Unspecified adverse effect of anesthesia     PATIENT STATES SHE DOESN'T GET GENERAL ANESTHESIA DUE TO COPD    Unspecified sleep apnea     wears O2 at night. no c-warren    Vitamin D deficiency 3/6/2013        Past Surgical History:   Procedure Laterality Date    APPENDECTOMY  1970s    BREAST BIOPSY Right     excision of cyst    BREAST SURGERY  1980s    cyst removed from right breast/benign    CATARACT REMOVAL Right 2016    right eye    CHOLECYSTECTOMY  1970s    COLOSTOMY  1990s    placed, then reversed    GYN  1970s    TIA BSO    HERNIA REPAIR  unknown    hiatal hernia repair    IR KYPHOPLASTY LUMBAR FIRST LEVEL  2/11/2021    IR KYPHOPLASTY LUMBAR FIRST LEVEL  2/11/2021    IR KYPHOPLASTY LUMBAR FIRST LEVEL 2/11/2021 [facility]    ORTHOPEDIC SURGERY  2008,2009    right knee replaced and then left the next year    THYROIDECTOMY, PARTIAL  1990s    Jones        Family History   Problem Relation Age of Onset    Heart Attack Brother     Heart Disease Father     Kidney Disease Mother            Social Connections:     Frequency of Communication with Friends and Family: Not on file    Frequency of Social Gatherings with Friends and Family: Not on file    Attends Baptism Services: Not on file    Active Member of Clubs or Organizations: Not on file    Attends Club or Organization Meetings: Not on file    Marital Status: Not on file        Allergies   Allergen Reactions    Amlodipine Hives    Lisinopril Diarrhea    Niacin Rash     Patient was not taking it with  mg 30 min before. She wants to give it another try. Pt takes this med and it does not cause a rash anymore        Vitals signs and nursing note reviewed.    Patient Vitals for the past 4 hrs:   Temp Pulse Resp BP SpO2   05/29/22 1234 -- 95 13 (!) 98/54 100 %   05/29/22 1101 97.5 °F (36.4 °C) 94 16 (!) 95/53 100 %          Physical Exam  Vitals and nursing note reviewed. Constitutional:       General: She is in acute distress. Appearance: Normal appearance. She is cachectic. HENT:      Head: Normocephalic and atraumatic. Right Ear: External ear normal.      Left Ear: External ear normal.      Nose: Nose normal.      Mouth/Throat:      Mouth: Mucous membranes are dry. Pharynx: Oropharynx is clear. Eyes:      General: No scleral icterus. Extraocular Movements: Extraocular movements intact. Conjunctiva/sclera: Conjunctivae normal.      Pupils: Pupils are equal, round, and reactive to light. Cardiovascular:      Rate and Rhythm: Normal rate and regular rhythm. Pulses: Normal pulses. Heart sounds: Normal heart sounds. Pulmonary:      Effort: Pulmonary effort is normal. No respiratory distress. Breath sounds: Normal breath sounds. Abdominal:      General: Abdomen is flat. Bowel sounds are normal. There is no distension. Palpations: Abdomen is soft. There is no mass. Tenderness: There is abdominal tenderness. Genitourinary:     Rectum: Guaiac result positive. Comments: No gross blood in stool, however, trace positive for occult blood  Musculoskeletal:         General: No deformity or signs of injury. Normal range of motion. Cervical back: Normal range of motion and neck supple. Skin:     General: Skin is warm and dry. Capillary Refill: Capillary refill takes less than 2 seconds. Findings: Bruising present. Neurological:      General: No focal deficit present. Mental Status: She is alert and oriented to person, place, and time. Psychiatric:         Mood and Affect: Mood normal. Affect is blunt and flat. Speech: Speech is delayed and tangential.         Behavior: Behavior is slowed. Behavior is cooperative. Cognition and Memory: Memory is impaired. She exhibits impaired recent memory.           EKG 12 Lead    Date/Time: 5/29/2022 12:02 PM  Performed by: Grace Gonzalez MD  Authorized by: Grace Gonzalez MD     ECG reviewed by ED Physician in the absence of a cardiologist: yes    Previous ECG:     Previous ECG:  Compared to current    Similarity:  No change  Interpretation:     Interpretation: abnormal    Rate:     ECG rate assessment: normal    Rhythm:     Rhythm: atrial fibrillation    Ectopy:     Ectopy: none    QRS:     QRS axis:  Normal    QRS intervals: Wide  Conduction:     Conduction: abnormal      Abnormal conduction: complete RBBB    T waves:     T waves: normal    Comments:      Rate controlled atrial fibrillation without acute ischemic changes  No change from prior tracing        Labs Reviewed   CBC WITH AUTO DIFFERENTIAL - Abnormal; Notable for the following components:       Result Value    RBC 2.45 (*)     Hemoglobin 6.9 (*)     Hematocrit 23.2 (*)     MCHC 29.7 (*)     Seg Neutrophils 82 (*)     Lymphocytes 10 (*)     All other components within normal limits   COMPREHENSIVE METABOLIC PANEL - Abnormal; Notable for the following components:    Potassium 3.2 (*)     BUN 40 (*)     CREATININE 1.15 (*)     GFR  59 (*)     GFR Non-African American 49 (*)     Calcium 7.8 (*)     Total Protein 4.9 (*)     Albumin 2.1 (*)     Albumin/Globulin Ratio 0.8 (*)     All other components within normal limits   MAGNESIUM   TSH WITH REFLEX   TYPE AND SCREEN   PREPARE RBC (CROSSMATCH)        XR CHEST PORTABLE   Final Result   CHF. XR PELVIS (1-2 VIEWS)   Final Result   No acute osseous abnormality. CT HEAD WO CONTRAST   Final Result   1. No CT evidence of acute intracranial abnormality. 2. Right maxillary sinusitis. Rice Coma Scale  Eye Opening: Spontaneous  Best Verbal Response: Oriented  Best Motor Response: Obeys commands  Rice Coma Scale Score: 15                     Voice dictation software was used during the making of this note.   This software is not perfect and grammatical and other typographical errors may be present. This note has not been completely proofread for errors.      Jeimy Vieyra MD  05/29/22 9531

## 2022-05-29 NOTE — ACP (ADVANCE CARE PLANNING)
Advanced Care Planning (Initial Encounter)      Name: Anastasia Wilkes            Age: 64 y.o. Sex: female  : 1945    MRN:     [id number]    Date of ACP Conversation: 22    Paul Carson Conducted with: Patient with Decision Making Capacity       Patient is AAOx3 and has adequate capacity to make medical decisions. In the event that she is no longer able to make medical decision, she would like Cindy Vazquez and Lee Barrett to make medical decisions. Discussed the current conditions, workup/management plans as well as prognosis. The patient has been informed and is fully aware of the sufficient risks of the active diagnosis and risk for further deterioration due to the underlying condition. In the event of cardiopulmonary arrest, patient would like us to perform resuscitation including chest compression and intubation. In addition, she states that she(and the family) has been working on placing her at the facility with hospice.      Time Spent face to face with patient/family: 16 mins (This is addition to time spent for clinical care)        Code Status: DNR  Designated Health Care Decision Maker:     Cindy Vazquez ([phone])  Lee Barrett ([phone])      Shemar Luu MD

## 2022-05-29 NOTE — PROGRESS NOTES
Patient in room 352. Kofi RN at bedside for handoff. PRBC's currently infusing @ 200cc/hr via 20g Left lower arm. No signs of distress noted.  4 LPM oxygen via NC.

## 2022-05-29 NOTE — CONSENT
Informed Consent for Blood Component Transfusion Note    I have discussed with the patient the rationale for blood component transfusion; its benefits in treating or preventing fatigue, organ damage, or death; and its risk which includes mild transfusion reactions, rare risk of blood borne infection, or more serious but rare reactions. I have discussed the alternatives to transfusion, including the risk and consequences of not receiving transfusion. The patient had an opportunity to ask questions and had agreed to proceed with transfusion of blood components.     Electronically signed by Sven Nascimento MD on 5/29/22 at 2:48 PM EDT

## 2022-05-29 NOTE — ED TRIAGE NOTES
Patient arrived via EMS/ambulance after patient fell on the floor at home. Patient called friend and friend called EMS. Patient does not know how long she was on the floor. Patient has history of chronic back pain, complains of lower back pain at this time.

## 2022-05-29 NOTE — H&P
Hospitalist History and Physical   Admit Date:  2022 11:00 AM   Name:  Nusrat Guajardo   Age:  64 y.o. Sex:  female  :  1945   MRN:  [id number]   Room:  03/    Presenting Complaint: Fall     Reason(s) for Admission: Syncope and collapse [R55]     History of Present Illness:   Nusrat Guajardo is a 64 y.o. female with medical history of end stage COPD, chronic respiratory failure with hypoxia on 3L O2, sleep apena, Afib not on AC, depression , chronic pain, fibromyalgia,  moderate mitral valve regurgitation and pulmonary hypertensionwho presented after a fall at home. Patient is unclear if she had any dizziness/lightheadedness prior to the fall. Reports that she was making her way to the bathroom when she fell. Unclear how long she was down on the floor but \"I willed myself to get ot my phone\"  And called a friend who called EMS. Patient reports that she lives alone and there is plans in placed to go to Hospice facility. Reporting pain in her lower back which has been chronic issue. Denies fever, chills, cough, sob that is worse than norm, or chest pains. In the ED , she was hypotensive to 95/53 but saturating well on 4L O2 NC. Workup revealed Hb 6.9, K+ 3.2, Cr 1.15.  +heme on rectal exam per ED staff. CT head without any acute abnormalities but showed right maxillary sinusitis. CXR shows small pleural effusions with pulmonary edema. Xray of pelvis without any acute fractures. Reports that she is willing to do blood transfusions but does not want any aggressive workup/intervention. After explaining possible EGD/Colonoscopy to evaluate for her heme+ stool, she states she does not want it done. Per ED record, patient was tearful and depressed. When questioned about self harm, she denies any plans for self harm. Review of Systems:  10 systems reviewed and negative except as noted in HPI. Assessment & Plan:   Syncope and collapse  Unclear etiology.  Possibly due to debility and weakness vs orthostatics given anemia and hypotension on presentation  - check orthostatics  - transfuse PRBC  - telemetry monitoring     Anemia  Possibly due to chronic illness with poor nutrition. Hb of 6.9 on admission with prior Hb of 11.6 on 4/12  - check anemia panel  - +heme stool test per ED. Patient does not want EGD/Colonoscopy and therefore will not consult GI    Severe protein-calorie malnutrition  Thin, elderly female with temporal wasting, visible clavicles and rib cages. BMI of Body mass index is 16.98 kg/m². - nutritional supplements    Hypokalemia  K+ of 3.2  - replete with KCl and recheck K+    Depression, major, in remission: on zoloft and buspar    GERD: PPI    Atrial fibrillation : rate controlled and not on AC. Not on any medications     Pulmonary hypertension due to chronic obstructive pulmonary disease  - on lasix PO    Chronic respiratory failure with hypoxia  End stage chronic obstructive pulmonary disease (HCC)  Stable and not in exacerbation  - nebs  - O2 supplement. Dispo/Discharge Planning: PT/OT.  PPD orderd    Diet:CLDs  VTE ppx: SCDs  Code status: DNR    Past History:  Past Medical History:   Diagnosis Date    Anxiety 3/6/2013    Arthritis     osteoarthritis    B12 deficiency 3/6/2013    Chronic back pain 3/6/2013    Chronic kidney disease (CKD) stage G3b/A1, moderately decreased glomerular filtration rate (GFR) between 30-44 mL/min/1.73 square meter and albuminuria creatinine ratio less than 30 mg/g ([facility]) 10/14/2016    Constipation 11/4/2015    Decreased GFR 5/26/2016    Depression 10/25/2012    Depression 10/25/2012    Depression, major, in remission ([facility] [address] 1/31/2018    Encounter for long-term (current) use of other medications 3/6/2013    Endocrine disease     partial removal, benign    Environmental allergies 3/6/2013    Fecal incontinence 12/7/2016    Fibromyalgia 9/15/2015    Gastrointestinal disorder     GERD (gastroesophageal reflux disease) \"years\"    Headache(784.0) 2/12/2013    HTN (hypertension) 10/25/2012    HTN (hypertension) 10/25/2012    Hyperlipidemia 10/25/2012    Lymphocytic colitis 00/9/5104    Metabolic syndrome 4/0/3329    Microalbuminuria 11/4/2015    Osteoporosis 10/25/2012    Other ill-defined conditions(799.89)     PUD (peptic ulcer disease) \"years ago\"    Had peptic ulcers    Unspecified adverse effect of anesthesia     PATIENT STATES SHE DOESN'T GET GENERAL ANESTHESIA DUE TO COPD    Unspecified sleep apnea     wears O2 at night. no c-warren    Vitamin D deficiency 3/6/2013     Past Surgical History:   Procedure Laterality Date    APPENDECTOMY  1970s    BREAST BIOPSY Right     excision of cyst    BREAST SURGERY  1980s    cyst removed from right breast/benign    CATARACT REMOVAL Right 2016    right eye    CHOLECYSTECTOMY  1970s    COLOSTOMY  1990s    placed, then reversed    GYN  1970s    TIA BSO    HERNIA REPAIR  unknown    hiatal hernia repair    IR KYPHOPLASTY LUMBAR FIRST LEVEL  2/11/2021    IR KYPHOPLASTY LUMBAR FIRST LEVEL  2/11/2021    IR KYPHOPLASTY LUMBAR FIRST LEVEL 2/11/2021 [facility]    ORTHOPEDIC SURGERY  2008,2009    right knee replaced and then left the next year    THYROIDECTOMY, PARTIAL  1990s    [facility]      Allergies   Allergen Reactions    Amlodipine Hives    Lisinopril Diarrhea    Niacin Rash     Patient was not taking it with  mg 30 min before. She wants to give it another try. Pt takes this med and it does not cause a rash anymore      Social History     Tobacco Use    Smoking status: Current Every Day Smoker     Packs/day: 0.50    Smokeless tobacco: Never Used   Substance Use Topics    Alcohol use: Yes      Family History   Problem Relation Age of Onset    Heart Attack Brother     Heart Disease Father     Kidney Disease Mother       Family history reviewed and negative except as noted above.     Immunization History   Administered Date(s) Administered    COVID-19, Moderna, Primary or Immunocompromised, PF, 100mcg/0.5mL 03/24/2021, 04/21/2021    Influenza Trivalent 10/17/2013, 10/13/2014    Influenza, High Dose (Fluzone 65 yrs and older) 11/04/2015, 11/03/2017, 10/10/2018    Influenza, [place], IM, PF (6 mo and older Fluzone, Flulaval, Fluarix, and 3 yrs and older Afluria) 10/12/2016    Influenza, Triv, inactivated, subunit, adjuvanted, IM (Fluad 65 yrs and older) 10/10/2019    PPD Test 02/07/2021, 03/12/2021, 12/24/2021    Pneumococcal Conjugate 13-valent ([id number]) 10/12/2016    Pneumococcal Polysaccharide ([id number]) 10/13/2014    Td vaccine (adult) 02/14/2018    Tdap (Boostrix, Adacel) 04/25/2017, 02/13/2020    Tst, Unspecified Formulation 02/07/2021, 03/12/2021     Prior to Admit Medications:  Current Outpatient Medications   Medication Instructions    acetaminophen (TYLENOL) 650 mg, Oral, EVERY 6 HOURS PRN    albuterol (PROVENTIL) 2.5 mg, Inhalation, EVERY 4 HOURS PRN    albuterol sulfate  (90 Base) MCG/ACT inhaler 2 puffs, Inhalation, EVERY 4 HOURS PRN    alendronate (FOSAMAX) 70 mg, Oral, EVERY 7 DAYS    atorvastatin (LIPITOR) 80 mg, Oral, DAILY    budesonide (ENTOCORT EC) 6 mg, Oral    budesonide-formoterol (SYMBICORT) 160-4.5 MCG/ACT AERO 2 puffs, Inhalation, 2 TIMES DAILY    busPIRone (BUSPAR) 15 mg, Oral, 3 TIMES DAILY    celecoxib (CELEBREX) 200 MG capsule Oral, DAILY PRN    cetirizine (ZYRTEC) 10 MG tablet Oral    colestipol (COLESTID) 1 g, Oral, 2 TIMES DAILY    cromolyn (OPTICROM) 4 % ophthalmic solution 1 drop, Ophthalmic, 3 TIMES DAILY    furosemide (LASIX) 40 mg, 2 TIMES DAILY    gabapentin (NEURONTIN) 100 mg, Oral, 3 TIMES DAILY    HYDROcodone-acetaminophen (NORCO)  MG per tablet TAKE 1 TABLET BY MOUTH EVERY 6 HOURS AS NEEDED FOR SEVERE    lidocaine 4 % external patch Apply to lower back    LORazepam (ATIVAN) 2 mg, Oral, EVERY 8 HOURS PRN    lubiprostone (AMITIZA) 8 MCG CAPS capsule Oral    metoclopramide (REGLAN) 10 mg, Oral, 4 TIMES DAILY BEFORE MEALS & NIGHTLY    nicotine (NICODERM CQ) 7 MG/24HR 1 patch, TransDERmal, DAILY    omeprazole (PRILOSEC) 40 mg, Oral, DAILY    ondansetron (ZOFRAN) 4 mg, Oral, EVERY 8 HOURS PRN    primidone (MYSOLINE) 100 mg, Oral, 3 TIMES DAILY    senna-docusate (PERICOLACE) 8.6-50 MG per tablet 1 tablet, Oral    sertraline (ZOLOFT) 100 MG tablet Take 2 tablets daily ( Dose increased from previously 100 mg daily ) Indications: anxiousness associated with depression    sucralfate (CARAFATE) 1 g, 4 TIMES DAILY BEFORE MEALS & NIGHTLY    tiotropium (SPIRIVA) 18 mcg, Inhalation, DAILY    topiramate (TOPAMAX) 100 MG tablet Take 1 tablet in the morning with breakfast and 1 tablet at night. Indications: migraine prevention    traZODone (DESYREL) 100 mg, Oral, NIGHTLY         Objective:     Patient Vitals for the past 24 hrs:   Temp Pulse Resp BP SpO2   05/29/22 1455 97.5 °F (36.4 °C) 95 18 108/67 100 %   05/29/22 1451 97.5 °F (36.4 °C) 100 14 108/67 100 %   05/29/22 1449 97.5 °F (36.4 °C) -- -- -- --   05/29/22 1448 -- 100 11 108/67 100 %   05/29/22 1234 -- 95 13 (!) 98/54 100 %   05/29/22 1101 97.5 °F (36.4 °C) 94 16 (!) 95/53 100 %       Oxygen Therapy  SpO2: 100 %  Pulse Oximeter Device Mode: Continuous  O2 Device: Nasal cannula  O2 Flow Rate (L/min): 4 L/min    Estimated body mass index is 16.98 kg/m² as calculated from the following:    Height as of this encounter: 5' 9\" (1.753 m). Weight as of this encounter: 115 lb (52.2 kg). Intake/Output Summary (Last 24 hours) at 5/29/2022 1513  Last data filed at 5/29/2022 1450  Gross per 24 hour   Intake 1000 ml   Output --   Net 1000 ml         Physical Exam:    Blood pressure 108/67, pulse 95, temperature 97.5 °F (36.4 °C), resp. rate 18, height 5' 9\" (1.753 m), weight 115 lb (52.2 kg), SpO2 100 %. General:    Chronically ill appearing, frail elderly female. Appear older than stated age.   Head:  Normocephalic, atraumatic  Eyes:  Sclerae appear normal.  Pupils equally round. ENT:  Nares appear normal, no drainage. Moist oral mucosa  Neck:  No restricted ROM. Trachea midline   CV:   RRR. No m/r/g. No jugular venous distension. Lungs:   CTAB, no wheezing,  Respirations even, unlabored  Abdomen: Bowel sounds present. Soft, nontender, nondistended. Extremities: No cyanosis or clubbing. No edema  Skin:     Ecchymosis to lower extremity bilaterally, Warm and dry. Neuro:  CN II-XII grossly intact. A&Ox3  Psych:  Normal mood and affect.       I have reviewed ordered lab tests and independently visualized imaging below:    Last 24hr Labs:  Recent Results (from the past 24 hour(s))   TSH with Reflex    Collection Time: 05/29/22 11:33 AM   Result Value Ref Range    TSH w Free Thyroid if Abnormal 1.48 UIU/ML   CBC with Auto Differential    Collection Time: 05/29/22 11:34 AM   Result Value Ref Range    WBC 8.7 4.3 - 11.1 K/uL    RBC 2.45 (L) 4.05 - 5.2 M/uL    Hemoglobin 6.9 (LL) 11.7 - 15.4 g/dL    Hematocrit 23.2 (L) 35.8 - 46.3 %    MCV 94.7 79.6 - 97.8 FL    MCH 28.2 26.1 - 32.9 PG    MCHC 29.7 (L) 31.4 - 35.0 g/dL    RDW 14.6 11.9 - 14.6 %    Platelets 137 249 - 259 K/uL    MPV 11.0 9.4 - 12.3 FL    nRBC 0.00 0.0 - 0.2 K/uL    Differential Type AUTOMATED      Seg Neutrophils 82 (H) 43 - 78 %    Lymphocytes 10 (L) 13 - 44 %    Monocytes 6 4.0 - 12.0 %    Eosinophils % 1 0.5 - 7.8 %    Basophils 0 0.0 - 2.0 %    Immature Granulocytes 1 0.0 - 5.0 %    Segs Absolute 7.2 1.7 - 8.2 K/UL    Absolute Lymph # 0.9 0.5 - 4.6 K/UL    Absolute Mono # 0.5 0.1 - 1.3 K/UL    Absolute Eos # 0.1 0.0 - 0.8 K/UL    Basophils Absolute 0.0 0.0 - 0.2 K/UL    Absolute Immature Granulocyte 0.1 0.0 - 0.5 K/UL   Comprehensive Metabolic Panel    Collection Time: 05/29/22 11:34 AM   Result Value Ref Range    Sodium 140 136 - 145 mmol/L    Potassium 3.2 (L) 3.5 - 5.1 mmol/L    Chloride 102 98 - 107 mmol/L    CO2 29 21 - 32 mmol/L    Anion Gap 9 7 - 16 mmol/L    Glucose 81 65 - 100 mg/dL    BUN 40 (H) 8 - 23 MG/DL    CREATININE 1.15 (H) 0.6 - 1.0 MG/DL    GFR  59 (L) >60 ml/min/1.73m2    GFR Non- 49 (L) >60 ml/min/1.73m2    Calcium 7.8 (L) 8.3 - 10.4 MG/DL    Total Bilirubin 0.4 0.2 - 1.1 MG/DL    ALT 15 12 - 65 U/L    AST 27 15 - 37 U/L    Alk Phosphatase 108 50 - 130 U/L    Total Protein 4.9 (L) 6.3 - 8.2 g/dL    Albumin 2.1 (L) 3.2 - 4.6 g/dL    Globulin 2.8 2.3 - 3.5 g/dL    Albumin/Globulin Ratio 0.8 (L) 1.2 - 3.5     Magnesium    Collection Time: 05/29/22 11:34 AM   Result Value Ref Range    Magnesium 2.3 1.8 - 2.4 mg/dL   EKG 12 Lead    Collection Time: 05/29/22 11:54 AM   Result Value Ref Range    Ventricular Rate 92 BPM    Atrial Rate 78 BPM    QRS Duration 112 ms    Q-T Interval 400 ms    QTc Calculation (Bazett) 494 ms    R Axis 151 degrees    T Axis 61 degrees    Diagnosis Atrial fibrillation    PREPARE RBC (CROSSMATCH), 1 Units    Collection Time: 05/29/22  1:30 PM   Result Value Ref Range    History Check Historical check performed    TYPE AND SCREEN    Collection Time: 05/29/22  1:33 PM   Result Value Ref Range    Crossmatch expiration date 06/01/2022,2359     ABO/Rh O POSITIVE     Antibody Screen NEG     Unit Number [id number]     Product Code Blood Bank RC LR     Unit Divison 00     Dispense Status Blood Bank ISSUED     Crossmatch Result Compatible        Other Studies:  XR PELVIS (1-2 VIEWS)    Result Date: 5/29/2022  Pelvis radiograph Clinical indication: Lumbar spine radiograph 3/12/2021 and abdominopelvic CT 2/12/2021 correlation Comparison: none Technique: Supine portable AP view Findings: There is no evidence of fracture, dislocation, or erosion. The pelvic ring, hip joints are intact. The bone density is low which can limit sensitivity for subtle osseous abnormalities. Chronic age appropriate degenerative joint changes are noted. Prominent stool in the visualized large bowel may indicate constipation.  Scattered vascular calcifications within soft tissues are not unusual for age. No acute osseous abnormality. CT HEAD WO CONTRAST    Result Date: 5/29/2022  Noncontrast head CT Clinical Indication: Patient found down with diminished responsiveness, loss of memory to the event, generalized severe weakness, suspected injury. Technique: Noncontrast axial images were obtained through the brain. All CT scans at this location are performed using dose modulation techniques as appropriate including the following: Automated exposure control, adjustment of the MA and/or kV according to patient's size, or use of iterative reconstruction technique. Reformatted sagittal, coronal images obtained to further evaluate bones, soft tissues, extra-axial spaces. Comparison: 10/31/2021 Findings: There is no acute intracranial hemorrhage, hydrocephalus, intra-axial mass, or mass-effect. There is no CT evidence of acute large artery territorial infarction or abnormal extra-axial fluid collection. The mastoid air cells are aerated. Paranasal sinuses demonstrate peripheral mucosal thickening and air-fluid level in the right maxillary sinus, elsewhere appear aerated. No displaced skull fractures are present. Patient is missing teeth. 1. No CT evidence of acute intracranial abnormality. 2. Right maxillary sinusitis. XR CHEST PORTABLE    Result Date: 5/29/2022  Chest portable CLINICAL INDICATION: Shortness of breath acute moderate with weakness, syncope, fall COMPARISON: 4/9/2022 TECHNIQUE: single AP portable view chest at 12:10 PM upright FINDINGS: Cardiac silhouette again enlarged. Small pleural effusions in the bases and diffuse moderate to marked pulmonary edema have increased somewhat since prior. No evidence of a pneumothorax or a complete lobar consolidation. Low bone density. No displaced fracture or dislocation is visualized. External monitoring wires overlie the chest.     CHF.        Echocardiogram:  No results found for this or any previous visit. Meds previously ordered:  Orders Placed This Encounter   Medications    lactated ringers bolus    0.9 % sodium chloride infusion       Signed:  Michael Herrera MD    Part of this note may have been written by using a voice dictation software. The note has been proof read but may still contain some grammatical/other typographical errors.

## 2022-05-30 PROBLEM — D50.9 IRON DEFICIENCY ANEMIA: Status: ACTIVE | Noted: 2022-05-30

## 2022-05-30 LAB
ABO + RH BLD: NORMAL
ANION GAP SERPL CALC-SCNC: 8 MMOL/L (ref 7–16)
BASOPHILS # BLD: 0 K/UL (ref 0–0.2)
BASOPHILS NFR BLD: 0 % (ref 0–2)
BLD PROD TYP BPU: NORMAL
BLOOD BANK DISPENSE STATUS: NORMAL
BLOOD GROUP ANTIBODIES SERPL: NORMAL
BPU ID: NORMAL
BUN SERPL-MCNC: 40 MG/DL (ref 8–23)
CALCIUM SERPL-MCNC: 7.9 MG/DL (ref 8.3–10.4)
CHLORIDE SERPL-SCNC: 101 MMOL/L (ref 98–107)
CO2 SERPL-SCNC: 28 MMOL/L (ref 21–32)
CREAT SERPL-MCNC: 0.97 MG/DL (ref 0.6–1)
CROSSMATCH RESULT: NORMAL
DIFFERENTIAL METHOD BLD: ABNORMAL
EOSINOPHIL # BLD: 0.1 K/UL (ref 0–0.8)
EOSINOPHIL NFR BLD: 1 % (ref 0.5–7.8)
ERYTHROCYTE [DISTWIDTH] IN BLOOD BY AUTOMATED COUNT: 14.6 % (ref 11.9–14.6)
GLUCOSE SERPL-MCNC: 49 MG/DL (ref 65–100)
HCT VFR BLD AUTO: 30.3 % (ref 35.8–46.3)
HGB BLD-MCNC: 9.1 G/DL (ref 11.7–15.4)
IMM GRANULOCYTES # BLD AUTO: 0.1 K/UL (ref 0–0.5)
IMM GRANULOCYTES NFR BLD AUTO: 1 % (ref 0–5)
LYMPHOCYTES # BLD: 1.1 K/UL (ref 0.5–4.6)
LYMPHOCYTES NFR BLD: 13 % (ref 13–44)
MAGNESIUM SERPL-MCNC: 2.3 MG/DL (ref 1.8–2.4)
MCH RBC QN AUTO: 28.6 PG (ref 26.1–32.9)
MCHC RBC AUTO-ENTMCNC: 30 G/DL (ref 31.4–35)
MCV RBC AUTO: 95.3 FL (ref 79.6–97.8)
MONOCYTES # BLD: 0.4 K/UL (ref 0.1–1.3)
MONOCYTES NFR BLD: 5 % (ref 4–12)
NEUTS SEG # BLD: 6.6 K/UL (ref 1.7–8.2)
NEUTS SEG NFR BLD: 79 % (ref 43–78)
NRBC # BLD: 0 K/UL (ref 0–0.2)
PLATELET # BLD AUTO: 215 K/UL (ref 150–450)
PMV BLD AUTO: 10.6 FL (ref 9.4–12.3)
POTASSIUM SERPL-SCNC: 3.1 MMOL/L (ref 3.5–5.1)
RBC # BLD AUTO: 3.18 M/UL (ref 4.05–5.2)
SODIUM SERPL-SCNC: 137 MMOL/L (ref 136–145)
SPECIMEN EXP DATE BLD: NORMAL
UNIT DIVISION: 0
WBC # BLD AUTO: 8.3 K/UL (ref 4.3–11.1)

## 2022-05-30 PROCEDURE — 6360000002 HC RX W HCPCS: Performed by: INTERNAL MEDICINE

## 2022-05-30 PROCEDURE — 80048 BASIC METABOLIC PNL TOTAL CA: CPT

## 2022-05-30 PROCEDURE — 97161 PT EVAL LOW COMPLEX 20 MIN: CPT

## 2022-05-30 PROCEDURE — 96365 THER/PROPH/DIAG IV INF INIT: CPT

## 2022-05-30 PROCEDURE — 2700000000 HC OXYGEN THERAPY PER DAY

## 2022-05-30 PROCEDURE — 83735 ASSAY OF MAGNESIUM: CPT

## 2022-05-30 PROCEDURE — G0378 HOSPITAL OBSERVATION PER HR: HCPCS

## 2022-05-30 PROCEDURE — 97165 OT EVAL LOW COMPLEX 30 MIN: CPT

## 2022-05-30 PROCEDURE — 94760 N-INVAS EAR/PLS OXIMETRY 1: CPT

## 2022-05-30 PROCEDURE — 97530 THERAPEUTIC ACTIVITIES: CPT

## 2022-05-30 PROCEDURE — 2580000003 HC RX 258: Performed by: INTERNAL MEDICINE

## 2022-05-30 PROCEDURE — 94640 AIRWAY INHALATION TREATMENT: CPT

## 2022-05-30 PROCEDURE — 1100000000 HC RM PRIVATE

## 2022-05-30 PROCEDURE — 6370000000 HC RX 637 (ALT 250 FOR IP): Performed by: INTERNAL MEDICINE

## 2022-05-30 PROCEDURE — 85025 COMPLETE CBC W/AUTO DIFF WBC: CPT

## 2022-05-30 PROCEDURE — 97535 SELF CARE MNGMENT TRAINING: CPT

## 2022-05-30 PROCEDURE — 36415 COLL VENOUS BLD VENIPUNCTURE: CPT

## 2022-05-30 RX ORDER — POTASSIUM BICARBONATE 25 MEQ/1
25 TABLET, EFFERVESCENT ORAL 3 TIMES DAILY
Status: DISCONTINUED | OUTPATIENT
Start: 2022-05-30 | End: 2022-05-30 | Stop reason: SDUPTHER

## 2022-05-30 RX ADMIN — TRAZODONE HYDROCHLORIDE 100 MG: 50 TABLET ORAL at 21:43

## 2022-05-30 RX ADMIN — CETIRIZINE HYDROCHLORIDE 5 MG: 5 TABLET ORAL at 09:07

## 2022-05-30 RX ADMIN — BUDESONIDE 6 MG: 3 CAPSULE, GELATIN COATED ORAL at 09:06

## 2022-05-30 RX ADMIN — ALBUTEROL SULFATE 2.5 MG: 2.5 SOLUTION RESPIRATORY (INHALATION) at 16:03

## 2022-05-30 RX ADMIN — BUSPIRONE HYDROCHLORIDE 15 MG: 5 TABLET ORAL at 15:35

## 2022-05-30 RX ADMIN — HYDROCODONE BITARTRATE AND ACETAMINOPHEN 1 TABLET: 10; 325 TABLET ORAL at 05:34

## 2022-05-30 RX ADMIN — POTASSIUM BICARBONATE 20 MEQ: 782 TABLET, EFFERVESCENT ORAL at 09:07

## 2022-05-30 RX ADMIN — PRIMIDONE 100 MG: 50 TABLET ORAL at 21:42

## 2022-05-30 RX ADMIN — MOMETASONE FUROATE AND FORMOTEROL FUMARATE DIHYDRATE 2 PUFF: 200; 5 AEROSOL RESPIRATORY (INHALATION) at 08:59

## 2022-05-30 RX ADMIN — BUSPIRONE HYDROCHLORIDE 15 MG: 5 TABLET ORAL at 09:07

## 2022-05-30 RX ADMIN — PANTOPRAZOLE SODIUM 40 MG: 40 TABLET, DELAYED RELEASE ORAL at 05:34

## 2022-05-30 RX ADMIN — SERTRALINE HYDROCHLORIDE 200 MG: 100 TABLET ORAL at 09:08

## 2022-05-30 RX ADMIN — POTASSIUM BICARBONATE 20 MEQ: 782 TABLET, EFFERVESCENT ORAL at 15:34

## 2022-05-30 RX ADMIN — BUSPIRONE HYDROCHLORIDE 15 MG: 5 TABLET ORAL at 21:42

## 2022-05-30 RX ADMIN — ALBUTEROL SULFATE 2.5 MG: 2.5 SOLUTION RESPIRATORY (INHALATION) at 04:41

## 2022-05-30 RX ADMIN — CHOLESTYRAMINE 4 G: 4 POWDER, FOR SUSPENSION ORAL at 09:07

## 2022-05-30 RX ADMIN — ATORVASTATIN CALCIUM 80 MG: 40 TABLET, FILM COATED ORAL at 09:06

## 2022-05-30 RX ADMIN — PRIMIDONE 100 MG: 50 TABLET ORAL at 09:08

## 2022-05-30 RX ADMIN — POTASSIUM BICARBONATE 20 MEQ: 782 TABLET, EFFERVESCENT ORAL at 21:43

## 2022-05-30 RX ADMIN — PRIMIDONE 100 MG: 50 TABLET ORAL at 15:35

## 2022-05-30 RX ADMIN — SODIUM CHLORIDE, PRESERVATIVE FREE 10 ML: 5 INJECTION INTRAVENOUS at 21:43

## 2022-05-30 RX ADMIN — GABAPENTIN 100 MG: 100 CAPSULE ORAL at 15:35

## 2022-05-30 RX ADMIN — HYDROCODONE BITARTRATE AND ACETAMINOPHEN 1 TABLET: 10; 325 TABLET ORAL at 12:17

## 2022-05-30 RX ADMIN — HYDROCODONE BITARTRATE AND ACETAMINOPHEN 1 TABLET: 10; 325 TABLET ORAL at 21:51

## 2022-05-30 RX ADMIN — GABAPENTIN 100 MG: 100 CAPSULE ORAL at 09:07

## 2022-05-30 RX ADMIN — GABAPENTIN 100 MG: 100 CAPSULE ORAL at 21:43

## 2022-05-30 RX ADMIN — SODIUM CHLORIDE 125 MG: 9 INJECTION, SOLUTION INTRAVENOUS at 09:14

## 2022-05-30 RX ADMIN — SODIUM CHLORIDE, PRESERVATIVE FREE 10 ML: 5 INJECTION INTRAVENOUS at 09:08

## 2022-05-30 RX ADMIN — MOMETASONE FUROATE AND FORMOTEROL FUMARATE DIHYDRATE 2 PUFF: 200; 5 AEROSOL RESPIRATORY (INHALATION) at 20:33

## 2022-05-30 ASSESSMENT — PAIN SCALES - GENERAL
PAINLEVEL_OUTOF10: 6
PAINLEVEL_OUTOF10: 6
PAINLEVEL_OUTOF10: 7
PAINLEVEL_OUTOF10: 0
PAINLEVEL_OUTOF10: 0
PAINLEVEL_OUTOF10: 8
PAINLEVEL_OUTOF10: 2
PAINLEVEL_OUTOF10: 7
PAINLEVEL_OUTOF10: 4

## 2022-05-30 ASSESSMENT — PAIN DESCRIPTION - ORIENTATION
ORIENTATION: MID
ORIENTATION: LOWER
ORIENTATION: MID;LOWER
ORIENTATION: MID

## 2022-05-30 ASSESSMENT — PAIN DESCRIPTION - LOCATION
LOCATION: BACK;HEAD
LOCATION: BACK

## 2022-05-30 ASSESSMENT — PAIN SCALES - WONG BAKER: WONGBAKER_NUMERICALRESPONSE: 0

## 2022-05-30 ASSESSMENT — PAIN DESCRIPTION - DESCRIPTORS
DESCRIPTORS: ACHING
DESCRIPTORS: SHARP
DESCRIPTORS: ACHING
DESCRIPTORS: ACHING

## 2022-05-30 ASSESSMENT — PAIN - FUNCTIONAL ASSESSMENT
PAIN_FUNCTIONAL_ASSESSMENT: ACTIVITIES ARE NOT PREVENTED
PAIN_FUNCTIONAL_ASSESSMENT: ACTIVITIES ARE NOT PREVENTED

## 2022-05-30 ASSESSMENT — PAIN DESCRIPTION - PAIN TYPE
TYPE: ACUTE PAIN
TYPE: ACUTE PAIN

## 2022-05-30 NOTE — PROGRESS NOTES
Hospitalist Progress Note   Admit Date:  2022 11:00 AM   Name:  Kevon Hicks   Age:  64 y.o. Sex:  female  :  1945   MRN:  [id number]   Room:  [facility]/    Presenting Complaint: Fall     Reason(s) for Admission: Syncope and collapse [R55]  Chronic pain syndrome [G89.4]  Volume depletion [E86.9]  Symptomatic anemia [D64.9]  Severe episode of recurrent major depressive disorder, without psychotic features ([facility] [address] [F33.2]  Chronic congestive heart failure, unspecified heart failure type [facility]) [I50.9]     Hospital Course & Interval History:   Please refer to the admission H&P for details of presentation. In summary, Kevon Hicks is a 64 y.o. female with past medical history significant for Kevon Hicks is a 64 y.o. female with medical history of end stage COPD, chronic respiratory failure with hypoxia on 3L O2, sleep apena, Afib not on AC, depression , chronic pain, fibromyalgia,  moderate mitral valve regurgitation and pulmonary hypertensionwho presented after a fall at home. Patient is unclear if she had any dizziness/lightheadedness prior to the fall. Reports that she was making her way to the bathroom when she fell. Unclear how long she was down on the floor but \"I willed myself to get ot my phone\"  And called a friend who called EMS. In the ED , she was hypotensive to 95/53 but saturating well on 4L O2 NC. Workup revealed Hb 6.9, K+ 3.2, Cr 1.15.  +heme on rectal exam per ED staff. CT head without any acute abnormalities but showed right maxillary sinusitis. CXR shows small pleural effusions with pulmonary edema. Xray of pelvis without any acute fractures. Subjective/24 hr Events (22) : Patient is seen and examined at bedside. No acute events reported overnight by nursing staff. Patient reports feeling better compared to prior to admission. Confirmed again that she does not want invasive testing such as EGD or colonoscopy.   Patient denies fever, chills, chest pains, shortness of breath, n/v, abdominal pain. Tolerating CLDs and want regular food. She is ok to try PO supplements. Review of Systems: 10 point review of systems is otherwise negative with the exception of the elements mentioned above. Assessment & Plan:   Syncope and collapse  Unclear etiology. Possibly due to debility and weakness vs orthostatics given anemia and hypotension on presentation  05/30/22: Orthostatics neg. S/p 1Unit of PRBC w/ repeat Hb of 9.1 today  - telemetry monitoring      Anemia  Possibly due to chronic illness with poor nutrition. Hb of 6.9 on admission with prior Hb of 11.6 on 4/12 05/30/22: S/p 1Unit of PRBC w/ repeat Hb of 9.1 today. Anemia panel with iron deficiency  - start on IV Iron x 2  - +heme stool test per ED. Patient does not want EGD/Colonoscopy and therefore will not consult GI     Severe protein-calorie malnutrition  Thin, elderly female with temporal wasting, visible clavicles and rib cages. BMI of Body mass index is 16.98 kg/m². - nutritional supplements     Hypokalemia  K+ of 3.1. Mag wnl  - replete with KCl and recheck K+     Depression, major, in remission: on zoloft and buspar     GERD: PPI     Atrial fibrillation : rate controlled and not on AC. Not on any medications      Pulmonary hypertension due to chronic obstructive pulmonary disease  - on lasix PO     Chronic respiratory failure with hypoxia  End stage chronic obstructive pulmonary disease (HCC)  Stable and not in exacerbation  - nebs  - O2 supplement.     DISPO: CM on board for placement as patient lives alone. She wants hospice and is appropriate for hospice but at this current state, she does not qualify for inpatient hospice. Diet:  ADULT DIET; Regular  ADULT ORAL NUTRITION SUPPLEMENT; Breakfast, Lunch, Dinner; Standard High Calorie/High Protein Oral Supplement  DVT PPx: SCDs  Code status: DNR        I have reviewed and ordered clinical lab tests and independently visualized images, tracing. I spent 32 minutes of time caring for this patient at bedside or nearby, more than 50 percent of which was spent on coordination of care and/or counseling regarding the disease process, treatment options, and treatment plan. Hospital Problems:  Principal Problem:    Syncope and collapse  Active Problems:    Iron deficiency anemia    Anxiety    Macrocytic anemia    Severe protein-calorie malnutrition (HCC)    Sleep apnea    Hypokalemia    Fibromyalgia    Depression, major, in remission (HCC)    GERD (gastroesophageal reflux disease)    Hyperlipidemia    Atrial fibrillation ([facility]    Pulmonary hypertension due to chronic obstructive pulmonary disease (HCC)    Chronic respiratory failure with hypoxia ([facility])    End stage chronic obstructive pulmonary disease ([facility]  Resolved Problems:    * No resolved hospital problems.  *      Objective:     Patient Vitals for the past 24 hrs:   Temp Pulse Resp BP SpO2   05/30/22 1135 97.9 °F (36.6 °C) (!) 101 16 (!) 97/58 99 %   05/30/22 0859 -- 81 16 -- 99 %   05/30/22 0823 97.3 °F (36.3 °C) 78 12 (!) 98/59 99 %   05/30/22 0504 97.5 °F (36.4 °C) (!) 112 18 102/64 92 %   05/30/22 0446 -- 88 16 -- 99 %   05/30/22 0445 -- 84 16 -- 99 %   05/29/22 2340 98.1 °F (36.7 °C) 94 -- 107/60 100 %   05/29/22 2115 98.4 °F (36.9 °C) (!) 104 19 112/71 100 %   05/29/22 1730 -- (!) 101 16 108/69 97 %   05/29/22 1646 -- -- -- -- 100 %   05/29/22 1542 -- 89 10 -- 99 %   05/29/22 1533 -- 95 14 -- 100 %   05/29/22 1526 97.4 °F (36.3 °C) 96 16 107/65 100 %   05/29/22 1455 97.5 °F (36.4 °C) 95 18 108/67 100 %   05/29/22 1451 97.5 °F (36.4 °C) 100 14 108/67 100 %   05/29/22 1449 97.5 °F (36.4 °C) -- -- -- --   05/29/22 1448 -- 100 11 108/67 100 %       Oxygen Therapy  SpO2: 99 %  Pulse Oximeter Device Mode: Intermittent  O2 Device: Nasal cannula  Skin Assessment: Clean, dry, & intact  Skin Protection for O2 Device: No  O2 Flow Rate (L/min): 3 L/min    Estimated body mass index is 16.98 kg/m² as calculated from the following:    Height as of this encounter: 5' 9\" (1.753 m). Weight as of this encounter: 115 lb (52.2 kg). Intake/Output Summary (Last 24 hours) at 5/30/2022 1338  Last data filed at 5/30/2022 0420  Gross per 24 hour   Intake 1452.08 ml   Output 300 ml   Net 1152.08 ml         Physical Exam:     Blood pressure (!) 97/58, pulse (!) 101, temperature 97.9 °F (36.6 °C), temperature source Oral, resp. rate 16, height 5' 9\" (1.753 m), weight 115 lb (52.2 kg), SpO2 99 %. General:          Chronically ill appearing, frail elderly female. Appear older than stated age. Head:               Normocephalic, atraumatic  Eyes:               Sclerae appear normal.  Pupils equally round. ENT:                Nares appear normal, no drainage. Moist oral mucosa  Neck:               No restricted ROM. Trachea midline   CV:                  RRR. No m/r/g. No jugular venous distension. Lungs:             CTAB, no wheezing,  Respirations even, unlabored  Abdomen: Bowel sounds present. Soft, nontender, nondistended. Extremities:     No cyanosis or clubbing. No edema  Skin:                Ecchymosis to lower extremity bilaterally, Warm and dry. Neuro:             CN II-XII grossly intact. A&Ox3  Psych:             Normal mood and affect.         I have reviewed ordered lab tests and independently visualized imaging below:    Recent Labs:  Recent Results (from the past 48 hour(s))   TSH with Reflex    Collection Time: 05/29/22 11:33 AM   Result Value Ref Range    TSH w Free Thyroid if Abnormal 1.48 UIU/ML   CBC with Auto Differential    Collection Time: 05/29/22 11:34 AM   Result Value Ref Range    WBC 8.7 4.3 - 11.1 K/uL    RBC 2.45 (L) 4.05 - 5.2 M/uL    Hemoglobin 6.9 (LL) 11.7 - 15.4 g/dL    Hematocrit 23.2 (L) 35.8 - 46.3 %    MCV 94.7 79.6 - 97.8 FL    MCH 28.2 26.1 - 32.9 PG    MCHC 29.7 (L) 31.4 - 35.0 g/dL    RDW 14.6 11.9 - 14.6 %    Platelets 712 071 - 135 K/uL    MPV 11.0 9.4 - 12.3 Antibody Screen NEG     Unit Number [id number]     Product Code Blood Bank RC LR     Unit Divison 00     Dispense Status Blood Bank TRANSFUSED     Crossmatch Result Compatible    Transferrin Saturation    Collection Time: 05/29/22  2:42 PM   Result Value Ref Range    Iron 7 (L) 35 - 150 ug/dL    TIBC 134 (L) 250 - 450 ug/dL    TRANSFERRIN SATURATION 5 %   Vitamin B12    Collection Time: 05/29/22  2:42 PM   Result Value Ref Range    Vitamin B-12 624 193 - 986 pg/mL   Folate    Collection Time: 05/29/22  2:42 PM   Result Value Ref Range    Folate 4.4 3.1 - 17.5 ng/mL   Ferritin    Collection Time: 05/29/22  2:42 PM   Result Value Ref Range    Ferritin 88 8 - 388 NG/ML   Hemoglobin and Hematocrit    Collection Time: 05/29/22  7:31 PM   Result Value Ref Range    Hemoglobin 9.2 (L) 11.7 - 15.4 g/dL    Hematocrit 30.7 (L) 35.8 - 46.3 %   Basic Metabolic Panel w/ Reflex to MG    Collection Time: 05/30/22  5:39 AM   Result Value Ref Range    Sodium 137 136 - 145 mmol/L    Potassium 3.1 (L) 3.5 - 5.1 mmol/L    Chloride 101 98 - 107 mmol/L    CO2 28 21 - 32 mmol/L    Anion Gap 8 7 - 16 mmol/L    Glucose 49 (L) 65 - 100 mg/dL    BUN 40 (H) 8 - 23 MG/DL    CREATININE 0.97 0.6 - 1.0 MG/DL    GFR African American >60 >60 ml/min/1.73m2    GFR Non- 59 (L) >60 ml/min/1.73m2    Calcium 7.9 (L) 8.3 - 10.4 MG/DL   CBC with Auto Differential    Collection Time: 05/30/22  5:39 AM   Result Value Ref Range    WBC 8.3 4.3 - 11.1 K/uL    RBC 3.18 (L) 4.05 - 5.2 M/uL    Hemoglobin 9.1 (L) 11.7 - 15.4 g/dL    Hematocrit 30.3 (L) 35.8 - 46.3 %    MCV 95.3 79.6 - 97.8 FL    MCH 28.6 26.1 - 32.9 PG    MCHC 30.0 (L) 31.4 - 35.0 g/dL    RDW 14.6 11.9 - 14.6 %    Platelets 639 539 - 094 K/uL    MPV 10.6 9.4 - 12.3 FL    nRBC 0.00 0.0 - 0.2 K/uL    Differential Type AUTOMATED      Seg Neutrophils 79 (H) 43 - 78 %    Lymphocytes 13 13 - 44 %    Monocytes 5 4.0 - 12.0 %    Eosinophils % 1 0.5 - 7.8 %    Basophils 0 0.0 - 2.0 % Immature Granulocytes 1 0.0 - 5.0 %    Segs Absolute 6.6 1.7 - 8.2 K/UL    Absolute Lymph # 1.1 0.5 - 4.6 K/UL    Absolute Mono # 0.4 0.1 - 1.3 K/UL    Absolute Eos # 0.1 0.0 - 0.8 K/UL    Basophils Absolute 0.0 0.0 - 0.2 K/UL    Absolute Immature Granulocyte 0.1 0.0 - 0.5 K/UL   Magnesium    Collection Time: 05/30/22  5:39 AM   Result Value Ref Range    Magnesium 2.3 1.8 - 2.4 mg/dL       Other Studies:  XR PELVIS (1-2 VIEWS)    Result Date: 5/29/2022  Pelvis radiograph Clinical indication: Lumbar spine radiograph 3/12/2021 and abdominopelvic CT 2/12/2021 correlation Comparison: none Technique: Supine portable AP view Findings: There is no evidence of fracture, dislocation, or erosion. The pelvic ring, hip joints are intact. The bone density is low which can limit sensitivity for subtle osseous abnormalities. Chronic age appropriate degenerative joint changes are noted. Prominent stool in the visualized large bowel may indicate constipation. Scattered vascular calcifications within soft tissues are not unusual for age. No acute osseous abnormality. CT HEAD WO CONTRAST    Result Date: 5/29/2022  Noncontrast head CT Clinical Indication: Patient found down with diminished responsiveness, loss of memory to the event, generalized severe weakness, suspected injury. Technique: Noncontrast axial images were obtained through the brain. All CT scans at this location are performed using dose modulation techniques as appropriate including the following: Automated exposure control, adjustment of the MA and/or kV according to patient's size, or use of iterative reconstruction technique. Reformatted sagittal, coronal images obtained to further evaluate bones, soft tissues, extra-axial spaces. Comparison: 10/31/2021 Findings: There is no acute intracranial hemorrhage, hydrocephalus, intra-axial mass, or mass-effect.  There is no CT evidence of acute large artery territorial infarction or abnormal extra-axial fluid collection. The mastoid air cells are aerated. Paranasal sinuses demonstrate peripheral mucosal thickening and air-fluid level in the right maxillary sinus, elsewhere appear aerated. No displaced skull fractures are present. Patient is missing teeth. 1. No CT evidence of acute intracranial abnormality. 2. Right maxillary sinusitis. XR CHEST PORTABLE    Result Date: 5/29/2022  Chest portable CLINICAL INDICATION: Shortness of breath acute moderate with weakness, syncope, fall COMPARISON: 4/9/2022 TECHNIQUE: single AP portable view chest at 12:10 PM upright FINDINGS: Cardiac silhouette again enlarged. Small pleural effusions in the bases and diffuse moderate to marked pulmonary edema have increased somewhat since prior. No evidence of a pneumothorax or a complete lobar consolidation. Low bone density. No displaced fracture or dislocation is visualized. External monitoring wires overlie the chest.     CHF.        Current Meds:  Current Facility-Administered Medications   Medication Dose Route Frequency    ferric gluconate (FERRLECIT) 125 mg in sodium chloride 0.9 % 100 mL IVPB  125 mg IntraVENous Daily    potassium bicarb-citric acid (EFFER-K) effervescent tablet 20 mEq  20 mEq Oral TID    sodium chloride flush 0.9 % injection 5-40 mL  5-40 mL IntraVENous 2 times per day    sodium chloride flush 0.9 % injection 5-40 mL  5-40 mL IntraVENous PRN    0.9 % sodium chloride infusion   IntraVENous PRN    ondansetron (ZOFRAN-ODT) disintegrating tablet 4 mg  4 mg Oral Q8H PRN    Or    ondansetron (ZOFRAN) injection 4 mg  4 mg IntraVENous Q6H PRN    polyethylene glycol (GLYCOLAX) packet 17 g  17 g Oral Daily PRN    bisacodyl (DULCOLAX) suppository 10 mg  10 mg Rectal Daily PRN    famotidine (PEPCID) tablet 10 mg  10 mg Oral BID PRN    aluminum & magnesium hydroxide-simethicone (MAALOX) 200-200-20 MG/5ML suspension 30 mL  30 mL Oral Q6H PRN    acetaminophen (TYLENOL) tablet 650 mg  650 mg Oral Q6H PRN    Or    acetaminophen (TYLENOL) suppository 650 mg  650 mg Rectal Q6H PRN    tuberculin injection 5 Units  5 Units IntraDERmal Once    albuterol (PROVENTIL) nebulizer solution 2.5 mg  2.5 mg Nebulization Q4H PRN    atorvastatin (LIPITOR) tablet 80 mg  80 mg Oral Daily    mometasone-formoterol (DULERA) 200-5 MCG/ACT inhaler 2 puff  2 puff Inhalation BID    busPIRone (BUSPAR) tablet 15 mg  15 mg Oral TID    cetirizine (ZYRTEC) tablet 5 mg  5 mg Oral Daily    gabapentin (NEURONTIN) capsule 100 mg  100 mg Oral TID    LORazepam (ATIVAN) tablet 1 mg  1 mg Oral Q8H PRN    pantoprazole (PROTONIX) tablet 40 mg  40 mg Oral QAM AC    primidone (MYSOLINE) tablet 100 mg  100 mg Oral TID    sertraline (ZOLOFT) tablet 200 mg  200 mg Oral Daily    traZODone (DESYREL) tablet 100 mg  100 mg Oral Nightly    budesonide (ENTOCORT EC) extended release capsule 6 mg  6 mg Oral Daily    cholestyramine light packet 4 g  4 g Oral Daily    HYDROcodone-acetaminophen (NORCO)  MG per tablet 1 tablet  1 tablet Oral Q6H PRN       Signed:  Shruthi Scott MD    Part of this note may have been written by using a voice dictation software. The note has been proof read but may still contain some grammatical/other typographical errors.

## 2022-05-30 NOTE — CARE COORDINATION
Interdisciplinary team meeting with Dr. Jeronimo Pinto, CM, and nutrition services. Patient not medically stable for d/c today. She has PT/OT ordered for possible short term rehab to long term. CM waiting on PT eval and then will send referrals to facilities for rehab and will need authorization. CM following.

## 2022-05-30 NOTE — PLAN OF CARE
Problem: Respiratory - Adult  Goal: Achieves optimal ventilation and oxygenation  Outcome: Progressing  Flowsheets (Taken 5/30/2022 0449)  Achieves optimal ventilation and oxygenation:   Assess for changes in respiratory status   Respiratory therapy support as indicated   Assess for changes in mentation and behavior   Oxygen supplementation based on oxygen saturation or arterial blood gases   Encourage broncho-pulmonary hygiene including cough, deep breathe, incentive spirometry   Assess and instruct to report shortness of breath or any respiratory difficulty

## 2022-05-30 NOTE — PROGRESS NOTES
Home meds brought by pt taken and placed in locked drawer, also completed PTA med list using medlist brought by pt

## 2022-05-30 NOTE — PROGRESS NOTES
PHYSICAL THERAPY Initial Assessment  (Link to Caseload Tracking: PT Visit Days : 1  Acknowledge Orders  Time In/Out  PT Charge Capture  Rehab Caseload Tracker    Amanda Chambers is a 64 y.o. female   PRIMARY DIAGNOSIS: Syncope and collapse  Syncope and collapse [R55]  Chronic pain syndrome [G89.4]  Volume depletion [E86.9]  Symptomatic anemia [D64.9]  Severe episode of recurrent major depressive disorder, without psychotic features ([facility] [address] [F33.2]  Chronic congestive heart failure, unspecified heart failure type ([facility] [address] [I50.9]       Reason for Referral: Generalized Muscle Weakness (M62.81)  Difficulty in walking, Not elsewhere classified (R26.2)  Other abnormalities of gait and mobility (R26.89)  History of falling (Z91.81)  Inpatient: Payor: Marion Rocha / Plan: Raheel Rashid / Product Type: *No Product type* /     ASSESSMENT:     REHAB RECOMMENDATIONS:   Recommendation to date pending progress:  Setting:   Short-term Rehab    Equipment:     None   To Be Determined     ASSESSMENT:  Ms. Torrie Hare  presents with overall deficits in strength, functional mobility and ADL performance. At baseline, she lives alone in a 1 level home with 1 step at entrance. She reports having assistance with ADLs and was using rollator for short distance ambualtion. Had an aid daily. She reports multiple falls and sometimes is unable to get up or get to her phone to call for assistance. She reports getting progressively worse over the last several months and especially the last 3 weeks. She states she has recently been spending most of her time in bed and transferring to [facility] with SPT. Today, she was able to perform bed mobility w CGA, sit<>stand w mod A, toileting at [facility] w min A, and transfer to recliner chair with min A. Gait belt used for all transfers. Presents with increased weakness overall but is able to participate more than she thinks. She moves slowly and required extra time for all activities.  She was left up in recliner with chair alarm on. Needs in reach; RN notified. Pt is functioning below her baseline and will benefit from skilled PT during hospital stay. Recommend [facility] upon discharge as she is unsafe to return home alone. .     [address] [facility] [address] [address] AM-PAC 6 Clicks Basic Mobility Inpatient Short Form  AM-PAC Mobility Inpatient   How much difficulty turning over in bed?: A Little  How much difficulty sitting down on / standing up from a chair with arms?: A Lot  How much difficulty moving from lying on back to sitting on side of bed?: A Little  How much help from another person moving to and from a bed to a chair?: A Little  How much help from another person needed to walk in hospital room?: A Lot  How much help from another person for climbing 3-5 steps with a railing?: Total  AM-PAC Inpatient Mobility Raw Score : 14  AM-PAC Inpatient T-Scale Score : 38.1  Mobility Inpatient CMS 0-100% Score: 61.29  Mobility Inpatient CMS G-Code Modifier : CL    SUBJECTIVE:   Ms. Louise Denis reports multiple falls. States she has been unable to get to the restroom at her home and has been using BSC. \" I can't get up by myself. I need help. \"    Social/Functional Lives With: Alone,Home care staff  Type of Home:  (Duplex)  Home Layout: One level  Home Access: Stairs to enter without rails  Entrance Stairs - Number of Steps: 1  Bathroom Equipment: 3-in-1 commode,Shower chair,Grab bars in shower  Home Equipment: Rollator,Grab bars  Has the patient had two or more falls in the past year or any fall with injury in the past year?: Yes  Receives Help From: Family,Personal care attendant,Home health  ADL Assistance: Needs assistance  Homemaking Assistance: Needs assistance  Occupation: Retired    OBJECTIVE:     PAIN: VITALS / O2: PRECAUTION / Petra Khoi / DRAINS:   Pre Treatment:   Pain Assessment: 0-10  Pain Level: 6  Pain Location:  (\"all over\")      Post Treatment: 6 Vitals        Oxygen  O2 Device: Nasal cannula   IV and Purewick    RESTRICTIONS/PRECAUTIONS:  Restrictions/Precautions: Fall Risk                 GROSS EVALUATION: Intact Impaired (Comments):   AROM []  generally decreased   PROM []    Strength []  generally decreased B UE and LEs   Balance [] Sitting - Static: Fair  Sitting - Dynamic: Fair  Standing - Static: Fair,-  Standing - Dynamic: Fair,-   Posture [] Forward Head  Rounded Shoulders  Trunk Flexion   Sensation []     Coordination []      Tone []     Edema []    Activity Tolerance [] Patient limited by fatigue    []      COGNITION/  PERCEPTION: Intact Impaired (Comments):   Orientation [x]     Vision []     Hearing []     Cognition  []       MOBILITY: I Mod I S SBA CGA Min Mod Max Total  NT x2 Comments:   Bed Mobility    Rolling [] [] [] [] [x] [] [] [] [] [] []    Supine to Sit [] [] [] [] [x] [] [] [] [] [] []  HOB elevated; used bed rails   Scooting [] [] [] [] [x] [] [] [] [] [] []    Sit to Supine [] [] [] [] [] [] [] [] [] [x] []    Transfers    Sit to Stand [] [] [] [] [] [] [x] [] [] [] []    Bed to Chair [] [] [] [] [] [x] [] [] [] [] [x]  min X 1-2   Stand to Sit [] [] [] [] [] [x] [] [] [] [] []    I=Independent, Mod I=Modified Independent, S=Supervision, SBA=Standby Assistance, CGA=Contact Guard Assistance,   Min=Minimal Assistance, Mod=Moderate Assistance, Max=Maximal Assistance, Total=Total Assistance, NT=Not Tested    GAIT: I Mod I S SBA CGA Min Mod Max Total  NT x2 Comments:   Level of Assistance [] [] [] [] [] [x] [] [] [] [] [x] Min X 1-2   Distance 5 feet plus 3 additional feet    DME Gait Belt and HHA    Gait Quality Decreased wilmer , Decreased step clearance, Decreased step length and Shuffling     Weightbearing Status Restrictions/Precautions  Restrictions/Precautions: Fall Risk    Stairs      I=Independent, Mod I=Modified Independent, S=Supervision, SBA=Standby Assistance, CGA=Contact Guard Assistance,   Min=Minimal Assistance, Mod=Moderate Assistance, Max=Maximal Assistance, Total=Total Assistance, NT=Not Tested    PLAN:   ACUTE PHYSICAL THERAPY GOALS:   (Developed with and agreed upon by patient and/or caregiver.)  STG:  (1.)Ms. Steven Dietz will move from supine to sit and sit to supine  with STAND BY ASSIST within 1-3 treatment day(s). (2.)Ms. Steven Dietz will transfer from bed to chair and chair to bed with MINIMAL ASSIST using the least restrictive device within 1-3 treatment day(s). (3.)Ms. Merritt will ambulate with MINIMAL ASSIST X 1-2 for 20 feet with the least restrictive device within 1-3 treatment day(s). LTG:  (1.)Ms. Steven Dietz will move from supine to sit and sit to supine  in bed with MODIFIED INDEPENDENCE within 4-6 treatment day(s). (2.)Ms. Steven Dietz will transfer from bed to chair and chair to bed with CONTACT GUARD ASSIST using the least restrictive device within 4-6 treatment day(s). (3.)Ms. Steven Dietz will ambulate with MINIMAL ASSIST for 75 feet with the least restrictive device within 4-6 treatment day(s). ________________________________________________________________________________________________    FREQUENCY AND DURATION: Daily for duration of hospital stay or until stated goals are met, whichever comes first.    THERAPY PROGNOSIS: Fair    PROBLEM LIST:   (Skilled intervention is medically necessary to address:)  Decreased ADL/Functional Activities  Decreased Activity Tolerance  Decreased Coordination  Decreased Gait Ability  Decreased Safety Awareness  Decreased Strength  Decreased Transfer Abilities INTERVENTIONS PLANNED:   (Benefits and precautions of physical therapy have been discussed with the patient.)  Therapeutic Activity  Therapeutic Exercise/HEP  Gait Training  transfer training       TREATMENT:   EVALUATION: LOW COMPLEXITY: (Untimed Charge)    TREATMENT:   Therapeutic Activity (35 Minutes):  Therapeutic activity included Rolling, Supine to Sit, Transfer Training, Ambulation on level ground, Sitting balance , Standing balance and toileting and hygiene to improve functional Activity tolerance, Balance, Mobility and Strength. AFTER TREATMENT PRECAUTIONS: Alarm Activated, Call light within reach, Chair, Needs within reach and RN notified    INTERDISCIPLINARY COLLABORATION:  RN/ PCT and OT/ BOLANOS    EDUCATION: Education Given To: Patient  Education Provided: Role of Therapy;Transfer Training;IADL Safety; Fall Prevention Strategies  Education Method: Demonstration;Verbal  Education Outcome: Verbalized understanding;Demonstrated understanding;Continued education needed    TIME IN/OUT:  Time In: 0910  Time Out: [phone]  Minutes: 18[address] Lola Andrade PT

## 2022-05-30 NOTE — PROGRESS NOTES
OCCUPATIONAL THERAPY Initial Assessment, Daily Note and AM       OT Visit Days: 1  Acknowledge Orders  Time  OT Charge Capture  Rehab Caseload Tracker      Deborah Hamilton is a 64 y.o. female   PRIMARY DIAGNOSIS: Syncope and collapse  Syncope and collapse [R55]  Chronic pain syndrome [G89.4]  Volume depletion [E86.9]  Symptomatic anemia [D64.9]  Severe episode of recurrent major depressive disorder, without psychotic features ([facility] [address] [F33.2]  Chronic congestive heart failure, unspecified heart failure type ([facility] [address] [I50.9]       Reason for Referral: Generalized Muscle Weakness (M62.81)  Other lack of cordination (R27.8)  Difficulty in walking, Not elsewhere classified (R26.2)  Repeated Falls (R29.6)  History of falling (Z91.81)  Inpatient: Payor: Tanner Lucia / Plan: Xander Page / Product Type: *No Product type* /     ASSESSMENT:     REHAB RECOMMENDATIONS:   Recommendation to date pending progress:  Setting:   Short-term Rehab    Equipment:     To Be Determined     ASSESSMENT:  Ms. Caro Christopher is admitted for the above diagnoses and presents with overall deficits in strength, functional mobility and ADL performance. At baseline, she lives alone in a 1 level home with 1 DULCE. She reports having assistance with ADLs recently and rollator for mobility. She endorses falling a lot at home and sometimes is unable to get up or get to her phone to call for assistance. She reports getting progressively worse over the last several months and more recently, the last 3 weeks. Today, she was able to perform bed mobility w CGA, sit<>stand w mod A, toileting at [facility] w min A, and transfer to recliner chair with min A HHA. Presents with increased weakness overall but is able to participate more than she thinks. She was left in recliner with alarm in place with all needs met and in reach. Pt is functioning below their baseline and will benefit from skilled OT during hospital stay.  Anticipate pt will benefit from SNF upon discharge as she is unsafe to return home alone. [address] [facility] [address] [address] AM-PAC 6 Clicks Daily Activity Inpatient Short Form:    AM-PAC Daily Activity Inpatient   How much help for putting on and taking off regular lower body clothing?: A Lot  How much help for Bathing?: A Lot  How much help for Toileting?: A Lot  How much help for putting on and taking off regular upper body clothing?: A Little  How much help for taking care of personal grooming?: None  How much help for eating meals?: None  AM-PAC Inpatient Daily Activity Raw Score: 17  AM-PAC Inpatient ADL T-Scale Score : 37.26  ADL Inpatient CMS 0-100% Score: 50.11  ADL Inpatient CMS G-Code Modifier : CK           SUBJECTIVE:     Ms. Sundar Jeong states, \"You girls are going to have to help me.  I can't push up\"     Social/Functional Lives With: Alone,Home care staff  Type of Home:  (Duplex)  Home Layout: One level  Home Access: Stairs to enter without rails  Entrance Stairs - Number of Steps: 1  Bathroom Equipment: 3-in-1 commode,Shower chair,Grab bars in shower  Home Equipment: Rollator  Has the patient had two or more falls in the past year or any fall with injury in the past year?: Yes  Receives Help From: Family,Personal care attendant,Home health  ADL Assistance: Needs assistance  Homemaking Assistance: Needs assistance  Occupation: Retired    OBJECTIVE:     KANDIS / Hoda Gaffney / [place]: IV and Purewick    RESTRICTIONS/PRECAUTIONS:       PAIN: Gilson Anurag / O2:   Pre Treatment:   Pain Assessment: 0-10  Pain Level: 6      Post Treatment: 6       Vitals          Oxygen  O2 Device: Nasal cannula         GROSS EVALUATION: INTACT IMPAIRED   (See Comments)   UE AROM [x] []   UE PROM [x] []   Strength []   decreased strength overall in B UE     Posture / Balance []  good sitting balance, fair/poor standing balance with support   Sensation []     Coordination []       Tone []       Edema []    Activity Tolerance []  greatly decreased     Hand Dominance R [] L [] COGNITION/  PERCEPTION: INTACT IMPAIRED   (See Comments)   Orientation [x]     Vision []     Hearing []     Cognition  [x]       MOBILITY: I Mod I S SBA CGA Min Mod Max Total  NT x2 Comments:   Bed Mobility    Rolling [] [] [] [] [] [] [] [] [] [x] []    Supine to Sit [] [] [] [] [x] [] [] [] [] [] [] Used bed rails   Scooting [] [] [] [] [x] [] [] [] [] [] []    Sit to Supine [] [] [] [] [] [] [] [] [] [x] []    Transfers    Sit to Stand [] [] [] [] [] [x] [x] [] [] [] [] From bed and BSC   Bed to Chair [] [] [] [] [] [x] [] [] [] [] []    Stand to Sit [] [] [] [] [] [x] [] [] [] [] [] From BSC and recliner   I=Independent, Mod I=Modified Independent, S=Supervision/Setup, SBA=Standby Assistance, CGA=Contact Guard Assistance, Min=Minimal Assistance, Mod=Moderate Assistance, Max=Maximal Assistance, Total=Total Assistance, NT=Not Tested    ACTIVITIES OF DAILY LIVING: I Mod I S SBA CGA Min Mod Max Total NT Comments   BASIC ADLs:              Bathing/ Showering [] [] [] [] [] [] [] [] [] [x]    Toileting [] [] [] [] [] [x] [] [] [] [] For clothing and thorough hygiene   Upper Body Dressing [] [] [] [] [] [] [] [] [] [x]    Lower Body Dressing [] [] [] [] [] [] [] [] [] [x]    Feeding [] [] [] [] [] [] [] [] [] [x]    Grooming [] [] [] [] [] [] [] [] [] []    Personal Device Care [] [] [] [] [] [] [] [] [] [x]    Functional Mobility [] [] [] [] [] [x] [x] [] [] []    I=Independent, Mod I=Modified Independent, S=Supervision/Setup, SBA=Standby Assistance, CGA=Contact Guard Assistance, Min=Minimal Assistance, Mod=Moderate Assistance, Max=Maximal Assistance, Total=Total Assistance, NT=Not Tested    PLAN:     FREQUENCY/DURATION   OT Plan of Care: 3 times/week for duration of hospital stay or until stated goals are met, whichever comes first.    ACUTE OCCUPATIONAL THERAPY GOALS:   (Developed with and agreed upon by patient and/or caregiver.)  1.  Patient will complete lower body dressing with minimal assist to increase self care independence. 2. Patient will complete toileting with stand by assist to increase self care independence. 3. Patient will improve static standing and dynamic standing at edge of bed for 5 minutes to improve independence with transfers and self cares. 4. Patient will tolerate 30 minutes of OT treatment with self incorporated rest breaks to increase activity tolerance to enhance participation in hobbies. 5. Patient will complete all functional transfers with stand by assist using adaptive equipment as needed. Timeframe: 7 visits       PROBLEM LIST:   (Skilled intervention is medically necessary to address:)  Decreased ADL/Functional Activities  Decreased Activity Tolerance  Decreased AROM/PROM  Decreased Balance  Decreased Coordination  Decreased Gait Ability  Decreased Safety Awareness  Decreased Strength  Decreased Transfer Abilities  Increased Pain   INTERVENTIONS PLANNED:  (Benefits and precautions of occupational therapy have been discussed with the patient.)  Self Care Training  Therapeutic Activity  Therapeutic Exercise/HEP  Neuromuscular Re-education  Manual Therapy  Education         TREATMENT:     EVALUATION: LOW COMPLEXITY: (Untimed Charge)    TREATMENT:   Co-Treatment PT/OT necessary due to patient's decreased overall endurance/tolerance levels, as well as need for high level skilled assistance to complete functional transfers/mobility and functional tasks  Self Care: (30): Procedure(s) (per grid) utilized to improve and/or restore self-care/home management as related to toileting, grooming and functional household transfers and mobility. Required minimal verbal, manual and tactile cueing to facilitate activities of daily living skills and compensatory activities.     TREATMENT GRID:  N/A    AFTER TREATMENT PRECAUTIONS: Alarm Activated, Call light within reach, Chair, Needs within reach and RN notified    INTERDISCIPLINARY COLLABORATION:  RN/ PCT and PT/ PTA    EDUCATION:       TOTAL TREATMENT DURATION AND TIME:  Time In: 0910  Time Out: [address]  Minutes: [address] [place], [place]

## 2022-05-31 LAB
ANION GAP SERPL CALC-SCNC: 8 MMOL/L (ref 7–16)
BUN SERPL-MCNC: 36 MG/DL (ref 8–23)
CALCIUM SERPL-MCNC: 8 MG/DL (ref 8.3–10.4)
CHLORIDE SERPL-SCNC: 103 MMOL/L (ref 98–107)
CO2 SERPL-SCNC: 28 MMOL/L (ref 21–32)
CREAT SERPL-MCNC: 0.88 MG/DL (ref 0.6–1)
GLUCOSE SERPL-MCNC: 76 MG/DL (ref 65–100)
HCT VFR BLD AUTO: 31.8 % (ref 35.8–46.3)
HGB BLD-MCNC: 9.6 G/DL (ref 11.7–15.4)
POTASSIUM SERPL-SCNC: 3.7 MMOL/L (ref 3.5–5.1)
SODIUM SERPL-SCNC: 139 MMOL/L (ref 136–145)

## 2022-05-31 PROCEDURE — 80048 BASIC METABOLIC PNL TOTAL CA: CPT

## 2022-05-31 PROCEDURE — 6370000000 HC RX 637 (ALT 250 FOR IP): Performed by: INTERNAL MEDICINE

## 2022-05-31 PROCEDURE — 97530 THERAPEUTIC ACTIVITIES: CPT

## 2022-05-31 PROCEDURE — 6360000002 HC RX W HCPCS: Performed by: INTERNAL MEDICINE

## 2022-05-31 PROCEDURE — 2700000000 HC OXYGEN THERAPY PER DAY

## 2022-05-31 PROCEDURE — 2580000003 HC RX 258: Performed by: INTERNAL MEDICINE

## 2022-05-31 PROCEDURE — 94640 AIRWAY INHALATION TREATMENT: CPT

## 2022-05-31 PROCEDURE — G0378 HOSPITAL OBSERVATION PER HR: HCPCS

## 2022-05-31 PROCEDURE — 96366 THER/PROPH/DIAG IV INF ADDON: CPT

## 2022-05-31 PROCEDURE — 94760 N-INVAS EAR/PLS OXIMETRY 1: CPT

## 2022-05-31 PROCEDURE — 85018 HEMOGLOBIN: CPT

## 2022-05-31 PROCEDURE — 1100000000 HC RM PRIVATE

## 2022-05-31 PROCEDURE — 36415 COLL VENOUS BLD VENIPUNCTURE: CPT

## 2022-05-31 RX ADMIN — PRIMIDONE 100 MG: 50 TABLET ORAL at 22:34

## 2022-05-31 RX ADMIN — SODIUM CHLORIDE, PRESERVATIVE FREE 10 ML: 5 INJECTION INTRAVENOUS at 09:01

## 2022-05-31 RX ADMIN — MOMETASONE FUROATE AND FORMOTEROL FUMARATE DIHYDRATE 2 PUFF: 200; 5 AEROSOL RESPIRATORY (INHALATION) at 07:55

## 2022-05-31 RX ADMIN — PANTOPRAZOLE SODIUM 40 MG: 40 TABLET, DELAYED RELEASE ORAL at 05:39

## 2022-05-31 RX ADMIN — SODIUM CHLORIDE, PRESERVATIVE FREE 10 ML: 5 INJECTION INTRAVENOUS at 22:39

## 2022-05-31 RX ADMIN — MOMETASONE FUROATE AND FORMOTEROL FUMARATE DIHYDRATE 2 PUFF: 200; 5 AEROSOL RESPIRATORY (INHALATION) at 20:03

## 2022-05-31 RX ADMIN — SERTRALINE HYDROCHLORIDE 200 MG: 100 TABLET ORAL at 08:56

## 2022-05-31 RX ADMIN — ATORVASTATIN CALCIUM 80 MG: 40 TABLET, FILM COATED ORAL at 08:55

## 2022-05-31 RX ADMIN — ALBUTEROL SULFATE 2.5 MG: 2.5 SOLUTION RESPIRATORY (INHALATION) at 11:44

## 2022-05-31 RX ADMIN — CHOLESTYRAMINE 4 G: 4 POWDER, FOR SUSPENSION ORAL at 09:01

## 2022-05-31 RX ADMIN — GABAPENTIN 100 MG: 100 CAPSULE ORAL at 08:56

## 2022-05-31 RX ADMIN — BUDESONIDE 6 MG: 3 CAPSULE, GELATIN COATED ORAL at 08:56

## 2022-05-31 RX ADMIN — HYDROCODONE BITARTRATE AND ACETAMINOPHEN 1 TABLET: 10; 325 TABLET ORAL at 08:56

## 2022-05-31 RX ADMIN — GABAPENTIN 100 MG: 100 CAPSULE ORAL at 15:18

## 2022-05-31 RX ADMIN — TRAZODONE HYDROCHLORIDE 100 MG: 50 TABLET ORAL at 22:34

## 2022-05-31 RX ADMIN — BUSPIRONE HYDROCHLORIDE 15 MG: 5 TABLET ORAL at 22:34

## 2022-05-31 RX ADMIN — ALBUTEROL SULFATE 2.5 MG: 2.5 SOLUTION RESPIRATORY (INHALATION) at 20:03

## 2022-05-31 RX ADMIN — BUSPIRONE HYDROCHLORIDE 15 MG: 5 TABLET ORAL at 15:17

## 2022-05-31 RX ADMIN — CETIRIZINE HYDROCHLORIDE 5 MG: 5 TABLET ORAL at 08:56

## 2022-05-31 RX ADMIN — PRIMIDONE 100 MG: 50 TABLET ORAL at 15:17

## 2022-05-31 RX ADMIN — HYDROCODONE BITARTRATE AND ACETAMINOPHEN 1 TABLET: 10; 325 TABLET ORAL at 15:42

## 2022-05-31 RX ADMIN — PRIMIDONE 100 MG: 50 TABLET ORAL at 08:56

## 2022-05-31 RX ADMIN — BUSPIRONE HYDROCHLORIDE 15 MG: 5 TABLET ORAL at 08:56

## 2022-05-31 RX ADMIN — SODIUM CHLORIDE 125 MG: 9 INJECTION, SOLUTION INTRAVENOUS at 10:02

## 2022-05-31 RX ADMIN — GABAPENTIN 100 MG: 100 CAPSULE ORAL at 22:38

## 2022-05-31 ASSESSMENT — PAIN SCALES - GENERAL: PAINLEVEL_OUTOF10: 7

## 2022-05-31 ASSESSMENT — PAIN DESCRIPTION - LOCATION: LOCATION: BACK

## 2022-05-31 ASSESSMENT — PAIN DESCRIPTION - ORIENTATION: ORIENTATION: LOWER

## 2022-05-31 NOTE — PROGRESS NOTES
PHYSICAL THERAPY Daily Note and AM  (Link to Caseload Tracking: PT Visit Days : 2  Time In/Out PT Charge Capture  Rehab Caseload Tracker  Orders      Blayne Rangel is a 64 y.o. female   PRIMARY DIAGNOSIS: Syncope and collapse  Syncope and collapse [R55]  Chronic pain syndrome [G89.4]  Volume depletion [E86.9]  Symptomatic anemia [D64.9]  Severe episode of recurrent major depressive disorder, without psychotic features ([facility] [address] [F33.2]  Chronic congestive heart failure, unspecified heart failure type ([facility] [address] [I50.9]       Inpatient: Payor: Manuel Gan / Plan: Renetta Kos / Product Type: *No Product type* /     ASSESSMENT:     REHAB RECOMMENDATIONS:   Recommendation to date pending progress:  [address] [place] [address]    Equipment:     To Be Determined     ASSESSMENT:  Ms. Susi Ocasio supine upon arrival.  Therapist had to encourage pt to work with therapy. Finally agree to exercises if therapist would help her. Remain in the bed with needs in reach and instructed to call for assist before getting up. SUBJECTIVE:   Ms. Susi Ocasio states, \"I am tired\".     Social/Functional Lives With: Alone,Home care staff  Type of Home:  (Duplex)  Home Layout: One level  Home Access: Stairs to enter without rails  Entrance Stairs - Number of Steps: 1  Bathroom Equipment: 3-in-1 commode,Shower chair,Grab bars in shower  Home Equipment: Rollator,Grab bars  Has the patient had two or more falls in the past year or any fall with injury in the past year?: Yes  Receives Help From: [facility] care attendant,Home health  ADL Assistance: Needs assistance  Homemaking Assistance: Needs assistance  Occupation: Retired  OBJECTIVE:     PAIN: VITALS / O2: PRECAUTION / Berenice Ocampo / Kriss Oas:   Pre Treatment:   Pain Assessment: None - Denies Pain      Post Treatment: none Vitals        Oxygen    IV    RESTRICTIONS/PRECAUTIONS:  Restrictions/Precautions  Restrictions/Precautions: Fall Risk  Restrictions/Precautions: Fall Risk MOBILITY: I Mod I S SBA CGA Min Mod Max Total  NT x2 Comments:   Bed Mobility    Rolling [] [] [] [] [] [] [] [] [] [x] []    Supine to Sit [] [] [] [] [] [] [] [] [] [x] []    Scooting [] [] [] [] [] [] [] [] [] [x] []    Sit to Supine [] [] [] [] [] [] [] [] [] [x] []    Transfers    Sit to Stand [] [] [] [] [] [] [] [] [] [x] []    Bed to Chair [] [] [] [] [] [] [] [] [] [x] []    Stand to Sit [] [] [] [] [] [] [] [] [] [x] []     [] [] [] [] [] [] [] [] [] [x] []    I=Independent, Mod I=Modified Independent, S=Supervision, SBA=Standby Assistance, CGA=Contact Guard Assistance,   Min=Minimal Assistance, Mod=Moderate Assistance, Max=Maximal Assistance, Total=Total Assistance, NT=Not Tested    BALANCE: Good Fair+ Fair Fair- Poor NT Comments   Sitting Static [] [] [] [] [] []    Sitting Dynamic [] [] [] [] [] []              Standing Static [] [] [] [] [] []    Standing Dynamic [] [] [] [] [] []      GAIT: I Mod I S SBA CGA Min Mod Max Total  NT x2 Comments:   Level of Assistance [] [] [] [] [] [] [] [] [] [] []    Distance   feet    DME None    Gait Quality did not get up    Weightbearing Status      Stairs      I=Independent, Mod I=Modified Independent, S=Supervision, SBA=Standby Assistance, CGA=Contact Guard Assistance,   Min=Minimal Assistance, Mod=Moderate Assistance, Max=Maximal Assistance, Total=Total Assistance, NT=Not Tested    PLAN:   ACUTE PHYSICAL THERAPY GOALS:   (Developed with and agreed upon by patient and/or caregiver.)  (1.)Ms. Foote Albino will move from supine to sit and sit to supine  with STAND BY ASSIST within 1-3 treatment day(s). (2.)Ms. Qamar Mcqueen will transfer from bed to chair and chair to bed with MINIMAL ASSIST using the least restrictive device within 1-3 treatment day(s). (3.)Ms. Qamar Mcqueen will ambulate with MINIMAL ASSIST X 1-2 for 20 feet with the least restrictive device within 1-3 treatment day(s).      LTG:  (1.)Ms. Qamar Mcqueen will move from supine to sit and sit to supine  in bed with MODIFIED INDEPENDENCE within 4-6 treatment day(s). (2.)Ms. Sid Weems will transfer from bed to chair and chair to bed with CONTACT GUARD ASSIST using the least restrictive device within 4-6 treatment day(s). (3.)Ms. Sid Weems will ambulate with MINIMAL ASSIST for 75 feet with the least restrictive device within 4-6 treatment day(s). FREQUENCY AND DURATION: Daily for duration of hospital stay or until stated goals are met, whichever comes first.    TREATMENT:   TREATMENT:   Therapeutic Activity (15 Minutes): Therapeutic activity included agree to do exercises only to improve functional Strength and ROM.     TREATMENT GRID:  B LE exercises  Date:  5/31 Date:   Date:     Activity/Exercise Parameters Parameters Parameters   Ankle pumps 10     Quad sets 10     Heel slides 10 aa     Hip abd/add 10 aa     SAQ 10 aa     SLR 10 aa                 AFTER TREATMENT PRECAUTIONS: Bed, Call light within reach and RN notified    INTERDISCIPLINARY COLLABORATION:  RN/ PCT and PT/ JUSTINO    EDUCATION: Education Given To: Patient  Education Provided: Role of Therapy    TIME IN/OUT:  Time In: 1430  Time Out: 2505 Donna Addison  Minutes: [address] Herman Snell PTA

## 2022-05-31 NOTE — PROGRESS NOTES
Hospitalist Progress Note   Admit Date:  2022 11:00 AM   Name:  Tena Flores   Age:  64 y.o. Sex:  female  :  1945   MRN:  [id number]   Room:  [facility]/    Presenting Complaint: Fall     Reason(s) for Admission: Syncope and collapse [R55]  Chronic pain syndrome [G89.4]  Volume depletion [E86.9]  Symptomatic anemia [D64.9]  Severe episode of recurrent major depressive disorder, without psychotic features ([facility] [F33.2]  Chronic congestive heart failure, unspecified heart failure type [facility]) [I50.9]     Hospital Course & Interval History:   Please refer to the admission H&P for details of presentation. In summary, Tena Flores is a 64 y.o. female with past medical history significant for Tena Flores is a 64 y.o. female with medical history of end stage COPD, chronic respiratory failure with hypoxia on 3L O2, sleep apena, Afib not on AC, depression , chronic pain, fibromyalgia,  moderate mitral valve regurgitation and pulmonary hypertensionwho presented after a fall at home. Patient is unclear if she had any dizziness/lightheadedness prior to the fall. Reports that she was making her way to the bathroom when she fell. Unclear how long she was down on the floor but \"I willed myself to get ot my phone\"  And called a friend who called EMS. In the ED , she was hypotensive to 95/53 but saturating well on 4L O2 NC. Workup revealed Hb 6.9, K+ 3.2, Cr 1.15.  +heme on rectal exam per ED staff. CT head without any acute abnormalities but showed right maxillary sinusitis. CXR shows small pleural effusions with pulmonary edema. Xray of pelvis without any acute fractures. Subjective/24 hr Events (22) : Patient is seen and examined at bedside. No acute events reported overnight by nursing staff. Feeling slightly better. Still weak. Reports not being able to get up unassisted Patient denies fever, chills, chest pains, shortness of breath, n/v, abdominal pain.   Tolerating regular food and PO supplements. Review of Systems: 10 point review of systems is otherwise negative with the exception of the elements mentioned above. Assessment & Plan:   Syncope and collapse  Unclear etiology. Possibly due to debility and weakness vs orthostatics given anemia and hypotension on presentation  05/31/22: Orthostatics neg. S/p 1Unit of PRBC w/ Hb of 9.6 today  - telemetry monitoring      Anemia  Possibly due to chronic illness with poor nutrition. Hb of 6.9 on admission with prior Hb of 11.6 on 4/12  S/p 1Unit of PRBC  05/31/22: Hb of 9.6 today. Anemia panel with iron deficiency s/p IV Iron x 2  - +heme stool test per ED. Patient does not want EGD/Colonoscopy and therefore will not consult GI     Severe protein-calorie malnutrition  Thin, elderly female with temporal wasting, visible clavicles and rib cages. BMI of Body mass index is 16.98 kg/m². - nutritional supplements     Hypokalemia - resolved     Depression, major, in remission: on zoloft and buspar     GERD: PPI     Atrial fibrillation : rate controlled and not on AC. Not on any medications      Pulmonary hypertension due to chronic obstructive pulmonary disease  - on lasix PO     Chronic respiratory failure with hypoxia  End stage chronic obstructive pulmonary disease (HCC)  Stable and not in exacerbation  - nebs  - O2 supplement.     DISPO: CM on board for placement as patient lives alone. SNF placement       Diet:  ADULT DIET; Regular  ADULT ORAL NUTRITION SUPPLEMENT; Breakfast, Lunch, Dinner; Standard High Calorie/High Protein Oral Supplement  DVT PPx: SCDs  Code status: DNR        I have reviewed and ordered clinical lab tests and independently visualized images, tracing. I spent 31 minutes of time caring for this patient at bedside or nearby, more than 50 percent of which was spent on coordination of care and/or counseling regarding the disease process, treatment options, and treatment plan.         Hospital Problems:  Principal Problem:    Syncope and collapse  Active Problems:    Iron deficiency anemia    Anxiety    Macrocytic anemia    Severe protein-calorie malnutrition (HCC)    Sleep apnea    Hypokalemia    Fibromyalgia    Depression, major, in remission ([facility])    GERD (gastroesophageal reflux disease)    Hyperlipidemia    Atrial fibrillation ([facility]    Pulmonary hypertension due to chronic obstructive pulmonary disease (HCC)    Chronic respiratory failure with hypoxia ([facility])    End stage chronic obstructive pulmonary disease ([facility]  Resolved Problems:    * No resolved hospital problems. *      Objective:     Patient Vitals for the past 24 hrs:   Temp Pulse Resp BP SpO2   05/31/22 1145 -- (!) 114 16 -- 96 %   05/31/22 1055 98.4 °F (36.9 °C) (!) 106 16 (!) 128/57 96 %   05/31/22 0856 -- -- 16 -- --   05/31/22 0802 97.3 °F (36.3 °C) (!) 110 16 (!) 118/57 96 %   05/31/22 0759 -- -- -- -- 92 %   05/31/22 0259 98.2 °F (36.8 °C) (!) 106 17 (!) 105/59 97 %   05/30/22 2033 -- (!) 105 16 -- 98 %   05/30/22 1910 97.7 °F (36.5 °C) 95 16 (!) 102/55 97 %   05/30/22 1620 97.5 °F (36.4 °C) 98 18 102/71 98 %   05/30/22 1604 -- 89 16 -- 99 %       Oxygen Therapy  SpO2: 96 %  Pulse Oximeter Device Mode: Other (Comment)  O2 Device: Nasal cannula  Skin Assessment: Clean, dry, & intact  Skin Protection for O2 Device: No  O2 Flow Rate (L/min): 3 L/min    Estimated body mass index is 16.98 kg/m² as calculated from the following:    Height as of this encounter: 5' 9\" (1.753 m). Weight as of this encounter: 115 lb (52.2 kg). Intake/Output Summary (Last 24 hours) at 5/31/2022 1257  Last data filed at 5/30/2022 2228  Gross per 24 hour   Intake 240 ml   Output 550 ml   Net -310 ml         Physical Exam:     Blood pressure (!) 128/57, pulse (!) 114, temperature 98.4 °F (36.9 °C), temperature source Oral, resp. rate 16, height 5' 9\" (1.753 m), weight 115 lb (52.2 kg), SpO2 96 %. General:          Chronically ill appearing, frail elderly female.  Appear older than stated age. Head:               Normocephalic, atraumatic  Eyes:               Sclerae appear normal.  Pupils equally round. ENT:                Nares appear normal, no drainage. Moist oral mucosa  Neck:               No restricted ROM. Trachea midline   CV:                  RRR. No m/r/g. No jugular venous distension. Lungs:             CTAB, no wheezing,  Respirations even, unlabored  Abdomen: Bowel sounds present. Soft, nontender, nondistended. Extremities:     No cyanosis or clubbing. No edema  Skin:                Ecchymosis to lower extremity bilaterally, Warm and dry. Neuro:             CN II-XII grossly intact. A&Ox3  Psych:             Normal mood and affect.         I have reviewed ordered lab tests and independently visualized imaging below:    Recent Labs:  Recent Results (from the past 48 hour(s))   PREPARE RBC (CROSSMATCH), 1 Units    Collection Time: 05/29/22  1:30 PM   Result Value Ref Range    History Check Historical check performed    TYPE AND SCREEN    Collection Time: 05/29/22  1:33 PM   Result Value Ref Range    Crossmatch expiration date 06/01/2022,2359     ABO/Rh O POSITIVE     Antibody Screen NEG     Unit Number [id number]     Product Code Blood Bank RC LR     Unit Divison 00     Dispense Status Blood Bank TRANSFUSED     Crossmatch Result Compatible    Transferrin Saturation    Collection Time: 05/29/22  2:42 PM   Result Value Ref Range    Iron 7 (L) 35 - 150 ug/dL    TIBC 134 (L) 250 - 450 ug/dL    TRANSFERRIN SATURATION 5 %   Vitamin B12    Collection Time: 05/29/22  2:42 PM   Result Value Ref Range    Vitamin B-12 624 193 - 986 pg/mL   Folate    Collection Time: 05/29/22  2:42 PM   Result Value Ref Range    Folate 4.4 3.1 - 17.5 ng/mL   Ferritin    Collection Time: 05/29/22  2:42 PM   Result Value Ref Range    Ferritin 88 8 - 388 NG/ML   Hemoglobin and Hematocrit    Collection Time: 05/29/22  7:31 PM   Result Value Ref Range    Hemoglobin 9.2 (L) 11.7 - 15.4 g/dL    Hematocrit 30.7 (L) 35.8 - 46.3 %   Basic Metabolic Panel w/ Reflex to MG    Collection Time: 05/30/22  5:39 AM   Result Value Ref Range    Sodium 137 136 - 145 mmol/L    Potassium 3.1 (L) 3.5 - 5.1 mmol/L    Chloride 101 98 - 107 mmol/L    CO2 28 21 - 32 mmol/L    Anion Gap 8 7 - 16 mmol/L    Glucose 49 (L) 65 - 100 mg/dL    BUN 40 (H) 8 - 23 MG/DL    CREATININE 0.97 0.6 - 1.0 MG/DL    GFR African American >60 >60 ml/min/1.73m2    GFR Non- 59 (L) >60 ml/min/1.73m2    Calcium 7.9 (L) 8.3 - 10.4 MG/DL   CBC with Auto Differential    Collection Time: 05/30/22  5:39 AM   Result Value Ref Range    WBC 8.3 4.3 - 11.1 K/uL    RBC 3.18 (L) 4.05 - 5.2 M/uL    Hemoglobin 9.1 (L) 11.7 - 15.4 g/dL    Hematocrit 30.3 (L) 35.8 - 46.3 %    MCV 95.3 79.6 - 97.8 FL    MCH 28.6 26.1 - 32.9 PG    MCHC 30.0 (L) 31.4 - 35.0 g/dL    RDW 14.6 11.9 - 14.6 %    Platelets 593 864 - 747 K/uL    MPV 10.6 9.4 - 12.3 FL    nRBC 0.00 0.0 - 0.2 K/uL    Differential Type AUTOMATED      Seg Neutrophils 79 (H) 43 - 78 %    Lymphocytes 13 13 - 44 %    Monocytes 5 4.0 - 12.0 %    Eosinophils % 1 0.5 - 7.8 %    Basophils 0 0.0 - 2.0 %    Immature Granulocytes 1 0.0 - 5.0 %    Segs Absolute 6.6 1.7 - 8.2 K/UL    Absolute Lymph # 1.1 0.5 - 4.6 K/UL    Absolute Mono # 0.4 0.1 - 1.3 K/UL    Absolute Eos # 0.1 0.0 - 0.8 K/UL    Basophils Absolute 0.0 0.0 - 0.2 K/UL    Absolute Immature Granulocyte 0.1 0.0 - 0.5 K/UL   Magnesium    Collection Time: 05/30/22  5:39 AM   Result Value Ref Range    Magnesium 2.3 1.8 - 2.4 mg/dL   Basic Metabolic Panel w/ Reflex to MG    Collection Time: 05/31/22  5:31 AM   Result Value Ref Range    Sodium 139 136 - 145 mmol/L    Potassium 3.7 3.5 - 5.1 mmol/L    Chloride 103 98 - 107 mmol/L    CO2 28 21 - 32 mmol/L    Anion Gap 8 7 - 16 mmol/L    Glucose 76 65 - 100 mg/dL    BUN 36 (H) 8 - 23 MG/DL    CREATININE 0.88 0.6 - 1.0 MG/DL    GFR African American >60 >60 ml/min/1.73m2    GFR Non- >60 >60 ml/min/1.73m2    Calcium 8.0 (L) 8.3 - 10.4 MG/DL   Hemoglobin and Hematocrit    Collection Time: 05/31/22  5:31 AM   Result Value Ref Range    Hemoglobin 9.6 (L) 11.7 - 15.4 g/dL    Hematocrit 31.8 (L) 35.8 - 46.3 %       Other Studies:  XR PELVIS (1-2 VIEWS)    Result Date: 5/29/2022  Pelvis radiograph Clinical indication: Lumbar spine radiograph 3/12/2021 and abdominopelvic CT 2/12/2021 correlation Comparison: none Technique: Supine portable AP view Findings: There is no evidence of fracture, dislocation, or erosion. The pelvic ring, hip joints are intact. The bone density is low which can limit sensitivity for subtle osseous abnormalities. Chronic age appropriate degenerative joint changes are noted. Prominent stool in the visualized large bowel may indicate constipation. Scattered vascular calcifications within soft tissues are not unusual for age. No acute osseous abnormality. CT HEAD WO CONTRAST    Result Date: 5/29/2022  Noncontrast head CT Clinical Indication: Patient found down with diminished responsiveness, loss of memory to the event, generalized severe weakness, suspected injury. Technique: Noncontrast axial images were obtained through the brain. All CT scans at this location are performed using dose modulation techniques as appropriate including the following: Automated exposure control, adjustment of the MA and/or kV according to patient's size, or use of iterative reconstruction technique. Reformatted sagittal, coronal images obtained to further evaluate bones, soft tissues, extra-axial spaces. Comparison: 10/31/2021 Findings: There is no acute intracranial hemorrhage, hydrocephalus, intra-axial mass, or mass-effect. There is no CT evidence of acute large artery territorial infarction or abnormal extra-axial fluid collection. The mastoid air cells are aerated.  Paranasal sinuses demonstrate peripheral mucosal thickening and air-fluid level in the right maxillary sinus, elsewhere appear aerated. No displaced skull fractures are present. Patient is missing teeth. 1. No CT evidence of acute intracranial abnormality. 2. Right maxillary sinusitis. XR CHEST PORTABLE    Result Date: 5/29/2022  Chest portable CLINICAL INDICATION: Shortness of breath acute moderate with weakness, syncope, fall COMPARISON: 4/9/2022 TECHNIQUE: single AP portable view chest at 12:10 PM upright FINDINGS: Cardiac silhouette again enlarged. Small pleural effusions in the bases and diffuse moderate to marked pulmonary edema have increased somewhat since prior. No evidence of a pneumothorax or a complete lobar consolidation. Low bone density. No displaced fracture or dislocation is visualized. External monitoring wires overlie the chest.     CHF.        Current Meds:  Current Facility-Administered Medications   Medication Dose Route Frequency    ferric gluconate (FERRLECIT) 125 mg in sodium chloride 0.9 % 100 mL IVPB  125 mg IntraVENous Daily    sodium chloride flush 0.9 % injection 5-40 mL  5-40 mL IntraVENous 2 times per day    sodium chloride flush 0.9 % injection 5-40 mL  5-40 mL IntraVENous PRN    0.9 % sodium chloride infusion   IntraVENous PRN    ondansetron (ZOFRAN-ODT) disintegrating tablet 4 mg  4 mg Oral Q8H PRN    Or    ondansetron (ZOFRAN) injection 4 mg  4 mg IntraVENous Q6H PRN    polyethylene glycol (GLYCOLAX) packet 17 g  17 g Oral Daily PRN    bisacodyl (DULCOLAX) suppository 10 mg  10 mg Rectal Daily PRN    famotidine (PEPCID) tablet 10 mg  10 mg Oral BID PRN    aluminum & magnesium hydroxide-simethicone (MAALOX) 200-200-20 MG/5ML suspension 30 mL  30 mL Oral Q6H PRN    acetaminophen (TYLENOL) tablet 650 mg  650 mg Oral Q6H PRN    Or    acetaminophen (TYLENOL) suppository 650 mg  650 mg Rectal Q6H PRN    tuberculin injection 5 Units  5 Units IntraDERmal Once    albuterol (PROVENTIL) nebulizer solution 2.5 mg  2.5 mg Nebulization Q4H PRN    atorvastatin (LIPITOR) tablet 80 mg  80 mg Oral Daily    mometasone-formoterol (DULERA) 200-5 MCG/ACT inhaler 2 puff  2 puff Inhalation BID    busPIRone (BUSPAR) tablet 15 mg  15 mg Oral TID    cetirizine (ZYRTEC) tablet 5 mg  5 mg Oral Daily    gabapentin (NEURONTIN) capsule 100 mg  100 mg Oral TID    LORazepam (ATIVAN) tablet 1 mg  1 mg Oral Q8H PRN    pantoprazole (PROTONIX) tablet 40 mg  40 mg Oral QAM AC    primidone (MYSOLINE) tablet 100 mg  100 mg Oral TID    sertraline (ZOLOFT) tablet 200 mg  200 mg Oral Daily    traZODone (DESYREL) tablet 100 mg  100 mg Oral Nightly    budesonide (ENTOCORT EC) extended release capsule 6 mg  6 mg Oral Daily    cholestyramine light packet 4 g  4 g Oral Daily    HYDROcodone-acetaminophen (NORCO)  MG per tablet 1 tablet  1 tablet Oral Q6H PRN       Signed:  Shemar Luu MD    Part of this note may have been written by using a voice dictation software. The note has been proof read but may still contain some grammatical/other typographical errors.

## 2022-06-01 ENCOUNTER — APPOINTMENT (OUTPATIENT)
Dept: GENERAL RADIOLOGY | Age: 77
DRG: 811 | End: 2022-06-01
Payer: MEDICARE

## 2022-06-01 PROBLEM — R10.9 ABDOMINAL PAIN: Status: ACTIVE | Noted: 2022-06-01

## 2022-06-01 PROCEDURE — 94760 N-INVAS EAR/PLS OXIMETRY 1: CPT

## 2022-06-01 PROCEDURE — 96366 THER/PROPH/DIAG IV INF ADDON: CPT

## 2022-06-01 PROCEDURE — 6360000002 HC RX W HCPCS: Performed by: INTERNAL MEDICINE

## 2022-06-01 PROCEDURE — 74018 RADEX ABDOMEN 1 VIEW: CPT

## 2022-06-01 PROCEDURE — G0378 HOSPITAL OBSERVATION PER HR: HCPCS

## 2022-06-01 PROCEDURE — 6370000000 HC RX 637 (ALT 250 FOR IP): Performed by: INTERNAL MEDICINE

## 2022-06-01 PROCEDURE — 97530 THERAPEUTIC ACTIVITIES: CPT

## 2022-06-01 PROCEDURE — 2580000003 HC RX 258: Performed by: INTERNAL MEDICINE

## 2022-06-01 PROCEDURE — 94640 AIRWAY INHALATION TREATMENT: CPT

## 2022-06-01 PROCEDURE — 97535 SELF CARE MNGMENT TRAINING: CPT

## 2022-06-01 PROCEDURE — 2700000000 HC OXYGEN THERAPY PER DAY

## 2022-06-01 PROCEDURE — 1100000000 HC RM PRIVATE

## 2022-06-01 RX ORDER — POLYETHYLENE GLYCOL 3350 17 G/17G
17 POWDER, FOR SOLUTION ORAL ONCE
Status: COMPLETED | OUTPATIENT
Start: 2022-06-01 | End: 2022-06-01

## 2022-06-01 RX ADMIN — SODIUM CHLORIDE, PRESERVATIVE FREE 10 ML: 5 INJECTION INTRAVENOUS at 21:28

## 2022-06-01 RX ADMIN — BISACODYL 10 MG: 5 TABLET, COATED ORAL at 12:20

## 2022-06-01 RX ADMIN — HYDROCODONE BITARTRATE AND ACETAMINOPHEN 1 TABLET: 10; 325 TABLET ORAL at 10:26

## 2022-06-01 RX ADMIN — BUDESONIDE 6 MG: 3 CAPSULE, GELATIN COATED ORAL at 09:03

## 2022-06-01 RX ADMIN — CETIRIZINE HYDROCHLORIDE 5 MG: 5 TABLET ORAL at 12:20

## 2022-06-01 RX ADMIN — LORAZEPAM 1 MG: 1 TABLET ORAL at 17:54

## 2022-06-01 RX ADMIN — BUSPIRONE HYDROCHLORIDE 15 MG: 5 TABLET ORAL at 21:28

## 2022-06-01 RX ADMIN — SERTRALINE HYDROCHLORIDE 200 MG: 100 TABLET ORAL at 09:03

## 2022-06-01 RX ADMIN — BUSPIRONE HYDROCHLORIDE 15 MG: 5 TABLET ORAL at 15:51

## 2022-06-01 RX ADMIN — GABAPENTIN 100 MG: 100 CAPSULE ORAL at 15:51

## 2022-06-01 RX ADMIN — GABAPENTIN 100 MG: 100 CAPSULE ORAL at 21:28

## 2022-06-01 RX ADMIN — PRIMIDONE 100 MG: 50 TABLET ORAL at 15:51

## 2022-06-01 RX ADMIN — PANTOPRAZOLE SODIUM 40 MG: 40 TABLET, DELAYED RELEASE ORAL at 05:30

## 2022-06-01 RX ADMIN — ATORVASTATIN CALCIUM 80 MG: 40 TABLET, FILM COATED ORAL at 09:03

## 2022-06-01 RX ADMIN — SODIUM CHLORIDE, PRESERVATIVE FREE 10 ML: 5 INJECTION INTRAVENOUS at 12:21

## 2022-06-01 RX ADMIN — PRIMIDONE 100 MG: 50 TABLET ORAL at 09:03

## 2022-06-01 RX ADMIN — BUSPIRONE HYDROCHLORIDE 15 MG: 5 TABLET ORAL at 09:04

## 2022-06-01 RX ADMIN — ACETAMINOPHEN 650 MG: 325 TABLET, FILM COATED ORAL at 09:11

## 2022-06-01 RX ADMIN — ALBUTEROL SULFATE 2.5 MG: 2.5 SOLUTION RESPIRATORY (INHALATION) at 08:29

## 2022-06-01 RX ADMIN — SODIUM CHLORIDE 125 MG: 9 INJECTION, SOLUTION INTRAVENOUS at 09:04

## 2022-06-01 RX ADMIN — POLYETHYLENE GLYCOL 3350 17 G: 17 POWDER, FOR SOLUTION ORAL at 12:20

## 2022-06-01 RX ADMIN — HYDROCODONE BITARTRATE AND ACETAMINOPHEN 1 TABLET: 10; 325 TABLET ORAL at 04:24

## 2022-06-01 RX ADMIN — GABAPENTIN 100 MG: 100 CAPSULE ORAL at 09:03

## 2022-06-01 RX ADMIN — PRIMIDONE 100 MG: 50 TABLET ORAL at 21:28

## 2022-06-01 RX ADMIN — ACETAMINOPHEN 650 MG: 325 TABLET, FILM COATED ORAL at 17:54

## 2022-06-01 ASSESSMENT — PAIN DESCRIPTION - LOCATION
LOCATION: BACK
LOCATION: GENERALIZED

## 2022-06-01 ASSESSMENT — PAIN SCALES - GENERAL
PAINLEVEL_OUTOF10: 6
PAINLEVEL_OUTOF10: 0
PAINLEVEL_OUTOF10: 8
PAINLEVEL_OUTOF10: 0
PAINLEVEL_OUTOF10: 7
PAINLEVEL_OUTOF10: 3
PAINLEVEL_OUTOF10: 3
PAINLEVEL_OUTOF10: 0
PAINLEVEL_OUTOF10: 3

## 2022-06-01 NOTE — PLAN OF CARE
Problem: Discharge Planning  Goal: Discharge to home or other facility with appropriate resources  Outcome: Progressing     Problem: Pain  Goal: Verbalizes/displays adequate comfort level or baseline comfort level  Outcome: Progressing     Problem: Skin/Tissue Integrity  Goal: Absence of new skin breakdown  Description: 1. Monitor for areas of redness and/or skin breakdown  2. Assess vascular access sites hourly  3. Every 4-6 hours minimum:  Change oxygen saturation probe site  4. Every 4-6 hours:  If on nasal continuous positive airway pressure, respiratory therapy assess nares and determine need for appliance change or resting period.   Outcome: Progressing     Problem: Safety - Adult  Goal: Free from fall injury  Outcome: Progressing     Problem: ABCDS Injury Assessment  Goal: Absence of physical injury  Outcome: Progressing     Problem: Respiratory - Adult  Goal: Achieves optimal ventilation and oxygenation  Outcome: Progressing  Flowsheets (Taken 5/31/2022 2015)  Achieves optimal ventilation and oxygenation: Assess for changes in respiratory status

## 2022-06-01 NOTE — PROGRESS NOTES
PHYSICAL THERAPY Daily Note and PM  (Link to Caseload Tracking: PT Visit Days : 3  Time In/Out PT Charge Capture  Rehab Caseload Tracker  Orders      Karoline Yusuf is a 64 y.o. female   PRIMARY DIAGNOSIS: Syncope and collapse  Syncope and collapse [R55]  Chronic pain syndrome [G89.4]  Volume depletion [E86.9]  Symptomatic anemia [D64.9]  Severe episode of recurrent major depressive disorder, without psychotic features ([facility] [F33.2]  Chronic congestive heart failure, unspecified heart failure type ([facility] [I50.9]       Inpatient: Payor: Casper Lomeli / Plan: Antonia Moses / Product Type: *No Product type* /     ASSESSMENT:     REHAB RECOMMENDATIONS:   Recommendation to date pending progress:  Setting:   Short-term Rehab    Equipment:     To Be Determined     ASSESSMENT:  Ms. Louise Denis needed encouragement to get up. She had just been up to the [facility] with assist from CNA. Patient needing min A for bed mobility and transfers between bed and [facility]. She did better with transfer with the RW than with HHA. Patient reports she is \"going home\", indicating she is going to [facility]. Will continue therapy until patient able to d/c to [facility]. SUBJECTIVE:   Ms. Louise Denis needing encouragement to get up.     Social/Functional Lives With: Alone,Home care staff  Type of Home:  (Duplex)  Home Layout: One level  Home Access: Stairs to enter without rails  Entrance Stairs - Number of Steps: 1  Bathroom Equipment: 3-in-1 commode,Shower chair,Grab bars in shower  Home Equipment: Rollator,Grab bars  Has the patient had two or more falls in the past year or any fall with injury in the past year?: Yes  Receives Help From: Family,Personal care attendant,Home health  ADL Assistance: Needs assistance  Homemaking Assistance: Needs assistance  Occupation: Retired  OBJECTIVE:     PAIN: VITALS / O2: PRECAUTION / Petra Khoi / DRAINS:   Pre Treatment:   Pain Assessment: 0-10  Pain Level: 6  Pain Location: Generalized      Post Treatment: 6/10 Vitals        Oxygen  O2 Device: Nasal cannula IV    RESTRICTIONS/PRECAUTIONS:  Restrictions/Precautions  Restrictions/Precautions: Fall Risk  Restrictions/Precautions: Fall Risk     MOBILITY: I Mod I S SBA CGA Min Mod Max Total  NT x2 Comments:   Bed Mobility    Rolling [] [] [] [] [] [x] [] [] [] [] []    Supine to Sit [] [] [] [] [] [x] [] [] [] [] []    Scooting [] [] [] [] [] [x] [] [] [] [] []    Sit to Supine [] [] [] [] [] [x] [] [] [] [] []    Transfers    Sit to Stand [] [] [] [] [] [x] [] [] [] [] []    Bed to Chair [] [] [] [] [] [x] [] [] [] [] []    Stand to Sit [] [] [] [] [] [x] [] [] [] [] []     [] [] [] [] [] [] [] [] [] [] []    I=Independent, Mod I=Modified Independent, S=Supervision, SBA=Standby Assistance, CGA=Contact Guard Assistance,   Min=Minimal Assistance, Mod=Moderate Assistance, Max=Maximal Assistance, Total=Total Assistance, NT=Not Tested    BALANCE: Good Fair+ Fair Fair- Poor NT Comments   Sitting Static [] [x] [] [] [] []    Sitting Dynamic [] [x] [] [] [] []              Standing Static [] [] [x] [] [] [] With RW   Standing Dynamic [] [] [x] [] [] [] With RW     GAIT: I Mod I S SBA CGA Min Mod Max Total  NT x2 Comments:   Level of Assistance [] [] [] [] [] [x] [] [] [] [] []    Distance 4 (x 2) feet    DME Gait Belt and Rolling Walker    Gait Quality Decreased step clearance and Decreased step length    Weightbearing Status      Stairs      I=Independent, Mod I=Modified Independent, S=Supervision, SBA=Standby Assistance, CGA=Contact Guard Assistance,   Min=Minimal Assistance, Mod=Moderate Assistance, Max=Maximal Assistance, Total=Total Assistance, NT=Not Tested    PLAN:   ACUTE PHYSICAL THERAPY GOALS:   (Developed with and agreed upon by patient and/or caregiver.)  (1.)Ms. Driscoll Brochakira will move from supine to sit and sit to supine  with STAND BY ASSIST within 1-3 treatment day(s). (2.)Ms. Driscoll Brochakira will transfer from bed to chair and chair to bed with MINIMAL ASSIST using the least restrictive device within 1-3 treatment day(s). (3.)Ms. Torrie Hare will ambulate with MINIMAL ASSIST X 1-2 for 20 feet with the least restrictive device within 1-3 treatment day(s).      LTG:  (1.)Ms. Torrie Hare will move from supine to sit and sit to supine  in bed with MODIFIED INDEPENDENCE within 4-6 treatment day(s). (2.)Ms. Torrie Hare will transfer from bed to chair and chair to bed with CONTACT GUARD ASSIST using the least restrictive device within 4-6 treatment day(s). (3.)Ms. Torrie Hare will ambulate with MINIMAL ASSIST for 75 feet with the least restrictive device within 4-6 treatment day(s). FREQUENCY AND DURATION: Daily for duration of hospital stay or until stated goals are met, whichever comes first.    TREATMENT:   TREATMENT:   Therapeutic Activity (25 Minutes): Therapeutic activity included Rolling, Supine to Sit, Sit to Supine, Scooting, Transfer Training, Ambulation on level ground, Sitting balance  and Standing balance to improve functional Activity tolerance, Balance, Coordination, Mobility, Strength and ROM.     TREATMENT GRID:  B LE exercises  Date:  5/31 Date:   Date:     Activity/Exercise Parameters Parameters Parameters   Ankle pumps 10     Quad sets 10     Heel slides 10 aa     Hip abd/add 10 aa     SAQ 10 aa     SLR 10 aa               AFTER TREATMENT PRECAUTIONS: Bed, Call light within reach, Needs within reach and RN notified    INTERDISCIPLINARY COLLABORATION:  RN/ PCT, PT/ PTA and OT/ BOLANOS    EDUCATION: Education Given To: Patient  Education Provided: Role of Therapy;Plan of Care  Education Outcome: Verbalized understanding    TIME IN/OUT:  Time In: 1355  Time Out: [id number]  Minutes: Edgar Nash, PT

## 2022-06-01 NOTE — PROGRESS NOTES
unsafe to return home alone. 6/1/2022  Pt seen in room with PT. Pt supine in bed and was agreeable, with encouragement, to get up in room. pt up to edge of bed and assisted with gown changed. Upon sitting on edge of bed, pt wanted to use the [facility]. Pt transferred to the [facility] and toileted with moderate assistance for BM hygine. Pt return to supine with all needs. Pt with poor activity tolerance but willing. continue [address] [facility] 6 Clicks Daily Activity Inpatient Short Form:    AM-PAC Daily Activity Inpatient   How much help for putting on and taking off regular lower body clothing?: A Lot  How much help for Bathing?: A Lot  How much help for Toileting?: A Lot  How much help for putting on and taking off regular upper body clothing?: A Little  How much help for taking care of personal grooming?: None  How much help for eating meals?: None  AM-[facility] Inpatient Daily Activity Raw Score: 17  AM-PAC Inpatient ADL T-Scale Score : 37.26  ADL Inpatient CMS 0-100% Score: 50.11  ADL Inpatient CMS G-Code Modifier : CK           SUBJECTIVE:     Ms. Giuliana Banks states, \"You girls are going to have to help me.  I can't push up\"     Social/Functional Lives With: Alone,Home care staff  Type of Home:  (Duplex)  Home Layout: One level  Home Access: Stairs to enter without rails  Entrance Stairs - Number of Steps: 1  Bathroom Equipment: 3-in-1 commode,Shower chair,Grab bars in shower  Home Equipment: Rollator,Grab bars  Has the patient had two or more falls in the past year or any fall with injury in the past year?: Yes  Receives Help From: Family,Personal care attendant,Home health  ADL Assistance: Needs assistance  Homemaking Assistance: Needs assistance  Occupation: Retired    OBJECTIVE:     Lio Lancaster / Jalen Gramajo / Kristian Tinsley: IV and [employer]    RESTRICTIONS/PRECAUTIONS:  Restrictions/Precautions: Fall Risk    PAIN: VITALS / O2:   Pre Treatment:          Post Treatment: 6       Vitals          Oxygen            GROSS EVALUATION: INTACT IMPAIRED   (See Comments)   UE AROM [x] []   UE PROM [x] []   Strength []   decreased strength overall in B UE     Posture / Balance []  good sitting balance, fair/poor standing balance with support   Sensation []     Coordination []       Tone []       Edema []    Activity Tolerance []  greatly decreased     Hand Dominance R [] L []      COGNITION/  PERCEPTION: INTACT IMPAIRED   (See Comments)   Orientation [x]     Vision []     Hearing []     Cognition  [x]       MOBILITY: I Mod I S SBA CGA Min Mod Max Total  NT x2 Comments:   Bed Mobility    Rolling [] [] [] [] [] [] [] [] [] [x] []    Supine to Sit [] [] [] [] [x] [] [] [] [] [] [] Used bed rails   Scooting [] [] [] [] [x] [] [] [] [] [] []    Sit to Supine [] [] [] [] [] [] [] [] [] [x] []    Transfers    Sit to Stand [] [] [] [] [] [x] [x] [] [] [] [] From bed and C   Bed to Chair [] [] [] [] [] [x] [] [] [] [] []    Stand to Sit [] [] [] [] [] [x] [] [] [] [] [] From BSC   I=Independent, Mod I=Modified Independent, S=Supervision/Setup, SBA=Standby Assistance, CGA=Contact Guard Assistance, Min=Minimal Assistance, Mod=Moderate Assistance, Max=Maximal Assistance, Total=Total Assistance, NT=Not Tested    ACTIVITIES OF DAILY LIVING: I Mod I S SBA CGA Min Mod Max Total NT Comments   BASIC ADLs:              Bathing/ Showering [] [] [] [] [] [] [] [] [] [x]    Toileting [] [] [] [] [] [] [x] [] [] [] For clothing and thorough hygiene   Upper Body Dressing [] [] [] [] [] [] [] [] [] [x]    Lower Body Dressing [] [] [] [] [] [] [] [] [] [x]    Feeding [] [] [] [] [] [] [] [] [] [x]    Grooming [] [] [] [] [] [] [] [] [] []    Personal Device Care [] [] [] [] [] [] [] [] [] [x]    Functional Mobility [] [] [] [] [] [x] [x] [] [] []    I=Independent, Mod I=Modified Independent, S=Supervision/Setup, SBA=Standby Assistance, CGA=Contact Guard Assistance, Min=Minimal Assistance, Mod=Moderate Assistance, Max=Maximal Assistance, Total=Total Assistance, NT=Not Tested    PLAN:     FREQUENCY/DURATION   OT Plan of Care: 3 times/week for duration of hospital stay or until stated goals are met, whichever comes first.    ACUTE OCCUPATIONAL THERAPY GOALS:   (Developed with and agreed upon by patient and/or caregiver.)  1. Patient will complete lower body dressing with minimal assist to increase self care independence. 2. Patient will complete toileting with stand by assist to increase self care independence. 3. Patient will improve static standing and dynamic standing at edge of bed for 5 minutes to improve independence with transfers and self cares. 4. Patient will tolerate 30 minutes of OT treatment with self incorporated rest breaks to increase activity tolerance to enhance participation in hobbies. 5. Patient will complete all functional transfers with stand by assist using adaptive equipment as needed. Timeframe: 7 visits       PROBLEM LIST:   (Skilled intervention is medically necessary to address:)  Decreased ADL/Functional Activities  Decreased Activity Tolerance  Decreased AROM/PROM  Decreased Balance  Decreased Coordination  Decreased Gait Ability  Decreased Safety Awareness  Decreased Strength  Decreased Transfer Abilities  Increased Pain   INTERVENTIONS PLANNED:  (Benefits and precautions of occupational therapy have been discussed with the patient.)  Self Care Training  Therapeutic Activity  Therapeutic Exercise/HEP  Neuromuscular Re-education  Manual Therapy  Education         TREATMENT:     EVALUATION: LOW COMPLEXITY: (Untimed Charge)    TREATMENT:   Co-Treatment PT/OT necessary due to patient's decreased overall endurance/tolerance levels, as well as need for high level skilled assistance to complete functional transfers/mobility and functional tasks  Self Care: (30): Procedure(s) (per grid) utilized to improve and/or restore self-care/home management as related to toileting, grooming and functional household transfers and mobility.  Required moderate verbal, manual and tactile cueing to facilitate activities of daily living skills and compensatory activities.     TREATMENT GRID:  N/A    AFTER TREATMENT PRECAUTIONS: Bed, Call light within reach, Needs within reach and RN notified    INTERDISCIPLINARY COLLABORATION:  RN/ PCT and PT/ PTA    EDUCATION:       TOTAL TREATMENT DURATION AND TIME:  Time In: [address]  Time Out: [address]  Minutes: [address] [place], OT

## 2022-06-01 NOTE — CARE COORDINATION
Pt care reviewed in interdisciplinary rounds with the following disciplines: MD, nursing, case management, therapy, and nutrition services. Bed offered and accepted at [facility]. Pt to be admitted under hospice care. Pt is medically stable and ready for discharge. Facility is working on insurance authorization. Plan to transfer by stretcher when Sifuentes Sans is received. CM met with patient to provide an update on plans and will continue to follow to assist with discharge.

## 2022-06-01 NOTE — PROGRESS NOTES
Hospitalist Progress Note   Admit Date:  2022 11:00 AM   Name:  Alayna Muhammad   Age:  64 y.o. Sex:  female  :  1945   MRN:  [id number]   Room:  [facility]/    Presenting Complaint: Fall     Reason(s) for Admission: Syncope and collapse [R55]  Chronic pain syndrome [G89.4]  Volume depletion [E86.9]  Symptomatic anemia [D64.9]  Severe episode of recurrent major depressive disorder, without psychotic features ([facility] [address] [F33.2]  Chronic congestive heart failure, unspecified heart failure type [facility]) [I50.9]     Hospital Course & Interval History:   Please refer to the admission H&P for details of presentation. In summary, Alayna Muhammad is a 64 y.o. female with past medical history significant for Alayna Muhammad is a 64 y.o. female with medical history of end stage COPD, chronic respiratory failure with hypoxia on 3L O2, sleep apena, Afib not on AC, depression , chronic pain, fibromyalgia,  moderate mitral valve regurgitation and pulmonary hypertensionwho presented after a fall at home. Patient is unclear if she had any dizziness/lightheadedness prior to the fall. Reports that she was making her way to the bathroom when she fell. Unclear how long she was down on the floor but \"I willed myself to get ot my phone\"  And called a friend who called EMS. In the ED , she was hypotensive to 95/53 but saturating well on 4L O2 NC. Workup revealed Hb 6.9, K+ 3.2, Cr 1.15.  +heme on rectal exam per ED staff. CT head without any acute abnormalities but showed right maxillary sinusitis. CXR shows small pleural effusions with pulmonary edema. Xray of pelvis without any acute fractures. Subjective/24 hr Events (22) : Patient is seen and examined at bedside. No acute events reported overnight by nursing staff. Complains of LLQ abdominal pain. Reports no BM for 2-3 days. States she feels like she needs to go but its difficult.   Patient denies fever, chills, chest pains, shortness of breath, n/v.  Tolerating regular food and PO supplements. Hb improved to 9.6. Review of Systems: 10 point review of systems is otherwise negative with the exception of the elements mentioned above. Assessment & Plan:   Syncope and collapse  Unclear etiology. Possibly due to debility and weakness given anemia and hypotension on presentation. Orthostatics was neg. Received 1 Unit of PRBC on admission. 06/01/22: Hb of 9.6 on 5/31  - dc telemetry     Anemia  Possibly due to chronic illness with poor nutrition. Hb of 6.9 on admission with prior Hb of 11.6 on 4/12  S/p 1Unit of PRBC  06/01/22: Hb of 9.6 on 5/31. She is s/p IV Iron x 2 for low Iron  - +heme stool test per ED. Patient does not want EGD/Colonoscopy and therefore will not consult GI     Abdominal pain likely constipation  06/01/22: no bm in ~3 days. LLQ pain and feels like she needs to have a BM.  - KUB  - miralax and bisacodyl. Severe protein-calorie malnutrition  Thin, elderly female with temporal wasting, visible clavicles and rib cages. BMI of Body mass index is 16.98 kg/m². - nutritional supplements     Hypokalemia - resolved     Depression, major, in remission: on zoloft and buspar     GERD: PPI     Atrial fibrillation : rate controlled and not on AC. Not on any medications      Pulmonary hypertension due to chronic obstructive pulmonary disease  - on lasix PO     Chronic respiratory failure with hypoxia  End stage chronic obstructive pulmonary disease (HCC)  Stable and not in exacerbation  - nebs  - O2 supplement.     DISPO: SNF placement pending auth      Diet:  ADULT DIET; Regular  ADULT ORAL NUTRITION SUPPLEMENT; Breakfast, Lunch, Dinner; Standard High Calorie/High Protein Oral Supplement  DVT PPx: SCDs  Code status: DNR        I have reviewed and ordered clinical lab tests and independently visualized images, tracing.      I spent 31 minutes of time caring for this patient at bedside or nearby, more than 50 percent of which was spent on coordination of care and/or counseling regarding the disease process, treatment options, and treatment plan. Hospital Problems:  Principal Problem:    Syncope and collapse  Active Problems:    Iron deficiency anemia    Abdominal pain    Anxiety    Macrocytic anemia    Severe protein-calorie malnutrition (HCC)    Sleep apnea    Hypokalemia    Fibromyalgia    Depression, major, in remission ([facility])    GERD (gastroesophageal reflux disease)    Hyperlipidemia    Atrial fibrillation ([facility]    Pulmonary hypertension due to chronic obstructive pulmonary disease ([facility])    Chronic respiratory failure with hypoxia ([facility])    End stage chronic obstructive pulmonary disease ([facility]  Resolved Problems:    * No resolved hospital problems. *      Objective:     Patient Vitals for the past 24 hrs:   Temp Pulse Resp BP SpO2   06/01/22 1137 97.9 °F (36.6 °C) (!) 111 18 123/76 96 %   06/01/22 1026 -- -- 20 -- --   06/01/22 0830 -- (!) 114 20 -- 97 %   06/01/22 0751 98.2 °F (36.8 °C) 86 18 130/61 96 %   06/01/22 0504 98.1 °F (36.7 °C) 87 18 132/83 98 %   05/31/22 2353 98.1 °F (36.7 °C) (!) 106 16 (!) 105/59 100 %   05/31/22 2009 98.1 °F (36.7 °C) (!) 110 18 123/79 100 %   05/31/22 2004 -- -- -- -- 97 %   05/31/22 1609 98.4 °F (36.9 °C) (!) 103 16 116/64 98 %       Oxygen Therapy  SpO2: 96 %  Pulse Oximeter Device Mode: Intermittent  O2 Device: Nasal cannula  Skin Assessment: Clean, dry, & intact  Skin Protection for O2 Device: No  O2 Flow Rate (L/min): 3 L/min    Estimated body mass index is 16.98 kg/m² as calculated from the following:    Height as of this encounter: 5' 9\" (1.753 m). Weight as of this encounter: 115 lb (52.2 kg). No intake or output data in the 24 hours ending 06/01/22 1149      Physical Exam:     Blood pressure 123/76, pulse (!) 111, temperature 97.9 °F (36.6 °C), temperature source Oral, resp. rate 18, height 5' 9\" (1.753 m), weight 115 lb (52.2 kg), SpO2 96 %.      General:          Chronically ill appearing, frail elderly female. Appear older than stated age. Head:               Normocephalic, atraumatic  Eyes:               Sclerae appear normal.  Pupils equally round. ENT:                Nares appear normal, no drainage. Moist oral mucosa  Neck:               No restricted ROM. Trachea midline   CV:                  RRR. No m/r/g. No jugular venous distension. Lungs:             CTAB, no wheezing,  Respirations even, unlabored  Abdomen: Bowel sounds present. Soft, tender in LLQ on palpation, nondistended. Extremities:     No cyanosis or clubbing. No edema  Skin:                Ecchymosis to lower extremity bilaterally, Warm and dry. Neuro:             CN II-XII grossly intact. A&Ox3  Psych:             Normal mood and affect. I have reviewed ordered lab tests and independently visualized imaging below:    Recent Labs:  Recent Results (from the past 48 hour(s))   Basic Metabolic Panel w/ Reflex to MG    Collection Time: 05/31/22  5:31 AM   Result Value Ref Range    Sodium 139 136 - 145 mmol/L    Potassium 3.7 3.5 - 5.1 mmol/L    Chloride 103 98 - 107 mmol/L    CO2 28 21 - 32 mmol/L    Anion Gap 8 7 - 16 mmol/L    Glucose 76 65 - 100 mg/dL    BUN 36 (H) 8 - 23 MG/DL    CREATININE 0.88 0.6 - 1.0 MG/DL    GFR African American >60 >60 ml/min/1.73m2    GFR Non- >60 >60 ml/min/1.73m2    Calcium 8.0 (L) 8.3 - 10.4 MG/DL   Hemoglobin and Hematocrit    Collection Time: 05/31/22  5:31 AM   Result Value Ref Range    Hemoglobin 9.6 (L) 11.7 - 15.4 g/dL    Hematocrit 31.8 (L) 35.8 - 46.3 %       Other Studies:  XR PELVIS (1-2 VIEWS)    Result Date: 5/29/2022  Pelvis radiograph Clinical indication: Lumbar spine radiograph 3/12/2021 and abdominopelvic CT 2/12/2021 correlation Comparison: none Technique: Supine portable AP view Findings: There is no evidence of fracture, dislocation, or erosion. The pelvic ring, hip joints are intact.  The bone density is low which can limit sensitivity for subtle osseous abnormalities. Chronic age appropriate degenerative joint changes are noted. Prominent stool in the visualized large bowel may indicate constipation. Scattered vascular calcifications within soft tissues are not unusual for age. No acute osseous abnormality. CT HEAD WO CONTRAST    Result Date: 5/29/2022  Noncontrast head CT Clinical Indication: Patient found down with diminished responsiveness, loss of memory to the event, generalized severe weakness, suspected injury. Technique: Noncontrast axial images were obtained through the brain. All CT scans at this location are performed using dose modulation techniques as appropriate including the following: Automated exposure control, adjustment of the MA and/or kV according to patient's size, or use of iterative reconstruction technique. Reformatted sagittal, coronal images obtained to further evaluate bones, soft tissues, extra-axial spaces. Comparison: 10/31/2021 Findings: There is no acute intracranial hemorrhage, hydrocephalus, intra-axial mass, or mass-effect. There is no CT evidence of acute large artery territorial infarction or abnormal extra-axial fluid collection. The mastoid air cells are aerated. Paranasal sinuses demonstrate peripheral mucosal thickening and air-fluid level in the right maxillary sinus, elsewhere appear aerated. No displaced skull fractures are present. Patient is missing teeth. 1. No CT evidence of acute intracranial abnormality. 2. Right maxillary sinusitis. XR CHEST PORTABLE    Result Date: 5/29/2022  Chest portable CLINICAL INDICATION: Shortness of breath acute moderate with weakness, syncope, fall COMPARISON: 4/9/2022 TECHNIQUE: single AP portable view chest at 12:10 PM upright FINDINGS: Cardiac silhouette again enlarged. Small pleural effusions in the bases and diffuse moderate to marked pulmonary edema have increased somewhat since prior. No evidence of a pneumothorax or a complete lobar consolidation. Low bone density. No displaced fracture or dislocation is visualized. External monitoring wires overlie the chest.     CHF.        Current Meds:  Current Facility-Administered Medications   Medication Dose Route Frequency    polyethylene glycol (GLYCOLAX) packet 17 g  17 g Oral Once    sodium chloride flush 0.9 % injection 5-40 mL  5-40 mL IntraVENous 2 times per day    sodium chloride flush 0.9 % injection 5-40 mL  5-40 mL IntraVENous PRN    0.9 % sodium chloride infusion   IntraVENous PRN    ondansetron (ZOFRAN-ODT) disintegrating tablet 4 mg  4 mg Oral Q8H PRN    Or    ondansetron (ZOFRAN) injection 4 mg  4 mg IntraVENous Q6H PRN    polyethylene glycol (GLYCOLAX) packet 17 g  17 g Oral Daily PRN    bisacodyl (DULCOLAX) suppository 10 mg  10 mg Rectal Daily PRN    famotidine (PEPCID) tablet 10 mg  10 mg Oral BID PRN    aluminum & magnesium hydroxide-simethicone (MAALOX) 200-200-20 MG/5ML suspension 30 mL  30 mL Oral Q6H PRN    acetaminophen (TYLENOL) tablet 650 mg  650 mg Oral Q6H PRN    Or    acetaminophen (TYLENOL) suppository 650 mg  650 mg Rectal Q6H PRN    albuterol (PROVENTIL) nebulizer solution 2.5 mg  2.5 mg Nebulization Q4H PRN    atorvastatin (LIPITOR) tablet 80 mg  80 mg Oral Daily    mometasone-formoterol (DULERA) 200-5 MCG/ACT inhaler 2 puff  2 puff Inhalation BID    busPIRone (BUSPAR) tablet 15 mg  15 mg Oral TID    cetirizine (ZYRTEC) tablet 5 mg  5 mg Oral Daily    gabapentin (NEURONTIN) capsule 100 mg  100 mg Oral TID    LORazepam (ATIVAN) tablet 1 mg  1 mg Oral Q8H PRN    pantoprazole (PROTONIX) tablet 40 mg  40 mg Oral QAM AC    primidone (MYSOLINE) tablet 100 mg  100 mg Oral TID    sertraline (ZOLOFT) tablet 200 mg  200 mg Oral Daily    traZODone (DESYREL) tablet 100 mg  100 mg Oral Nightly    budesonide (ENTOCORT EC) extended release capsule 6 mg  6 mg Oral Daily    cholestyramine light packet 4 g  4 g Oral Daily    HYDROcodone-acetaminophen (NORCO)  MG per tablet 1 tablet  1 tablet Oral Q6H PRN       Signed:  Keena Harmon MD    Part of this note may have been written by using a voice dictation software. The note has been proof read but may still contain some grammatical/other typographical errors.

## 2022-06-02 ENCOUNTER — APPOINTMENT (OUTPATIENT)
Dept: GENERAL RADIOLOGY | Age: 77
DRG: 811 | End: 2022-06-02
Payer: MEDICARE

## 2022-06-02 LAB
HCT VFR BLD AUTO: 31.4 % (ref 35.8–46.3)
HGB BLD-MCNC: 9.7 G/DL (ref 11.7–15.4)
SARS-COV-2 RDRP RESP QL NAA+PROBE: NOT DETECTED
SOURCE: NORMAL

## 2022-06-02 PROCEDURE — 93005 ELECTROCARDIOGRAM TRACING: CPT | Performed by: INTERNAL MEDICINE

## 2022-06-02 PROCEDURE — 2580000003 HC RX 258: Performed by: INTERNAL MEDICINE

## 2022-06-02 PROCEDURE — 71045 X-RAY EXAM CHEST 1 VIEW: CPT

## 2022-06-02 PROCEDURE — 97535 SELF CARE MNGMENT TRAINING: CPT

## 2022-06-02 PROCEDURE — 51701 INSERT BLADDER CATHETER: CPT

## 2022-06-02 PROCEDURE — 1100000000 HC RM PRIVATE

## 2022-06-02 PROCEDURE — 51798 US URINE CAPACITY MEASURE: CPT

## 2022-06-02 PROCEDURE — G0378 HOSPITAL OBSERVATION PER HR: HCPCS

## 2022-06-02 PROCEDURE — 6370000000 HC RX 637 (ALT 250 FOR IP): Performed by: INTERNAL MEDICINE

## 2022-06-02 PROCEDURE — 94760 N-INVAS EAR/PLS OXIMETRY 1: CPT

## 2022-06-02 PROCEDURE — 97112 NEUROMUSCULAR REEDUCATION: CPT

## 2022-06-02 PROCEDURE — 87635 SARS-COV-2 COVID-19 AMP PRB: CPT

## 2022-06-02 PROCEDURE — 2700000000 HC OXYGEN THERAPY PER DAY

## 2022-06-02 PROCEDURE — 97530 THERAPEUTIC ACTIVITIES: CPT

## 2022-06-02 PROCEDURE — 2500000003 HC RX 250 WO HCPCS: Performed by: INTERNAL MEDICINE

## 2022-06-02 PROCEDURE — 36415 COLL VENOUS BLD VENIPUNCTURE: CPT

## 2022-06-02 PROCEDURE — 85018 HEMOGLOBIN: CPT

## 2022-06-02 RX ORDER — POLYETHYLENE GLYCOL 3350 17 G/17G
17 POWDER, FOR SOLUTION ORAL DAILY
Status: DISCONTINUED | OUTPATIENT
Start: 2022-06-02 | End: 2022-06-03 | Stop reason: HOSPADM

## 2022-06-02 RX ORDER — HYDROCODONE BITARTRATE AND ACETAMINOPHEN 5; 325 MG/1; MG/1
1 TABLET ORAL ONCE
Status: COMPLETED | OUTPATIENT
Start: 2022-06-02 | End: 2022-06-02

## 2022-06-02 RX ADMIN — GABAPENTIN 100 MG: 100 CAPSULE ORAL at 09:00

## 2022-06-02 RX ADMIN — TRAZODONE HYDROCHLORIDE 100 MG: 50 TABLET ORAL at 21:56

## 2022-06-02 RX ADMIN — PRIMIDONE 100 MG: 50 TABLET ORAL at 14:04

## 2022-06-02 RX ADMIN — ATORVASTATIN CALCIUM 80 MG: 40 TABLET, FILM COATED ORAL at 09:45

## 2022-06-02 RX ADMIN — SODIUM CHLORIDE, PRESERVATIVE FREE 5 ML: 5 INJECTION INTRAVENOUS at 10:11

## 2022-06-02 RX ADMIN — SERTRALINE HYDROCHLORIDE 200 MG: 100 TABLET ORAL at 09:44

## 2022-06-02 RX ADMIN — SODIUM CHLORIDE, PRESERVATIVE FREE 10 ML: 5 INJECTION INTRAVENOUS at 20:28

## 2022-06-02 RX ADMIN — GABAPENTIN 100 MG: 100 CAPSULE ORAL at 14:03

## 2022-06-02 RX ADMIN — PRIMIDONE 100 MG: 50 TABLET ORAL at 20:30

## 2022-06-02 RX ADMIN — METOPROLOL TARTRATE 25 MG: 25 TABLET, FILM COATED ORAL at 20:27

## 2022-06-02 RX ADMIN — PRIMIDONE 100 MG: 50 TABLET ORAL at 09:44

## 2022-06-02 RX ADMIN — PANTOPRAZOLE SODIUM 40 MG: 40 TABLET, DELAYED RELEASE ORAL at 04:00

## 2022-06-02 RX ADMIN — HYDROCODONE BITARTRATE AND ACETAMINOPHEN 1 TABLET: 5; 325 TABLET ORAL at 15:31

## 2022-06-02 RX ADMIN — BUDESONIDE 6 MG: 3 CAPSULE, GELATIN COATED ORAL at 09:51

## 2022-06-02 RX ADMIN — HYDROCODONE BITARTRATE AND ACETAMINOPHEN 1 TABLET: 10; 325 TABLET ORAL at 20:24

## 2022-06-02 RX ADMIN — GABAPENTIN 100 MG: 100 CAPSULE ORAL at 21:56

## 2022-06-02 RX ADMIN — TUBERCULIN PURIFIED PROTEIN DERIVATIVE 5 UNITS: 5 INJECTION, SOLUTION INTRADERMAL at 17:05

## 2022-06-02 RX ADMIN — BUSPIRONE HYDROCHLORIDE 15 MG: 5 TABLET ORAL at 20:30

## 2022-06-02 RX ADMIN — BUSPIRONE HYDROCHLORIDE 15 MG: 5 TABLET ORAL at 14:03

## 2022-06-02 RX ADMIN — HYDROCODONE BITARTRATE AND ACETAMINOPHEN 1 TABLET: 10; 325 TABLET ORAL at 05:56

## 2022-06-02 RX ADMIN — CETIRIZINE HYDROCHLORIDE 5 MG: 5 TABLET ORAL at 09:59

## 2022-06-02 RX ADMIN — BUSPIRONE HYDROCHLORIDE 15 MG: 5 TABLET ORAL at 09:45

## 2022-06-02 RX ADMIN — LORAZEPAM 1 MG: 1 TABLET ORAL at 14:22

## 2022-06-02 RX ADMIN — LORAZEPAM 1 MG: 1 TABLET ORAL at 03:59

## 2022-06-02 ASSESSMENT — PAIN SCALES - WONG BAKER
WONGBAKER_NUMERICALRESPONSE: 0

## 2022-06-02 ASSESSMENT — PAIN - FUNCTIONAL ASSESSMENT: PAIN_FUNCTIONAL_ASSESSMENT: PREVENTS OR INTERFERES SOME ACTIVE ACTIVITIES AND ADLS

## 2022-06-02 ASSESSMENT — PAIN DESCRIPTION - DESCRIPTORS
DESCRIPTORS: TIGHTNESS;ACHING;DISCOMFORT
DESCRIPTORS: THROBBING;TIGHTNESS

## 2022-06-02 ASSESSMENT — PAIN DESCRIPTION - ORIENTATION
ORIENTATION: LOWER
ORIENTATION: LOWER

## 2022-06-02 ASSESSMENT — PAIN SCALES - GENERAL
PAINLEVEL_OUTOF10: 0
PAINLEVEL_OUTOF10: 7
PAINLEVEL_OUTOF10: 0
PAINLEVEL_OUTOF10: 7

## 2022-06-02 ASSESSMENT — PAIN DESCRIPTION - ONSET: ONSET: GRADUAL

## 2022-06-02 ASSESSMENT — PAIN DESCRIPTION - LOCATION
LOCATION: BACK

## 2022-06-02 ASSESSMENT — PAIN DESCRIPTION - FREQUENCY: FREQUENCY: INTERMITTENT

## 2022-06-02 ASSESSMENT — PAIN DESCRIPTION - PAIN TYPE: TYPE: ACUTE PAIN

## 2022-06-02 NOTE — PLAN OF CARE
Problem: Discharge Planning  Goal: Discharge to home or other facility with appropriate resources  Outcome: Progressing     Problem: Pain  Goal: Verbalizes/displays adequate comfort level or baseline comfort level  Outcome: Progressing     Problem: Skin/Tissue Integrity  Goal: Absence of new skin breakdown  Description: 1. Monitor for areas of redness and/or skin breakdown  2. Assess vascular access sites hourly  3. Every 4-6 hours minimum:  Change oxygen saturation probe site  4. Every 4-6 hours:  If on nasal continuous positive airway pressure, respiratory therapy assess nares and determine need for appliance change or resting period.   Outcome: Progressing     Problem: Safety - Adult  Goal: Free from fall injury  Outcome: Progressing  Flowsheets (Taken 6/1/2022 1915)  Free From Fall Injury: Instruct family/caregiver on patient safety     Problem: ABCDS Injury Assessment  Goal: Absence of physical injury  Outcome: Progressing  Flowsheets (Taken 6/1/2022 1915)  Absence of Physical Injury: Implement safety measures based on patient assessment     Problem: Respiratory - Adult  Goal: Achieves optimal ventilation and oxygenation  Outcome: Progressing  Flowsheets (Taken 6/1/2022 1915)  Achieves optimal ventilation and oxygenation: Assess for changes in respiratory status

## 2022-06-02 NOTE — PROGRESS NOTES
Patient heart rate tachy 128   116  148 gave prn ativan then pain meds HR now down to 115. Patient has loose stool held diuretics.

## 2022-06-02 NOTE — PROGRESS NOTES
PHYSICAL THERAPY Daily Note and PM  (Link to Caseload Tracking: PT Visit Days : 4  Time In/Out PT Charge Capture  Rehab Caseload Tracker  Orders      Quinn Galvan is a 64 y.o. female   PRIMARY DIAGNOSIS: Syncope and collapse  Syncope and collapse [R55]  Chronic pain syndrome [G89.4]  Volume depletion [E86.9]  Symptomatic anemia [D64.9]  Severe episode of recurrent major depressive disorder, without psychotic features ([facility] [F33.2]  Chronic congestive heart failure, unspecified heart failure type [facility] [I50.9]       Inpatient: Payor: Varinder Spain / Plan: Alexandria Cj / Product Type: *No Product type* /     ASSESSMENT:     REHAB RECOMMENDATIONS:   Recommendation to date pending progress:  Setting:   Short-term Rehab    Equipment:     To Be Determined     ASSESSMENT:  Ms. Grant Garcia needed encouragement to get up. She had just been up to the [facility] with assist from CNA. Patient needing min A for bed mobility and transfers between bed and C. She did better with transfer with the RW than with HHA. Patient reports she is \"going home\", indicating she is going to [facility]. Will continue therapy until patient able to d/c to STR.    6/2- supine in bed just following transferring back to bed from [facility]. Short of breath and fatigued. Participated with PT/OT with much encouragement but did not want to sit up in the recliner. She transferred supine to sit with CGA and then ambulated with RW and Min assist 10 ft to bathroom where she sat for ADL's with OT. Following time with OT she stood with min assist and ambulated 10 ft back to the bed and returned supine to sit with CGA. She was left supine in bed with HOB raised and pressure relieving boots on bilateral feet. She is planning on discharging to STR. Very weak and needs extra time to complete all tasks due to weakness and shortness of breath. SUBJECTIVE:   Ms. Grant Garcia needing encouragement to get up.  \"I don't want to get in that chair\"    Social/Functional Lives With: Alone,Home care staff  Type of Home:  (Duplex)  Home Layout: One level  Home Access: Stairs to enter without rails  Entrance Stairs - Number of Steps: 1  Bathroom Equipment: 3-in-1 commode,Shower chair,Grab bars in shower  Home Equipment: Rollator,Grab bars  Has the patient had two or more falls in the past year or any fall with injury in the past year?: Yes  Receives Help From: Manny May care attendant,Home health  ADL Assistance: Needs assistance  Homemaking Assistance: Needs assistance  Occupation: Retired  OBJECTIVE:     PAIN: VITALS / O2: PRECAUTION / Jackeline Gokul / Elvera Breslow:   Pre Treatment:          Post Treatment: 6/10 Vitals        Oxygen  O2 Device: Nasal cannula  O2 Flow Rate (L/min): 3 L/min IV    RESTRICTIONS/PRECAUTIONS:  Restrictions/Precautions  Restrictions/Precautions: Fall Risk  Restrictions/Precautions: Fall Risk     MOBILITY: I Mod I S SBA CGA Min Mod Max Total  NT x2 Comments:   Bed Mobility    Rolling [] [] [] [x] [] [] [] [] [] [] []    Supine to Sit [] [] [] [] [x] [] [] [] [] [] []    Scooting [] [] [] [] [x] [] [] [] [] [] []    Sit to Supine [] [] [] [] [x] [] [] [] [] [] []    Transfers    Sit to Stand [] [] [] [] [] [x] [] [] [] [] []    Bed to Chair [] [] [] [] [] [x] [] [] [] [] []    Stand to Sit [] [] [] [] [] [x] [] [] [] [] []     [] [] [] [] [] [] [] [] [] [] []    I=Independent, Mod I=Modified Independent, S=Supervision, SBA=Standby Assistance, CGA=Contact Guard Assistance,   Min=Minimal Assistance, Mod=Moderate Assistance, Max=Maximal Assistance, Total=Total Assistance, NT=Not Tested    BALANCE: Good Fair+ Fair Fair- Poor NT Comments   Sitting Static [] [x] [] [] [] []    Sitting Dynamic [] [x] [] [] [] []              Standing Static [] [] [x] [] [] [] With RW   Standing Dynamic [] [] [x] [] [] [] With RW     GAIT: I Mod I S SBA CGA Min Mod Max Total  NT x2 Comments:   Level of Assistance [] [] [] [] [] [x] [] [] [] [] []    Distance 10 (done twice with rest in between) feet    DME Gait Belt and Rolling Walker    Gait Quality Decreased step clearance and Decreased step length    Weightbearing Status      Stairs      I=Independent, Mod I=Modified Independent, S=Supervision, SBA=Standby Assistance, CGA=Contact Guard Assistance,   Min=Minimal Assistance, Mod=Moderate Assistance, Max=Maximal Assistance, Total=Total Assistance, NT=Not Tested    PLAN:   ACUTE PHYSICAL THERAPY GOALS:   (Developed with and agreed upon by patient and/or caregiver.)  (1.)Ms. Colten Salgado will move from supine to sit and sit to supine  with STAND BY ASSIST within 1-3 treatment day(s). (2.)Ms. Colten Salgado will transfer from bed to chair and chair to bed with MINIMAL ASSIST using the least restrictive device within 1-3 treatment day(s). (3.)Ms. Colten Salgado will ambulate with MINIMAL ASSIST X 1-2 for 20 feet with the least restrictive device within 1-3 treatment day(s).      LTG:  (1.)Ms. Colten Salgado will move from supine to sit and sit to supine  in bed with MODIFIED INDEPENDENCE within 4-6 treatment day(s). (2.)Ms. Colten Salgado will transfer from bed to chair and chair to bed with CONTACT GUARD ASSIST using the least restrictive device within 4-6 treatment day(s). (3.)Ms. Colten Salgado will ambulate with MINIMAL ASSIST for 75 feet with the least restrictive device within 4-6 treatment day(s). FREQUENCY AND DURATION: Daily for duration of hospital stay or until stated goals are met, whichever comes first.    TREATMENT:   TREATMENT:   Therapeutic Activity (25 Minutes): Therapeutic activity included Rolling, Supine to Sit, Sit to Supine, Scooting, Transfer Training, Ambulation on level ground, Sitting balance  and Standing balance to improve functional Activity tolerance, Balance, Coordination, Mobility, Strength and ROM.     TREATMENT GRID:  B LE exercises  Date:  5/31 Date:   Date:     Activity/Exercise Parameters Parameters Parameters   Ankle pumps 10     Quad sets 10     Heel slides 10 aa     Hip abd/add 10 aa     SAQ 10 aa SLR 10 aa               AFTER TREATMENT PRECAUTIONS: Bed, Call light within reach, Needs within reach and RN notified    INTERDISCIPLINARY COLLABORATION:  RN/ PCT, PT/ PTA, OT/ BOLANOS and RN Case Manager/      EDUCATION:      TIME IN/OUT:  Time In: 0920  Time Out: 0945  Minutes: [id number] PT

## 2022-06-02 NOTE — PROGRESS NOTES
Hospitalist Progress Note   Admit Date:  2022 11:00 AM   Name:  Blayne Rangel   Age:  64 y.o. Sex:  female  :  1945   MRN:  [id number]   Room:  [facility]/    Presenting Complaint: Fall     Reason(s) for Admission: Syncope and collapse [R55]  Chronic pain syndrome [G89.4]  Volume depletion [E86.9]  Symptomatic anemia [D64.9]  Severe episode of recurrent major depressive disorder, without psychotic features ([facility] [F33.2]  Chronic congestive heart failure, unspecified heart failure type [facility]) [I50.9]     Hospital Course & Interval History:   Please refer to the admission H&P for details of presentation. In summary, Blayne Rangel is a 64 y.o. female with past medical history significant for Blayne aRngel is a 64 y.o. female with medical history of end stage COPD, chronic respiratory failure with hypoxia on 3L O2, sleep apena, Afib not on AC, depression , chronic pain, fibromyalgia,  moderate mitral valve regurgitation and pulmonary hypertensionwho presented after a fall at home. Patient is unclear if she had any dizziness/lightheadedness prior to the fall. Reports that she was making her way to the bathroom when she fell. Unclear how long she was down on the floor but \"I willed myself to get ot my phone\"  And called a friend who called EMS. In the ED , she was hypotensive to 95/53 but saturating well on 4L O2 NC. Workup revealed Hb 6.9, K+ 3.2, Cr 1.15.  +heme on rectal exam per ED staff. CT head without any acute abnormalities but showed right maxillary sinusitis. CXR shows small pleural effusions with pulmonary edema. Xray of pelvis without any acute fractures. Patient received 1 unit of PRBC so far during hospitalization with normalization of her hemoglobin around 9. Anemia work-up shows iron deficiency anemia received 2 doses of IV iron. Subjective/24 hr Events (22) : Patient is seen and examined at bedside. No acute events reported overnight by nursing staff.   Had small BM today per patient. Still with lower quadrant abdominal pain but has improved  Complains of LLQ abdominal pain. Patient denies fever, chills, chest pains, shortness of breath, n/v. Tolerating regular food and PO supplements. Hb improved to 9.7. Review of Systems: 10 point review of systems is otherwise negative with the exception of the elements mentioned above. Assessment & Plan:   Syncope and collapse  Unclear etiology. Possibly due to debility and weakness given anemia and hypotension on presentation. Orthostatics was neg. Received 1 Unit of PRBC on admission.     Anemia  Possibly due to chronic illness with poor nutrition. Hb of 6.9 on admission with prior Hb of 11.6 on 4/12  S/p 1Unit of PRBC  06/02/22: Hb of 9.7. She is s/p IV Iron x 2 for low Iron  - +heme stool test per ED. Patient does not want EGD/Colonoscopy and therefore will not consult GI     Abdominal pain likely constipation  06/02/22: had 1 small BM today but still with abdominal pain. KUB yesterday with normal bowel gas pattern  - miralax and bisacodyl. Severe protein-calorie malnutrition  Thin, elderly female with temporal wasting, visible clavicles and rib cages. BMI of Body mass index is 16.98 kg/m². - nutritional supplements     Hypokalemia - resolved     Depression, major, in remission: on zoloft and buspar     GERD: PPI     Atrial fibrillation  06/02/22: To be tachycardic at times. She is not on any rate control medication at home. We will start her on low-dose metoprolol. Not any anticoagulation likely due to ongoing anemia. - chck EKG    Pulmonary hypertension due to chronic obstructive pulmonary disease  - on lasix PO     Chronic respiratory failure with hypoxia  End stage chronic obstructive pulmonary disease (HCC)  Stable and not in exacerbation  - nebs  - O2 supplement.     DISPO: SNF placement pending auth      Diet:  ADULT DIET;  Regular  ADULT ORAL NUTRITION SUPPLEMENT; Breakfast, Lunch, Dinner; Standard High Calorie/High Protein Oral Supplement  DVT PPx: SCDs  Code status: DNR      Hospital Problems:  Principal Problem:    Syncope and collapse  Active Problems:    Iron deficiency anemia    Abdominal pain    Anxiety    Macrocytic anemia    Severe protein-calorie malnutrition (HCC)    Sleep apnea    Hypokalemia    Fibromyalgia    Depression, major, in remission ([facility])    GERD (gastroesophageal reflux disease)    Hyperlipidemia    Atrial fibrillation ([facility]    Pulmonary hypertension due to chronic obstructive pulmonary disease (HCC)    Chronic respiratory failure with hypoxia ([facility])    End stage chronic obstructive pulmonary disease ([facility]  Resolved Problems:    * No resolved hospital problems. *      Objective:     Patient Vitals for the past 24 hrs:   Temp Pulse Resp BP SpO2   06/02/22 1446 -- 83 20 -- 99 %   06/02/22 1117 97.3 °F (36.3 °C) 98 18 125/67 98 %   06/02/22 0858 98.2 °F (36.8 °C) (!) 109 16 131/76 98 %   06/02/22 0301 97.5 °F (36.4 °C) 87 16 134/82 99 %   06/01/22 2302 97.7 °F (36.5 °C) 99 16 131/71 100 %   06/01/22 2019 -- 100 16 -- 90 %   06/01/22 2015 -- 100 16 128/74 90 %   06/01/22 1923 97.7 °F (36.5 °C) 96 16 116/66 99 %   06/01/22 1641 97.3 °F (36.3 °C) (!) 135 18 126/69 97 %       Oxygen Therapy  SpO2: 99 %  Pulse Oximeter Device Mode: Intermittent  O2 Device: Nasal cannula  Skin Assessment: Clean, dry, & intact  Skin Protection for O2 Device: No  O2 Flow Rate (L/min): 3 L/min    Estimated body mass index is 16.98 kg/m² as calculated from the following:    Height as of this encounter: 5' 9\" (1.753 m). Weight as of this encounter: 115 lb (52.2 kg). Intake/Output Summary (Last 24 hours) at 6/2/2022 1534  Last data filed at 6/1/2022 1641  Gross per 24 hour   Intake 240 ml   Output --   Net 240 ml         Physical Exam:     Blood pressure 125/67, pulse 83, temperature 97.3 °F (36.3 °C), temperature source Oral, resp. rate 20, height 5' 9\" (1.753 m), weight 115 lb (52.2 kg), SpO2 99 %.      General: Chronically ill appearing, frail elderly female. Appear older than stated age. Head:               Normocephalic, atraumatic  Eyes:               Sclerae appear normal.  Pupils equally round. ENT:                Nares appear normal, no drainage. Moist oral mucosa  Neck:               No restricted ROM. Trachea midline   CV:                  RRR. No m/r/g. No jugular venous distension. Lungs:             CTAB, no wheezing,  Respirations even, unlabored  Abdomen: Bowel sounds present. Soft, tender in LLQ on palpation, nondistended. Extremities:     No cyanosis or clubbing. No edema  Skin:                Ecchymosis to lower extremity bilaterally, Warm and dry. Neuro:             CN II-XII grossly intact. A&Ox3  Psych:             Normal mood and affect. I have reviewed ordered lab tests and independently visualized imaging below:    Recent Labs:  Recent Results (from the past 48 hour(s))   Hemoglobin and Hematocrit    Collection Time: 06/02/22  4:57 AM   Result Value Ref Range    Hemoglobin 9.7 (L) 11.7 - 15.4 g/dL    Hematocrit 31.4 (L) 35.8 - 46.3 %   COVID-19, Rapid    Collection Time: 06/02/22 12:54 PM    Specimen: Nasopharyngeal   Result Value Ref Range    Source Nasopharyngeal      SARS-CoV-2, Rapid Not detected NOTD         Other Studies:  XR PELVIS (1-2 VIEWS)    Result Date: 5/29/2022  Pelvis radiograph Clinical indication: Lumbar spine radiograph 3/12/2021 and abdominopelvic CT 2/12/2021 correlation Comparison: none Technique: Supine portable AP view Findings: There is no evidence of fracture, dislocation, or erosion. The pelvic ring, hip joints are intact. The bone density is low which can limit sensitivity for subtle osseous abnormalities. Chronic age appropriate degenerative joint changes are noted. Prominent stool in the visualized large bowel may indicate constipation. Scattered vascular calcifications within soft tissues are not unusual for age.      No acute osseous abnormality. CT HEAD WO CONTRAST    Result Date: 5/29/2022  Noncontrast head CT Clinical Indication: Patient found down with diminished responsiveness, loss of memory to the event, generalized severe weakness, suspected injury. Technique: Noncontrast axial images were obtained through the brain. All CT scans at this location are performed using dose modulation techniques as appropriate including the following: Automated exposure control, adjustment of the MA and/or kV according to patient's size, or use of iterative reconstruction technique. Reformatted sagittal, coronal images obtained to further evaluate bones, soft tissues, extra-axial spaces. Comparison: 10/31/2021 Findings: There is no acute intracranial hemorrhage, hydrocephalus, intra-axial mass, or mass-effect. There is no CT evidence of acute large artery territorial infarction or abnormal extra-axial fluid collection. The mastoid air cells are aerated. Paranasal sinuses demonstrate peripheral mucosal thickening and air-fluid level in the right maxillary sinus, elsewhere appear aerated. No displaced skull fractures are present. Patient is missing teeth. 1. No CT evidence of acute intracranial abnormality. 2. Right maxillary sinusitis. XR CHEST PORTABLE    Result Date: 5/29/2022  Chest portable CLINICAL INDICATION: Shortness of breath acute moderate with weakness, syncope, fall COMPARISON: 4/9/2022 TECHNIQUE: single AP portable view chest at 12:10 PM upright FINDINGS: Cardiac silhouette again enlarged. Small pleural effusions in the bases and diffuse moderate to marked pulmonary edema have increased somewhat since prior. No evidence of a pneumothorax or a complete lobar consolidation. Low bone density. No displaced fracture or dislocation is visualized. External monitoring wires overlie the chest.     CHF.        Current Meds:  Current Facility-Administered Medications   Medication Dose Route Frequency    tuberculin injection 5 Units  5 Units IntraDERmal Once    sodium chloride flush 0.9 % injection 5-40 mL  5-40 mL IntraVENous 2 times per day    sodium chloride flush 0.9 % injection 5-40 mL  5-40 mL IntraVENous PRN    0.9 % sodium chloride infusion   IntraVENous PRN    ondansetron (ZOFRAN-ODT) disintegrating tablet 4 mg  4 mg Oral Q8H PRN    Or    ondansetron (ZOFRAN) injection 4 mg  4 mg IntraVENous Q6H PRN    polyethylene glycol (GLYCOLAX) packet 17 g  17 g Oral Daily PRN    bisacodyl (DULCOLAX) suppository 10 mg  10 mg Rectal Daily PRN    famotidine (PEPCID) tablet 10 mg  10 mg Oral BID PRN    aluminum & magnesium hydroxide-simethicone (MAALOX) 200-200-20 MG/5ML suspension 30 mL  30 mL Oral Q6H PRN    acetaminophen (TYLENOL) tablet 650 mg  650 mg Oral Q6H PRN    Or    acetaminophen (TYLENOL) suppository 650 mg  650 mg Rectal Q6H PRN    albuterol (PROVENTIL) nebulizer solution 2.5 mg  2.5 mg Nebulization Q4H PRN    atorvastatin (LIPITOR) tablet 80 mg  80 mg Oral Daily    mometasone-formoterol (DULERA) 200-5 MCG/ACT inhaler 2 puff  2 puff Inhalation BID    busPIRone (BUSPAR) tablet 15 mg  15 mg Oral TID    cetirizine (ZYRTEC) tablet 5 mg  5 mg Oral Daily    gabapentin (NEURONTIN) capsule 100 mg  100 mg Oral TID    LORazepam (ATIVAN) tablet 1 mg  1 mg Oral Q8H PRN    pantoprazole (PROTONIX) tablet 40 mg  40 mg Oral QAM AC    primidone (MYSOLINE) tablet 100 mg  100 mg Oral TID    sertraline (ZOLOFT) tablet 200 mg  200 mg Oral Daily    traZODone (DESYREL) tablet 100 mg  100 mg Oral Nightly    budesonide (ENTOCORT EC) extended release capsule 6 mg  6 mg Oral Daily    cholestyramine light packet 4 g  4 g Oral Daily    HYDROcodone-acetaminophen (NORCO)  MG per tablet 1 tablet  1 tablet Oral Q6H PRN       Signed:  Zafar Swain MD    Part of this note may have been written by using a voice dictation software. The note has been proof read but may still contain some grammatical/other typographical errors.          Hospitalist Progress Note   Admit Date:  2022 11:00 AM   Name:  Wang Laguna   Age:  64 y.o. Sex:  female  :  1945   MRN:  [id number]   Room:  [facility]/    Presenting Complaint: Fall     Reason(s) for Admission: Syncope and collapse [R55]  Chronic pain syndrome [G89.4]  Volume depletion [E86.9]  Symptomatic anemia [D64.9]  Severe episode of recurrent major depressive disorder, without psychotic features ([facility] [F33.2]  Chronic congestive heart failure, unspecified heart failure type [facility]) [I50.9]     Hospital Course & Interval History:   Please refer to the admission H&P for details of presentation. In summary, Wang Laguna is a 64 y.o. female with past medical history significant for Wang Laguna is a 64 y.o. female with medical history of end stage COPD, chronic respiratory failure with hypoxia on 3L O2, sleep apena, Afib not on AC, depression , chronic pain, fibromyalgia,  moderate mitral valve regurgitation and pulmonary hypertensionwho presented after a fall at home. Patient is unclear if she had any dizziness/lightheadedness prior to the fall. Reports that she was making her way to the bathroom when she fell. Unclear how long she was down on the floor but \"I willed myself to get ot my phone\"  And called a friend who called EMS. In the ED , she was hypotensive to 95/53 but saturating well on 4L O2 NC. Workup revealed Hb 6.9, K+ 3.2, Cr 1.15.  +heme on rectal exam per ED staff. CT head without any acute abnormalities but showed right maxillary sinusitis. CXR shows small pleural effusions with pulmonary edema. Xray of pelvis without any acute fractures. Subjective/24 hr Events (22) : Patient is seen and examined at bedside. No acute events reported overnight by nursing staff. Complains of LLQ abdominal pain. Reports no BM for 2-3 days. States she feels like she needs to go but its difficult. Patient denies fever, chills, chest pains, shortness of breath, n/v.   Tolerating regular food and PO supplements. Hb improved to 9.6. Review of Systems: 10 point review of systems is otherwise negative with the exception of the elements mentioned above. Assessment & Plan:   Syncope and collapse  Unclear etiology. Possibly due to debility and weakness given anemia and hypotension on presentation. Orthostatics was neg. Received 1 Unit of PRBC on admission. 06/02/22: Hb of 9.6 on 5/31  - dc telemetry     Anemia  Possibly due to chronic illness with poor nutrition. Hb of 6.9 on admission with prior Hb of 11.6 on 4/12  S/p 1Unit of PRBC  06/02/22: Hb of 9.6 on 5/31. She is s/p IV Iron x 2 for low Iron  - +heme stool test per ED. Patient does not want EGD/Colonoscopy and therefore will not consult GI     Abdominal pain likely constipation  06/02/22: no bm in ~3 days. LLQ pain and feels like she needs to have a BM.  - KUB  - miralax and bisacodyl. Severe protein-calorie malnutrition  Thin, elderly female with temporal wasting, visible clavicles and rib cages. BMI of Body mass index is 16.98 kg/m². - nutritional supplements     Hypokalemia - resolved     Depression, major, in remission: on zoloft and buspar     GERD: PPI     Atrial fibrillation : rate controlled and not on AC. Not on any medications      Pulmonary hypertension due to chronic obstructive pulmonary disease  - on lasix PO     Chronic respiratory failure with hypoxia  End stage chronic obstructive pulmonary disease (HCC)  Stable and not in exacerbation  - nebs  - O2 supplement.     DISPO: SNF placement pending auth      Diet:  ADULT DIET; Regular  ADULT ORAL NUTRITION SUPPLEMENT; Breakfast, Lunch, Dinner; Standard High Calorie/High Protein Oral Supplement  DVT PPx: SCDs  Code status: DNR        I have reviewed and ordered clinical lab tests and independently visualized images, tracing.      I spent 31 minutes of time caring for this patient at bedside or nearby, more than 50 percent of which was spent on coordination of care and/or counseling regarding the disease process, treatment options, and treatment plan. Hospital Problems:  Principal Problem:    Syncope and collapse  Active Problems:    Iron deficiency anemia    Abdominal pain    Anxiety    Macrocytic anemia    Severe protein-calorie malnutrition (HCC)    Sleep apnea    Hypokalemia    Fibromyalgia    Depression, major, in remission ([facility])    GERD (gastroesophageal reflux disease)    Hyperlipidemia    Atrial fibrillation ([facility]    Pulmonary hypertension due to chronic obstructive pulmonary disease (HCC)    Chronic respiratory failure with hypoxia ([facility])    End stage chronic obstructive pulmonary disease ([facility]  Resolved Problems:    * No resolved hospital problems. *      Objective:     Patient Vitals for the past 24 hrs:   Temp Pulse Resp BP SpO2   06/02/22 1446 -- 83 20 -- 99 %   06/02/22 1117 97.3 °F (36.3 °C) 98 18 125/67 98 %   06/02/22 0858 98.2 °F (36.8 °C) (!) 109 16 131/76 98 %   06/02/22 0301 97.5 °F (36.4 °C) 87 16 134/82 99 %   06/01/22 2302 97.7 °F (36.5 °C) 99 16 131/71 100 %   06/01/22 2019 -- 100 16 -- 90 %   06/01/22 2015 -- 100 16 128/74 90 %   06/01/22 1923 97.7 °F (36.5 °C) 96 16 116/66 99 %   06/01/22 1641 97.3 °F (36.3 °C) (!) 135 18 126/69 97 %       Oxygen Therapy  SpO2: 99 %  Pulse Oximeter Device Mode: Intermittent  O2 Device: Nasal cannula  Skin Assessment: Clean, dry, & intact  Skin Protection for O2 Device: No  O2 Flow Rate (L/min): 3 L/min    Estimated body mass index is 16.98 kg/m² as calculated from the following:    Height as of this encounter: 5' 9\" (1.753 m). Weight as of this encounter: 115 lb (52.2 kg). Intake/Output Summary (Last 24 hours) at 6/2/2022 1534  Last data filed at 6/1/2022 1641  Gross per 24 hour   Intake 240 ml   Output --   Net 240 ml         Physical Exam:     Blood pressure 125/67, pulse 83, temperature 97.3 °F (36.3 °C), temperature source Oral, resp. rate 20, height 5' 9\" (1.753 m), weight 115 lb (52.2 kg), SpO2 99 %. General:          Chronically ill appearing, frail elderly female. Appear older than stated age. Head:               Normocephalic, atraumatic  Eyes:               Sclerae appear normal.  Pupils equally round. ENT:                Nares appear normal, no drainage. Moist oral mucosa  Neck:               No restricted ROM. Trachea midline   CV:                  RRR. No m/r/g. No jugular venous distension. Lungs:             CTAB, no wheezing,  Respirations even, unlabored  Abdomen: Bowel sounds present. Soft, tender in LLQ on palpation, nondistended. Extremities:     No cyanosis or clubbing. No edema  Skin:                Ecchymosis to lower extremity bilaterally, Warm and dry. Neuro:             CN II-XII grossly intact. A&Ox3  Psych:             Normal mood and affect. I have reviewed ordered lab tests and independently visualized imaging below:    Recent Labs:  Recent Results (from the past 48 hour(s))   Hemoglobin and Hematocrit    Collection Time: 06/02/22  4:57 AM   Result Value Ref Range    Hemoglobin 9.7 (L) 11.7 - 15.4 g/dL    Hematocrit 31.4 (L) 35.8 - 46.3 %   COVID-19, Rapid    Collection Time: 06/02/22 12:54 PM    Specimen: Nasopharyngeal   Result Value Ref Range    Source Nasopharyngeal      SARS-CoV-2, Rapid Not detected NOTD         Other Studies:  XR PELVIS (1-2 VIEWS)    Result Date: 5/29/2022  Pelvis radiograph Clinical indication: Lumbar spine radiograph 3/12/2021 and abdominopelvic CT 2/12/2021 correlation Comparison: none Technique: Supine portable AP view Findings: There is no evidence of fracture, dislocation, or erosion. The pelvic ring, hip joints are intact. The bone density is low which can limit sensitivity for subtle osseous abnormalities. Chronic age appropriate degenerative joint changes are noted. Prominent stool in the visualized large bowel may indicate constipation. Scattered vascular calcifications within soft tissues are not unusual for age.      No acute osseous abnormality. CT HEAD WO CONTRAST    Result Date: 5/29/2022  Noncontrast head CT Clinical Indication: Patient found down with diminished responsiveness, loss of memory to the event, generalized severe weakness, suspected injury. Technique: Noncontrast axial images were obtained through the brain. All CT scans at this location are performed using dose modulation techniques as appropriate including the following: Automated exposure control, adjustment of the MA and/or kV according to patient's size, or use of iterative reconstruction technique. Reformatted sagittal, coronal images obtained to further evaluate bones, soft tissues, extra-axial spaces. Comparison: 10/31/2021 Findings: There is no acute intracranial hemorrhage, hydrocephalus, intra-axial mass, or mass-effect. There is no CT evidence of acute large artery territorial infarction or abnormal extra-axial fluid collection. The mastoid air cells are aerated. Paranasal sinuses demonstrate peripheral mucosal thickening and air-fluid level in the right maxillary sinus, elsewhere appear aerated. No displaced skull fractures are present. Patient is missing teeth. 1. No CT evidence of acute intracranial abnormality. 2. Right maxillary sinusitis. XR CHEST PORTABLE    Result Date: 5/29/2022  Chest portable CLINICAL INDICATION: Shortness of breath acute moderate with weakness, syncope, fall COMPARISON: 4/9/2022 TECHNIQUE: single AP portable view chest at 12:10 PM upright FINDINGS: Cardiac silhouette again enlarged. Small pleural effusions in the bases and diffuse moderate to marked pulmonary edema have increased somewhat since prior. No evidence of a pneumothorax or a complete lobar consolidation. Low bone density. No displaced fracture or dislocation is visualized. External monitoring wires overlie the chest.     CHF.        Current Meds:  Current Facility-Administered Medications   Medication Dose Route Frequency    tuberculin injection 5 Units 5 Units IntraDERmal Once    sodium chloride flush 0.9 % injection 5-40 mL  5-40 mL IntraVENous 2 times per day    sodium chloride flush 0.9 % injection 5-40 mL  5-40 mL IntraVENous PRN    0.9 % sodium chloride infusion   IntraVENous PRN    ondansetron (ZOFRAN-ODT) disintegrating tablet 4 mg  4 mg Oral Q8H PRN    Or    ondansetron (ZOFRAN) injection 4 mg  4 mg IntraVENous Q6H PRN    polyethylene glycol (GLYCOLAX) packet 17 g  17 g Oral Daily PRN    bisacodyl (DULCOLAX) suppository 10 mg  10 mg Rectal Daily PRN    famotidine (PEPCID) tablet 10 mg  10 mg Oral BID PRN    aluminum & magnesium hydroxide-simethicone (MAALOX) 200-200-20 MG/5ML suspension 30 mL  30 mL Oral Q6H PRN    acetaminophen (TYLENOL) tablet 650 mg  650 mg Oral Q6H PRN    Or    acetaminophen (TYLENOL) suppository 650 mg  650 mg Rectal Q6H PRN    albuterol (PROVENTIL) nebulizer solution 2.5 mg  2.5 mg Nebulization Q4H PRN    atorvastatin (LIPITOR) tablet 80 mg  80 mg Oral Daily    mometasone-formoterol (DULERA) 200-5 MCG/ACT inhaler 2 puff  2 puff Inhalation BID    busPIRone (BUSPAR) tablet 15 mg  15 mg Oral TID    cetirizine (ZYRTEC) tablet 5 mg  5 mg Oral Daily    gabapentin (NEURONTIN) capsule 100 mg  100 mg Oral TID    LORazepam (ATIVAN) tablet 1 mg  1 mg Oral Q8H PRN    pantoprazole (PROTONIX) tablet 40 mg  40 mg Oral QAM AC    primidone (MYSOLINE) tablet 100 mg  100 mg Oral TID    sertraline (ZOLOFT) tablet 200 mg  200 mg Oral Daily    traZODone (DESYREL) tablet 100 mg  100 mg Oral Nightly    budesonide (ENTOCORT EC) extended release capsule 6 mg  6 mg Oral Daily    cholestyramine light packet 4 g  4 g Oral Daily    HYDROcodone-acetaminophen (NORCO)  MG per tablet 1 tablet  1 tablet Oral Q6H PRN       Signed:  Dacia Holguin MD    Part of this note may have been written by using a voice dictation software. The note has been proof read but may still contain some grammatical/other typographical errors.

## 2022-06-02 NOTE — PROGRESS NOTES
OCCUPATIONAL THERAPY Daily Note     OT Visit Days: 3   Time  OT Charge Capture  Rehab Caseload Tracker  OT Orders    Margo Parada is a 64 y.o. female   PRIMARY DIAGNOSIS: Syncope and collapse  Syncope and collapse [R55]  Chronic pain syndrome [G89.4]  Volume depletion [E86.9]  Symptomatic anemia [D64.9]  Severe episode of recurrent major depressive disorder, without psychotic features ([facility] [address] [F33.2]  Chronic congestive heart failure, unspecified heart failure type ([facility] [address] [I50.9]       Inpatient: Payor: Tara Up / Plan: Luis Enrique Ear / Product Type: *No Product type* /     ASSESSMENT:     REHAB RECOMMENDATIONS: CURRENT LEVEL OF FUNCTION:  (Most Recently Demonstrated)   Recommendation to date pending progress:  Setting:   Short-term Rehab    Equipment:     To Be Determined Bathing:   Not Tested  Dressing:   Not Tested  Feeding/Grooming:   Supervision/Setup- Minimal Assistance  Toileting:   Not Tested  Functional Mobility:   Contact Guard Assist- Minimal Assistance x2 RW     ASSESSMENT:  Ms. Mervyn Peabody is supine in bed after getting cleaned up by PCT. Pt required max encouragement for therapy. Pt mod A for bed mobility and CGA x2 RW for functional transfers and mobility of household distances. Pt able to ambulate into bathroom to complete grooming ADLs sitting at sink with set up-min A. Pt requires encouragement to finish the ADLs she started. Pt educated on pursed lip breathing during ADLs as she was SOB on 3L O2 NC. Pt declined sitting in chair and put in chair position in bed. Pt is making slow progress toward goals, continue POC.         SUBJECTIVE:     Ms. Mervyn Peabody states, \"I don't want to sit in the chair\"     Social/Functional Lives With: Alone,Home care staff  Type of Home:  (Duplex)  Home Layout: One level  Home Access: Stairs to enter without rails  Entrance Stairs - Number of Steps: 1  Bathroom Equipment: 3-in-1 commode,Shower chair,Grab bars in shower  Home Equipment: Utilize [employer] Brake bars  Has the patient had two or more falls in the past year or any fall with injury in the past year?: Yes  Receives Help From: [facility] attendant,Home health  ADL Assistance: Needs assistance  Homemaking Assistance: Needs assistance  Occupation: Retired    OBJECTIVE:     KANDIS / Mehnaz Schwab / Antwon Slot: None    RESTRICTIONS/PRECAUTIONS:  Restrictions/Precautions  Restrictions/Precautions: Fall Risk        PAIN: VITALS / O2:   Pre Treatment:   Pain Assessment: None - Denies Pain          Post Treatment: 0/10 Vitals          Oxygen  O2 Device: Nasal cannula  O2 Flow Rate (L/min): 3 L/min         MOBILITY: I Mod I S SBA CGA Min Mod Max Total  NT x2 Comments:   Bed Mobility    Rolling [] [] [] [] [] [] [] [] [] [x] []    Supine to Sit [] [] [] [] [] [] [x] [] [] [] []    Scooting [] [] [] [] [] [] [x] [] [] [] []    Sit to Supine [] [] [] [] [] [] [x] [] [] [] []    Transfers    Sit to Stand [] [] [] [] [] [x] [] [] [] [] [x] RW   Bed to Chair [] [] [] [] [x] [] [] [] [] [] [x] RW   Stand to Sit [] [] [] [] [] [x] [] [] [] [] [x] RW   Tub/Shower [] [] [] [] [] [] [] [] [] [x] []     Toilet [] [] [] [] [] [] [] [] [] [x] []      [] [] [] [] [] [] [] [] [] [x] []    I=Independent, Mod I=Modified Independent, S=Supervision/Setup, SBA=Standby Assistance, CGA=Contact Guard Assistance, Min=Minimal Assistance, Mod=Moderate Assistance, Max=Maximal Assistance, Total=Total Assistance, NT=Not Tested    ACTIVITIES OF DAILY LIVING: I Mod I S SBA CGA Min Mod Max Total NT Comments   BASIC ADLs:              Bathing/ Showering [] [] [] [] [] [] [] [] [] [x]    Toileting [] [] [] [] [] [] [] [] [] [x]    Upper Body Dressing [] [] [] [] [] [] [] [] [] [x]    Lower Body Dressing [] [] [] [] [] [] [] [] [] [x]    Feeding [] [] [] [] [] [] [] [] [] [x]    Grooming [] [] [x] [] [] [x] [] [] [] [] Set up washing face and min A combing hair sitting at sink   Personal [facility] [] [] [] [] [] [] [] [] [] [x]    Functional Mobility [] [] [] [] [x] [x] [] [] [] [] x2 RW   I=Independent, Mod I=Modified Independent, S=Supervision/Setup, SBA=Standby Assistance, CGA=Contact Guard Assistance, Min=Minimal Assistance, Mod=Moderate Assistance, Max=Maximal Assistance, Total=Total Assistance, NT=Not Tested    BALANCE: Good Fair+ Fair Fair- Poor NT Comments   Sitting Static [] [] [x] [] [] []    Sitting Dynamic [] [] [x] [] [] []              Standing Static [] [] [x] [] [] []    Standing Dynamic [] [] [] [x] [] []        PLAN:     FREQUENCY/DURATION   OT Plan of Care: 3 times/week for duration of hospital stay or until stated goals are met, whichever comes first.    ACUTE OCCUPATIONAL THERAPY GOALS:   (Developed with and agreed upon by patient and/or caregiver.)  1. Patient will complete lower body dressing with minimal assist to increase self care independence. 2. Patient will complete toileting with stand by assist to increase self care independence. 3. Patient will improve static standing and dynamic standing at edge of bed for 5 minutes to improve independence with transfers and self cares. 4. Patient will tolerate 30 minutes of OT treatment with self incorporated rest breaks to increase activity tolerance to enhance participation in hobbies. 5. Patient will complete all functional transfers with stand by assist using adaptive equipment as needed.      Timeframe: 7 visits    TREATMENT:     TREATMENT:   Co-Treatment PT/OT necessary due to patient's decreased overall endurance/tolerance levels, as well as need for high level skilled assistance to complete functional transfers/mobility and functional tasks  Self Care: (13): Procedure(s) (per grid) utilized to improve and/or restore self-care/home management as related to grooming and functional transfers in prep for ADLs and ambulating household distances and energy conservation training.  Required moderate verbal, manual and tactile cueing to facilitate activities of daily living skills and compensatory activities. Neuromuscular Re-education (12 Minutes): Neuromuscular Re-education included Balance Training, Coordination training, Functional mobility with facilitation, Postural training, Sitting balance training and Standing balance training to improve Balance, Coordination, Functional Mobility and Postural Control.     TREATMENT GRID:  N/A    AFTER TREATMENT PRECAUTIONS: Alarm Activated, Bed, Call light within reach, Heels floated, Needs within reach and RN notified    INTERDISCIPLINARY COLLABORATION:  RN/ PCT, PT/ PTA and OT/ BOLANOS    EDUCATION:  Education Given To: Patient  Education Provided: Energy Conservation  Education Provided Comments: pursed lip breathing  Education Method: Verbal;Demonstration  Barriers to Learning: None  Education Outcome: Verbalized understanding;Demonstrated understanding    TOTAL TREATMENT DURATION AND TIME:  Time In: 9339  Time Out: 321 [facility]  Minutes: Barber Ledbetter 24, OT

## 2022-06-03 VITALS
RESPIRATION RATE: 16 BRPM | BODY MASS INDEX: 17.03 KG/M2 | DIASTOLIC BLOOD PRESSURE: 63 MMHG | TEMPERATURE: 98.4 F | OXYGEN SATURATION: 99 % | HEIGHT: 69 IN | SYSTOLIC BLOOD PRESSURE: 107 MMHG | WEIGHT: 115 LBS | HEART RATE: 98 BPM

## 2022-06-03 PROBLEM — R55 SYNCOPE AND COLLAPSE: Status: RESOLVED | Noted: 2022-05-29 | Resolved: 2022-06-03

## 2022-06-03 PROBLEM — R06.02 CHRONIC SHORTNESS OF BREATH: Status: ACTIVE | Noted: 2022-06-03

## 2022-06-03 LAB
EKG ATRIAL RATE: 102 BPM
EKG DIAGNOSIS: NORMAL
EKG Q-T INTERVAL: 368 MS
EKG QRS DURATION: 114 MS
EKG QTC CALCULATION (BAZETT): 493 MS
EKG R AXIS: 261 DEGREES
EKG T AXIS: -56 DEGREES
EKG VENTRICULAR RATE: 108 BPM
MM INDURATION, POC: 0 MM (ref 0–5)
PPD, POC: NEGATIVE

## 2022-06-03 PROCEDURE — G0378 HOSPITAL OBSERVATION PER HR: HCPCS

## 2022-06-03 PROCEDURE — 94760 N-INVAS EAR/PLS OXIMETRY 1: CPT

## 2022-06-03 PROCEDURE — 6370000000 HC RX 637 (ALT 250 FOR IP): Performed by: INTERNAL MEDICINE

## 2022-06-03 PROCEDURE — 6360000002 HC RX W HCPCS: Performed by: INTERNAL MEDICINE

## 2022-06-03 PROCEDURE — 94640 AIRWAY INHALATION TREATMENT: CPT

## 2022-06-03 PROCEDURE — 2700000000 HC OXYGEN THERAPY PER DAY

## 2022-06-03 PROCEDURE — 96375 TX/PRO/DX INJ NEW DRUG ADDON: CPT

## 2022-06-03 PROCEDURE — 96376 TX/PRO/DX INJ SAME DRUG ADON: CPT

## 2022-06-03 RX ORDER — HYDROCODONE BITARTRATE AND ACETAMINOPHEN 10; 325 MG/1; MG/1
1 TABLET ORAL EVERY 6 HOURS PRN
Qty: 12 TABLET | Refills: 0 | Status: SHIPPED | OUTPATIENT
Start: 2022-06-03 | End: 2022-06-06

## 2022-06-03 RX ORDER — TRAZODONE HYDROCHLORIDE 100 MG/1
100 TABLET ORAL NIGHTLY
Qty: 3 TABLET | Refills: 0 | Status: SHIPPED | OUTPATIENT
Start: 2022-06-03

## 2022-06-03 RX ORDER — LORAZEPAM 2 MG/1
2 TABLET ORAL EVERY 8 HOURS PRN
Qty: 12 TABLET | Refills: 0 | Status: SHIPPED | OUTPATIENT
Start: 2022-06-03 | End: 2022-06-06

## 2022-06-03 RX ORDER — MORPHINE SULFATE 2 MG/ML
2 INJECTION, SOLUTION INTRAMUSCULAR; INTRAVENOUS
Status: DISCONTINUED | OUTPATIENT
Start: 2022-06-03 | End: 2022-06-03 | Stop reason: HOSPADM

## 2022-06-03 RX ORDER — MORPHINE SULFATE 2 MG/ML
2 INJECTION, SOLUTION INTRAMUSCULAR; INTRAVENOUS
Status: DISCONTINUED | OUTPATIENT
Start: 2022-06-03 | End: 2022-06-03

## 2022-06-03 RX ADMIN — SERTRALINE HYDROCHLORIDE 200 MG: 100 TABLET ORAL at 11:02

## 2022-06-03 RX ADMIN — MORPHINE SULFATE 2 MG: 2 INJECTION, SOLUTION INTRAMUSCULAR; INTRAVENOUS at 18:24

## 2022-06-03 RX ADMIN — HYDROCODONE BITARTRATE AND ACETAMINOPHEN 1 TABLET: 10; 325 TABLET ORAL at 03:39

## 2022-06-03 RX ADMIN — METOPROLOL TARTRATE 25 MG: 25 TABLET, FILM COATED ORAL at 11:03

## 2022-06-03 RX ADMIN — LORAZEPAM 1 MG: 1 TABLET ORAL at 16:35

## 2022-06-03 RX ADMIN — PANTOPRAZOLE SODIUM 40 MG: 40 TABLET, DELAYED RELEASE ORAL at 05:13

## 2022-06-03 RX ADMIN — BUSPIRONE HYDROCHLORIDE 15 MG: 5 TABLET ORAL at 13:32

## 2022-06-03 RX ADMIN — BUSPIRONE HYDROCHLORIDE 15 MG: 5 TABLET ORAL at 11:03

## 2022-06-03 RX ADMIN — LORAZEPAM 1 MG: 1 TABLET ORAL at 04:53

## 2022-06-03 RX ADMIN — MORPHINE SULFATE 2 MG: 2 INJECTION, SOLUTION INTRAMUSCULAR; INTRAVENOUS at 12:37

## 2022-06-03 RX ADMIN — PRIMIDONE 100 MG: 50 TABLET ORAL at 13:32

## 2022-06-03 RX ADMIN — PRIMIDONE 100 MG: 50 TABLET ORAL at 11:03

## 2022-06-03 RX ADMIN — MORPHINE SULFATE 2 MG: 2 INJECTION, SOLUTION INTRAMUSCULAR; INTRAVENOUS at 14:13

## 2022-06-03 RX ADMIN — MORPHINE SULFATE 2 MG: 2 INJECTION, SOLUTION INTRAMUSCULAR; INTRAVENOUS at 16:13

## 2022-06-03 RX ADMIN — ATORVASTATIN CALCIUM 80 MG: 40 TABLET, FILM COATED ORAL at 11:03

## 2022-06-03 RX ADMIN — BUDESONIDE 6 MG: 3 CAPSULE, GELATIN COATED ORAL at 11:10

## 2022-06-03 RX ADMIN — GABAPENTIN 100 MG: 100 CAPSULE ORAL at 13:32

## 2022-06-03 RX ADMIN — CETIRIZINE HYDROCHLORIDE 5 MG: 5 TABLET ORAL at 11:03

## 2022-06-03 RX ADMIN — GABAPENTIN 100 MG: 100 CAPSULE ORAL at 11:04

## 2022-06-03 RX ADMIN — HYDROCODONE BITARTRATE AND ACETAMINOPHEN 1 TABLET: 10; 325 TABLET ORAL at 11:10

## 2022-06-03 ASSESSMENT — PAIN SCALES - GENERAL
PAINLEVEL_OUTOF10: 9
PAINLEVEL_OUTOF10: 7
PAINLEVEL_OUTOF10: 9
PAINLEVEL_OUTOF10: 9
PAINLEVEL_OUTOF10: 7
PAINLEVEL_OUTOF10: 7
PAINLEVEL_OUTOF10: 0
PAINLEVEL_OUTOF10: 9
PAINLEVEL_OUTOF10: 9

## 2022-06-03 ASSESSMENT — PAIN DESCRIPTION - FREQUENCY
FREQUENCY: CONTINUOUS
FREQUENCY: CONTINUOUS

## 2022-06-03 ASSESSMENT — PAIN DESCRIPTION - ONSET
ONSET: ON-GOING
ONSET: ON-GOING

## 2022-06-03 ASSESSMENT — PAIN - FUNCTIONAL ASSESSMENT
PAIN_FUNCTIONAL_ASSESSMENT: PREVENTS OR INTERFERES SOME ACTIVE ACTIVITIES AND ADLS
PAIN_FUNCTIONAL_ASSESSMENT: PREVENTS OR INTERFERES SOME ACTIVE ACTIVITIES AND ADLS

## 2022-06-03 ASSESSMENT — PAIN DESCRIPTION - DESCRIPTORS
DESCRIPTORS: ACHING
DESCRIPTORS: ACHING;DISCOMFORT

## 2022-06-03 ASSESSMENT — PAIN DESCRIPTION - LOCATION
LOCATION: BACK
LOCATION: BACK

## 2022-06-03 ASSESSMENT — PAIN DESCRIPTION - ORIENTATION
ORIENTATION: LOWER
ORIENTATION: LOWER

## 2022-06-03 ASSESSMENT — PAIN DESCRIPTION - PAIN TYPE
TYPE: CHRONIC PAIN
TYPE: CHRONIC PAIN

## 2022-06-03 NOTE — PLAN OF CARE
Problem: Discharge Planning  Goal: Discharge to home or other facility with appropriate resources  6/3/2022 1451 by Benjy Ye RN  Outcome: Adequate for Discharge  Flowsheets (Taken 6/3/2022 0930)  Discharge to home or other facility with appropriate resources: Identify barriers to discharge with patient and caregiver  6/3/2022 0129 by Dean Joe RN  Outcome: Progressing  Flowsheets (Taken 6/2/2022 1950)  Discharge to home or other facility with appropriate resources: Identify barriers to discharge with patient and caregiver     Problem: Pain  Goal: Verbalizes/displays adequate comfort level or baseline comfort level  6/3/2022 1451 by Benjy Ye RN  Outcome: Adequate for Discharge  6/3/2022 0129 by Dean Joe RN  Outcome: Progressing  Flowsheets  Taken 6/3/2022 0105  Verbalizes/displays adequate comfort level or baseline comfort level: Encourage patient to monitor pain and request assistance  Taken 6/2/2022 2124  Verbalizes/displays adequate comfort level or baseline comfort level:   Assess pain using appropriate pain scale   Administer analgesics based on type and severity of pain and evaluate response   Implement non-pharmacological measures as appropriate and evaluate response     Problem: Skin/Tissue Integrity  Goal: Absence of new skin breakdown  Description: 1. Monitor for areas of redness and/or skin breakdown  2. Assess vascular access sites hourly  3. Every 4-6 hours minimum:  Change oxygen saturation probe site  4. Every 4-6 hours:  If on nasal continuous positive airway pressure, respiratory therapy assess nares and determine need for appliance change or resting period.   6/3/2022 1451 by Benjy Ye RN  Outcome: Adequate for Discharge  6/3/2022 0129 by Dean Joe RN  Outcome: Progressing     Problem: Safety - Adult  Goal: Free from fall injury  6/3/2022 1451 by Benjy Ye RN  Outcome: Adequate for Discharge  Flowsheets (Taken 6/3/2022 0930)  Free From Fall Injury: Instruct family/caregiver on patient safety  6/3/2022 0129 by Ashley Ackerman RN  Outcome: Progressing  Flowsheets (Taken 6/2/2022 1950)  Free From Fall Injury: Instruct family/caregiver on patient safety     Problem: ABCDS Injury Assessment  Goal: Absence of physical injury  6/3/2022 1451 by Yo Ceron RN  Outcome: Adequate for Discharge  Flowsheets (Taken 6/3/2022 0930)  Absence of Physical Injury: Implement safety measures based on patient assessment  6/3/2022 0129 by Ashley Ackerman RN  Outcome: Progressing  Flowsheets (Taken 6/2/2022 1950)  Absence of Physical Injury: Implement safety measures based on patient assessment     Problem: Respiratory - Adult  Goal: Achieves optimal ventilation and oxygenation  6/3/2022 1451 by Yo Ceron RN  Outcome: Adequate for Discharge  Flowsheets (Taken 6/3/2022 0930)  Achieves optimal ventilation and oxygenation: Assess for changes in respiratory status  6/3/2022 0129 by Ashley Ackerman RN  Outcome: Progressing

## 2022-06-03 NOTE — PLAN OF CARE
Problem: Discharge Planning  Goal: Discharge to home or other facility with appropriate resources  Outcome: Progressing  Flowsheets (Taken 6/2/2022 1950)  Discharge to home or other facility with appropriate resources: Identify barriers to discharge with patient and caregiver     Problem: Pain  Goal: Verbalizes/displays adequate comfort level or baseline comfort level  Outcome: Progressing  Flowsheets  Taken 6/3/2022 0105  Verbalizes/displays adequate comfort level or baseline comfort level: Encourage patient to monitor pain and request assistance  Taken 6/2/2022 2124  Verbalizes/displays adequate comfort level or baseline comfort level:   Assess pain using appropriate pain scale   Administer analgesics based on type and severity of pain and evaluate response   Implement non-pharmacological measures as appropriate and evaluate response     Problem: Skin/Tissue Integrity  Goal: Absence of new skin breakdown  Description: 1. Monitor for areas of redness and/or skin breakdown  2. Assess vascular access sites hourly  3. Every 4-6 hours minimum:  Change oxygen saturation probe site  4. Every 4-6 hours:  If on nasal continuous positive airway pressure, respiratory therapy assess nares and determine need for appliance change or resting period.   Outcome: Progressing     Problem: Safety - Adult  Goal: Free from fall injury  Outcome: Progressing  Flowsheets (Taken 6/2/2022 1950)  Free From Fall Injury: Instruct family/caregiver on patient safety     Problem: ABCDS Injury Assessment  Goal: Absence of physical injury  Outcome: Progressing  Flowsheets (Taken 6/2/2022 1950)  Absence of Physical Injury: Implement safety measures based on patient assessment     Problem: Respiratory - Adult  Goal: Achieves optimal ventilation and oxygenation  Outcome: Progressing

## 2022-06-03 NOTE — CARE COORDINATION
Pt is medically stable and ready for discharge to skilled nursing facility for hospice care. Insurance auth received this afternoon. Plan to transport by stretcher at 5:30 today. Facility staff is aware of and in agreement with plans. CM spoke with patient and family Sherman Daniel dtr in law). Both are in agreement with plans. No additional discharge planning needs noted.

## 2022-06-03 NOTE — DISCHARGE SUMMARY
Hospitalist Discharge Summary   Admit Date:  2022 11:00 AM   DC Note date: 6/3/2022  Name:  Quinn Galvan   Age:  64 y.o. Sex:  female  :  1945   MRN:  [id number]   Room:  [facility]  PCP:  Ashli Brooks MD    Presenting Complaint: Fall     Initial Admission Diagnosis: Syncope and collapse [R55]  Chronic pain syndrome [G89.4]  Volume depletion [E86.9]  Symptomatic anemia [D64.9]  Severe episode of recurrent major depressive disorder, without psychotic features ([address] [F33.2]  Chronic congestive heart failure, unspecified heart failure type ([address] [I50.9]     Problem List for this Hospitalization (present on admission):    Principal Problem (Resolved):    Syncope and collapse  Active Problems:    Iron deficiency anemia    Abdominal pain    Chronic shortness of breath    Anxiety    Macrocytic anemia    Severe protein-calorie malnutrition (HCC)    Sleep apnea    Hypokalemia    Fibromyalgia    Depression, major, in remission ([address]    GERD (gastroesophageal reflux disease)    Hyperlipidemia    Atrial fibrillation ([address]    Pulmonary hypertension due to chronic obstructive pulmonary disease (HCC)    Chronic respiratory failure with hypoxia (HCC)    End stage chronic obstructive pulmonary disease ([address]    Did Patient have Sepsis (YES OR NO): NO    Hospital Course:  64 y.o. female with past medical history significant for end stage COPD, chronic respiratory failure with hypoxia on 3L O2 and chronic SOB, sleep apena, Afib not on AC, depression, chronic pain, fibromyalgia,  moderate mitral valve regurgitation and pulmonary hypertensionwho presented after a fall at home.  Patient is unclear if she had any dizziness/lightheadedness prior to the fall.  Reports that she was making her way to the bathroom when she fell. Unclear how long she was down on the floor but \"I willed myself to get ot my phone\"  And called a friend who called EMS.  In the ED , she was hypotensive to 95/53 but saturating well on 4L O2 NC. Workup revealed Hb 6.9, K+ 3.2, Cr 1.15.  +heme on rectal exam per ED staff.  CT head without any acute abnormalities but showed right maxillary sinusitis.  CXR shows small pleural effusions with pulmonary edema. Xray of pelvis without any acute fractures. She received 1 unit of PRBC so far during hospitalization with normalization of her hemoglobin around 9. Anemia work-up shows iron deficiency anemia received 2 doses of IV iron. She remained stable with continued complaints of full body pain and shortness of breath. She was discharged to SNF to open with hospice. Disposition: SNF with hospice  Diet: ADULT DIET; Regular  ADULT ORAL NUTRITION SUPPLEMENT; Breakfast, Lunch, Dinner; Standard High Calorie/High Protein Oral Supplement  Code Status: DNR    Follow Ups:   Contact information for after-discharge care     Discharge [address] [facility]) . Service: Skilled Nursing  Contact information:  [address] [address]  [phone]                           Follow up labs/diagnostics (ultimately defer to outpatient provider):  None    Time spent in patient discharge and coordination 35 minutes. Plan was discussed with patient, nursing, and case management. All questions answered. Patient was stable at time of discharge. Instructions given to call a physician or return if any concerns. Current Discharge Medication List      START taking these medications    Details   metoprolol tartrate (LOPRESSOR) 25 MG tablet Take 1 tablet by mouth 2 times daily  Qty: 60 tablet, Refills: 0         CONTINUE these medications which have CHANGED    Details   HYDROcodone-acetaminophen (NORCO)  MG per tablet Take 1 tablet by mouth every 6 hours as needed for Pain for up to 3 days.  TAKE 1 TABLET BY MOUTH EVERY 6 HOURS AS NEEDED FOR SEVERE  Qty: 12 tablet, Refills: 0    Comments: Reduce doses taken as pain becomes manageable  Associated Diagnoses: Epigastric pain; Other chronic pain      LORazepam (ATIVAN) 2 MG tablet Take 1 tablet by mouth every 8 hours as needed for Anxiety for up to 3 days. Qty: 12 tablet, Refills: 0    Associated Diagnoses: Anxiety      traZODone (DESYREL) 100 MG tablet Take 1 tablet by mouth nightly  Qty: 3 tablet, Refills: 0         CONTINUE these medications which have NOT CHANGED    Details   acetaminophen (TYLENOL) 325 MG tablet Take 650 mg by mouth every 6 hours as needed      albuterol sulfate  (90 Base) MCG/ACT inhaler Inhale 2 puffs into the lungs every 4 hours as needed      albuterol (PROVENTIL) (2.5 MG/3ML) 0.083% nebulizer solution Inhale 2.5 mg into the lungs every 4 hours as needed      atorvastatin (LIPITOR) 80 MG tablet Take 80 mg by mouth daily      budesonide (ENTOCORT EC) 3 MG extended release capsule Take 6 mg by mouth      budesonide-formoterol (SYMBICORT) 160-4.5 MCG/ACT AERO Inhale 2 puffs into the lungs 2 times daily      busPIRone (BUSPAR) 15 MG tablet Take 15 mg by mouth 3 times daily      celecoxib (CELEBREX) 200 MG capsule Take by mouth daily as needed      cetirizine (ZYRTEC) 10 MG tablet Take by mouth      colestipol (COLESTID) 1 g tablet Take 1 g by mouth 2 times daily      cromolyn (OPTICROM) 4 % ophthalmic solution Apply 1 drop to eye 3 times daily      gabapentin (NEURONTIN) 100 MG capsule Take 100 mg by mouth 3 times daily.       lidocaine 4 % external patch Apply to lower back      metoclopramide (REGLAN) 10 MG tablet Take 10 mg by mouth 4 times daily (before meals and nightly)      nicotine (NICODERM CQ) 7 MG/24HR Place 1 patch onto the skin daily      omeprazole (PRILOSEC) 40 MG delayed release capsule Take 40 mg by mouth daily      ondansetron (ZOFRAN) 4 MG tablet Take 4 mg by mouth every 8 hours as needed      primidone (MYSOLINE) 50 MG tablet Take 100 mg by mouth 3 times daily      senna-docusate (PERICOLACE) 8.6-50 MG per tablet Take 1 tablet by mouth      sertraline (ZOLOFT) 100 MG tablet Take 2 tablets daily ( Dose increased from previously 100 mg daily ) Indications: anxiousness associated with depression      sucralfate (CARAFATE) 1 GM tablet 1 g 4 times daily (before meals and nightly)      tiotropium (SPIRIVA) 18 MCG inhalation capsule Inhale 18 mcg into the lungs daily      topiramate (TOPAMAX) 100 MG tablet Take 1 tablet in the morning with breakfast and 1 tablet at night. Indications: migraine prevention         STOP taking these medications       dilTIAZem (CARDIZEM 12 HR) 120 MG extended release capsule Comments:   Reason for Stopping:         alendronate (FOSAMAX) 70 MG tablet Comments:   Reason for Stopping:         furosemide (LASIX) 20 MG tablet Comments:   Reason for Stopping:         lubiprostone (AMITIZA) 8 MCG CAPS capsule Comments:   Reason for Stopping:               Procedures done this admission:  * No surgery found *    Consults this admission:  None    Echocardiogram results:  No results found for this or any previous visit. Diagnostic Imaging/Tests:   XR PELVIS (1-2 VIEWS)    Result Date: 5/29/2022  Pelvis radiograph Clinical indication: Lumbar spine radiograph 3/12/2021 and abdominopelvic CT 2/12/2021 correlation Comparison: none Technique: Supine portable AP view Findings: There is no evidence of fracture, dislocation, or erosion. The pelvic ring, hip joints are intact. The bone density is low which can limit sensitivity for subtle osseous abnormalities. Chronic age appropriate degenerative joint changes are noted. Prominent stool in the visualized large bowel may indicate constipation. Scattered vascular calcifications within soft tissues are not unusual for age. No acute osseous abnormality. CT HEAD WO CONTRAST    Result Date: 5/29/2022  Noncontrast head CT Clinical Indication: Patient found down with diminished responsiveness, loss of memory to the event, generalized severe weakness, suspected injury.  Technique: Noncontrast axial images were obtained through the brain. All CT scans at this location are performed using dose modulation techniques as appropriate including the following: Automated exposure control, adjustment of the MA and/or kV according to patient's size, or use of iterative reconstruction technique. Reformatted sagittal, coronal images obtained to further evaluate bones, soft tissues, extra-axial spaces. Comparison: 10/31/2021 Findings: There is no acute intracranial hemorrhage, hydrocephalus, intra-axial mass, or mass-effect. There is no CT evidence of acute large artery territorial infarction or abnormal extra-axial fluid collection. The mastoid air cells are aerated. Paranasal sinuses demonstrate peripheral mucosal thickening and air-fluid level in the right maxillary sinus, elsewhere appear aerated. No displaced skull fractures are present. Patient is missing teeth. 1. No CT evidence of acute intracranial abnormality. 2. Right maxillary sinusitis. XR CHEST PORTABLE    Result Date: 5/29/2022  Chest portable CLINICAL INDICATION: Shortness of breath acute moderate with weakness, syncope, fall COMPARISON: 4/9/2022 TECHNIQUE: single AP portable view chest at 12:10 PM upright FINDINGS: Cardiac silhouette again enlarged. Small pleural effusions in the bases and diffuse moderate to marked pulmonary edema have increased somewhat since prior. No evidence of a pneumothorax or a complete lobar consolidation. Low bone density. No displaced fracture or dislocation is visualized. External monitoring wires overlie the chest.     CHF. Labs: Results:       BMP, Mg, Phos No results for input(s): NA, K, CL, CO2, AGAP, BUN, CREA, CA, GLU, MG, PHOS in the last 72 hours. CBC Recent Labs     06/02/22  0457   HGB 9.7*   HCT 31.4*      LFT No results for input(s): ALT, TP, ALB, GLOB in the last 72 hours.     Invalid input(s): SGOT, TBIL, AP, AGRAT, GPT   Cardiac Testing Lab Results   Component Value Date    BNP 5,118 04/09/2022    BNP 5,130 12/24/2021    BNP 6,999 02/06/2021      Coagulation Tests Lab Results   Component Value Date    INR 1.1 03/11/2021    INR 1.1 02/06/2021    APTT 30.0 02/06/2021      A1c No results found for: HBA1C   Lipid Panel No results found for: CHOL, CHOLPOCT, CHOLX, CHLST, CHOLV, 109996, HDL, HDLC, LDL, LDLC, 956614, VLDLC, VLDL, TGLX, TRIGL   Thyroid Panel Lab Results   Component Value Date    TSH 2.440 12/24/2021    TSH 4.180 02/06/2021        Most Recent UA Lab Results   Component Value Date    MUCUS Present 05/23/2019          All Labs from Last 24 Hrs:  Recent Results (from the past 24 hour(s))   EKG 12 Lead    Collection Time: 06/02/22  2:38 PM   Result Value Ref Range    Ventricular Rate 108 BPM    Atrial Rate 102 BPM    QRS Duration 114 ms    Q-T Interval 368 ms    QTc Calculation (Bazett) 493 ms    R Axis 261 degrees    T Axis -56 degrees    Diagnosis       Atrial fibrillation with rapid ventricular response       Allergies   Allergen Reactions    Amlodipine Hives    Lisinopril Diarrhea    Niacin Rash     Patient was not taking it with  mg 30 min before. She wants to give it another try.   Pt takes this med and it does not cause a rash anymore     Immunization History   Administered Date(s) Administered    COVID-19, Moderna, Primary or Immunocompromised, PF, 100mcg/0.5mL 03/24/2021, 04/21/2021    Influenza Trivalent 10/17/2013, 10/13/2014    Influenza, High Dose (Fluzone 65 yrs and older) 11/04/2015, 11/03/2017, 10/10/2018    Influenza, Quadv, IM, PF (6 mo and older Fluzone, Flulaval, Fluarix, and 3 yrs and older Afluria) 10/12/2016    Influenza, Triv, inactivated, subunit, adjuvanted, IM (Fluad 65 yrs and older) 10/10/2019    PPD Test 02/07/2021, 03/12/2021, 12/24/2021, 06/02/2022    Pneumococcal Conjugate 13-valent ([id number]) 10/12/2016    Pneumococcal Polysaccharide ([id number]) 10/13/2014    Td vaccine (adult) 02/14/2018    Tdap (Boostrix, Adacel) 04/25/2017, 02/13/2020    Tst, Unspecified Formulation 02/07/2021, 03/12/2021       Recent Vital Data:  Patient Vitals for the past 24 hrs:   Temp Pulse Resp BP SpO2   06/03/22 1052 97.2 °F (36.2 °C) (!) 119 16 105/62 99 %   06/03/22 0815 -- 94 17 -- 99 %   06/03/22 0717 97.5 °F (36.4 °C) 83 16 (!) 117/59 98 %   06/03/22 0458 97.7 °F (36.5 °C) 82 18 (!) 104/57 99 %   06/03/22 0409 -- -- 18 -- --   06/02/22 2328 97.2 °F (36.2 °C) 80 18 103/64 99 %   06/02/22 2054 -- -- 18 -- --   06/02/22 2046 -- (!) 104 18 -- 100 %   06/02/22 2024 -- -- 16 -- --   06/02/22 1957 97.7 °F (36.5 °C) (!) 106 16 116/71 100 %   06/02/22 1727 -- 59 16 114/73 98 %   06/02/22 1446 -- 83 20 -- 99 %       Oxygen Therapy  SpO2: 99 %  Pulse Oximeter Device Mode: Intermittent  O2 Device: Nasal cannula  Skin Assessment: Clean, dry, & intact  Skin Protection for O2 Device: No  O2 Flow Rate (L/min): 3 L/min    Estimated body mass index is 16.98 kg/m² as calculated from the following:    Height as of this encounter: 5' 9\" (1.753 m). Weight as of this encounter: 115 lb (52.2 kg). Intake/Output Summary (Last 24 hours) at 6/3/2022 1407  Last data filed at 6/3/2022 0930  Gross per 24 hour   Intake 120 ml   Output 350 ml   Net -230 ml         Physical Exam:  General:    Cachectic, tachypneic, anxious  Head:  Normocephalic, atraumatic  Eyes:  Sclerae appear normal.  Pupils equally round. HENT:  Nares appear normal, no drainage. Moist mucous membranes  Neck:  No restricted ROM. Trachea midline  CV:   RRR. No m/r/g. No JVD  Lungs:   Shallow breathing. Diminished. No wheezing, rhonchi, or rales. Mildly labored. Abdomen:   Soft, nontender, nondistended. Extremities: Warm and dry. No cyanosis or clubbing. No edema. Skin:     No rashes. Normal coloration  Neuro:  CN II-XII grossly intact. Psych:  Normal mood and affect. Signed:  Debbie Benitez DO    Part of this note may have been written by using a voice dictation software.   The note has been proof read but may still contain some grammatical/other typographical errors.

## 2022-06-06 ENCOUNTER — CARE COORDINATION (OUTPATIENT)
Dept: CARE COORDINATION | Facility: CLINIC | Age: 77
End: 2022-06-06

## 2024-06-12 NOTE — PROGRESS NOTES
MD requested to hold PT until after her kyphoplasty 2/11/21.  Austin Mcneal, PT, VARUN Render Risk Assessment In Note?: yes Detail Level: Detailed Additional Notes: Negative for vitiligo under wood’s lamp test today in office.

## 2024-09-26 NOTE — ANESTHESIA POSTPROCEDURE EVALUATION
Procedure(s):  IR ANESTHESIA L3 KYPHOPLASTY/ [id number].    total IV anesthesia    Anesthesia Post Evaluation      Multimodal analgesia: multimodal analgesia used between 6 hours prior to anesthesia start to PACU discharge  Patient location during evaluation: PACU  Patient participation: complete - patient participated  Level of consciousness: awake  Pain management: adequate  Airway patency: patent  Anesthetic complications: no  Cardiovascular status: acceptable  Respiratory status: acceptable  Hydration status: acceptable  Post anesthesia nausea and vomiting:  none      INITIAL Post-op Vital signs:   Vitals Value Taken Time   /59 02/11/21 1503   Temp 36.6 °C (97.9 °F) 02/11/21 1503   Pulse 63 02/11/21 1503   Resp 16 02/11/21 1503   SpO2 96 % 02/11/21 1503 present